# Patient Record
Sex: MALE | Race: WHITE | NOT HISPANIC OR LATINO | Employment: FULL TIME | URBAN - METROPOLITAN AREA
[De-identification: names, ages, dates, MRNs, and addresses within clinical notes are randomized per-mention and may not be internally consistent; named-entity substitution may affect disease eponyms.]

---

## 2017-01-19 ENCOUNTER — HOSPITAL ENCOUNTER (INPATIENT)
Facility: HOSPITAL | Age: 47
LOS: 4 days | Discharge: HOME | End: 2017-01-23
Attending: EMERGENCY MEDICINE | Admitting: SURGERY

## 2017-01-19 ENCOUNTER — APPOINTMENT (OUTPATIENT)
Dept: CT IMAGING | Facility: HOSPITAL | Age: 47
End: 2017-01-19

## 2017-01-19 DIAGNOSIS — Z93.2 ILEOSTOMY PRESENT (HCC): ICD-10-CM

## 2017-01-19 DIAGNOSIS — K56.609 SBO (SMALL BOWEL OBSTRUCTION) (HCC): Primary | ICD-10-CM

## 2017-01-19 LAB
ALBUMIN SERPL-MCNC: 4.3 G/DL (ref 3.5–5.2)
ALBUMIN/GLOB SERPL: 1.1 G/DL
ALP SERPL-CCNC: 114 U/L (ref 39–117)
ALT SERPL W P-5'-P-CCNC: 32 U/L (ref 1–41)
ANION GAP SERPL CALCULATED.3IONS-SCNC: 12.2 MMOL/L
AST SERPL-CCNC: 30 U/L (ref 1–40)
BASOPHILS # BLD AUTO: 0.03 10*3/MM3 (ref 0–0.2)
BASOPHILS NFR BLD AUTO: 0.2 % (ref 0–1.5)
BILIRUB SERPL-MCNC: 0.8 MG/DL (ref 0.1–1.2)
BUN BLD-MCNC: 10 MG/DL (ref 6–20)
BUN/CREAT SERPL: 10.9 (ref 7–25)
CALCIUM SPEC-SCNC: 9.9 MG/DL (ref 8.6–10.5)
CHLORIDE SERPL-SCNC: 99 MMOL/L (ref 98–107)
CO2 SERPL-SCNC: 28.8 MMOL/L (ref 22–29)
CREAT BLD-MCNC: 0.92 MG/DL (ref 0.76–1.27)
DEPRECATED RDW RBC AUTO: 49 FL (ref 37–54)
EOSINOPHIL # BLD AUTO: 0.04 10*3/MM3 (ref 0–0.7)
EOSINOPHIL NFR BLD AUTO: 0.3 % (ref 0.3–6.2)
ERYTHROCYTE [DISTWIDTH] IN BLOOD BY AUTOMATED COUNT: 13.2 % (ref 11.5–14.5)
GFR SERPL CREATININE-BSD FRML MDRD: 89 ML/MIN/1.73
GLOBULIN UR ELPH-MCNC: 3.8 GM/DL
GLUCOSE BLD-MCNC: 119 MG/DL (ref 65–99)
HCT VFR BLD AUTO: 49.5 % (ref 40.4–52.2)
HGB BLD-MCNC: 16.1 G/DL (ref 13.7–17.6)
IMM GRANULOCYTES # BLD: 0.03 10*3/MM3 (ref 0–0.03)
IMM GRANULOCYTES NFR BLD: 0.2 % (ref 0–0.5)
LYMPHOCYTES # BLD AUTO: 1.06 10*3/MM3 (ref 0.9–4.8)
LYMPHOCYTES NFR BLD AUTO: 7.1 % (ref 19.6–45.3)
MCH RBC QN AUTO: 32.7 PG (ref 27–32.7)
MCHC RBC AUTO-ENTMCNC: 32.5 G/DL (ref 32.6–36.4)
MCV RBC AUTO: 100.6 FL (ref 79.8–96.2)
MONOCYTES # BLD AUTO: 0.73 10*3/MM3 (ref 0.2–1.2)
MONOCYTES NFR BLD AUTO: 4.9 % (ref 5–12)
NEUTROPHILS # BLD AUTO: 13.03 10*3/MM3 (ref 1.9–8.1)
NEUTROPHILS NFR BLD AUTO: 87.3 % (ref 42.7–76)
PLATELET # BLD AUTO: 248 10*3/MM3 (ref 140–500)
PMV BLD AUTO: 10.4 FL (ref 6–12)
POTASSIUM BLD-SCNC: 5.4 MMOL/L (ref 3.5–5.2)
PROT SERPL-MCNC: 8.1 G/DL (ref 6–8.5)
RBC # BLD AUTO: 4.92 10*6/MM3 (ref 4.6–6)
SODIUM BLD-SCNC: 140 MMOL/L (ref 136–145)
WBC NRBC COR # BLD: 14.92 10*3/MM3 (ref 4.5–10.7)

## 2017-01-19 PROCEDURE — 25010000002 DIPHENHYDRAMINE PER 50 MG: Performed by: SURGERY

## 2017-01-19 PROCEDURE — 25010000002 ONDANSETRON PER 1 MG

## 2017-01-19 PROCEDURE — 85025 COMPLETE CBC W/AUTO DIFF WBC: CPT | Performed by: EMERGENCY MEDICINE

## 2017-01-19 PROCEDURE — 80053 COMPREHEN METABOLIC PANEL: CPT | Performed by: EMERGENCY MEDICINE

## 2017-01-19 PROCEDURE — 99284 EMERGENCY DEPT VISIT MOD MDM: CPT

## 2017-01-19 PROCEDURE — 0 IOPAMIDOL 61 % SOLUTION: Performed by: EMERGENCY MEDICINE

## 2017-01-19 PROCEDURE — 25010000002 ENOXAPARIN PER 10 MG: Performed by: SURGERY

## 2017-01-19 PROCEDURE — 74177 CT ABD & PELVIS W/CONTRAST: CPT

## 2017-01-19 PROCEDURE — 25010000002 MORPHINE PER 10 MG: Performed by: EMERGENCY MEDICINE

## 2017-01-19 PROCEDURE — 25010000002 MORPHINE PER 10 MG

## 2017-01-19 PROCEDURE — 25010000002 ONDANSETRON PER 1 MG: Performed by: PHYSICIAN ASSISTANT

## 2017-01-19 RX ORDER — LISINOPRIL AND HYDROCHLOROTHIAZIDE 20; 12.5 MG/1; MG/1
1 TABLET ORAL DAILY
Status: DISCONTINUED | OUTPATIENT
Start: 2017-01-20 | End: 2017-01-23 | Stop reason: HOSPADM

## 2017-01-19 RX ORDER — SERTRALINE HYDROCHLORIDE 100 MG/1
100 TABLET, FILM COATED ORAL DAILY
Status: DISCONTINUED | OUTPATIENT
Start: 2017-01-20 | End: 2017-01-23 | Stop reason: HOSPADM

## 2017-01-19 RX ORDER — ONDANSETRON 2 MG/ML
INJECTION INTRAMUSCULAR; INTRAVENOUS
Status: COMPLETED
Start: 2017-01-19 | End: 2017-01-19

## 2017-01-19 RX ORDER — ONDANSETRON 2 MG/ML
4 INJECTION INTRAMUSCULAR; INTRAVENOUS ONCE
Status: COMPLETED | OUTPATIENT
Start: 2017-01-19 | End: 2017-01-19

## 2017-01-19 RX ORDER — SODIUM CHLORIDE 0.9 % (FLUSH) 0.9 %
1-10 SYRINGE (ML) INJECTION AS NEEDED
Status: DISCONTINUED | OUTPATIENT
Start: 2017-01-19 | End: 2017-01-23 | Stop reason: HOSPADM

## 2017-01-19 RX ORDER — PROMETHAZINE HYDROCHLORIDE 12.5 MG/1
12.5 SUPPOSITORY RECTAL EVERY 6 HOURS PRN
Status: DISCONTINUED | OUTPATIENT
Start: 2017-01-19 | End: 2017-01-23 | Stop reason: HOSPADM

## 2017-01-19 RX ORDER — SODIUM CHLORIDE 0.9 % (FLUSH) 0.9 %
10 SYRINGE (ML) INJECTION AS NEEDED
Status: DISCONTINUED | OUTPATIENT
Start: 2017-01-19 | End: 2017-01-19

## 2017-01-19 RX ORDER — DEXTROSE AND SODIUM CHLORIDE 5; .45 G/100ML; G/100ML
100 INJECTION, SOLUTION INTRAVENOUS CONTINUOUS
Status: DISCONTINUED | OUTPATIENT
Start: 2017-01-19 | End: 2017-01-23 | Stop reason: HOSPADM

## 2017-01-19 RX ORDER — HYDROMORPHONE HYDROCHLORIDE 1 MG/ML
0.5 INJECTION, SOLUTION INTRAMUSCULAR; INTRAVENOUS; SUBCUTANEOUS
Status: DISCONTINUED | OUTPATIENT
Start: 2017-01-19 | End: 2017-01-23 | Stop reason: HOSPADM

## 2017-01-19 RX ORDER — DIPHENHYDRAMINE HYDROCHLORIDE 50 MG/ML
50 INJECTION INTRAMUSCULAR; INTRAVENOUS ONCE
Status: COMPLETED | OUTPATIENT
Start: 2017-01-19 | End: 2017-01-19

## 2017-01-19 RX ORDER — PROMETHAZINE HYDROCHLORIDE 25 MG/1
12.5 TABLET ORAL EVERY 6 HOURS PRN
Status: DISCONTINUED | OUTPATIENT
Start: 2017-01-19 | End: 2017-01-23 | Stop reason: HOSPADM

## 2017-01-19 RX ORDER — PROMETHAZINE HYDROCHLORIDE 25 MG/ML
12.5 INJECTION, SOLUTION INTRAMUSCULAR; INTRAVENOUS EVERY 6 HOURS PRN
Status: DISCONTINUED | OUTPATIENT
Start: 2017-01-19 | End: 2017-01-23 | Stop reason: HOSPADM

## 2017-01-19 RX ORDER — NALOXONE HCL 0.4 MG/ML
0.4 VIAL (ML) INJECTION
Status: DISCONTINUED | OUTPATIENT
Start: 2017-01-19 | End: 2017-01-23 | Stop reason: HOSPADM

## 2017-01-19 RX ADMIN — ONDANSETRON 4 MG: 2 INJECTION INTRAMUSCULAR; INTRAVENOUS at 18:17

## 2017-01-19 RX ADMIN — SODIUM CHLORIDE 1000 ML: 9 INJECTION, SOLUTION INTRAVENOUS at 14:53

## 2017-01-19 RX ADMIN — ENOXAPARIN SODIUM 40 MG: 40 INJECTION SUBCUTANEOUS at 23:02

## 2017-01-19 RX ADMIN — DEXTROSE AND SODIUM CHLORIDE 100 ML/HR: 5; 450 INJECTION, SOLUTION INTRAVENOUS at 23:02

## 2017-01-19 RX ADMIN — Medication 4 MG: at 18:18

## 2017-01-19 RX ADMIN — DIPHENHYDRAMINE HYDROCHLORIDE 50 MG: 50 INJECTION, SOLUTION INTRAMUSCULAR; INTRAVENOUS at 23:02

## 2017-01-19 RX ADMIN — MORPHINE SULFATE 4 MG: 4 INJECTION, SOLUTION INTRAMUSCULAR; INTRAVENOUS at 14:59

## 2017-01-19 RX ADMIN — IOPAMIDOL 85 ML: 612 INJECTION, SOLUTION INTRAVENOUS at 15:44

## 2017-01-19 RX ADMIN — ONDANSETRON 4 MG: 2 INJECTION INTRAMUSCULAR; INTRAVENOUS at 14:59

## 2017-01-19 RX ADMIN — MORPHINE SULFATE 4 MG: 4 INJECTION, SOLUTION INTRAMUSCULAR; INTRAVENOUS at 18:18

## 2017-01-19 NOTE — Clinical Note
Level of Care: Med/Surg [1]   Admitting Physician: MEET RUDOLPH [[id number]]   Attending Physician: MEET RUDOLPH [[id number]]   Patient Class: Inpatient [101]

## 2017-01-19 NOTE — ED PROVIDER NOTES
46 y.o. male presents c/o abd pain w/ decreased outpt from his ileostomy since 0630 this morning. Pt also reports diffuse abd pain & 2-3 episodes of vomiting. Pt has a hx of colon cancer & Dr. Crane performed protocolectomy in 07/2015.    On exam:  Pt is AOx3 & in NAD. Pt has dry mucous membranes, PERRL, his heart is RRR, lungs are clear to auscultation bilaterally, & has a normal neuro exam. Pt has decreased bowel sounds, upper abd & periosteal pain with non tender hernia, & no guarding or rebound.    Results/Plan:    1614 Reviewed CT abd/pel, which showed an SBO. Independently viewed by me. Interpreted by radiologist. I will call Dr. Crane (gen surgery) for admission.    I supervised care provided by the midlevel provider.  We have discussed this patient's history, physical exam, and treatment plan.  I have reviewed the note and personally saw and examined the patient and agree with the plan of care.    --  Documentation assistance provided by zeb Mccracken for .  Information recorded by the zeb was done at my direction and has been verified and validated by me.     Linda Mccracken  01/19/17 1536       Linda Mccracken  01/19/17 1624       Linda Mccracken  01/19/17 1712       Gurmeet Hernandez MD  01/19/17 [id number]

## 2017-01-19 NOTE — ED PROVIDER NOTES
EMERGENCY DEPARTMENT ENCOUNTER    CHIEF COMPLAINT  Chief Complaint: Abdominal Pain  History given by: Patient  History limited by:  Room Number: 11/11  PMD: Ace Herron MD  Oncologist: Dr. Shelby  Surgeon: Dr. Crane    HPI:  Pt is a 46 y.o. male who presents with abdominal pain that onset today at 06:30. 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 Pt states he is normally able to the pain the with cold beverages which did not improve the sxs at this time. The only change that he notes is eating tater tots yesterday.  He describes the pain as a generalized pressure/tightness. He states that he has had decreased output of his ileostomy since this AM an producing a small 1/2 dollar amount of stool every few hours. Pt denies any fever, but states that he has had nausea and vomiting x1        Duration: 8 hours  Timing: gradual  Location: generalized abd  Radiation:none  Quality: pressure/tightness  Intensity/Severity: moderate  Progression:uncahnged  Associated Symptoms: nausea, vomiting  Aggravating Factors: none  Alleviating Factors: none  Previous Episodes: sxs usually resolved without intervention  Treatment before arrival:drank cold liquid which did not improve sxs.     MEDICAL RECORD REVIEW      PAST MEDICAL HISTORY  Active Ambulatory Problems     Diagnosis Date Noted   • Malignant neoplasm of rectum 06/01/2016     Resolved Ambulatory Problems     Diagnosis Date Noted   • No Resolved Ambulatory Problems     Past Medical History   Diagnosis Date   • Adenomatous polyposis    • Anemia    • Colon carcinoma    • Depression    • Hypertension    • Insomnia    • Renal calculi    • RLS (restless legs syndrome)    • Testicular hypofunction        PAST SURGICAL HISTORY  Past Surgical History   Procedure Laterality Date   • Colonoscopy  06/16/1915     DR SHASTA HOWARD   • Endoscopy  06/23/2015     DR SHASTA HOWARD   • Abdominoperineal proctocolectomy  07/06/2015     DR MEET CRANE   •  Ileostomy  07/06/2015       FAMILY HISTORY  Family History   Problem Relation Age of Onset   • Colon cancer Father    • Aneurysm Maternal Grandfather    • Colon cancer Paternal Grandfather    • Hypertension Maternal Uncle    • Diabetes Maternal Uncle        SOCIAL HISTORY  Social History     Social History   • Marital status:      Spouse name: N/A   • Number of children: N/A   • Years of education: college     Occupational History   • Maintenance      Social History Main Topics   • Smoking status: Current Every Day Smoker   • Smokeless tobacco: Current User     Types: Chew      Comment: CHEWS TOBACCO   • Alcohol use 3.6 oz/week     6 Cans of beer per week      Comment: DAILY CONSUMPTION LEVEL   • Drug use: No   • Sexual activity: Defer     Other Topics Concern   • Not on file     Social History Narrative       ALLERGIES  Review of patient's allergies indicates no known allergies.    REVIEW OF SYSTEMS  Review of Systems   Constitutional: Negative for activity change, appetite change and fever.   HENT: Negative for congestion and sore throat.    Eyes: Negative.    Respiratory: Negative for cough and shortness of breath.    Cardiovascular: Negative for chest pain and leg swelling.   Gastrointestinal: Positive for abdominal pain, nausea and vomiting. Negative for diarrhea.   Endocrine: Negative.    Genitourinary: Negative for decreased urine volume and dysuria.   Musculoskeletal: Negative for neck pain.   Skin: Negative for rash and wound.   Allergic/Immunologic: Negative.    Neurological: Negative for weakness, numbness and headaches.   Hematological: Negative.    Psychiatric/Behavioral: Negative.    All other systems reviewed and are negative.      PHYSICAL EXAM  ED Triage Vitals   Temp Heart Rate Resp BP SpO2   01/19/17 1412 01/19/17 1412 01/19/17 1415 01/19/17 1415 01/19/17 1412   98.5 °F (36.9 °C) 84 16 134/90 95 %      Temp src Heart Rate Source Patient Position BP Location FiO2 (%)   01/19/17 1412 01/19/17  1412 01/19/17 1415 01/19/17 1415 --   Tympanic Monitor Sitting Right arm        Physical Exam   Constitutional: He is oriented to person, place, and time and well-developed, well-nourished, and in no distress.  Non-toxic appearance. He does not have a sickly appearance. No distress.   HENT:   Head: Normocephalic and atraumatic.   Mouth/Throat: Oropharynx is clear and moist.   Eyes: EOM are normal. Pupils are equal, round, and reactive to light.   Neck: Normal range of motion. Neck supple.   Cardiovascular: Normal rate, regular rhythm and normal heart sounds.    Pulmonary/Chest: Effort normal and breath sounds normal. No respiratory distress. He has no wheezes. He exhibits no tenderness.   Abdominal: Soft. He exhibits no distension. Bowel sounds are hypoactive. There is generalized tenderness. There is no rebound and no guarding.   Ileostomy in place with minimal amount of yellow stool in bag   Musculoskeletal: Normal range of motion. He exhibits no edema.   Lymphadenopathy:     He has no cervical adenopathy.   Neurological: He is alert and oriented to person, place, and time.   Skin: Skin is warm and dry. No rash noted. No pallor.   Psychiatric: Mood, memory, affect and judgment normal.   Nursing note and vitals reviewed.      LAB RESULTS  Recent Results (from the past 24 hour(s))   Comprehensive Metabolic Panel    Collection Time: 01/19/17  2:53 PM   Result Value Ref Range    Glucose 119 (H) 65 - 99 mg/dL    BUN 10 6 - 20 mg/dL    Creatinine 0.92 0.76 - 1.27 mg/dL    Sodium 140 136 - 145 mmol/L    Potassium 5.4 (H) 3.5 - 5.2 mmol/L    Chloride 99 98 - 107 mmol/L    CO2 28.8 22.0 - 29.0 mmol/L    Calcium 9.9 8.6 - 10.5 mg/dL    Total Protein 8.1 6.0 - 8.5 g/dL    Albumin 4.30 3.50 - 5.20 g/dL    ALT (SGPT) 32 1 - 41 U/L    AST (SGOT) 30 1 - 40 U/L    Alkaline Phosphatase 114 39 - 117 U/L    Total Bilirubin 0.8 0.1 - 1.2 mg/dL    eGFR Non African Amer 89 >60 mL/min/1.73    Globulin 3.8 gm/dL    A/G Ratio 1.1 g/dL     BUN/Creatinine Ratio 10.9 7.0 - 25.0    Anion Gap 12.2 mmol/L   CBC Auto Differential    Collection Time: 01/19/17  2:53 PM   Result Value Ref Range    WBC 14.92 (H) 4.50 - 10.70 10*3/mm3    RBC 4.92 4.60 - 6.00 10*6/mm3    Hemoglobin 16.1 13.7 - 17.6 g/dL    Hematocrit 49.5 40.4 - 52.2 %    .6 (H) 79.8 - 96.2 fL    MCH 32.7 27.0 - 32.7 pg    MCHC 32.5 (L) 32.6 - 36.4 g/dL    RDW 13.2 11.5 - 14.5 %    RDW-SD 49.0 37.0 - 54.0 fl    MPV 10.4 6.0 - 12.0 fL    Platelets 248 140 - 500 10*3/mm3    Neutrophil % 87.3 (H) 42.7 - 76.0 %    Lymphocyte % 7.1 (L) 19.6 - 45.3 %    Monocyte % 4.9 (L) 5.0 - 12.0 %    Eosinophil % 0.3 0.3 - 6.2 %    Basophil % 0.2 0.0 - 1.5 %    Immature Grans % 0.2 0.0 - 0.5 %    Neutrophils, Absolute 13.03 (H) 1.90 - 8.10 10*3/mm3    Lymphocytes, Absolute 1.06 0.90 - 4.80 10*3/mm3    Monocytes, Absolute 0.73 0.20 - 1.20 10*3/mm3    Eosinophils, Absolute 0.04 0.00 - 0.70 10*3/mm3    Basophils, Absolute 0.03 0.00 - 0.20 10*3/mm3    Immature Grans, Absolute 0.03 0.00 - 0.03 10*3/mm3       I ordered the above labs and reviewed the results    RADIOLOGY  CT Abdomen Pelvis With Contrast - SBO in lower abd. Hernia that is slightly larger than previous CT.        I ordered the above noted radiological studies and reviewed the images on the PACS system.  Spoke with Dr. Garcia regarding CT scan results    PROCEDURES      COURSE & MEDICAL DECISION MAKING  Pertinent Labs and Imaging studies that were ordered and reviewed are noted above.  Results were reviewed/discussed with the patient and they were also made aware of online assess.     PROGRESS AND CONSULTS    Progress Notes:    1442  Ordered CT abd/pel. Ordered blood work. Morphine and Zofran ordered for pain and nausea prn. He is currently refusing meds at this time.     1521   Reviewed pt's history and workup with Dr. Hernandez.  After a bedside evaluation; Dr Hernandez agrees with the plan of care    1620  Call placed to Dr. Crane     1623 Rechecked  "with pt. Informed pt of his imaging result showing SBO and consult call to surgery. Discussed with pt about his need for admission and possible surgery. Pt understands and agrees with plan. All concerns addressed. He has had analgesics and states that sxs have improved.       1711 Based on the patient's lab findings and presenting symptoms, the doctor and I feel it is appropriate to admit the patient for further management, evaluation, and treatment.  I have discussed this with the admitting team (Dr. Crane) .  I have also discussed this with the patient/family.  They are in agreement with admission. Will place NG tube prior to admission.        MEDICATIONS GIVEN IN ER  Medications   sodium chloride 0.9 % flush 10 mL (not administered)   sodium chloride 0.9 % bolus 1,000 mL (0 mL Intravenous Stopped 1/19/17 1616)   ondansetron (ZOFRAN) injection 4 mg (4 mg Intravenous Given 1/19/17 1459)   morphine injection 4 mg (4 mg Intravenous Given 1/19/17 1459)   iopamidol (ISOVUE-300) 61 % injection 100 mL (85 mL Intravenous Given 1/19/17 1544)       Visit Vitals   • /91   • Pulse 84   • Temp 98.5 °F (36.9 °C) (Tympanic)   • Resp 16   • Ht 68\" (172.7 cm)   • Wt 260 lb (118 kg)   • SpO2 99%   • BMI 39.53 kg/m2         DIAGNOSIS  Final diagnoses:   SBO (small bowel obstruction)   Ileostomy present     I personally scribed for Anisa Guerrero PA-C on 1/19/2017 at 5:11 PM.  Electronically signed by Delvis Encarnacion on 1/19/2017 at time 5:11 PM         Jacob Encarnacion  01/19/17 1529       Jacob Encarnacion  01/19/17 1630       Jacob Encarnacion  01/19/17 1714       Anisa Guerrero PA-C  01/19/17 1717    "

## 2017-01-19 NOTE — IP AVS SNAPSHOT
AFTER VISIT SUMMARY             Nikolai Shaw           About your hospitalization     You were admitted on:  January 19, 2017 You last received care in the:  [address]       Procedures & Surgeries         Medications    If you or your caregiver advised us that you are currently taking a medication and that medication is marked below as “Resume”, this simply indicates that we have reviewed those medications to make sure our new therapy recommendations do not interfere.  If you have concerns about medications other than those new ones which we are prescribing today, please consult the physician who prescribed them (or your primary physician).  Our review of your home medications is not meant to indicate that we are directing their use.             Your Medications      CONTINUE taking these medications     lisinopril-hydrochlorothiazide 20-12.5 MG per tablet   Take  by mouth daily.   Last time this was given:  1/23/2017 10:23 AM   Commonly known as:  PRINZIDE,ZESTORETIC           sertraline 100 MG tablet   Take  by mouth daily.   Last time this was given:  1/23/2017 10:23 AM   Commonly known as:  ZOLOFT                      Your Medications      Your Medication List           Morning Noon Evening Bedtime As Needed    lisinopril-hydrochlorothiazide 20-12.5 MG per tablet   Take  by mouth daily.   Commonly known as:  PRINZIDE,ZESTORETIC                                sertraline 100 MG tablet   Take  by mouth daily.   Commonly known as:  ZOLOFT                                         Instructions for After Discharge        Discharge References/Attachments     SMALL BOWEL OBSTRUCTION, EASY-TO-READ (ENGLISH)    HYDROCHLOROTHIAZIDE, HCTZ; LISINOPRIL TABLETS (ENGLISH)    SERTRALINE TABLETS (ENGLISH)       Follow-ups for After Discharge        Scheduled Appointments     Feb 03, 2017  9:00 AM EST   Lab with LAB CHAIR 3 [facility]    [address]  [address]  [facility] [address]   [phone]            Feb 03, 2017  9:40 AM EST   FOLLOW UP with Jacob Shelby MD   [facility] BLOOD DISORDERS AND CANCER ([facility])    [address]  [facility] [address]   [phone]              MyChart Signup     Our records indicate that your [employer] account has been deactivated. If you would like to reactivate your account, please email [email or web address] or call [phone] to talk to our [employer] staff.         Summary of Your Hospitalization        Reason for Hospitalization     Your primary diagnosis was:  Not on File    Your diagnoses also included:  Sbo (Small Bowel Obstruction)      Care Providers     Provider Service Role Specialty    Pal Crane MD -- Attending Provider General Surgery    Pal Crane MD -- Consulting Physician  General Surgery      Your Allergies  Date Reviewed: 1/20/2017    No active allergies      Patient Belongings Returned     Document Return of Belongings Flowsheet     Were the patient bedside belongings sent home?   --   Belongings Retrieved from Security & Sent Home   --    Belongings Sent to Safe   --   Medications Retrieved from Pharmacy & Sent Home   --              More Information      Small Bowel Obstruction  A small bowel obstruction means that something is blocking the small bowel. The small bowel is also called the small intestine. It is the long tube that connects the stomach to the colon. An obstruction will stop food and fluids from passing through the small bowel. Treatment depends on what is causing the problem and how bad the problem is.  HOME CARE  · Get a lot of rest.  · Follow your diet as told by your doctor. You may need to:    Only drink clear liquids until you start to get better.    Avoid solid foods as told by your doctor.  · Take over-the-counter and prescription medicines only as told by your doctor.  · Keep all  follow-up visits as told by your doctor. This is important.  GET HELP IF:  · You have a fever.  · You have chills.  GET HELP RIGHT AWAY IF:  · You have pain or cramps that get worse.  · You throw up (vomit) blood.  · You have a feeling of being sick to your stomach (nausea) that does not go away.  · You cannot stop throwing up.  · You cannot drink fluids.  · You feel confused.  · You feel dry or thirsty (dehydrated).  · Your belly gets more bloated.  · You feel weak or you pass out (faint).     This information is not intended to replace advice given to you by your health care provider. Make sure you discuss any questions you have with your health care provider.     Document Released: 01/25/2006 Document Revised: 09/07/2016 Document Reviewed: 02/11/2016  [employer] Interactive Patient Education ©2016 [employer]vier Inc.          Hydrochlorothiazide, HCTZ; Lisinopril tablets  What is this medicine?  HYDROCHLOROTHIAZIDE; LISINOPRIL (bryan droe klor oh THYE a zide; lyse IN oh pril) is a combination of a diuretic and an ACE inhibitor. It is used to treat high blood pressure.  This medicine may be used for other purposes; ask your health care provider or pharmacist if you have questions.  What should I tell my health care provider before I take this medicine?  They need to know if you have any of these conditions:  -bone marrow disease  -decreased urine  -heart or blood vessel disease  -if you are on a special diet like a low salt diet  -immune system problems, like lupus  -kidney disease  -liver disease  -previous swelling of the tongue, face, or lips with difficulty breathing, difficulty swallowing, hoarseness, or tightening of the throat  -recent heart attack or stroke  -an unusual or allergic reaction to lisinopril, hydrochlorothiazide, sulfa drugs, other medicines, insect venom, foods, dyes, or preservatives  -pregnant or trying to get pregnant  -breast-feeding  How should I use this medicine?  Take this medicine by mouth  with a glass of water. Follow the directions on the prescription label. You can take it with or without food. If it upsets your stomach, take it with food. Take your medicine at regular intervals. Do not take it more often than directed. Do not stop taking except on your doctor's advice.  Talk to your pediatrician regarding the use of this medicine in children. Special care may be needed.  Overdosage: If you think you have taken too much of this medicine contact a poison control center or emergency room at once.  NOTE: This medicine is only for you. Do not share this medicine with others.  What if I miss a dose?  If you miss a dose, take it as soon as you can. If it is almost time for your next dose, take only that dose. Do not take double or extra doses.  What may interact with this medicine?  -barbiturates like phenobarbital  -blood pressure medicines  -corticosteroids like prednisone  -diabetic medications  -diuretics, especially triamterene, spironolactone or amiloride  -lithium  -NSAIDs like ibuprofen  -potassium salts or potassium supplements  -prescription pain medicines  -skeletal muscle relaxants like tubocurarine  -some cholesterol lowering medications like cholestyramine or colestipol  This list may not describe all possible interactions. Give your health care provider a list of all the medicines, herbs, non-prescription drugs, or dietary supplements you use. Also tell them if you smoke, drink alcohol, or use illegal drugs. Some items may interact with your medicine.  What should I watch for while using this medicine?  Visit your doctor or health care professional for regular checks on your progress. Check your blood pressure as directed. Ask your doctor or health care professional what your blood pressure should be and when you should contact him or her. Call your doctor or health care professional if you notice an irregular or fast heart beat.  You must not get dehydrated. Ask your doctor or health care  professional how much fluid you need to drink a day. Check with him or her if you get an attack of severe diarrhea, nausea and vomiting, or if you sweat a lot. The loss of too much body fluid can make it dangerous for you to take this medicine.  Women should inform their doctor if they wish to become pregnant or think they might be pregnant. There is a potential for serious side effects to an unborn child. Talk to your health care professional or pharmacist for more information.  You may get drowsy or dizzy. Do not drive, use machinery, or do anything that needs mental alertness until you know how this drug affects you. Do not stand or sit up quickly, especially if you are an older patient. This reduces the risk of dizzy or fainting spells. Alcohol can make you more drowsy and dizzy. Avoid alcoholic drinks.  This medicine may affect your blood sugar level. If you have diabetes, check with your doctor or health care professional before changing the dose of your diabetic medicine.  Avoid salt substitutes unless you are told otherwise by your doctor or health care professional.  This medicine can make you more sensitive to the sun. Keep out of the sun. If you cannot avoid being in the sun, wear protective clothing and use sunscreen. Do not use sun lamps or tanning beds/booths.  Do not treat yourself for coughs, colds, or pain while you are taking this medicine without asking your doctor or health care professional for advice. Some ingredients may increase your blood pressure.  What side effects may I notice from receiving this medicine?  Side effects that you should report to your doctor or health care professional as soon as possible:  -changes in vision  -confusion, dizziness, light headedness or fainting spells  -decreased amount of urine passed  -difficulty breathing or swallowing, hoarseness, or tightening of the throat  -eye pain  -fast or irregular heart beat, palpitations, or chest pain  -muscle cramps  -nausea  and vomiting  -persistent dry cough  -redness, blistering, peeling or loosening of the skin, including inside the mouth  -stomach pain  -swelling of your face, lips, tongue, hands, or feet  -unusual rash, bleeding or bruising, or pinpoint red spots on the skin  -worsened gout pain  -yellowing of the eyes or skin  Side effects that usually do not require medical attention (report to your doctor or health care professional if they continue or are bothersome):  -change in sex drive or performance  -cough  -headache  This list may not describe all possible side effects. Call your doctor for medical advice about side effects. You may report side effects to FDA at 2-236-FDA-6402.  Where should I keep my medicine?  Keep out of the reach of children.  Store at room temperature between 20 and 25 degrees C (68 and 77 degrees F). Protect from moisture and excessive light. Keep container tightly closed. Throw away any unused medicine after the expiration date.  NOTE: This sheet is a summary. It may not cover all possible information. If you have questions about this medicine, talk to your doctor, pharmacist, or health care provider.     © 2016, Elsevier/Gold Standard. (2011-09-07 13:33:52)          Sertraline tablets  What is this medicine?  SERTRALINE (SER tra bailey) is used to treat depression. It may also be used to treat obsessive compulsive disorder, panic disorder, post-trauma stress, premenstrual dysphoric disorder (PMDD) or social anxiety.  This medicine may be used for other purposes; ask your health care provider or pharmacist if you have questions.  What should I tell my health care provider before I take this medicine?  They need to know if you have any of these conditions:  -bipolar disorder or a family history of bipolar disorder  -diabetes  -glaucoma  -heart disease  -high blood pressure  -history of irregular heartbeat  -history of low levels of calcium, magnesium, or potassium in the blood  -if you often drink  alcohol  -liver disease  -receiving electroconvulsive therapy  -seizures  -suicidal thoughts, plans, or attempt; a previous suicide attempt by you or a family member  -thyroid disease  -an unusual or allergic reaction to sertraline, other medicines, foods, dyes, or preservatives  -pregnant or trying to get pregnant  -breast-feeding  How should I use this medicine?  Take this medicine by mouth with a glass of water. Follow the directions on the prescription label. You can take it with or without food. Take your medicine at regular intervals. Do not take your medicine more often than directed. Do not stop taking this medicine suddenly except upon the advice of your doctor. Stopping this medicine too quickly may cause serious side effects or your condition may worsen.  A special MedGuide will be given to you by the pharmacist with each prescription and refill. Be sure to read this information carefully each time.  Talk to your pediatrician regarding the use of this medicine in children. While this drug may be prescribed for children as young as 7 years for selected conditions, precautions do apply.  Overdosage: If you think you have taken too much of this medicine contact a poison control center or emergency room at once.  NOTE: This medicine is only for you. Do not share this medicine with others.  What if I miss a dose?  If you miss a dose, take it as soon as you can. If it is almost time for your next dose, take only that dose. Do not take double or extra doses.  What may interact with this medicine?  Do not take this medicine with any of the following medications:  -certain medicines for fungal infections like fluconazole, itraconazole, ketoconazole, posaconazole, voriconazole  -cisapride  -disulfiram  -dofetilide  -linezolid  -MAOIs like Carbex, Eldepryl, Marplan, Nardil, and Parnate  -metronidazole  -methylene blue (injected into a vein)  -pimozide  -thioridazine  -ziprasidone  This medicine may also interact  with the following medications:  -alcohol  -aspirin and aspirin-like medicines  -certain medicines for depression, anxiety, or psychotic disturbances  -certain medicines for irregular heart beat like flecainide, propafenone  -certain medicines for migraine headaches like almotriptan, eletriptan, frovatriptan, naratriptan, rizatriptan, sumatriptan, zolmitriptan  -certain medicines for sleep  -certain medicines for seizures like carbamazepine, valproic acid, phenytoin  -certain medicines that treat or prevent blood clots like warfarin, enoxaparin, dalteparin  -cimetidine  -digoxin  -diuretics  -fentanyl  -furazolidone  -isoniazid  -lithium  -NSAIDs, medicines for pain and inflammation, like ibuprofen or naproxen  -other medicines that prolong the QT interval (cause an abnormal heart rhythm)  -procarbazine  -rasagiline  -supplements like Leatha's wort, kava kava, valerian  -tolbutamide  -tramadol  -tryptophan  This list may not describe all possible interactions. Give your health care provider a list of all the medicines, herbs, non-prescription drugs, or dietary supplements you use. Also tell them if you smoke, drink alcohol, or use illegal drugs. Some items may interact with your medicine.  What should I watch for while using this medicine?  Tell your doctor if your symptoms do not get better or if they get worse. Visit your doctor or health care professional for regular checks on your progress. Because it may take several weeks to see the full effects of this medicine, it is important to continue your treatment as prescribed by your doctor.  Patients and their families should watch out for new or worsening thoughts of suicide or depression. Also watch out for sudden changes in feelings such as feeling anxious, agitated, panicky, irritable, hostile, aggressive, impulsive, severely restless, overly excited and hyperactive, or not being able to sleep. If this happens, especially at the beginning of treatment or after  a change in dose, call your health care professional.  You may get drowsy or dizzy. Do not drive, use machinery, or do anything that needs mental alertness until you know how this medicine affects you. Do not stand or sit up quickly, especially if you are an older patient. This reduces the risk of dizzy or fainting spells. Alcohol may interfere with the effect of this medicine. Avoid alcoholic drinks.  Your mouth may get dry. Chewing sugarless gum or sucking hard candy, and drinking plenty of water may help. Contact your doctor if the problem does not go away or is severe.  What side effects may I notice from receiving this medicine?  Side effects that you should report to your doctor or health care professional as soon as possible:  -allergic reactions like skin rash, itching or hives, swelling of the face, lips, or tongue  -black or bloody stools, blood in the urine or vomit  -fast, irregular heartbeat  -feeling faint or lightheaded, falls  -hallucination, loss of contact with reality  -seizures  -suicidal thoughts or other mood changes  -unusual bleeding or bruising  -unusually weak or tired  -vomiting  Side effects that usually do not require medical attention (report to your doctor or health care professional if they continue or are bothersome):  -change in appetite  -change in sex drive or performance  -diarrhea  -increased sweating  -indigestion, nausea  -tremors  This list may not describe all possible side effects. Call your doctor for medical advice about side effects. You may report side effects to FDA at 6-048-FDA-9930.  Where should I keep my medicine?  Keep out of the reach of children.  Store at room temperature between 15 and 30 degrees C (59 and 86 degrees F). Throw away any unused medicine after the expiration date.  NOTE: This sheet is a summary. It may not cover all possible information. If you have questions about this medicine, talk to your doctor, pharmacist, or health care provider.     ©  2016, Elsevier/Gold Standard. (2014-07-15 12:57:35)            SYMPTOMS OF A STROKE    Call 911 or have someone take you to the Emergency Department if you have any of the following:    · Sudden numbness or weakness of your face, arm or leg especially on one side of the body  · Sudden confusion, diffiiculty speaking or trouble understanding   · Changes in your vision or loss of sight in one eye  · Sudden severe headache with no known cause  · sudden dizziness, trouble walking, loss of balance or coordination    It is important to seek emergency care right away if you have further stroke symptoms. If you get emergency help quickly, the powerful clot-dissolving medicines can reduce the disabilities caused by a stroke.     For more information:    American Stroke Association  7-708-7-STROKE  www.strokeassociation.org           IF YOU SMOKE OR USE TOBACCO PLEASE READ THE FOLLOWING:    Why is smoking bad for me?  Smoking increases the risk of heart disease, lung disease, vascular disease, stroke, and cancer.     If you smoke, STOP!    If you would like more information on quitting smoking, please visit the [employer] website: [email or web address]   This link will provide additional resources including the QUIT line and the Beat the Pack support groups.     For more information:    American Cancer Society  ([phone]    American Heart Association  1-950.887.5888               YOU ARE THE MOST IMPORTANT FACTOR IN YOUR RECOVERY.     Follow all instructions carefully.     I have reviewed my discharge instructions with my nurse, including the following information, if applicable:     Information about my illness and diagnosis   Follow up appointments (including lab draws)   Wound Care   Equipment Needs   Medications (new and continuing) along with side effects   Preventative information such as vaccines and smoking cessations   Diet   Pain   I know when to contact my  Doctor's office or seek emergency care      I want my nurse to describe the side effects of my medications: YES NO   If the answer is no, I understand the side effects of my medications: YES NO   My nurse described the side effects of my medications in a way that I could understand: YES NO   I have taken my personal belongings and my own medications with me at discharge: YES NO            I have received this information and my questions have been answered. I have discussed any concerns I see with this plan with the nurse or physician. I understand these instructions.    Signature of Patient or Responsible Person: _____________________________________    Date: _________________  Time: __________________    Signature of Healthcare Provider: _______________________________________  Date: _________________  Time: __________________

## 2017-01-19 NOTE — LETTER
January 23, 2017     Patient: Nikolai Shaw   YOB: 1970   Date of Visit: 1/19/2017       To Whom It May Concern:    It is my medical opinion that Nikolai Shaw be excused from work 1/19/17-1/25/17 due to being in the hospital..           Sincerely,        Dr. Royce LEVI    CC: No Recipients

## 2017-01-19 NOTE — ED NOTES
"Patient has had a very small amount of output into his illeostomy since 0630 today. He states he has had this happen before, but he would be able to drink coke gatorade and it would \"break free\" but it's not working today.      Tenisha Carmona RN  01/19/17 4715       Tenisha Carmona RN  01/19/17 0727    "

## 2017-01-20 ENCOUNTER — APPOINTMENT (OUTPATIENT)
Dept: GENERAL RADIOLOGY | Facility: HOSPITAL | Age: 47
End: 2017-01-20

## 2017-01-20 PROCEDURE — 25010000002 HYDROMORPHONE PER 4 MG: Performed by: SURGERY

## 2017-01-20 PROCEDURE — 25010000002 ENOXAPARIN PER 10 MG: Performed by: SURGERY

## 2017-01-20 PROCEDURE — 99221 1ST HOSP IP/OBS SF/LOW 40: CPT | Performed by: SURGERY

## 2017-01-20 PROCEDURE — 74020 HC XR ABDOMEN FLAT & UPRIGHT: CPT

## 2017-01-20 RX ADMIN — HYDROMORPHONE HYDROCHLORIDE 0.5 MG: 1 INJECTION, SOLUTION INTRAMUSCULAR; INTRAVENOUS; SUBCUTANEOUS at 01:07

## 2017-01-20 RX ADMIN — DEXTROSE AND SODIUM CHLORIDE 100 ML/HR: 5; 450 INJECTION, SOLUTION INTRAVENOUS at 21:42

## 2017-01-20 RX ADMIN — SERTRALINE 100 MG: 100 TABLET, FILM COATED ORAL at 07:59

## 2017-01-20 RX ADMIN — HYDROMORPHONE HYDROCHLORIDE 0.5 MG: 1 INJECTION, SOLUTION INTRAMUSCULAR; INTRAVENOUS; SUBCUTANEOUS at 21:41

## 2017-01-20 RX ADMIN — HYDROMORPHONE HYDROCHLORIDE 0.5 MG: 1 INJECTION, SOLUTION INTRAMUSCULAR; INTRAVENOUS; SUBCUTANEOUS at 07:59

## 2017-01-20 RX ADMIN — ENOXAPARIN SODIUM 40 MG: 40 INJECTION SUBCUTANEOUS at 21:40

## 2017-01-20 RX ADMIN — HYDROMORPHONE HYDROCHLORIDE 0.5 MG: 1 INJECTION, SOLUTION INTRAMUSCULAR; INTRAVENOUS; SUBCUTANEOUS at 11:32

## 2017-01-20 RX ADMIN — LISINOPRIL AND HYDROCHLOROTHIAZIDE 1 TABLET: 12.5; 2 TABLET ORAL at 07:59

## 2017-01-20 RX ADMIN — HYDROMORPHONE HYDROCHLORIDE 0.5 MG: 1 INJECTION, SOLUTION INTRAMUSCULAR; INTRAVENOUS; SUBCUTANEOUS at 04:29

## 2017-01-20 RX ADMIN — SERTRALINE 100 MG: 100 TABLET, FILM COATED ORAL at 08:00

## 2017-01-20 NOTE — PLAN OF CARE
Problem: Patient Care Overview (Adult)  Goal: Plan of Care Review  Outcome: Ongoing (interventions implemented as appropriate)    01/20/17 1813   Coping/Psychosocial Response Interventions   Plan Of Care Reviewed With patient;spouse   Patient Care Overview   Progress improving   Outcome Evaluation   Outcome Summary/Follow up Plan med x2 for pain w/ relief. n/g d/c'd. taking sips of water.         Problem: Bowel Obstruction (Adult)  Goal: Signs and Symptoms of Listed Potential Problems Will be Absent or Manageable (Bowel Obstruction)  Outcome: Ongoing (interventions implemented as appropriate)    01/20/17 1813   Bowel Obstruction   Problems Assessed (Bowel Obstruction) pain   Problems Present (Bowel Obstruction) pain         Problem: Fall Risk (Adult)  Goal: Absence of Falls  Outcome: Ongoing (interventions implemented as appropriate)    01/20/17 1813   Fall Risk (Adult)   Absence of Falls making progress toward outcome         Problem: Pain, Acute (Adult)  Goal: Acceptable Pain Control/Comfort Level  Outcome: Ongoing (interventions implemented as appropriate)    01/20/17 1813   Pain, Acute (Adult)   Acceptable Pain Control/Comfort Level making progress toward outcome

## 2017-01-20 NOTE — PLAN OF CARE
Problem: Patient Care Overview (Adult)  Goal: Plan of Care Review  Outcome: Ongoing (interventions implemented as appropriate)    01/20/17 0532   Coping/Psychosocial Response Interventions   Plan Of Care Reviewed With patient   Patient Care Overview   Progress no change   Outcome Evaluation   Outcome Summary/Follow up Plan Pt admitted from ER, oriented to Room/Unit. NG tube to cont. LWS, rust-colored clear drainge noted. Pt c/o pain abdomen meidcated with IV dilaudid x2, with positive effect noted. Pt ambulated around nurses station. Ileostomy drainge clear red colored liquid. No distress noted.       Goal: Adult Individualization and Mutuality  Outcome: Ongoing (interventions implemented as appropriate)  Goal: Discharge Needs Assessment  Outcome: Ongoing (interventions implemented as appropriate)    Problem: Bowel Obstruction (Adult)  Goal: Signs and Symptoms of Listed Potential Problems Will be Absent or Manageable (Bowel Obstruction)  Outcome: Ongoing (interventions implemented as appropriate)    Problem: Fall Risk (Adult)  Goal: Identify Related Risk Factors and Signs and Symptoms  Outcome: Outcome(s) achieved Date Met:  01/20/17  Goal: Absence of Falls  Outcome: Ongoing (interventions implemented as appropriate)    Problem: Pain, Acute (Adult)  Goal: Identify Related Risk Factors and Signs and Symptoms  Outcome: Outcome(s) achieved Date Met:  01/20/17  Goal: Acceptable Pain Control/Comfort Level  Outcome: Ongoing (interventions implemented as appropriate)

## 2017-01-20 NOTE — H&P
CC:  Crampy abdominal pain    HPI:  46-year-old gentleman developed moderately severe intermittent crampy abdominal pain and a generalized distribution starting slightly more than 24 hours ago associated with nausea and emesis.    PSH:    7/6/15 laparoscopic total proctocolectomy with ileostomy    PMH:    Hypertension  Familial adenomatous polyposis  History of rectal cancer    MEDICATIONS & ALLERGIES: reviewed and reconciled in EMR    FAMILY HISTORY:  Familial adenomatous polyposis    SOCIAL HISTORY:  Denies tobacco use, occasional alcohol    ROS:  No chest pain or shortness of air.  Negative for unexpected weight loss, appetite change or change in ostomy output prior to 24 hours ago.  Negative for fever or chills.  All other systems reviewed and negative other than presenting complaints.    PHYSICAL EXAM:  Constitutional: Well-developed well-nourished, no acute distress   Height 68 inches   Weight 260 pounds, BMI 39.5   Heart rate 75, /67, RR 18, T 99.6  Eyes: Conjunctiva normal, sclera nonicteric  ENMT: Hearing grossly normal, oral mucosa moist  Neck: Supple, no palpable mass, normal thyroid, trachea midline  Respiratory: Clear to auscultation, normal inspiratory effort  Cardiovascular: Regular rate, no murmur, no carotid bruit, no peripheral edema, no jugular venous distention  Gastrointestinal: Soft, nontender, no palpable mass, no hepatosplenomegaly, bowel sounds are present, parastomal hernia is present without tenderness  Lymphatics: Palpable adenopathy:  Cervical-no, inguinal-no  Skin:  Warm, dry  Musculoskeletal: Symmetric strength, normal gait  Psychiatric: Alert and oriented ×3, normal affect     RADIOLOGY:    CT abdomen pelvis 1/19/17 Bahai: Small bowel obstruction, on my review of the images it does appear that the obstruction is in the pelvis, there is a parastomal hernia which does not appear to be the source of obstruction    LABS:  (1/19/17)  CMP: Glucose 119, potassium 5.4, otherwise  normal  CBC: WBC 14.9, hemoglobin 16.1, platelets 248    CLINICAL SUMMARY (A/P):    46-year-old gentleman status post laparoscopic total proctocolectomy with end ileostomy in July 2015 for familial adenomatous polyposis and rectal cancer presents now with signs and symptoms of small bowel obstruction with confirmatory CT scan.  Plan is nasogastric tube decompression, IV fluids and gut rest with follow-up x-rays and further recommendations pending clinical course.    Pal Crane M.D.

## 2017-01-20 NOTE — PROGRESS NOTES
Discharge Planning Assessment  [facility]     Patient Name: Nikolai Shaw  MRN: [id number]  Today's Date: 1/20/2017    Admit Date: 1/19/2017          Discharge Needs Assessment       01/20/17 1053    Living Environment    Lives With spouse    Living Arrangements house    Provides Primary Care For no one    Quality Of Family Relationships supportive    Able to Return to Prior Living Arrangements yes    Discharge Needs Assessment    Concerns To Be Addressed no discharge needs identified    Readmission Within The Last 30 Days no previous admission in last 30 days    Equipment Currently Used at Home none    Transportation Available car;family or friend will provide            Discharge Plan       01/20/17 1054    Case Management/Social Work Plan    Plan home with wife    Patient/Family In Agreement With Plan yes    Additional Comments Face sheet verified.  Patient is independent with adl's prior to admission.  Drives.  Denies use of any assistive devices.  Meds through [facility] pharmacy- denies problems with meds.  Patient does plan to return back home at discharge with assist of wife as needed, and currently denies any discharge planning needs at this time.  Holly PEREZ,CCP        Discharge Placement     No information found                Demographic Summary       01/20/17 1044    Referral Information    Admission Type inpatient    Arrived From home or self-care    Reason For Consult discharge planning    Contact Information    Permission Granted to Share Information With family/designee    Comments Wife Hansa Whitman [phone]    Primary Care Physician Information    Name Dr. Ace Herron            Functional Status       01/20/17 1052    Functional Status Current    Ambulation 0-->independent    Transferring 0-->independent    Toileting 0-->independent    Bathing 0-->independent    Dressing 0-->independent    Eating 0-->independent    Communication 0-->understands/communicates without difficulty    Change in  Functional Status Since Onset of Current Illness/Injury no    Functional Status Prior    Ambulation 0-->independent    Transferring 0-->independent    Toileting 0-->independent    Bathing 0-->independent    Dressing 0-->independent    Eating 0-->independent    Communication 0-->understands/communicates without difficulty    Swallowing 0-->swallows foods/liquids without difficulty    IADL    Medications independent    Meal Preparation independent    Housekeeping independent    Laundry independent    Shopping independent    Oral Care independent    Activity Tolerance    Current Activity Limitations none    Cognitive/Perceptual/Developmental    Current Mental Status/Cognitive Functioning no deficits noted                Sarah Wadsworth, RN

## 2017-01-21 PROCEDURE — 25010000002 PROMETHAZINE PER 50 MG: Performed by: SURGERY

## 2017-01-21 PROCEDURE — 25010000002 HYDROMORPHONE PER 4 MG: Performed by: SURGERY

## 2017-01-21 PROCEDURE — 25010000002 ENOXAPARIN PER 10 MG: Performed by: SURGERY

## 2017-01-21 PROCEDURE — 99232 SBSQ HOSP IP/OBS MODERATE 35: CPT | Performed by: SURGERY

## 2017-01-21 RX ADMIN — SERTRALINE 100 MG: 100 TABLET, FILM COATED ORAL at 09:24

## 2017-01-21 RX ADMIN — HYDROMORPHONE HYDROCHLORIDE 0.5 MG: 1 INJECTION, SOLUTION INTRAMUSCULAR; INTRAVENOUS; SUBCUTANEOUS at 13:56

## 2017-01-21 RX ADMIN — HYDROMORPHONE HYDROCHLORIDE 0.5 MG: 1 INJECTION, SOLUTION INTRAMUSCULAR; INTRAVENOUS; SUBCUTANEOUS at 10:33

## 2017-01-21 RX ADMIN — HYDROMORPHONE HYDROCHLORIDE 0.5 MG: 1 INJECTION, SOLUTION INTRAMUSCULAR; INTRAVENOUS; SUBCUTANEOUS at 20:10

## 2017-01-21 RX ADMIN — DEXTROSE AND SODIUM CHLORIDE 100 ML/HR: 5; 450 INJECTION, SOLUTION INTRAVENOUS at 20:11

## 2017-01-21 RX ADMIN — PROMETHAZINE HYDROCHLORIDE 12.5 MG: 25 INJECTION INTRAMUSCULAR; INTRAVENOUS at 17:09

## 2017-01-21 RX ADMIN — LISINOPRIL AND HYDROCHLOROTHIAZIDE 1 TABLET: 12.5; 2 TABLET ORAL at 09:24

## 2017-01-21 RX ADMIN — HYDROMORPHONE HYDROCHLORIDE 0.5 MG: 1 INJECTION, SOLUTION INTRAMUSCULAR; INTRAVENOUS; SUBCUTANEOUS at 17:04

## 2017-01-21 RX ADMIN — HYDROMORPHONE HYDROCHLORIDE 0.5 MG: 1 INJECTION, SOLUTION INTRAMUSCULAR; INTRAVENOUS; SUBCUTANEOUS at 01:14

## 2017-01-21 RX ADMIN — ENOXAPARIN SODIUM 40 MG: 40 INJECTION SUBCUTANEOUS at 20:11

## 2017-01-21 RX ADMIN — PROMETHAZINE HYDROCHLORIDE 12.5 MG: 25 INJECTION INTRAMUSCULAR; INTRAVENOUS at 22:38

## 2017-01-21 NOTE — PLAN OF CARE
Problem: Patient Care Overview (Adult)  Goal: Plan of Care Review  Outcome: Ongoing (interventions implemented as appropriate)    01/21/17 6644   Coping/Psychosocial Response Interventions   Plan Of Care Reviewed With patient   Patient Care Overview   Progress no change   Outcome Evaluation   Outcome Summary/Follow up Plan patient having some liquid /stool out of ileostomy, still ahving pain issues, iv pain medications given, patient alert and independent, follows commands, will coontinue to monitor

## 2017-01-21 NOTE — PROGRESS NOTES
"Chief Complaint   Patient presents with   • Abdominal Pain     Pt reports that he has not had output from his illeostomy since 0630 this morning.        S: Patient had crampy abdominal pain overnight. Better this morning. Minimal gas and stool at the ostomy bag.     Diet:   Diet Order   Procedures   • Diet Clear Liquid       I/O last 3 completed shifts:  In: 770 [I.V.:770]  Out: 1225 [Urine:850; Other:250; Stool:125]       O:  Visit Vitals   • /78 (BP Location: Left arm, Patient Position: Lying)   • Pulse 67   • Temp 98.4 °F (36.9 °C) (Oral)   • Resp 18   • Ht 68\" (172.7 cm)   • Wt 260 lb (118 kg)   • SpO2 95%   • BMI 39.53 kg/m2     Body mass index is 39.53 kg/(m^2).  GENERAL:alert, well appearing, and in no distress and oriented to person, place, and time  HEENT: normochephalic, atraumatic, moist mucus membranes, clear sclera    CHEST: clear to auscultation, no wheezes, rales or rhonchi, symmetric air entry  CARDIAC: regular rate and rhythm    ABDOMEN: soft, nondistended, no masses or organomegaly  tenderness noted over LLQ and left paraumbilical area. No rebound or guarding. Ostomy congested but viable, prolapsed, some gas and liquid stool.   EXTREMITIES: no cyanosis, clubbing or edema    SKIN: Warm and moist, no rashes       A/P: 46 y.o. male with partial bowel obstruction s/p total colectomy with end ileostomy. His pain is improved and has been tolerating small amounts of clears. Small amount of gas and liquid stool.     - I will advance diet at this time. Diet: CLD  - AXR on am  - Labs on am  - Encourage ambulation  - SCDs  - DVT prophylaxis      Dre Rodriguez MD  General, Minimally Invasive and Endoscopic Surgery  [facility]    [address], [address]  [place], [place], [address]  P: [phone]  F: [phone]   "

## 2017-01-21 NOTE — PLAN OF CARE
Problem: Patient Care Overview (Adult)  Goal: Plan of Care Review  Outcome: Ongoing (interventions implemented as appropriate)    01/21/17 4793   Coping/Psychosocial Response Interventions   Plan Of Care Reviewed With patient;spouse   Patient Care Overview   Progress improving   Outcome Evaluation   Outcome Summary/Follow up Plan pt c/o pain, stool liquid yellow from ileostomy, care done, enc pt to ambulate, x1 nausea meds given       Goal: Adult Individualization and Mutuality  Outcome: Ongoing (interventions implemented as appropriate)  Goal: Discharge Needs Assessment  Outcome: Ongoing (interventions implemented as appropriate)    Problem: Bowel Obstruction (Adult)  Goal: Signs and Symptoms of Listed Potential Problems Will be Absent or Manageable (Bowel Obstruction)  Outcome: Ongoing (interventions implemented as appropriate)    Problem: Fall Risk (Adult)  Goal: Absence of Falls  Outcome: Ongoing (interventions implemented as appropriate)    Problem: Pain, Acute (Adult)  Goal: Acceptable Pain Control/Comfort Level  Outcome: Ongoing (interventions implemented as appropriate)    Problem: Ileostomy (Adult)  Goal: Signs and Symptoms of Listed Potential Problems Will be Absent or Manageable (Ileostomy)  Outcome: Ongoing (interventions implemented as appropriate)

## 2017-01-22 ENCOUNTER — APPOINTMENT (OUTPATIENT)
Dept: GENERAL RADIOLOGY | Facility: HOSPITAL | Age: 47
End: 2017-01-22

## 2017-01-22 LAB
ANION GAP SERPL CALCULATED.3IONS-SCNC: 11.2 MMOL/L
BASOPHILS # BLD AUTO: 0.02 10*3/MM3 (ref 0–0.2)
BASOPHILS NFR BLD AUTO: 0.2 % (ref 0–1.5)
BUN BLD-MCNC: 9 MG/DL (ref 6–20)
BUN/CREAT SERPL: 11 (ref 7–25)
CALCIUM SPEC-SCNC: 9.2 MG/DL (ref 8.6–10.5)
CHLORIDE SERPL-SCNC: 91 MMOL/L (ref 98–107)
CO2 SERPL-SCNC: 34.8 MMOL/L (ref 22–29)
CREAT BLD-MCNC: 0.82 MG/DL (ref 0.76–1.27)
DEPRECATED RDW RBC AUTO: 47.7 FL (ref 37–54)
EOSINOPHIL # BLD AUTO: 0.13 10*3/MM3 (ref 0–0.7)
EOSINOPHIL NFR BLD AUTO: 1.5 % (ref 0.3–6.2)
ERYTHROCYTE [DISTWIDTH] IN BLOOD BY AUTOMATED COUNT: 12.9 % (ref 11.5–14.5)
GFR SERPL CREATININE-BSD FRML MDRD: 101 ML/MIN/1.73
GLUCOSE BLD-MCNC: 100 MG/DL (ref 65–99)
HCT VFR BLD AUTO: 44.6 % (ref 40.4–52.2)
HGB BLD-MCNC: 14.1 G/DL (ref 13.7–17.6)
IMM GRANULOCYTES # BLD: 0.02 10*3/MM3 (ref 0–0.03)
IMM GRANULOCYTES NFR BLD: 0.2 % (ref 0–0.5)
LYMPHOCYTES # BLD AUTO: 1.21 10*3/MM3 (ref 0.9–4.8)
LYMPHOCYTES NFR BLD AUTO: 14.3 % (ref 19.6–45.3)
MAGNESIUM SERPL-MCNC: 2.4 MG/DL (ref 1.6–2.6)
MCH RBC QN AUTO: 32 PG (ref 27–32.7)
MCHC RBC AUTO-ENTMCNC: 31.6 G/DL (ref 32.6–36.4)
MCV RBC AUTO: 101.4 FL (ref 79.8–96.2)
MONOCYTES # BLD AUTO: 1.21 10*3/MM3 (ref 0.2–1.2)
MONOCYTES NFR BLD AUTO: 14.3 % (ref 5–12)
NEUTROPHILS # BLD AUTO: 5.88 10*3/MM3 (ref 1.9–8.1)
NEUTROPHILS NFR BLD AUTO: 69.5 % (ref 42.7–76)
PHOSPHATE SERPL-MCNC: 3.9 MG/DL (ref 2.5–4.5)
PLATELET # BLD AUTO: 243 10*3/MM3 (ref 140–500)
PMV BLD AUTO: 10.4 FL (ref 6–12)
POTASSIUM BLD-SCNC: 3.9 MMOL/L (ref 3.5–5.2)
RBC # BLD AUTO: 4.4 10*6/MM3 (ref 4.6–6)
SODIUM BLD-SCNC: 137 MMOL/L (ref 136–145)
WBC NRBC COR # BLD: 8.47 10*3/MM3 (ref 4.5–10.7)

## 2017-01-22 PROCEDURE — 80048 BASIC METABOLIC PNL TOTAL CA: CPT | Performed by: SURGERY

## 2017-01-22 PROCEDURE — 99232 SBSQ HOSP IP/OBS MODERATE 35: CPT | Performed by: SURGERY

## 2017-01-22 PROCEDURE — 83735 ASSAY OF MAGNESIUM: CPT | Performed by: SURGERY

## 2017-01-22 PROCEDURE — 25010000002 PROMETHAZINE PER 50 MG: Performed by: SURGERY

## 2017-01-22 PROCEDURE — 85025 COMPLETE CBC W/AUTO DIFF WBC: CPT | Performed by: SURGERY

## 2017-01-22 PROCEDURE — 84100 ASSAY OF PHOSPHORUS: CPT | Performed by: SURGERY

## 2017-01-22 PROCEDURE — 25010000002 HYDROMORPHONE PER 4 MG: Performed by: SURGERY

## 2017-01-22 PROCEDURE — 25010000002 ENOXAPARIN PER 10 MG: Performed by: SURGERY

## 2017-01-22 PROCEDURE — 74020 HC XR ABDOMEN FLAT & UPRIGHT: CPT

## 2017-01-22 RX ADMIN — SERTRALINE 100 MG: 100 TABLET, FILM COATED ORAL at 09:01

## 2017-01-22 RX ADMIN — PROMETHAZINE HYDROCHLORIDE 12.5 MG: 25 INJECTION INTRAMUSCULAR; INTRAVENOUS at 21:24

## 2017-01-22 RX ADMIN — HYDROMORPHONE HYDROCHLORIDE 0.5 MG: 1 INJECTION, SOLUTION INTRAMUSCULAR; INTRAVENOUS; SUBCUTANEOUS at 15:22

## 2017-01-22 RX ADMIN — HYDROMORPHONE HYDROCHLORIDE 0.5 MG: 1 INJECTION, SOLUTION INTRAMUSCULAR; INTRAVENOUS; SUBCUTANEOUS at 02:36

## 2017-01-22 RX ADMIN — PROMETHAZINE HYDROCHLORIDE 12.5 MG: 25 INJECTION INTRAMUSCULAR; INTRAVENOUS at 09:01

## 2017-01-22 RX ADMIN — LISINOPRIL AND HYDROCHLOROTHIAZIDE 1 TABLET: 12.5; 2 TABLET ORAL at 09:01

## 2017-01-22 RX ADMIN — HYDROMORPHONE HYDROCHLORIDE 0.5 MG: 1 INJECTION, SOLUTION INTRAMUSCULAR; INTRAVENOUS; SUBCUTANEOUS at 11:34

## 2017-01-22 RX ADMIN — ENOXAPARIN SODIUM 40 MG: 40 INJECTION SUBCUTANEOUS at 22:42

## 2017-01-22 RX ADMIN — HYDROMORPHONE HYDROCHLORIDE 0.5 MG: 1 INJECTION, SOLUTION INTRAMUSCULAR; INTRAVENOUS; SUBCUTANEOUS at 09:01

## 2017-01-22 RX ADMIN — DEXTROSE AND SODIUM CHLORIDE 100 ML/HR: 5; 450 INJECTION, SOLUTION INTRAVENOUS at 21:27

## 2017-01-22 RX ADMIN — HYDROMORPHONE HYDROCHLORIDE 0.5 MG: 1 INJECTION, SOLUTION INTRAMUSCULAR; INTRAVENOUS; SUBCUTANEOUS at 21:24

## 2017-01-22 RX ADMIN — PROMETHAZINE HYDROCHLORIDE 12.5 MG: 25 INJECTION INTRAMUSCULAR; INTRAVENOUS at 15:22

## 2017-01-22 RX ADMIN — HYDROMORPHONE HYDROCHLORIDE 0.5 MG: 1 INJECTION, SOLUTION INTRAMUSCULAR; INTRAVENOUS; SUBCUTANEOUS at 05:30

## 2017-01-22 NOTE — PROGRESS NOTES
"GENERAL SURGERY  Nikolai Shaw  1970    CC: abdominal pain    HPI: Started having more ostomy output this am. Crampy RUQ abdominal pain with CLD. Ambulating. Some nausea after taking pain meds.    Temp:  [98.7 °F (37.1 °C)-99 °F (37.2 °C)] 99 °F (37.2 °C)  Heart Rate:  [76-85] 85  Resp:  [16-18] 16  BP: (129-140)/(78-88) 137/78  Intake & Output (last day)       01/21 0701 - 01/22 0700 01/22 0701 - 01/23 0700    Urine (mL/kg/hr) 300 (0.1)     Stool      Total Output 300      Net -300                Physical Exam   Constitutional: He is oriented to person, place, and time.   Cardiovascular: Normal rate.    Pulmonary/Chest: Effort normal and breath sounds normal.   Abdominal: Soft. He exhibits no distension. There is tenderness (mid abdomen bilaterally, mild). There is no rebound and no guarding.   Ostomy with yellow output, small amount   Neurological: He is alert and oriented to person, place, and time.   Skin: Skin is warm and dry.       Pertinent labs/imaging:    Results from last 7 days  Lab Units 01/22/17  0735 01/19/17  1453   WBC 10*3/mm3 8.47 14.92*   HEMOGLOBIN g/dL 14.1 16.1   HEMATOCRIT % 44.6 49.5   PLATELETS 10*3/mm3 243 248       Results from last 7 days  Lab Units 01/19/17  1453   SODIUM mmol/L 140   POTASSIUM mmol/L 5.4*   CHLORIDE mmol/L 99   TOTAL CO2 mmol/L 28.8   BUN mg/dL 10   CREATININE mg/dL 0.92   CALCIUM mg/dL 9.9   BILIRUBIN mg/dL 0.8   ALK PHOS U/L 114   ALT (SGPT) U/L 32   AST (SGOT) U/L 30   GLUCOSE mg/dL 119*     A/P: 46 y.o. male with partial bowel obstruction s/p total colectomy with end ileostomy. His pain is improved \"somewhat\" . His ostomy started working this am.     - XR ordered  - Encourage ambulation  - SCDs  - Advance to FLD  - DVT prophylaxis  - BMP in AM  - Continue IVFs    YOUNG Linares     This patient was evaluated by me, recommendations made, documentation reviewed, edited, and revised by me.    S/p total colectomy and end ileostomy now with partial sbo, started " having output from ostomy this am and tolerating clears without nausea. XR showed still features of bowel obstruction but this was done prior to patient having more gas and liquid at ostomy.     - Advance to FLD.   - Cont IVF  - DVT prophylaxis    Dre Rodriguez MD  General, Minimally Invasive and Endoscopic Surgery  [facility]    [address], [address]  [place], [place], [address]  P: [phone]  F: [phone]

## 2017-01-22 NOTE — PLAN OF CARE
Problem: Patient Care Overview (Adult)  Goal: Plan of Care Review  Outcome: Ongoing (interventions implemented as appropriate)    01/22/17 0531   Coping/Psychosocial Response Interventions   Plan Of Care Reviewed With patient   Patient Care Overview   Progress no change   Outcome Evaluation   Outcome Summary/Follow up Plan patient had intermittent nauseand vomiting, emesis greenish brown, prn medications given, patient alert inependent follows commands, patient ambulated in the hallways 300 feet, no distress noted will continue to monitor

## 2017-01-23 ENCOUNTER — APPOINTMENT (OUTPATIENT)
Dept: GENERAL RADIOLOGY | Facility: HOSPITAL | Age: 47
End: 2017-01-23
Attending: SURGERY

## 2017-01-23 VITALS
RESPIRATION RATE: 16 BRPM | DIASTOLIC BLOOD PRESSURE: 74 MMHG | HEIGHT: 68 IN | TEMPERATURE: 98.7 F | BODY MASS INDEX: 39.4 KG/M2 | SYSTOLIC BLOOD PRESSURE: 114 MMHG | HEART RATE: 63 BPM | OXYGEN SATURATION: 94 % | WEIGHT: 260 LBS

## 2017-01-23 LAB
ANION GAP SERPL CALCULATED.3IONS-SCNC: 12.4 MMOL/L
BUN BLD-MCNC: 13 MG/DL (ref 6–20)
BUN/CREAT SERPL: 14.6 (ref 7–25)
CALCIUM SPEC-SCNC: 9.8 MG/DL (ref 8.6–10.5)
CHLORIDE SERPL-SCNC: 88 MMOL/L (ref 98–107)
CO2 SERPL-SCNC: 34.6 MMOL/L (ref 22–29)
CREAT BLD-MCNC: 0.89 MG/DL (ref 0.76–1.27)
GFR SERPL CREATININE-BSD FRML MDRD: 92 ML/MIN/1.73
GLUCOSE BLD-MCNC: 107 MG/DL (ref 65–99)
POTASSIUM BLD-SCNC: 3.8 MMOL/L (ref 3.5–5.2)
SODIUM BLD-SCNC: 135 MMOL/L (ref 136–145)

## 2017-01-23 PROCEDURE — 99238 HOSP IP/OBS DSCHRG MGMT 30/<: CPT | Performed by: SURGERY

## 2017-01-23 PROCEDURE — 74020 HC XR ABDOMEN FLAT & UPRIGHT: CPT

## 2017-01-23 PROCEDURE — 25010000002 HYDROMORPHONE PER 4 MG: Performed by: SURGERY

## 2017-01-23 PROCEDURE — 80048 BASIC METABOLIC PNL TOTAL CA: CPT | Performed by: NURSE PRACTITIONER

## 2017-01-23 RX ADMIN — SERTRALINE 100 MG: 100 TABLET, FILM COATED ORAL at 10:23

## 2017-01-23 RX ADMIN — HYDROMORPHONE HYDROCHLORIDE 0.5 MG: 1 INJECTION, SOLUTION INTRAMUSCULAR; INTRAVENOUS; SUBCUTANEOUS at 01:29

## 2017-01-23 RX ADMIN — LISINOPRIL AND HYDROCHLOROTHIAZIDE 1 TABLET: 12.5; 2 TABLET ORAL at 10:23

## 2017-01-23 NOTE — PLAN OF CARE
Problem: Patient Care Overview (Adult)  Goal: Plan of Care Review  Outcome: Ongoing (interventions implemented as appropriate)    01/22/17 2032   Coping/Psychosocial Response Interventions   Plan Of Care Reviewed With patient   Patient Care Overview   Progress improving   Outcome Evaluation   Outcome Summary/Follow up Plan diet advanced to full liq, admin pain meds, no c/o nausea, ambulating halls will cont to monitor       Goal: Adult Individualization and Mutuality  Outcome: Ongoing (interventions implemented as appropriate)  Goal: Discharge Needs Assessment  Outcome: Ongoing (interventions implemented as appropriate)    Problem: Bowel Obstruction (Adult)  Goal: Signs and Symptoms of Listed Potential Problems Will be Absent or Manageable (Bowel Obstruction)  Outcome: Ongoing (interventions implemented as appropriate)    Problem: Fall Risk (Adult)  Goal: Absence of Falls  Outcome: Ongoing (interventions implemented as appropriate)    Problem: Pain, Acute (Adult)  Goal: Acceptable Pain Control/Comfort Level  Outcome: Ongoing (interventions implemented as appropriate)    Problem: Ileostomy (Adult)  Goal: Signs and Symptoms of Listed Potential Problems Will be Absent or Manageable (Ileostomy)  Outcome: Ongoing (interventions implemented as appropriate)

## 2017-01-23 NOTE — PROGRESS NOTES
Continued Stay Note  [facility]     Patient Name: Nikolai Shaw  MRN: [id number]  Today's Date: 1/23/2017    Admit Date: 1/19/2017          Discharge Plan       01/23/17 1705    Case Management/Social Work Plan    Plan HOme with wife, no needs    Final Note    Final Note home with wife              Discharge Codes       01/23/17 1705    Discharge Codes    Discharge Codes 01  Discharge to home        Expected Discharge Date and Time     Expected Discharge Date Expected Discharge Time    Jan 23, 2017             Farrah Post RN

## 2017-01-23 NOTE — PLAN OF CARE
Problem: Patient Care Overview (Adult)  Goal: Plan of Care Review  Outcome: Ongoing (interventions implemented as appropriate)    01/23/17 0426   Coping/Psychosocial Response Interventions   Plan Of Care Reviewed With patient   Patient Care Overview   Progress improving   Outcome Evaluation   Outcome Summary/Follow up Plan pt resting in bed. medicated prn for pain. pt ambulating in halls. tolerating full liquid diet. emptying and caring for ostomy himself. Will continue to monitor.,        Goal: Adult Individualization and Mutuality  Outcome: Ongoing (interventions implemented as appropriate)  Goal: Discharge Needs Assessment  Outcome: Ongoing (interventions implemented as appropriate)    Problem: Bowel Obstruction (Adult)  Goal: Signs and Symptoms of Listed Potential Problems Will be Absent or Manageable (Bowel Obstruction)  Outcome: Ongoing (interventions implemented as appropriate)    Problem: Fall Risk (Adult)  Goal: Absence of Falls  Outcome: Ongoing (interventions implemented as appropriate)    Problem: Pain, Acute (Adult)  Goal: Acceptable Pain Control/Comfort Level  Outcome: Ongoing (interventions implemented as appropriate)    Problem: Ileostomy (Adult)  Goal: Signs and Symptoms of Listed Potential Problems Will be Absent or Manageable (Ileostomy)  Outcome: Ongoing (interventions implemented as appropriate)

## 2017-01-23 NOTE — DISCHARGE SUMMARY
DATE OF ADMIT:  1/19/17    DATE OF DISCHARGE:  1/23/17    DIAGNOSIS:  Small bowel obstruction    SUMMARY OF HOSPITAL COURSE:   Admitted through emergency room on 1/19/17 with signs and symptoms consistent with small bowel obstruction and confirmatory CT scan.  This resolved with conservative measures including gut rest and nasogastric decompression.  By the date of discharge his abdomen was soft, nontender with active normal bowel sounds.  He was tolerating a full liquid diet and instructed to advance further as tolerated.  He is to follow-up as needed.    Pal Crane M.D.

## 2017-01-23 NOTE — PROGRESS NOTES
Continued Stay Note  [facility]     Patient Name: Nikolai Shaw  MRN: [id number]  Today's Date: 1/23/2017    Admit Date: 1/19/2017          Discharge Plan       01/23/17 1007    Case Management/Social Work Plan    Plan Home with wife, no needs    Patient/Family In Agreement With Plan yes    Additional Comments S/W patient and wife, at bedside and verified plan to go home and denies needing any HH, at this time.  Melvin, RN, CCP    Final Note    Final Note orders to d/c home              Discharge Codes     None        Expected Discharge Date and Time     Expected Discharge Date Expected Discharge Time    Jan 23, 2017             Farrah Post RN

## 2017-01-27 ENCOUNTER — HOSPITAL ENCOUNTER (INPATIENT)
Facility: HOSPITAL | Age: 47
LOS: 13 days | Discharge: HOME | End: 2017-02-09
Attending: SURGERY | Admitting: SURGERY

## 2017-01-27 DIAGNOSIS — K56.609 SMALL BOWEL OBSTRUCTION (HCC): ICD-10-CM

## 2017-01-28 ENCOUNTER — APPOINTMENT (OUTPATIENT)
Dept: GENERAL RADIOLOGY | Facility: HOSPITAL | Age: 47
End: 2017-01-28

## 2017-01-28 PROBLEM — K56.609 SMALL BOWEL OBSTRUCTION (HCC): Status: ACTIVE | Noted: 2017-01-28

## 2017-01-28 LAB
ALBUMIN SERPL-MCNC: 4.1 G/DL (ref 3.5–5.2)
ALBUMIN/GLOB SERPL: 0.9 G/DL
ALP SERPL-CCNC: 123 U/L (ref 39–117)
ALT SERPL W P-5'-P-CCNC: 52 U/L (ref 1–41)
AMYLASE SERPL-CCNC: 126 U/L (ref 28–100)
ANION GAP SERPL CALCULATED.3IONS-SCNC: 15.1 MMOL/L
ANION GAP SERPL CALCULATED.3IONS-SCNC: 17.8 MMOL/L
AST SERPL-CCNC: 29 U/L (ref 1–40)
BASOPHILS # BLD AUTO: 0.04 10*3/MM3 (ref 0–0.2)
BASOPHILS NFR BLD AUTO: 0.3 % (ref 0–1.5)
BILIRUB SERPL-MCNC: 0.6 MG/DL (ref 0.1–1.2)
BUN BLD-MCNC: 21 MG/DL (ref 6–20)
BUN BLD-MCNC: 24 MG/DL (ref 6–20)
BUN/CREAT SERPL: 18.8 (ref 7–25)
BUN/CREAT SERPL: 20 (ref 7–25)
CALCIUM SPEC-SCNC: 10.1 MG/DL (ref 8.6–10.5)
CALCIUM SPEC-SCNC: 9.4 MG/DL (ref 8.6–10.5)
CHLORIDE SERPL-SCNC: 88 MMOL/L (ref 98–107)
CHLORIDE SERPL-SCNC: 90 MMOL/L (ref 98–107)
CO2 SERPL-SCNC: 23.9 MMOL/L (ref 22–29)
CO2 SERPL-SCNC: 24.2 MMOL/L (ref 22–29)
CREAT BLD-MCNC: 1.12 MG/DL (ref 0.76–1.27)
CREAT BLD-MCNC: 1.2 MG/DL (ref 0.76–1.27)
DEPRECATED RDW RBC AUTO: 43.1 FL (ref 37–54)
DEPRECATED RDW RBC AUTO: 44.4 FL (ref 37–54)
EOSINOPHIL # BLD AUTO: 0.15 10*3/MM3 (ref 0–0.7)
EOSINOPHIL NFR BLD AUTO: 1 % (ref 0.3–6.2)
ERYTHROCYTE [DISTWIDTH] IN BLOOD BY AUTOMATED COUNT: 12.5 % (ref 11.5–14.5)
ERYTHROCYTE [DISTWIDTH] IN BLOOD BY AUTOMATED COUNT: 12.6 % (ref 11.5–14.5)
GFR SERPL CREATININE-BSD FRML MDRD: 65 ML/MIN/1.73
GFR SERPL CREATININE-BSD FRML MDRD: 71 ML/MIN/1.73
GLOBULIN UR ELPH-MCNC: 4.4 GM/DL
GLUCOSE BLD-MCNC: 113 MG/DL (ref 65–99)
GLUCOSE BLD-MCNC: 99 MG/DL (ref 65–99)
HCT VFR BLD AUTO: 45.3 % (ref 40.4–52.2)
HCT VFR BLD AUTO: 45.9 % (ref 40.4–52.2)
HGB BLD-MCNC: 15.3 G/DL (ref 13.7–17.6)
HGB BLD-MCNC: 16.3 G/DL (ref 13.7–17.6)
IMM GRANULOCYTES # BLD: 0.08 10*3/MM3 (ref 0–0.03)
IMM GRANULOCYTES NFR BLD: 0.5 % (ref 0–0.5)
LYMPHOCYTES # BLD AUTO: 1.76 10*3/MM3 (ref 0.9–4.8)
LYMPHOCYTES NFR BLD AUTO: 11.3 % (ref 19.6–45.3)
MAGNESIUM SERPL-MCNC: 2.2 MG/DL (ref 1.6–2.6)
MCH RBC QN AUTO: 32.8 PG (ref 27–32.7)
MCH RBC QN AUTO: 33.5 PG (ref 27–32.7)
MCHC RBC AUTO-ENTMCNC: 33.8 G/DL (ref 32.6–36.4)
MCHC RBC AUTO-ENTMCNC: 35.5 G/DL (ref 32.6–36.4)
MCV RBC AUTO: 94.3 FL (ref 79.8–96.2)
MCV RBC AUTO: 97.2 FL (ref 79.8–96.2)
MONOCYTES # BLD AUTO: 1.7 10*3/MM3 (ref 0.2–1.2)
MONOCYTES NFR BLD AUTO: 10.9 % (ref 5–12)
NEUTROPHILS # BLD AUTO: 11.81 10*3/MM3 (ref 1.9–8.1)
NEUTROPHILS NFR BLD AUTO: 76 % (ref 42.7–76)
PHOSPHATE SERPL-MCNC: 3.8 MG/DL (ref 2.5–4.5)
PLATELET # BLD AUTO: 346 10*3/MM3 (ref 140–500)
PLATELET # BLD AUTO: 369 10*3/MM3 (ref 140–500)
PMV BLD AUTO: 10.8 FL (ref 6–12)
PMV BLD AUTO: 11 FL (ref 6–12)
POTASSIUM BLD-SCNC: 3.9 MMOL/L (ref 3.5–5.2)
POTASSIUM BLD-SCNC: 4.3 MMOL/L (ref 3.5–5.2)
PROT SERPL-MCNC: 8.5 G/DL (ref 6–8.5)
RBC # BLD AUTO: 4.66 10*6/MM3 (ref 4.6–6)
RBC # BLD AUTO: 4.87 10*6/MM3 (ref 4.6–6)
SODIUM BLD-SCNC: 129 MMOL/L (ref 136–145)
SODIUM BLD-SCNC: 130 MMOL/L (ref 136–145)
WBC NRBC COR # BLD: 15.22 10*3/MM3 (ref 4.5–10.7)
WBC NRBC COR # BLD: 15.54 10*3/MM3 (ref 4.5–10.7)

## 2017-01-28 PROCEDURE — 85025 COMPLETE CBC W/AUTO DIFF WBC: CPT | Performed by: SURGERY

## 2017-01-28 PROCEDURE — 82150 ASSAY OF AMYLASE: CPT | Performed by: SURGERY

## 2017-01-28 PROCEDURE — 83735 ASSAY OF MAGNESIUM: CPT | Performed by: SURGERY

## 2017-01-28 PROCEDURE — 25010000002 HYDROMORPHONE PER 4 MG: Performed by: SURGERY

## 2017-01-28 PROCEDURE — 74000 HC ABDOMEN KUB: CPT

## 2017-01-28 PROCEDURE — 99222 1ST HOSP IP/OBS MODERATE 55: CPT | Performed by: SURGERY

## 2017-01-28 PROCEDURE — 85027 COMPLETE CBC AUTOMATED: CPT | Performed by: SURGERY

## 2017-01-28 PROCEDURE — 80053 COMPREHEN METABOLIC PANEL: CPT | Performed by: SURGERY

## 2017-01-28 PROCEDURE — 84100 ASSAY OF PHOSPHORUS: CPT | Performed by: SURGERY

## 2017-01-28 PROCEDURE — 80048 BASIC METABOLIC PNL TOTAL CA: CPT | Performed by: SURGERY

## 2017-01-28 RX ORDER — LISINOPRIL AND HYDROCHLOROTHIAZIDE 20; 12.5 MG/1; MG/1
1 TABLET ORAL
Status: DISCONTINUED | OUTPATIENT
Start: 2017-01-28 | End: 2017-02-09 | Stop reason: HOSPADM

## 2017-01-28 RX ORDER — SODIUM CHLORIDE 9 MG/ML
75 INJECTION, SOLUTION INTRAVENOUS CONTINUOUS
Status: DISCONTINUED | OUTPATIENT
Start: 2017-01-28 | End: 2017-02-01

## 2017-01-28 RX ORDER — ONDANSETRON 2 MG/ML
4 INJECTION INTRAMUSCULAR; INTRAVENOUS EVERY 6 HOURS PRN
Status: DISCONTINUED | OUTPATIENT
Start: 2017-01-28 | End: 2017-02-09 | Stop reason: HOSPADM

## 2017-01-28 RX ORDER — NALOXONE HCL 0.4 MG/ML
0.4 VIAL (ML) INJECTION
Status: DISCONTINUED | OUTPATIENT
Start: 2017-01-28 | End: 2017-02-07

## 2017-01-28 RX ORDER — SODIUM CHLORIDE 0.9 % (FLUSH) 0.9 %
1-10 SYRINGE (ML) INJECTION AS NEEDED
Status: DISCONTINUED | OUTPATIENT
Start: 2017-01-28 | End: 2017-02-09 | Stop reason: HOSPADM

## 2017-01-28 RX ORDER — HYDROMORPHONE HYDROCHLORIDE 1 MG/ML
0.5 INJECTION, SOLUTION INTRAMUSCULAR; INTRAVENOUS; SUBCUTANEOUS
Status: DISCONTINUED | OUTPATIENT
Start: 2017-01-28 | End: 2017-02-07

## 2017-01-28 RX ORDER — PANTOPRAZOLE SODIUM 40 MG/10ML
40 INJECTION, POWDER, LYOPHILIZED, FOR SOLUTION INTRAVENOUS
Status: DISCONTINUED | OUTPATIENT
Start: 2017-01-28 | End: 2017-02-09

## 2017-01-28 RX ORDER — PROMETHAZINE HYDROCHLORIDE 25 MG/ML
12.5 INJECTION, SOLUTION INTRAMUSCULAR; INTRAVENOUS EVERY 6 HOURS PRN
Status: DISCONTINUED | OUTPATIENT
Start: 2017-01-28 | End: 2017-02-09 | Stop reason: HOSPADM

## 2017-01-28 RX ORDER — SERTRALINE HYDROCHLORIDE 100 MG/1
100 TABLET, FILM COATED ORAL DAILY
Status: DISCONTINUED | OUTPATIENT
Start: 2017-01-28 | End: 2017-02-09 | Stop reason: HOSPADM

## 2017-01-28 RX ADMIN — SERTRALINE 100 MG: 100 TABLET, FILM COATED ORAL at 08:26

## 2017-01-28 RX ADMIN — LISINOPRIL AND HYDROCHLOROTHIAZIDE 1 TABLET: 12.5; 2 TABLET ORAL at 08:26

## 2017-01-28 RX ADMIN — HYDROMORPHONE HYDROCHLORIDE 0.5 MG: 1 INJECTION, SOLUTION INTRAMUSCULAR; INTRAVENOUS; SUBCUTANEOUS at 15:06

## 2017-01-28 RX ADMIN — HYDROMORPHONE HYDROCHLORIDE 0.5 MG: 1 INJECTION, SOLUTION INTRAMUSCULAR; INTRAVENOUS; SUBCUTANEOUS at 17:29

## 2017-01-28 RX ADMIN — HYDROMORPHONE HYDROCHLORIDE 0.5 MG: 1 INJECTION, SOLUTION INTRAMUSCULAR; INTRAVENOUS; SUBCUTANEOUS at 20:25

## 2017-01-28 RX ADMIN — HYDROMORPHONE HYDROCHLORIDE 0.5 MG: 1 INJECTION, SOLUTION INTRAMUSCULAR; INTRAVENOUS; SUBCUTANEOUS at 22:37

## 2017-01-28 RX ADMIN — SODIUM CHLORIDE 150 ML/HR: 9 INJECTION, SOLUTION INTRAVENOUS at 15:02

## 2017-01-28 RX ADMIN — HYDROMORPHONE HYDROCHLORIDE 0.5 MG: 1 INJECTION, SOLUTION INTRAMUSCULAR; INTRAVENOUS; SUBCUTANEOUS at 03:45

## 2017-01-28 RX ADMIN — HYDROMORPHONE HYDROCHLORIDE 0.5 MG: 1 INJECTION, SOLUTION INTRAMUSCULAR; INTRAVENOUS; SUBCUTANEOUS at 08:26

## 2017-01-28 RX ADMIN — SODIUM CHLORIDE 150 ML/HR: 9 INJECTION, SOLUTION INTRAVENOUS at 01:28

## 2017-01-28 RX ADMIN — HYDROMORPHONE HYDROCHLORIDE 0.5 MG: 1 INJECTION, SOLUTION INTRAMUSCULAR; INTRAVENOUS; SUBCUTANEOUS at 01:28

## 2017-01-28 RX ADMIN — PANTOPRAZOLE SODIUM 40 MG: 40 INJECTION, POWDER, FOR SOLUTION INTRAVENOUS at 06:24

## 2017-01-28 RX ADMIN — SODIUM CHLORIDE 150 ML/HR: 9 INJECTION, SOLUTION INTRAVENOUS at 21:39

## 2017-01-28 RX ADMIN — SODIUM CHLORIDE 150 ML/HR: 9 INJECTION, SOLUTION INTRAVENOUS at 08:21

## 2017-01-29 LAB
ANION GAP SERPL CALCULATED.3IONS-SCNC: 12.3 MMOL/L
BUN BLD-MCNC: 8 MG/DL (ref 6–20)
BUN/CREAT SERPL: 10 (ref 7–25)
CALCIUM SPEC-SCNC: 8.4 MG/DL (ref 8.6–10.5)
CHLORIDE SERPL-SCNC: 94 MMOL/L (ref 98–107)
CO2 SERPL-SCNC: 24.7 MMOL/L (ref 22–29)
CREAT BLD-MCNC: 0.8 MG/DL (ref 0.76–1.27)
DEPRECATED RDW RBC AUTO: 44.3 FL (ref 37–54)
ERYTHROCYTE [DISTWIDTH] IN BLOOD BY AUTOMATED COUNT: 12.6 % (ref 11.5–14.5)
GFR SERPL CREATININE-BSD FRML MDRD: 104 ML/MIN/1.73
GLUCOSE BLD-MCNC: 87 MG/DL (ref 65–99)
HCT VFR BLD AUTO: 41.7 % (ref 40.4–52.2)
HGB BLD-MCNC: 14 G/DL (ref 13.7–17.6)
MCH RBC QN AUTO: 32.7 PG (ref 27–32.7)
MCHC RBC AUTO-ENTMCNC: 33.6 G/DL (ref 32.6–36.4)
MCV RBC AUTO: 97.4 FL (ref 79.8–96.2)
PLATELET # BLD AUTO: 304 10*3/MM3 (ref 140–500)
PMV BLD AUTO: 10.4 FL (ref 6–12)
POTASSIUM BLD-SCNC: 3.9 MMOL/L (ref 3.5–5.2)
RBC # BLD AUTO: 4.28 10*6/MM3 (ref 4.6–6)
SODIUM BLD-SCNC: 131 MMOL/L (ref 136–145)
WBC NRBC COR # BLD: 12.55 10*3/MM3 (ref 4.5–10.7)

## 2017-01-29 PROCEDURE — 85027 COMPLETE CBC AUTOMATED: CPT | Performed by: SURGERY

## 2017-01-29 PROCEDURE — 80048 BASIC METABOLIC PNL TOTAL CA: CPT | Performed by: SURGERY

## 2017-01-29 PROCEDURE — 99231 SBSQ HOSP IP/OBS SF/LOW 25: CPT | Performed by: SURGERY

## 2017-01-29 PROCEDURE — 25010000002 HYDROMORPHONE PER 4 MG: Performed by: SURGERY

## 2017-01-29 RX ADMIN — HYDROMORPHONE HYDROCHLORIDE 0.5 MG: 1 INJECTION, SOLUTION INTRAMUSCULAR; INTRAVENOUS; SUBCUTANEOUS at 18:09

## 2017-01-29 RX ADMIN — PANTOPRAZOLE SODIUM 40 MG: 40 INJECTION, POWDER, FOR SOLUTION INTRAVENOUS at 05:34

## 2017-01-29 RX ADMIN — HYDROMORPHONE HYDROCHLORIDE 0.5 MG: 1 INJECTION, SOLUTION INTRAMUSCULAR; INTRAVENOUS; SUBCUTANEOUS at 11:04

## 2017-01-29 RX ADMIN — LISINOPRIL AND HYDROCHLOROTHIAZIDE 1 TABLET: 12.5; 2 TABLET ORAL at 08:26

## 2017-01-29 RX ADMIN — HYDROMORPHONE HYDROCHLORIDE 0.5 MG: 1 INJECTION, SOLUTION INTRAMUSCULAR; INTRAVENOUS; SUBCUTANEOUS at 08:26

## 2017-01-29 RX ADMIN — SODIUM CHLORIDE 150 ML/HR: 9 INJECTION, SOLUTION INTRAVENOUS at 04:40

## 2017-01-29 RX ADMIN — HYDROMORPHONE HYDROCHLORIDE 0.5 MG: 1 INJECTION, SOLUTION INTRAMUSCULAR; INTRAVENOUS; SUBCUTANEOUS at 00:39

## 2017-01-29 RX ADMIN — HYDROMORPHONE HYDROCHLORIDE 0.5 MG: 1 INJECTION, SOLUTION INTRAMUSCULAR; INTRAVENOUS; SUBCUTANEOUS at 21:15

## 2017-01-29 RX ADMIN — SODIUM CHLORIDE 150 ML/HR: 9 INJECTION, SOLUTION INTRAVENOUS at 11:26

## 2017-01-29 RX ADMIN — SERTRALINE 100 MG: 100 TABLET, FILM COATED ORAL at 08:26

## 2017-01-29 RX ADMIN — SODIUM CHLORIDE 75 ML/HR: 9 INJECTION, SOLUTION INTRAVENOUS at 20:32

## 2017-01-29 RX ADMIN — HYDROMORPHONE HYDROCHLORIDE 0.5 MG: 1 INJECTION, SOLUTION INTRAMUSCULAR; INTRAVENOUS; SUBCUTANEOUS at 03:20

## 2017-01-30 LAB
ANION GAP SERPL CALCULATED.3IONS-SCNC: 11.3 MMOL/L
BUN BLD-MCNC: 5 MG/DL (ref 6–20)
BUN/CREAT SERPL: 5.8 (ref 7–25)
CALCIUM SPEC-SCNC: 8.1 MG/DL (ref 8.6–10.5)
CHLORIDE SERPL-SCNC: 102 MMOL/L (ref 98–107)
CO2 SERPL-SCNC: 24.7 MMOL/L (ref 22–29)
CREAT BLD-MCNC: 0.86 MG/DL (ref 0.76–1.27)
GFR SERPL CREATININE-BSD FRML MDRD: 96 ML/MIN/1.73
GLUCOSE BLD-MCNC: 71 MG/DL (ref 65–99)
MAGNESIUM SERPL-MCNC: 2.1 MG/DL (ref 1.6–2.6)
PHOSPHATE SERPL-MCNC: 2.5 MG/DL (ref 2.5–4.5)
POTASSIUM BLD-SCNC: 3.2 MMOL/L (ref 3.5–5.2)
SODIUM BLD-SCNC: 138 MMOL/L (ref 136–145)

## 2017-01-30 PROCEDURE — 84100 ASSAY OF PHOSPHORUS: CPT | Performed by: SURGERY

## 2017-01-30 PROCEDURE — 83735 ASSAY OF MAGNESIUM: CPT | Performed by: SURGERY

## 2017-01-30 PROCEDURE — 80048 BASIC METABOLIC PNL TOTAL CA: CPT | Performed by: SURGERY

## 2017-01-30 PROCEDURE — 25010000002 HYDROMORPHONE PER 4 MG: Performed by: SURGERY

## 2017-01-30 PROCEDURE — 99231 SBSQ HOSP IP/OBS SF/LOW 25: CPT | Performed by: SURGERY

## 2017-01-30 RX ADMIN — HYDROMORPHONE HYDROCHLORIDE 0.5 MG: 1 INJECTION, SOLUTION INTRAMUSCULAR; INTRAVENOUS; SUBCUTANEOUS at 17:33

## 2017-01-30 RX ADMIN — HYDROMORPHONE HYDROCHLORIDE 0.5 MG: 1 INJECTION, SOLUTION INTRAMUSCULAR; INTRAVENOUS; SUBCUTANEOUS at 02:41

## 2017-01-30 RX ADMIN — LISINOPRIL AND HYDROCHLOROTHIAZIDE 1 TABLET: 12.5; 2 TABLET ORAL at 08:28

## 2017-01-30 RX ADMIN — HYDROMORPHONE HYDROCHLORIDE 0.5 MG: 1 INJECTION, SOLUTION INTRAMUSCULAR; INTRAVENOUS; SUBCUTANEOUS at 00:15

## 2017-01-30 RX ADMIN — PANTOPRAZOLE SODIUM 40 MG: 40 INJECTION, POWDER, FOR SOLUTION INTRAVENOUS at 06:21

## 2017-01-30 RX ADMIN — HYDROMORPHONE HYDROCHLORIDE 0.5 MG: 1 INJECTION, SOLUTION INTRAMUSCULAR; INTRAVENOUS; SUBCUTANEOUS at 04:40

## 2017-01-30 RX ADMIN — SERTRALINE 100 MG: 100 TABLET, FILM COATED ORAL at 08:28

## 2017-01-30 RX ADMIN — HYDROMORPHONE HYDROCHLORIDE 0.5 MG: 1 INJECTION, SOLUTION INTRAMUSCULAR; INTRAVENOUS; SUBCUTANEOUS at 20:18

## 2017-01-30 RX ADMIN — HYDROMORPHONE HYDROCHLORIDE 0.5 MG: 1 INJECTION, SOLUTION INTRAMUSCULAR; INTRAVENOUS; SUBCUTANEOUS at 23:48

## 2017-01-30 RX ADMIN — SODIUM CHLORIDE 75 ML/HR: 9 INJECTION, SOLUTION INTRAVENOUS at 23:58

## 2017-01-30 RX ADMIN — HYDROMORPHONE HYDROCHLORIDE 0.5 MG: 1 INJECTION, SOLUTION INTRAMUSCULAR; INTRAVENOUS; SUBCUTANEOUS at 12:57

## 2017-01-30 RX ADMIN — HYDROMORPHONE HYDROCHLORIDE 0.5 MG: 1 INJECTION, SOLUTION INTRAMUSCULAR; INTRAVENOUS; SUBCUTANEOUS at 15:12

## 2017-01-30 RX ADMIN — HYDROMORPHONE HYDROCHLORIDE 0.5 MG: 1 INJECTION, SOLUTION INTRAMUSCULAR; INTRAVENOUS; SUBCUTANEOUS at 10:14

## 2017-01-31 ENCOUNTER — APPOINTMENT (OUTPATIENT)
Dept: GENERAL RADIOLOGY | Facility: HOSPITAL | Age: 47
End: 2017-01-31
Attending: SURGERY

## 2017-01-31 ENCOUNTER — APPOINTMENT (OUTPATIENT)
Dept: GENERAL RADIOLOGY | Facility: HOSPITAL | Age: 47
End: 2017-01-31

## 2017-01-31 PROCEDURE — 99231 SBSQ HOSP IP/OBS SF/LOW 25: CPT | Performed by: SURGERY

## 2017-01-31 PROCEDURE — 25010000002 HYDROMORPHONE PER 4 MG: Performed by: SURGERY

## 2017-01-31 PROCEDURE — 74000 HC ABDOMEN KUB: CPT

## 2017-01-31 RX ADMIN — HYDROMORPHONE HYDROCHLORIDE 0.5 MG: 1 INJECTION, SOLUTION INTRAMUSCULAR; INTRAVENOUS; SUBCUTANEOUS at 19:22

## 2017-01-31 RX ADMIN — HYDROMORPHONE HYDROCHLORIDE 0.5 MG: 1 INJECTION, SOLUTION INTRAMUSCULAR; INTRAVENOUS; SUBCUTANEOUS at 21:26

## 2017-01-31 RX ADMIN — SERTRALINE 100 MG: 100 TABLET, FILM COATED ORAL at 08:53

## 2017-01-31 RX ADMIN — HYDROMORPHONE HYDROCHLORIDE 0.5 MG: 1 INJECTION, SOLUTION INTRAMUSCULAR; INTRAVENOUS; SUBCUTANEOUS at 04:41

## 2017-01-31 RX ADMIN — HYDROMORPHONE HYDROCHLORIDE 0.5 MG: 1 INJECTION, SOLUTION INTRAMUSCULAR; INTRAVENOUS; SUBCUTANEOUS at 15:55

## 2017-01-31 RX ADMIN — PANTOPRAZOLE SODIUM 40 MG: 40 INJECTION, POWDER, FOR SOLUTION INTRAVENOUS at 06:29

## 2017-01-31 RX ADMIN — SODIUM CHLORIDE 75 ML/HR: 9 INJECTION, SOLUTION INTRAVENOUS at 12:44

## 2017-01-31 RX ADMIN — HYDROMORPHONE HYDROCHLORIDE 0.5 MG: 1 INJECTION, SOLUTION INTRAMUSCULAR; INTRAVENOUS; SUBCUTANEOUS at 13:53

## 2017-02-01 ENCOUNTER — APPOINTMENT (OUTPATIENT)
Dept: GENERAL RADIOLOGY | Facility: HOSPITAL | Age: 47
End: 2017-02-01
Attending: SURGERY

## 2017-02-01 PROCEDURE — 74000 HC ABDOMEN KUB: CPT

## 2017-02-01 PROCEDURE — 25010000002 ONDANSETRON PER 1 MG: Performed by: SURGERY

## 2017-02-01 PROCEDURE — 99231 SBSQ HOSP IP/OBS SF/LOW 25: CPT | Performed by: SURGERY

## 2017-02-01 PROCEDURE — 25010000002 PROMETHAZINE PER 50 MG: Performed by: SURGERY

## 2017-02-01 PROCEDURE — 25010000002 HYDROMORPHONE PER 4 MG: Performed by: SURGERY

## 2017-02-01 RX ORDER — DEXTROSE, SODIUM CHLORIDE, AND POTASSIUM CHLORIDE 5; .45; .15 G/100ML; G/100ML; G/100ML
100 INJECTION INTRAVENOUS CONTINUOUS
Status: DISCONTINUED | OUTPATIENT
Start: 2017-02-01 | End: 2017-02-04 | Stop reason: DRUGHIGH

## 2017-02-01 RX ADMIN — SODIUM CHLORIDE 75 ML/HR: 9 INJECTION, SOLUTION INTRAVENOUS at 02:35

## 2017-02-01 RX ADMIN — ONDANSETRON 4 MG: 2 INJECTION INTRAMUSCULAR; INTRAVENOUS at 20:17

## 2017-02-01 RX ADMIN — PROMETHAZINE HYDROCHLORIDE 12.5 MG: 25 INJECTION INTRAMUSCULAR; INTRAVENOUS at 14:28

## 2017-02-01 RX ADMIN — HYDROMORPHONE HYDROCHLORIDE 0.5 MG: 1 INJECTION, SOLUTION INTRAMUSCULAR; INTRAVENOUS; SUBCUTANEOUS at 23:51

## 2017-02-01 RX ADMIN — PROMETHAZINE HYDROCHLORIDE 12.5 MG: 25 INJECTION INTRAMUSCULAR; INTRAVENOUS at 07:56

## 2017-02-01 RX ADMIN — SERTRALINE 100 MG: 100 TABLET, FILM COATED ORAL at 10:00

## 2017-02-01 RX ADMIN — HYDROMORPHONE HYDROCHLORIDE 0.5 MG: 1 INJECTION, SOLUTION INTRAMUSCULAR; INTRAVENOUS; SUBCUTANEOUS at 00:36

## 2017-02-01 RX ADMIN — HYDROMORPHONE HYDROCHLORIDE 0.5 MG: 1 INJECTION, SOLUTION INTRAMUSCULAR; INTRAVENOUS; SUBCUTANEOUS at 20:18

## 2017-02-01 RX ADMIN — HYDROMORPHONE HYDROCHLORIDE 0.5 MG: 1 INJECTION, SOLUTION INTRAMUSCULAR; INTRAVENOUS; SUBCUTANEOUS at 03:11

## 2017-02-01 RX ADMIN — LISINOPRIL AND HYDROCHLOROTHIAZIDE 1 TABLET: 12.5; 2 TABLET ORAL at 10:00

## 2017-02-01 RX ADMIN — PANTOPRAZOLE SODIUM 40 MG: 40 INJECTION, POWDER, FOR SOLUTION INTRAVENOUS at 07:58

## 2017-02-01 RX ADMIN — POTASSIUM CHLORIDE, DEXTROSE MONOHYDRATE AND SODIUM CHLORIDE 100 ML/HR: 150; 5; 450 INJECTION, SOLUTION INTRAVENOUS at 15:14

## 2017-02-01 RX ADMIN — HYDROMORPHONE HYDROCHLORIDE 0.5 MG: 1 INJECTION, SOLUTION INTRAMUSCULAR; INTRAVENOUS; SUBCUTANEOUS at 16:53

## 2017-02-01 NOTE — PROGRESS NOTES
CC:  Follow-up small bowel obstruction  HPI: Again had episodes of emesis today, rather sleepy right now secondary to receiving Phenergan.  Pain has not been significant.  X-rays: Plain abdominal films today demonstrate improves bowel gas pattern, agree with report based on my review of images  PE:   Awake, sleepy due to Phenergan administration  Lungs CTA, normal inspiratory effort  Heart RRR, no JVD  Abdomen: Soft, nondistended and not tender, bowel sounds are present  Vital signs: Afebrile, vital signs stable     IMPRESSION & PLAN:  Small bowel obstruction which yesterday appeared to be improving but today he again has had 2 episodes of emesis despite plain films seemingly showing improvement.  I have recommended and ordered a small bowel follow-through to start the morning and if high-grade partial or complete bowel obstruction confirmed then we will proceed with surgery.     Pal Crane M.D.

## 2017-02-02 ENCOUNTER — APPOINTMENT (OUTPATIENT)
Dept: GENERAL RADIOLOGY | Facility: HOSPITAL | Age: 47
End: 2017-02-02

## 2017-02-02 PROCEDURE — 74250 X-RAY XM SM INT 1CNTRST STD: CPT

## 2017-02-02 PROCEDURE — 25010000002 HYDROMORPHONE PER 4 MG: Performed by: SURGERY

## 2017-02-02 PROCEDURE — 25010000002 PROMETHAZINE PER 50 MG: Performed by: SURGERY

## 2017-02-02 PROCEDURE — 99231 SBSQ HOSP IP/OBS SF/LOW 25: CPT | Performed by: SURGERY

## 2017-02-02 PROCEDURE — 25010000002 ONDANSETRON PER 1 MG: Performed by: SURGERY

## 2017-02-02 RX ADMIN — POTASSIUM CHLORIDE, DEXTROSE MONOHYDRATE AND SODIUM CHLORIDE 100 ML/HR: 150; 5; 450 INJECTION, SOLUTION INTRAVENOUS at 02:01

## 2017-02-02 RX ADMIN — HYDROMORPHONE HYDROCHLORIDE 0.5 MG: 1 INJECTION, SOLUTION INTRAMUSCULAR; INTRAVENOUS; SUBCUTANEOUS at 06:50

## 2017-02-02 RX ADMIN — PROMETHAZINE HYDROCHLORIDE 12.5 MG: 25 INJECTION INTRAMUSCULAR; INTRAVENOUS at 20:42

## 2017-02-02 RX ADMIN — ONDANSETRON 4 MG: 2 INJECTION INTRAMUSCULAR; INTRAVENOUS at 11:53

## 2017-02-02 RX ADMIN — POTASSIUM CHLORIDE, DEXTROSE MONOHYDRATE AND SODIUM CHLORIDE 100 ML/HR: 150; 5; 450 INJECTION, SOLUTION INTRAVENOUS at 11:57

## 2017-02-02 RX ADMIN — HYDROMORPHONE HYDROCHLORIDE 0.5 MG: 1 INJECTION, SOLUTION INTRAMUSCULAR; INTRAVENOUS; SUBCUTANEOUS at 16:00

## 2017-02-02 RX ADMIN — HYDROMORPHONE HYDROCHLORIDE 0.5 MG: 1 INJECTION, SOLUTION INTRAMUSCULAR; INTRAVENOUS; SUBCUTANEOUS at 02:01

## 2017-02-02 RX ADMIN — HYDROMORPHONE HYDROCHLORIDE 0.5 MG: 1 INJECTION, SOLUTION INTRAMUSCULAR; INTRAVENOUS; SUBCUTANEOUS at 17:59

## 2017-02-02 RX ADMIN — HYDROMORPHONE HYDROCHLORIDE 0.5 MG: 1 INJECTION, SOLUTION INTRAMUSCULAR; INTRAVENOUS; SUBCUTANEOUS at 22:43

## 2017-02-02 RX ADMIN — HYDROMORPHONE HYDROCHLORIDE 0.5 MG: 1 INJECTION, SOLUTION INTRAMUSCULAR; INTRAVENOUS; SUBCUTANEOUS at 04:52

## 2017-02-02 RX ADMIN — HYDROMORPHONE HYDROCHLORIDE 0.5 MG: 1 INJECTION, SOLUTION INTRAMUSCULAR; INTRAVENOUS; SUBCUTANEOUS at 11:53

## 2017-02-02 RX ADMIN — ONDANSETRON 4 MG: 2 INJECTION INTRAMUSCULAR; INTRAVENOUS at 22:43

## 2017-02-02 RX ADMIN — POTASSIUM CHLORIDE, DEXTROSE MONOHYDRATE AND SODIUM CHLORIDE 100 ML/HR: 150; 5; 450 INJECTION, SOLUTION INTRAVENOUS at 22:29

## 2017-02-02 RX ADMIN — HYDROMORPHONE HYDROCHLORIDE 0.5 MG: 1 INJECTION, SOLUTION INTRAMUSCULAR; INTRAVENOUS; SUBCUTANEOUS at 20:43

## 2017-02-02 RX ADMIN — PANTOPRAZOLE SODIUM 40 MG: 40 INJECTION, POWDER, FOR SOLUTION INTRAVENOUS at 06:22

## 2017-02-02 RX ADMIN — HYDROMORPHONE HYDROCHLORIDE 0.5 MG: 1 INJECTION, SOLUTION INTRAMUSCULAR; INTRAVENOUS; SUBCUTANEOUS at 09:31

## 2017-02-02 NOTE — PLAN OF CARE
Problem: Patient Care Overview (Adult)  Goal: Plan of Care Review  Outcome: Ongoing (interventions implemented as appropriate)    02/02/17 1608   Coping/Psychosocial Response Interventions   Plan Of Care Reviewed With patient   Outcome Evaluation   Outcome Summary/Follow up Plan IV dilaudid for pain. NPO. Spent most of day in XR getting SBFT. Zofran given x1 for nausea.   Patient Care Overview   Progress improving       Goal: Adult Individualization and Mutuality  Outcome: Ongoing (interventions implemented as appropriate)  Goal: Discharge Needs Assessment  Outcome: Ongoing (interventions implemented as appropriate)    Problem: Pain, Acute (Adult)  Goal: Acceptable Pain Control/Comfort Level  Outcome: Ongoing (interventions implemented as appropriate)    Problem: Bowel Obstruction (Adult)  Goal: Signs and Symptoms of Listed Potential Problems Will be Absent or Manageable (Bowel Obstruction)  Outcome: Ongoing (interventions implemented as appropriate)

## 2017-02-02 NOTE — PROGRESS NOTES
CC:  Follow-up small bowel obstruction  HPI: No further emesis since nothing by mouth.  Abdominal pain minimal.  X-rays: Small bowel follow-through shows dilated small bowel throughout course of study thus far.  No transition point has been seen.  I reviewed the images and discussed with Dr. Pickett.  PE:   Awake, sleepy due to Phenergan administration  Lungs CTA, normal inspiratory effort  Heart RRR, no JVD  Abdomen: Soft, nondistended and not tender, bowel sounds are present  Vital signs: Afebrile, vital signs stable      IMPRESSION & PLAN:  Small bowel obstruction.  Based on the small bowel follow-through results thus far I am starting to wonder whether the main source of obstruction is the paraesophageal hernia or stoma configuration itself.  We'll continue to follow films tonight and first thing in the morning.  If no intra-abdominal transition point present them we will likely proceed with stomal revision tomorrow.      Pal Crane M.D.

## 2017-02-03 ENCOUNTER — APPOINTMENT (OUTPATIENT)
Dept: LAB | Facility: HOSPITAL | Age: 47
End: 2017-02-03

## 2017-02-03 ENCOUNTER — ANESTHESIA EVENT (OUTPATIENT)
Dept: PERIOP | Facility: HOSPITAL | Age: 47
End: 2017-02-03

## 2017-02-03 ENCOUNTER — APPOINTMENT (OUTPATIENT)
Dept: ONCOLOGY | Facility: CLINIC | Age: 47
End: 2017-02-03

## 2017-02-03 ENCOUNTER — TELEPHONE (OUTPATIENT)
Dept: ONCOLOGY | Facility: CLINIC | Age: 47
End: 2017-02-03

## 2017-02-03 ENCOUNTER — ANESTHESIA (OUTPATIENT)
Dept: PERIOP | Facility: HOSPITAL | Age: 47
End: 2017-02-03

## 2017-02-03 ENCOUNTER — APPOINTMENT (OUTPATIENT)
Dept: GENERAL RADIOLOGY | Facility: HOSPITAL | Age: 47
End: 2017-02-03

## 2017-02-03 LAB
ALBUMIN SERPL-MCNC: 3.5 G/DL (ref 3.5–5.2)
ALBUMIN/GLOB SERPL: 0.9 G/DL
ALP SERPL-CCNC: 100 U/L (ref 39–117)
ALT SERPL W P-5'-P-CCNC: 41 U/L (ref 1–41)
ANION GAP SERPL CALCULATED.3IONS-SCNC: 14.1 MMOL/L
AST SERPL-CCNC: 29 U/L (ref 1–40)
BASOPHILS # BLD AUTO: 0.06 10*3/MM3 (ref 0–0.2)
BASOPHILS NFR BLD AUTO: 0.4 % (ref 0–1.5)
BILIRUB SERPL-MCNC: 0.4 MG/DL (ref 0.1–1.2)
BUN BLD-MCNC: 14 MG/DL (ref 6–20)
BUN/CREAT SERPL: 9.8 (ref 7–25)
CALCIUM SPEC-SCNC: 9.2 MG/DL (ref 8.6–10.5)
CHLORIDE SERPL-SCNC: 97 MMOL/L (ref 98–107)
CO2 SERPL-SCNC: 24.9 MMOL/L (ref 22–29)
CREAT BLD-MCNC: 1.43 MG/DL (ref 0.76–1.27)
DEPRECATED RDW RBC AUTO: 43.8 FL (ref 37–54)
EOSINOPHIL # BLD AUTO: 0.36 10*3/MM3 (ref 0–0.7)
EOSINOPHIL NFR BLD AUTO: 2.4 % (ref 0.3–6.2)
ERYTHROCYTE [DISTWIDTH] IN BLOOD BY AUTOMATED COUNT: 12.6 % (ref 11.5–14.5)
GFR SERPL CREATININE-BSD FRML MDRD: 53 ML/MIN/1.73
GLOBULIN UR ELPH-MCNC: 3.9 GM/DL
GLUCOSE BLD-MCNC: 90 MG/DL (ref 65–99)
HCT VFR BLD AUTO: 42.5 % (ref 40.4–52.2)
HGB BLD-MCNC: 14.5 G/DL (ref 13.7–17.6)
IMM GRANULOCYTES # BLD: 0.05 10*3/MM3 (ref 0–0.03)
IMM GRANULOCYTES NFR BLD: 0.3 % (ref 0–0.5)
LYMPHOCYTES # BLD AUTO: 1.8 10*3/MM3 (ref 0.9–4.8)
LYMPHOCYTES NFR BLD AUTO: 12.2 % (ref 19.6–45.3)
MCH RBC QN AUTO: 32.2 PG (ref 27–32.7)
MCHC RBC AUTO-ENTMCNC: 34.1 G/DL (ref 32.6–36.4)
MCV RBC AUTO: 94.4 FL (ref 79.8–96.2)
MONOCYTES # BLD AUTO: 1.24 10*3/MM3 (ref 0.2–1.2)
MONOCYTES NFR BLD AUTO: 8.4 % (ref 5–12)
NEUTROPHILS # BLD AUTO: 11.23 10*3/MM3 (ref 1.9–8.1)
NEUTROPHILS NFR BLD AUTO: 76.3 % (ref 42.7–76)
PLATELET # BLD AUTO: 352 10*3/MM3 (ref 140–500)
PMV BLD AUTO: 9.9 FL (ref 6–12)
POTASSIUM BLD-SCNC: 4.1 MMOL/L (ref 3.5–5.2)
PROT SERPL-MCNC: 7.4 G/DL (ref 6–8.5)
RBC # BLD AUTO: 4.5 10*6/MM3 (ref 4.6–6)
SODIUM BLD-SCNC: 136 MMOL/L (ref 136–145)
WBC NRBC COR # BLD: 14.74 10*3/MM3 (ref 4.5–10.7)

## 2017-02-03 PROCEDURE — 85025 COMPLETE CBC W/AUTO DIFF WBC: CPT | Performed by: ANESTHESIOLOGY

## 2017-02-03 PROCEDURE — 25010000002 HYDROMORPHONE PER 4 MG: Performed by: SURGERY

## 2017-02-03 PROCEDURE — 25010000002 PHENYLEPHRINE PER 1 ML: Performed by: NURSE ANESTHETIST, CERTIFIED REGISTERED

## 2017-02-03 PROCEDURE — 25010000002 PROPOFOL 10 MG/ML EMULSION: Performed by: NURSE ANESTHETIST, CERTIFIED REGISTERED

## 2017-02-03 PROCEDURE — 44120 REMOVAL OF SMALL INTESTINE: CPT | Performed by: SURGERY

## 2017-02-03 PROCEDURE — 88307 TISSUE EXAM BY PATHOLOGIST: CPT | Performed by: SURGERY

## 2017-02-03 PROCEDURE — 44120 REMOVAL OF SMALL INTESTINE: CPT | Performed by: SPECIALIST/TECHNOLOGIST, OTHER

## 2017-02-03 PROCEDURE — 74000 HC ABDOMEN KUB: CPT

## 2017-02-03 PROCEDURE — 25010000002 FENTANYL CITRATE (PF) 100 MCG/2ML SOLUTION: Performed by: ANESTHESIOLOGY

## 2017-02-03 PROCEDURE — 25010000002 MIDAZOLAM PER 1 MG: Performed by: ANESTHESIOLOGY

## 2017-02-03 PROCEDURE — 80053 COMPREHEN METABOLIC PANEL: CPT | Performed by: ANESTHESIOLOGY

## 2017-02-03 PROCEDURE — 25010000003 CEFAZOLIN IN DEXTROSE 2-4 GM/100ML-% SOLUTION: Performed by: SURGERY

## 2017-02-03 PROCEDURE — 0DT80ZZ RESECTION OF SMALL INTESTINE, OPEN APPROACH: ICD-10-PCS | Performed by: SURGERY

## 2017-02-03 PROCEDURE — 88331 PATH CONSLTJ SURG 1 BLK 1SPC: CPT | Performed by: SURGERY

## 2017-02-03 PROCEDURE — 25010000002 FENTANYL CITRATE (PF) 100 MCG/2ML SOLUTION: Performed by: NURSE ANESTHETIST, CERTIFIED REGISTERED

## 2017-02-03 PROCEDURE — 25010000002 HYDROMORPHONE PER 4 MG: Performed by: NURSE ANESTHETIST, CERTIFIED REGISTERED

## 2017-02-03 RX ORDER — DIPHENHYDRAMINE HYDROCHLORIDE 50 MG/ML
12.5 INJECTION INTRAMUSCULAR; INTRAVENOUS
Status: DISCONTINUED | OUTPATIENT
Start: 2017-02-03 | End: 2017-02-03 | Stop reason: HOSPADM

## 2017-02-03 RX ORDER — LABETALOL HYDROCHLORIDE 5 MG/ML
5 INJECTION, SOLUTION INTRAVENOUS
Status: DISCONTINUED | OUTPATIENT
Start: 2017-02-03 | End: 2017-02-03 | Stop reason: HOSPADM

## 2017-02-03 RX ORDER — BUPIVACAINE HYDROCHLORIDE AND EPINEPHRINE 5; 5 MG/ML; UG/ML
INJECTION, SOLUTION PERINEURAL AS NEEDED
Status: DISCONTINUED | OUTPATIENT
Start: 2017-02-03 | End: 2017-02-03 | Stop reason: HOSPADM

## 2017-02-03 RX ORDER — HYDRALAZINE HYDROCHLORIDE 20 MG/ML
5 INJECTION INTRAMUSCULAR; INTRAVENOUS
Status: DISCONTINUED | OUTPATIENT
Start: 2017-02-03 | End: 2017-02-03 | Stop reason: HOSPADM

## 2017-02-03 RX ORDER — PROMETHAZINE HYDROCHLORIDE 25 MG/ML
12.5 INJECTION, SOLUTION INTRAMUSCULAR; INTRAVENOUS ONCE AS NEEDED
Status: DISCONTINUED | OUTPATIENT
Start: 2017-02-03 | End: 2017-02-03 | Stop reason: HOSPADM

## 2017-02-03 RX ORDER — HYDROMORPHONE HYDROCHLORIDE 1 MG/ML
0.5 INJECTION, SOLUTION INTRAMUSCULAR; INTRAVENOUS; SUBCUTANEOUS
Status: DISCONTINUED | OUTPATIENT
Start: 2017-02-03 | End: 2017-02-03 | Stop reason: HOSPADM

## 2017-02-03 RX ORDER — HYDROMORPHONE HYDROCHLORIDE 1 MG/ML
0.25 INJECTION, SOLUTION INTRAMUSCULAR; INTRAVENOUS; SUBCUTANEOUS
Status: DISCONTINUED | OUTPATIENT
Start: 2017-02-03 | End: 2017-02-03 | Stop reason: HOSPADM

## 2017-02-03 RX ORDER — MIDAZOLAM HYDROCHLORIDE 1 MG/ML
1 INJECTION INTRAMUSCULAR; INTRAVENOUS
Status: DISCONTINUED | OUTPATIENT
Start: 2017-02-03 | End: 2017-02-03 | Stop reason: HOSPADM

## 2017-02-03 RX ORDER — LIDOCAINE HYDROCHLORIDE 20 MG/ML
INJECTION, SOLUTION INFILTRATION; PERINEURAL AS NEEDED
Status: DISCONTINUED | OUTPATIENT
Start: 2017-02-03 | End: 2017-02-03 | Stop reason: SURG

## 2017-02-03 RX ORDER — SODIUM CHLORIDE 0.9 % (FLUSH) 0.9 %
1-10 SYRINGE (ML) INJECTION AS NEEDED
Status: DISCONTINUED | OUTPATIENT
Start: 2017-02-03 | End: 2017-02-03 | Stop reason: HOSPADM

## 2017-02-03 RX ORDER — HYDROMORPHONE HCL IN 0.9% NACL 10 MG/50ML
PATIENT CONTROLLED ANALGESIA SYRINGE INTRAVENOUS
Status: COMPLETED
Start: 2017-02-03 | End: 2017-02-03

## 2017-02-03 RX ORDER — ROCURONIUM BROMIDE 10 MG/ML
INJECTION, SOLUTION INTRAVENOUS AS NEEDED
Status: DISCONTINUED | OUTPATIENT
Start: 2017-02-03 | End: 2017-02-03 | Stop reason: SURG

## 2017-02-03 RX ORDER — CEFAZOLIN SODIUM 2 G/100ML
2 INJECTION, SOLUTION INTRAVENOUS
Status: COMPLETED | OUTPATIENT
Start: 2017-02-03 | End: 2017-02-03

## 2017-02-03 RX ORDER — HYDROCODONE BITARTRATE AND ACETAMINOPHEN 7.5; 325 MG/1; MG/1
1 TABLET ORAL ONCE AS NEEDED
Status: DISCONTINUED | OUTPATIENT
Start: 2017-02-03 | End: 2017-02-03 | Stop reason: HOSPADM

## 2017-02-03 RX ORDER — MIDAZOLAM HYDROCHLORIDE 1 MG/ML
2 INJECTION INTRAMUSCULAR; INTRAVENOUS
Status: DISCONTINUED | OUTPATIENT
Start: 2017-02-03 | End: 2017-02-03 | Stop reason: HOSPADM

## 2017-02-03 RX ORDER — NALOXONE HCL 0.4 MG/ML
0.1 VIAL (ML) INJECTION
Status: DISCONTINUED | OUTPATIENT
Start: 2017-02-03 | End: 2017-02-09 | Stop reason: HOSPADM

## 2017-02-03 RX ORDER — FENTANYL CITRATE 50 UG/ML
50 INJECTION, SOLUTION INTRAMUSCULAR; INTRAVENOUS
Status: DISCONTINUED | OUTPATIENT
Start: 2017-02-03 | End: 2017-02-03 | Stop reason: HOSPADM

## 2017-02-03 RX ORDER — PROMETHAZINE HYDROCHLORIDE 25 MG/1
25 SUPPOSITORY RECTAL ONCE AS NEEDED
Status: DISCONTINUED | OUTPATIENT
Start: 2017-02-03 | End: 2017-02-03 | Stop reason: HOSPADM

## 2017-02-03 RX ORDER — FLUMAZENIL 0.1 MG/ML
0.2 INJECTION INTRAVENOUS AS NEEDED
Status: DISCONTINUED | OUTPATIENT
Start: 2017-02-03 | End: 2017-02-03 | Stop reason: HOSPADM

## 2017-02-03 RX ORDER — NALOXONE HCL 0.4 MG/ML
0.2 VIAL (ML) INJECTION AS NEEDED
Status: DISCONTINUED | OUTPATIENT
Start: 2017-02-03 | End: 2017-02-03 | Stop reason: HOSPADM

## 2017-02-03 RX ORDER — MAGNESIUM HYDROXIDE 1200 MG/15ML
LIQUID ORAL AS NEEDED
Status: DISCONTINUED | OUTPATIENT
Start: 2017-02-03 | End: 2017-02-03 | Stop reason: HOSPADM

## 2017-02-03 RX ORDER — PROMETHAZINE HYDROCHLORIDE 25 MG/1
25 TABLET ORAL ONCE AS NEEDED
Status: DISCONTINUED | OUTPATIENT
Start: 2017-02-03 | End: 2017-02-03 | Stop reason: HOSPADM

## 2017-02-03 RX ORDER — SODIUM CHLORIDE, SODIUM LACTATE, POTASSIUM CHLORIDE, CALCIUM CHLORIDE 600; 310; 30; 20 MG/100ML; MG/100ML; MG/100ML; MG/100ML
9 INJECTION, SOLUTION INTRAVENOUS CONTINUOUS
Status: DISCONTINUED | OUTPATIENT
Start: 2017-02-03 | End: 2017-02-03

## 2017-02-03 RX ORDER — DEXTROSE, SODIUM CHLORIDE, AND POTASSIUM CHLORIDE 5; .45; .15 G/100ML; G/100ML; G/100ML
125 INJECTION INTRAVENOUS CONTINUOUS
Status: DISCONTINUED | OUTPATIENT
Start: 2017-02-03 | End: 2017-02-07

## 2017-02-03 RX ORDER — OXYCODONE AND ACETAMINOPHEN 7.5; 325 MG/1; MG/1
1 TABLET ORAL ONCE AS NEEDED
Status: DISCONTINUED | OUTPATIENT
Start: 2017-02-03 | End: 2017-02-03 | Stop reason: HOSPADM

## 2017-02-03 RX ORDER — PROPOFOL 10 MG/ML
VIAL (ML) INTRAVENOUS AS NEEDED
Status: DISCONTINUED | OUTPATIENT
Start: 2017-02-03 | End: 2017-02-03 | Stop reason: SURG

## 2017-02-03 RX ORDER — ONDANSETRON 2 MG/ML
4 INJECTION INTRAMUSCULAR; INTRAVENOUS ONCE AS NEEDED
Status: DISCONTINUED | OUTPATIENT
Start: 2017-02-03 | End: 2017-02-03 | Stop reason: HOSPADM

## 2017-02-03 RX ORDER — HYDROMORPHONE HCL IN 0.9% NACL 10 MG/50ML
PATIENT CONTROLLED ANALGESIA SYRINGE INTRAVENOUS CONTINUOUS
Status: DISCONTINUED | OUTPATIENT
Start: 2017-02-03 | End: 2017-02-06

## 2017-02-03 RX ORDER — PROMETHAZINE HYDROCHLORIDE 25 MG/1
12.5 TABLET ORAL ONCE AS NEEDED
Status: DISCONTINUED | OUTPATIENT
Start: 2017-02-03 | End: 2017-02-03 | Stop reason: HOSPADM

## 2017-02-03 RX ORDER — FAMOTIDINE 10 MG/ML
20 INJECTION, SOLUTION INTRAVENOUS ONCE
Status: COMPLETED | OUTPATIENT
Start: 2017-02-03 | End: 2017-02-03

## 2017-02-03 RX ORDER — SODIUM CHLORIDE 9 MG/ML
INJECTION, SOLUTION INTRAVENOUS CONTINUOUS PRN
Status: DISCONTINUED | OUTPATIENT
Start: 2017-02-03 | End: 2017-02-03 | Stop reason: SURG

## 2017-02-03 RX ORDER — FENTANYL CITRATE 50 UG/ML
INJECTION, SOLUTION INTRAMUSCULAR; INTRAVENOUS AS NEEDED
Status: DISCONTINUED | OUTPATIENT
Start: 2017-02-03 | End: 2017-02-03 | Stop reason: SURG

## 2017-02-03 RX ADMIN — HYDROMORPHONE HYDROCHLORIDE 0.5 MG: 1 INJECTION, SOLUTION INTRAMUSCULAR; INTRAVENOUS; SUBCUTANEOUS at 06:34

## 2017-02-03 RX ADMIN — POTASSIUM CHLORIDE, DEXTROSE MONOHYDRATE AND SODIUM CHLORIDE 100 ML/HR: 150; 5; 450 INJECTION, SOLUTION INTRAVENOUS at 09:47

## 2017-02-03 RX ADMIN — HYDROMORPHONE HYDROCHLORIDE 0.5 MG: 1 INJECTION, SOLUTION INTRAMUSCULAR; INTRAVENOUS; SUBCUTANEOUS at 09:43

## 2017-02-03 RX ADMIN — FENTANYL CITRATE 50 MCG: 50 INJECTION, SOLUTION INTRAMUSCULAR; INTRAVENOUS at 14:12

## 2017-02-03 RX ADMIN — SODIUM CHLORIDE, POTASSIUM CHLORIDE, SODIUM LACTATE AND CALCIUM CHLORIDE: 600; 310; 30; 20 INJECTION, SOLUTION INTRAVENOUS at 14:30

## 2017-02-03 RX ADMIN — FENTANYL CITRATE 50 MCG: 50 INJECTION, SOLUTION INTRAMUSCULAR; INTRAVENOUS at 14:05

## 2017-02-03 RX ADMIN — POTASSIUM CHLORIDE, DEXTROSE MONOHYDRATE AND SODIUM CHLORIDE 125 ML/HR: 150; 5; 450 INJECTION, SOLUTION INTRAVENOUS at 17:42

## 2017-02-03 RX ADMIN — SERTRALINE 100 MG: 100 TABLET, FILM COATED ORAL at 09:43

## 2017-02-03 RX ADMIN — PANTOPRAZOLE SODIUM 40 MG: 40 INJECTION, POWDER, FOR SOLUTION INTRAVENOUS at 06:06

## 2017-02-03 RX ADMIN — ROCURONIUM BROMIDE 10 MG: 10 INJECTION INTRAVENOUS at 15:20

## 2017-02-03 RX ADMIN — SUGAMMADEX 300 MG: 100 INJECTION, SOLUTION INTRAVENOUS at 15:35

## 2017-02-03 RX ADMIN — HYDROMORPHONE HYDROCHLORIDE 0.5 MG: 1 INJECTION, SOLUTION INTRAMUSCULAR; INTRAVENOUS; SUBCUTANEOUS at 00:53

## 2017-02-03 RX ADMIN — HYDROMORPHONE HYDROCHLORIDE 0.5 MG: 1 INJECTION, SOLUTION INTRAMUSCULAR; INTRAVENOUS; SUBCUTANEOUS at 16:48

## 2017-02-03 RX ADMIN — EPHEDRINE SULFATE 10 MG: 50 INJECTION INTRAMUSCULAR; INTRAVENOUS; SUBCUTANEOUS at 13:43

## 2017-02-03 RX ADMIN — FAMOTIDINE 20 MG: 10 INJECTION, SOLUTION INTRAVENOUS at 12:33

## 2017-02-03 RX ADMIN — ROCURONIUM BROMIDE 10 MG: 10 INJECTION INTRAVENOUS at 14:09

## 2017-02-03 RX ADMIN — Medication: at 16:20

## 2017-02-03 RX ADMIN — EPHEDRINE SULFATE 10 MG: 50 INJECTION INTRAMUSCULAR; INTRAVENOUS; SUBCUTANEOUS at 13:51

## 2017-02-03 RX ADMIN — ROCURONIUM BROMIDE 10 MG: 10 INJECTION INTRAVENOUS at 14:11

## 2017-02-03 RX ADMIN — LIDOCAINE HYDROCHLORIDE 100 MG: 20 INJECTION, SOLUTION INFILTRATION; PERINEURAL at 13:33

## 2017-02-03 RX ADMIN — HYDROMORPHONE HYDROCHLORIDE 0.5 MG: 1 INJECTION, SOLUTION INTRAMUSCULAR; INTRAVENOUS; SUBCUTANEOUS at 16:33

## 2017-02-03 RX ADMIN — CEFAZOLIN SODIUM 2 G: 2 INJECTION, SOLUTION INTRAVENOUS at 13:24

## 2017-02-03 RX ADMIN — FENTANYL CITRATE 100 MCG: 50 INJECTION, SOLUTION INTRAMUSCULAR; INTRAVENOUS at 13:28

## 2017-02-03 RX ADMIN — FENTANYL CITRATE 50 MCG: 50 INJECTION INTRAMUSCULAR; INTRAVENOUS at 12:33

## 2017-02-03 RX ADMIN — LISINOPRIL AND HYDROCHLOROTHIAZIDE 1 TABLET: 12.5; 2 TABLET ORAL at 09:43

## 2017-02-03 RX ADMIN — HYDROMORPHONE HYDROCHLORIDE 0.5 MG: 1 INJECTION, SOLUTION INTRAMUSCULAR; INTRAVENOUS; SUBCUTANEOUS at 17:49

## 2017-02-03 RX ADMIN — SODIUM CHLORIDE, POTASSIUM CHLORIDE, SODIUM LACTATE AND CALCIUM CHLORIDE 9 ML/HR: 600; 310; 30; 20 INJECTION, SOLUTION INTRAVENOUS at 12:34

## 2017-02-03 RX ADMIN — PHENYLEPHRINE HYDROCHLORIDE 200 MCG: 10 INJECTION INTRAVENOUS at 13:43

## 2017-02-03 RX ADMIN — EPHEDRINE SULFATE 10 MG: 50 INJECTION INTRAMUSCULAR; INTRAVENOUS; SUBCUTANEOUS at 14:53

## 2017-02-03 RX ADMIN — FENTANYL CITRATE 50 MCG: 50 INJECTION, SOLUTION INTRAMUSCULAR; INTRAVENOUS at 15:21

## 2017-02-03 RX ADMIN — ROCURONIUM BROMIDE 40 MG: 10 INJECTION INTRAVENOUS at 13:33

## 2017-02-03 RX ADMIN — HYDROMORPHONE HYDROCHLORIDE 0.5 MG: 1 INJECTION, SOLUTION INTRAMUSCULAR; INTRAVENOUS; SUBCUTANEOUS at 04:30

## 2017-02-03 RX ADMIN — PROPOFOL 150 MG: 10 INJECTION, EMULSION INTRAVENOUS at 13:33

## 2017-02-03 RX ADMIN — MIDAZOLAM 1 MG: 1 INJECTION INTRAMUSCULAR; INTRAVENOUS at 12:33

## 2017-02-03 RX ADMIN — SODIUM CHLORIDE: 9 INJECTION, SOLUTION INTRAVENOUS at 14:30

## 2017-02-03 NOTE — PAYOR COMM NOTE
"Bob Peres (46 y.o. Male)     Date of Birth Social Security Number Address Home Phone MRN    1970  [id number] [facility] [address]  [id number]    Holiness Marital Status          None        Admission Date Admission Type Admitting Provider Attending Provider Department, Room/Bed    1/27/17 Urgent Pal Crane MD Pokorny, Richard, MD [facility] [address] [place], P393/1    Discharge Date Discharge Disposition Discharge Destination                      Attending Provider: Pal Crane MD     Allergies:  No Known Allergies    Isolation:  None   Infection:  None   Code Status:  FULL    Ht:  68\" (172.7 cm)   Wt:  244 lb 11.4 oz (111 kg)    Admission Cmt:  None   Principal Problem:  SBO (small bowel obstruction) [K56.69]                 Active Insurance as of 1/27/2017     Primary Coverage     Payor Plan Insurance Group Employer/Plan Group    [employer] BLUE CROSS [employer] BLUE CROSS BLUE SHIELD PPO [id number]     Payor Plan Address Payor Plan Phone Number Effective From Effective To    [address] [phone] 6/1/2014     [place] [address]       Subscriber Name Subscriber Birth Date Member ID       BOB PERES 1970 [id number]                 Emergency Contacts      (Rel.) Home Phone Work Phone Mobile Phone    Hansa Whitman (Spouse) [phone] -- [phone]               History & Physical      Daniel Dye MD at 1/28/2017 11:56 AM          CHIEF COMPLAINT:    Abdominal pain and vomiting    HISTORY OF PRESENT ILLNESS:    Bob Peres is a 46 y.o. male who underwent a total proctocolectomy and end ileostomy a year and a half ago for familial adenomatous polyposis and rectal adenocarcinoma. He was admitted a week ago with similar symptoms and CT imaging that looked like a small bowel obstruction. Since discharge from the hospital, he has not been doing well. He kept on a liquid diet for the first two days, and ended up having a syncopal episode for " which his wife contacted EMS. After they evaluated him, it was decided that he did not need to be in the hospital.  He felt better the next day, but not well enough to eat much. Last night he began vomiting again and had centrally located cramping abdominal pain.    Past Medical History   Diagnosis Date   • Adenomatous polyposis      FAMILIAL    • Anemia    • Colon carcinoma      invasive adenocarcinoma of rectum w 59 benign lymph nodes   • Depression    • Hypertension    • Insomnia    • Renal calculi    • RLS (restless legs syndrome)    • Testicular hypofunction        Past Surgical History   Procedure Laterality Date   • Colonoscopy  06/16/1915     DR SHASTA HOWARD   • Endoscopy  06/23/2015     DR SHASTA HOWARD   • Abdominoperineal proctocolectomy  07/06/2015     DR MEET RUDOLPH   • Ileostomy  07/06/2015         Current Facility-Administered Medications:   •  HYDROmorphone (DILAUDID) injection 0.5 mg, 0.5 mg, Intravenous, Q2H PRN, 0.5 mg at 01/28/17 0826 **AND** naloxone (NARCAN) injection 0.4 mg, 0.4 mg, Intravenous, Q5 Min PRN, Daniel Dye MD  •  lisinopril-hydrochlorothiazide (PRINZIDE,ZESTORETIC) 20-12.5 MG per tablet 1 tablet, 1 tablet, Oral, Q24H, Daniel Dye MD, 1 tablet at 01/28/17 0826  •  ondansetron (ZOFRAN) injection 4 mg, 4 mg, Intravenous, Q6H PRN, Daniel Dye MD  •  pantoprazole (PROTONIX) injection 40 mg, 40 mg, Intravenous, Q AM, Daniel Dye MD, 40 mg at 01/28/17 0624  •  promethazine (PHENERGAN) injection 12.5 mg, 12.5 mg, Intravenous, Q6H PRN, Daniel Dye MD  •  sertraline (ZOLOFT) tablet 100 mg, 100 mg, Oral, Daily, Daniel Dye MD, 100 mg at 01/28/17 0826  •  sodium chloride 0.9 % flush 1-10 mL, 1-10 mL, Intravenous, PRN, Daniel Dye MD  •  sodium chloride 0.9 % infusion, 150 mL/hr, Intravenous, Continuous, Daniel Dye MD, Last Rate: 150 mL/hr at 01/28/17 0821, 150 mL/hr at 01/28/17 0821    No Known Allergies    Family History  "  Problem Relation Age of Onset   • Colon cancer Father    • Aneurysm Maternal Grandfather    • Colon cancer Paternal Grandfather    • Hypertension Maternal Uncle    • Diabetes Maternal Uncle        Social History     Social History   • Marital status:      Spouse name: Hansa   • Number of children: N/A   • Years of education: college     Occupational History   • Maintenance      Social History Main Topics   • Smoking status: Current Every Day Smoker   • Smokeless tobacco: Current User     Types: Chew      Comment: CHEWS TOBACCO   • Alcohol use 3.6 oz/week     6 Cans of beer per week      Comment: DAILY CONSUMPTION LEVEL   • Drug use: No   • Sexual activity: Defer     Other Topics Concern   • Not on file     Social History Narrative       Review of Systems   Constitutional: Positive for unexpected weight change (he estimates 18 lbs wt loss in 2 weeks).   HENT: Negative for trouble swallowing.    Eyes:        Color-blind   Respiratory: Negative for cough and shortness of breath.    Cardiovascular: Negative for chest pain.   Gastrointestinal: Positive for abdominal distention, abdominal pain, nausea and vomiting. Negative for blood in stool.        Decreased ileostomy output   Genitourinary: Negative for dysuria and hematuria.   Skin: Positive for wound (skin ulcer at the ileostomy site).   Neurological: Positive for syncope.   Psychiatric/Behavioral: Positive for confusion (on Wednesday post syncope). The patient is not nervous/anxious.        Objective     Visit Vitals   • /73 (BP Location: Left arm, Patient Position: Lying)   • Pulse 63   • Temp 97.3 °F (36.3 °C) (Oral)   • Resp 16   • Ht 68\" (172.7 cm)   • Wt 244 lb 11.4 oz (111 kg)   • SpO2 95%   • BMI 37.21 kg/m2       Physical Exam   Constitutional: He is oriented to person, place, and time. He appears well-developed and well-nourished. No distress.   HENT:   Head: Normocephalic and atraumatic.   Eyes: Conjunctivae are normal. No scleral icterus. "   Neck: Neck supple. No tracheal deviation present.   Cardiovascular: Normal rate, regular rhythm and normal heart sounds.    No murmur heard.  Pulmonary/Chest: Effort normal and breath sounds normal. No respiratory distress. He has no wheezes.   Abdominal: Soft. Bowel sounds are normal. He exhibits no distension and no mass. There is no tenderness. There is no rebound and no guarding. A hernia (paraileostomy hernia) is present.   There is a right abdominal ileostomy. The appliance contains pasty stool that appears not bloody   Musculoskeletal: He exhibits no edema.   Lymphadenopathy:     He has no cervical adenopathy.   Neurological: He is alert and oriented to person, place, and time.   Skin: Skin is warm and dry. He is not diaphoretic.   Ulcerated area under the ileostomy flange- not cellulitic   Psychiatric: He has a normal mood and affect. His behavior is normal.   Nursing note and vitals reviewed.      DIAGNOSTIC DATA:    CT images reviewed from prior admission. There were distended proximal small bowel loops and decompressed distal loops. There is a paraileostomy hernia that did not appear to be a point of obstruction.    KUB today shows air filled dilated small bowel consistent with bowel obstruction.    WBC 15.22, Hgb 15.3, Creat 1.12, CO2 23.9    ASSESSMENT:    Partial small bowel obstruction  Total proctocolectomy for familial polyposis and rectal adenocarcinoma    PLAN:    Admission  He has a non-toxic appearance, so I favor a period of IVF and bowel rest at present.  With regard to the ulcerated skin at the ileostomy, I offered consultation by the wound / ostomy nurse that he refused.     Electronically signed by Daniel Dye MD at 1/28/2017 12:18 PM        Hospital Medications (active)       Dose Frequency Start End    dextrose 5 % and sodium chloride 0.45 % with KCl 20 mEq/L infusion 100 mL/hr Continuous 2/1/2017     Sig - Route: Infuse 100 mL/hr into a venous catheter Continuous. - Intravenous  "   HYDROmorphone (DILAUDID) injection 0.5 mg 0.5 mg Every 2 Hours PRN 1/28/2017 2/7/2017    Sig - Route: Infuse 0.5 mg into a venous catheter Every 2 (Two) Hours As Needed for severe pain (7-10). - Intravenous    Linked Group 1:  \"And\" Linked Group Details        lisinopril-hydrochlorothiazide (PRINZIDE,ZESTORETIC) 20-12.5 MG per tablet 1 tablet 1 tablet Every 24 Hours Scheduled 1/28/2017     Sig - Route: Take 1 tablet by mouth Daily. - Oral    naloxone (NARCAN) injection 0.4 mg 0.4 mg Every 5 Minutes PRN 1/28/2017     Sig - Route: Infuse 1 mL into a venous catheter Every 5 (Five) Minutes As Needed for respiratory depression. - Intravenous    Linked Group 1:  \"And\" Linked Group Details        ondansetron (ZOFRAN) injection 4 mg 4 mg Every 6 Hours PRN 1/28/2017     Sig - Route: Infuse 2 mL into a venous catheter Every 6 (Six) Hours As Needed for nausea or vomiting. - Intravenous    pantoprazole (PROTONIX) injection 40 mg 40 mg Every Early Morning 1/28/2017     Sig - Route: Infuse 10 mL into a venous catheter Every Morning. - Intravenous    promethazine (PHENERGAN) injection 12.5 mg 12.5 mg Every 6 Hours PRN 1/28/2017     Sig - Route: Infuse 0.5 mL into a venous catheter Every 6 (Six) Hours As Needed for nausea or vomiting. - Intravenous    sertraline (ZOLOFT) tablet 100 mg 100 mg Daily 1/28/2017     Sig - Route: Take 1 tablet by mouth Daily. - Oral    sodium chloride 0.9 % flush 1-10 mL 1-10 mL As Needed 1/28/2017     Sig - Route: Infuse 1-10 mL into a venous catheter As Needed for line care. - Intravenous             Physician Progress Notes (last 72 hours) (Notes from 1/31/2017 10:42 AM through 2/3/2017 10:42 AM)      Pal Crane MD at 1/31/2017  2:02 PM  Version 1 of 1         CC:  Follow-up small bowel obstruction  HPI:  Had significant distention with some emesis during the day yesterday but as of 10 PM last night he's had no further nausea or vomiting and no significant crampy abdominal pain.  He is been " tolerating clear liquids all day today.  Ostomy appears to be functioning.  X-rays:  Plain abdominal films show small bowel distention but improved from 2 days ago on my review of images  PE:    Awake, alert  Lungs CTA, normal inspiratory effort  Heart RRR, no JVD  Abdomen: Soft, nondistended and not tender, bowel sounds are present  Vital signs: Afebrile, vital signs stable    IMPRESSION & PLAN:  Small bowel obstruction which clinically appears to be improving.  We will repeat abdominal series tomorrow.  We'll advance to regular diet today and see how he does.    Pal Crane M.D.     Electronically signed by Pal Crane MD at 1/31/2017  2:05 PM      Pal Crane MD at 2/1/2017  6:50 PM  Version 1 of 1         CC:  Follow-up small bowel obstruction  HPI: Again had episodes of emesis today, rather sleepy right now secondary to receiving Phenergan.  Pain has not been significant.  X-rays: Plain abdominal films today demonstrate improves bowel gas pattern, agree with report based on my review of images  PE:   Awake, sleepy due to Phenergan administration  Lungs CTA, normal inspiratory effort  Heart RRR, no JVD  Abdomen: Soft, nondistended and not tender, bowel sounds are present  Vital signs: Afebrile, vital signs stable     IMPRESSION & PLAN:  Small bowel obstruction which yesterday appeared to be improving but today he again has had 2 episodes of emesis despite plain films seemingly showing improvement.  I have recommended and ordered a small bowel follow-through to start the morning and if high-grade partial or complete bowel obstruction confirmed then we will proceed with surgery.     Pal Crane M.D.     Electronically signed by Pal Crane MD at 2/1/2017  6:53 PM      Pal Crane MD at 2/2/2017  5:11 PM  Version 1 of 1         CC:  Follow-up small bowel obstruction  HPI: No further emesis since nothing by mouth.  Abdominal pain minimal.  X-rays: Small bowel follow-through shows dilated  small bowel throughout course of study thus far.  No transition point has been seen.  I reviewed the images and discussed with Dr. Pickett.  PE:   Awake, sleepy due to Phenergan administration  Lungs CTA, normal inspiratory effort  Heart RRR, no JVD  Abdomen: Soft, nondistended and not tender, bowel sounds are present  Vital signs: Afebrile, vital signs stable      IMPRESSION & PLAN:  Small bowel obstruction.  Based on the small bowel follow-through results thus far I am starting to wonder whether the main source of obstruction is the paraesophageal hernia or stoma configuration itself.  We'll continue to follow films tonight and first thing in the morning.  If no intra-abdominal transition point present them we will likely proceed with stomal revision tomorrow.      Pal Crane M.D.     Electronically signed by Pal Crane MD at 2/2/2017  5:13 PM        Consult Notes (last 72 hours) (Notes from 1/31/2017 10:42 AM through 2/3/2017 10:42 AM)     No notes of this type exist for this encounter.        ATTN: NURSE REVIEW    REQUESTING CONTINUED STAY FOR ADDITIONAL DAYS FROM 2-1 FORWARD   REF#[id number]  PLEASE CALL BACK TO MIKE [phone] OR [phone]  THANKS!  MIKE

## 2017-02-03 NOTE — ANESTHESIA PREPROCEDURE EVALUATION
Anesthesia Evaluation     Patient summary reviewed and Nursing notes reviewed    Airway   Mallampati: II  TM distance: >3 FB  Neck ROM: full  no difficulty expected  Dental - normal exam     Pulmonary - negative pulmonary ROS    breath sounds clear to auscultation  Cardiovascular   (+) hypertension,     ECG reviewed  Rhythm: regular    Neuro/Psych- negative ROS  GI/Hepatic/Renal/Endo    (-) renal disease    ROS Comment: Colon cancer c ileoostomy    Musculoskeletal (-) negative ROS    Abdominal    Substance History - negative use     OB/GYN negative ob/gyn ROS         Other                         Anesthesia Plan    ASA 3     general     intravenous induction   Anesthetic plan and risks discussed with patient.    Plan discussed with CRNA.

## 2017-02-03 NOTE — TELEPHONE ENCOUNTER
----- Message from Jacob Shelby MD sent at 2/2/2017  9:29 PM EST -----  Regarding: inpatient  He is inpatient so please reschedule him for a few weeks from now.  Thanks,  BAUTISTA

## 2017-02-03 NOTE — OP NOTE
PREOPERATIVE DIAGNOSIS:  Small bowel obstruction    POSTOPERATIVE DIAGNOSIS AND FINDINGS:  Adhesive small bowel obstruction    PROCEDURE:  Open small bowel resection    SURGEON:  Pal Crane MD    ASSISTANT:  Candice Nielsen    ANESTHESIA:  General    EBL:  Minimal    DESCRIPTION:  In supine position under general anesthetic prepped and draped usual sterile manner.  Midline laparotomy incision made and peritoneal cavity entered.  Minimal intraperitoneal adhesions were encountered.  There was one loop of small bowel which was mid jejunal that was densely adhered to the pelvis and with blunt and sharp dissection this was pulled out of the pelvis.  The actual focus of obstruction though was just proximal to this loop and caused by a firm adhesive band between 2 loops of jejunum without involvement of omentum or abdominal wall or mesentery.  The obstructed area appeared chronically obstructed and strictured and I elected therefore to resect this section of small bowel as well as dissection which was adhesed into the pelvis.  I divided the small bowel proximal and distal with the PAUL stapler.  There was an additional area of jejunum that was scarred to that loop that I simply excised off of that distended loop of jejunum and closed the enterotomy in 2 layers.  I oversewed the staple line on the distended jejunum and then attempted and and of jejunum to side of distended jejunum anastomosis but during the course of the anastomosis to 2-0 silk sutures being used for the seromuscular layer and several areas tore through the small bowel.  I elected to take the resection slightly more proximal where the bowel was less thickened and more pliable and therefore re-divided it at that point with the PAUL stapler and just distal to the previous distal division with the PAUL stapler and came through that mesentery with the Enseal device.  I then fashioned a side of proximal jejunum to end of distal jejunum handsewn 2 layer  anastomosis of outer interrupted 2-0 silk and running interlocking 3-0 chromic.  Mesentery was closed with interrupted 2-0 silk.  Abdomen was irrigated with bacitracin solution and good hemostasis was noted.  Seprafilm was placed in the pelvis bowel was appropriately oriented and fascia was closed with running 0 PDS.  Skin was closed with staples.  Sterile dressing applied, tolerated well.    Pal Crane M.D.

## 2017-02-03 NOTE — PROGRESS NOTES
Continued Stay Note  [facility]     Patient Name: Nikolai Shaw  MRN: [id number]  Today's Date: 2/3/2017    Admit Date: 1/27/2017          Discharge Plan       02/03/17 1712    Case Management/Social Work Plan    Plan  Plans dc home with spouse. Currently in OR for open small bowel resection. Has used [facility] in the past. Continue to follow for potential post-op needs.    Patient/Family In Agreement With Plan yes              Discharge Codes     None            Aniyah Hernandez RN

## 2017-02-03 NOTE — ANESTHESIA PROCEDURE NOTES
Airway  Urgency: elective    Date/Time: 2/3/2017 1:36 PM  Airway not difficult    General Information and Staff    Patient location during procedure: OR  Anesthesiologist: SHARONDA ALAN  CRNA: JERONIMO CHAVES    Indications and Patient Condition  Indications for airway management: airway protection    Preoxygenated: yes  Mask difficulty assessment: 2 - vent by mask + OA or adjuvant +/- NMBA    Final Airway Details  Final airway type: endotracheal airway      Successful airway: ETT  Cuffed: yes   Successful intubation technique: direct laryngoscopy  Endotracheal tube insertion site: oral  Blade: Alexis  Blade size: #3  ETT size: 7.5 mm  Cormack-Lehane Classification: grade IIa - partial view of glottis  Placement verified by: chest auscultation and capnometry   Cuff volume (mL): 9  Measured from: lips  ETT to lips (cm): 22  Number of attempts at approach: 1    Additional Comments  EBBS: ETCO2+: Lips and teeth same as pre op

## 2017-02-03 NOTE — PLAN OF CARE
Problem: Perioperative Period (Adult)  Goal: Signs and Symptoms of Listed Potential Problems Will be Absent or Manageable (Perioperative Period)  Outcome: Ongoing (interventions implemented as appropriate)    02/03/17 2104   Perioperative Period   Problems Assessed (Perioperative Period) all   Problems Present (Perioperative Period) pain

## 2017-02-03 NOTE — ANESTHESIA POSTPROCEDURE EVALUATION
Patient: Nikolai Shaw    Procedure Summary     Date Anesthesia Start Anesthesia Stop Room / Location    02/03/17 1324 1427  [facility] OR 11 / [facility]       Procedure Diagnosis Surgeon Provider    LAPAROTOMY EXPLORATORY  SMALL BOWEL OBSTRUCTION (N/A Abdomen) No diagnosis on file. MD Isrrael Cameron MD          Anesthesia Type: general  Last vitals  /53 (02/03/17 1650)    Temp      Pulse 67 (02/03/17 1650)   Resp 14 (02/03/17 1650)    SpO2 97 % (02/03/17 1650)      Post Anesthesia Care and Evaluation    Patient location during evaluation: PACU  Patient participation: complete - patient participated  Level of consciousness: awake and alert  Pain score: 0  Pain management: adequate  Airway patency: patent  Anesthetic complications: No anesthetic complications    Cardiovascular status: acceptable  Respiratory status: acceptable  Hydration status: acceptable

## 2017-02-03 NOTE — ANESTHESIA POSTPROCEDURE EVALUATION
Patient: Nikolai Shaw    Procedure Summary     Date Anesthesia Start Anesthesia Stop Room / Location    02/03/17 1324 3192  [facility] OR 11 / [facility]       Procedure Diagnosis Surgeon Provider    LAPAROTOMY EXPLORATORY  SMALL BOWEL OBSTRUCTION (N/A Abdomen) No diagnosis on file. MD Isrrael Cameron MD          Anesthesia Type: general  Last vitals  BP     Temp      Pulse     Resp      SpO2        Post Anesthesia Care and Evaluation    Patient location during evaluation: bedside  Patient participation: complete - patient participated  Level of consciousness: awake  Pain score: 1  Pain management: adequate  Airway patency: patent  Anesthetic complications: No anesthetic complications    Cardiovascular status: acceptable  Respiratory status: acceptable  Hydration status: acceptable

## 2017-02-04 PROCEDURE — 99024 POSTOP FOLLOW-UP VISIT: CPT | Performed by: SURGERY

## 2017-02-04 RX ADMIN — POTASSIUM CHLORIDE, DEXTROSE MONOHYDRATE AND SODIUM CHLORIDE 125 ML/HR: 150; 5; 450 INJECTION, SOLUTION INTRAVENOUS at 09:25

## 2017-02-04 RX ADMIN — Medication: at 13:52

## 2017-02-04 RX ADMIN — PANTOPRAZOLE SODIUM 40 MG: 40 INJECTION, POWDER, FOR SOLUTION INTRAVENOUS at 06:16

## 2017-02-04 RX ADMIN — POTASSIUM CHLORIDE, DEXTROSE MONOHYDRATE AND SODIUM CHLORIDE 125 ML/HR: 150; 5; 450 INJECTION, SOLUTION INTRAVENOUS at 17:10

## 2017-02-04 RX ADMIN — POTASSIUM CHLORIDE, DEXTROSE MONOHYDRATE AND SODIUM CHLORIDE 125 ML/HR: 150; 5; 450 INJECTION, SOLUTION INTRAVENOUS at 01:47

## 2017-02-04 RX ADMIN — POTASSIUM CHLORIDE, DEXTROSE MONOHYDRATE AND SODIUM CHLORIDE 125 ML/HR: 150; 5; 450 INJECTION, SOLUTION INTRAVENOUS at 23:46

## 2017-02-04 NOTE — PROGRESS NOTES
Chief Complaint: POD # 1    Subjective   Pt reports he has not been out of bed, pain is controlled, denies vomiting but is nauseated, requesting montez removal    Objective     Vital signs in last 24 hours:  Temp:  [97.4 °F (36.3 °C)-98.5 °F (36.9 °C)] 98.3 °F (36.8 °C)  Heart Rate:  [60-76] 74  Resp:  [14-20] 16  BP: ()/(42-70) 100/59    Intake/Output last 3 shifts:  I/O last 3 completed shifts:  In: 5021 [I.V.:5021]  Out: 4225 [Urine:300; Stool:3925]    Intake/Output this shift:       Results from last 7 days  Lab Units 02/03/17  1227   WBC 10*3/mm3 14.74*   HEMOGLOBIN g/dL 14.5   HEMATOCRIT % 42.5   PLATELETS 10*3/mm3 352       Results from last 7 days  Lab Units 02/03/17  1226   SODIUM mmol/L 136   POTASSIUM mmol/L 4.1   CHLORIDE mmol/L 97*   TOTAL CO2 mmol/L 24.9   BUN mg/dL 14   CREATININE mg/dL 1.43*   GLUCOSE mg/dL 90   CALCIUM mg/dL 9.2       Physical Exam:  Lungs: CTA, good inspiratory effect  CV: RRR  Abd: no BS, soft, ND, usual post-op tenderness, no rebound, no guarding, incision bandage dry, ileostomy with small amount of stool    Assessment/Plan   S/P Open Small Bowel adhesiolysis   Expected post-op ileus - await bowel function  D/c montez  Ambulate Pt

## 2017-02-04 NOTE — PLAN OF CARE
Problem: Patient Care Overview (Adult)  Goal: Plan of Care Review  Outcome: Ongoing (interventions implemented as appropriate)    02/04/17 0150   Coping/Psychosocial Response Interventions   Plan Of Care Reviewed With patient   Outcome Evaluation   Outcome Summary/Follow up Plan Doing well post op. Pain controlled with Dilaudid PCA. Continues to be NPO, VSS. No complaints of nausea. Hypoactive BS. Continue to moniitor.    Patient Care Overview   Progress progress toward functional goals as expected       Goal: Discharge Needs Assessment  Outcome: Ongoing (interventions implemented as appropriate)    Problem: Pain, Acute (Adult)  Goal: Acceptable Pain Control/Comfort Level  Outcome: Ongoing (interventions implemented as appropriate)    Problem: Bowel Obstruction (Adult)  Goal: Signs and Symptoms of Listed Potential Problems Will be Absent or Manageable (Bowel Obstruction)  Outcome: Ongoing (interventions implemented as appropriate)    Problem: Perioperative Period (Adult)  Goal: Signs and Symptoms of Listed Potential Problems Will be Absent or Manageable (Perioperative Period)  Outcome: Ongoing (interventions implemented as appropriate)

## 2017-02-05 PROCEDURE — 25010000002 PROMETHAZINE PER 50 MG: Performed by: SURGERY

## 2017-02-05 PROCEDURE — 99024 POSTOP FOLLOW-UP VISIT: CPT | Performed by: SURGERY

## 2017-02-05 RX ADMIN — PANTOPRAZOLE SODIUM 40 MG: 40 INJECTION, POWDER, FOR SOLUTION INTRAVENOUS at 06:21

## 2017-02-05 RX ADMIN — POTASSIUM CHLORIDE, DEXTROSE MONOHYDRATE AND SODIUM CHLORIDE 125 ML/HR: 150; 5; 450 INJECTION, SOLUTION INTRAVENOUS at 07:28

## 2017-02-05 RX ADMIN — PROMETHAZINE HYDROCHLORIDE 12.5 MG: 25 INJECTION INTRAMUSCULAR; INTRAVENOUS at 01:29

## 2017-02-05 RX ADMIN — SERTRALINE 100 MG: 100 TABLET, FILM COATED ORAL at 09:24

## 2017-02-05 RX ADMIN — POTASSIUM CHLORIDE, DEXTROSE MONOHYDRATE AND SODIUM CHLORIDE 125 ML/HR: 150; 5; 450 INJECTION, SOLUTION INTRAVENOUS at 15:42

## 2017-02-05 RX ADMIN — Medication: at 10:20

## 2017-02-05 RX ADMIN — POTASSIUM CHLORIDE, DEXTROSE MONOHYDRATE AND SODIUM CHLORIDE 125 ML/HR: 150; 5; 450 INJECTION, SOLUTION INTRAVENOUS at 23:19

## 2017-02-05 NOTE — PLAN OF CARE
Problem: Patient Care Overview (Adult)  Goal: Plan of Care Review  Outcome: Ongoing (interventions implemented as appropriate)    02/04/17 1953 02/04/17 2000 02/05/17 0033   Coping/Psychosocial Response Interventions   Plan Of Care Reviewed With --  patient --    Outcome Evaluation   Outcome Summary/Follow up Plan --  --  Ileostomy with loose stool. Dilaudid pca controlling incisonal pain. Midline abdominal incision covered with island dressing.    Patient Care Overview   Progress improving --  --        Goal: Adult Individualization and Mutuality  Outcome: Ongoing (interventions implemented as appropriate)  Goal: Discharge Needs Assessment  Outcome: Ongoing (interventions implemented as appropriate)    Problem: Pain, Acute (Adult)  Goal: Acceptable Pain Control/Comfort Level  Outcome: Ongoing (interventions implemented as appropriate)    Problem: Bowel Obstruction (Adult)  Goal: Signs and Symptoms of Listed Potential Problems Will be Absent or Manageable (Bowel Obstruction)  Outcome: Ongoing (interventions implemented as appropriate)    Problem: Perioperative Period (Adult)  Goal: Signs and Symptoms of Listed Potential Problems Will be Absent or Manageable (Perioperative Period)  Outcome: Ongoing (interventions implemented as appropriate)

## 2017-02-05 NOTE — PLAN OF CARE
Problem: Patient Care Overview (Adult)  Goal: Plan of Care Review  Outcome: Ongoing (interventions implemented as appropriate)    02/05/17 1092   Coping/Psychosocial Response Interventions   Plan Of Care Reviewed With patient   Outcome Evaluation   Outcome Summary/Follow up Plan pain controlled with PCA dilaudid, ambulated in browne x2, tolerating clear liquid diet, midline incision with staples intact, illeostomy with stool   Patient Care Overview   Progress improving       Goal: Adult Individualization and Mutuality  Outcome: Ongoing (interventions implemented as appropriate)  Goal: Discharge Needs Assessment  Outcome: Ongoing (interventions implemented as appropriate)    Problem: Pain, Acute (Adult)  Goal: Acceptable Pain Control/Comfort Level  Outcome: Ongoing (interventions implemented as appropriate)    Problem: Bowel Obstruction (Adult)  Goal: Signs and Symptoms of Listed Potential Problems Will be Absent or Manageable (Bowel Obstruction)  Outcome: Ongoing (interventions implemented as appropriate)    Problem: Perioperative Period (Adult)  Goal: Signs and Symptoms of Listed Potential Problems Will be Absent or Manageable (Perioperative Period)  Outcome: Ongoing (interventions implemented as appropriate)

## 2017-02-05 NOTE — PROGRESS NOTES
Chief Complaint: POD # 2    Subjective   Pt reports emptying his ostomy bag x 2, has an appetite, no Nausea or Vomiting, ambulating in browne    Objective     Vital signs in last 24 hours:  Temp:  [98.2 °F (36.8 °C)-99.9 °F (37.7 °C)] 98.7 °F (37.1 °C)  Heart Rate:  [76-93] 76  Resp:  [16-18] 16  BP: ()/(58-70) 98/58    Intake/Output last 3 shifts:  I/O last 3 completed shifts:  In: 5139.4 [I.V.:5139.4]  Out: 950 [Urine:750; Stool:200]    Intake/Output this shift:  I/O this shift:  In: -   Out: 250 [Urine:250]    Physical Exam:  Respiratory: CTA, good inspiratory effort  CV: RRR  Abd: + BS, soft, ND, usual post-op tenderness, no rebound, no guarding, ileostomy with stool, incision no erythema, no drainage    Assessment/Plan   S/P small bowel adhesiolysis  Ileus resolving - start on clear liquids    Ifeoma Canales MD

## 2017-02-05 NOTE — PLAN OF CARE
Problem: Patient Care Overview (Adult)  Goal: Plan of Care Review  Outcome: Ongoing (interventions implemented as appropriate)    02/04/17 1953   Coping/Psychosocial Response Interventions   Plan Of Care Reviewed With patient;spouse   Outcome Evaluation   Outcome Summary/Follow up Plan pain controlled with PCA dilaudid, cont to be NPO except ice chips, montez dcd this am , voiding without difficulty, ambulated in browne for short distance, 100cc output of loose BM form ostomy   Patient Care Overview   Progress improving       Goal: Adult Individualization and Mutuality  Outcome: Ongoing (interventions implemented as appropriate)  Goal: Discharge Needs Assessment  Outcome: Ongoing (interventions implemented as appropriate)    Problem: Pain, Acute (Adult)  Goal: Acceptable Pain Control/Comfort Level  Outcome: Ongoing (interventions implemented as appropriate)    Problem: Bowel Obstruction (Adult)  Goal: Signs and Symptoms of Listed Potential Problems Will be Absent or Manageable (Bowel Obstruction)  Outcome: Ongoing (interventions implemented as appropriate)    Problem: Perioperative Period (Adult)  Goal: Signs and Symptoms of Listed Potential Problems Will be Absent or Manageable (Perioperative Period)  Outcome: Ongoing (interventions implemented as appropriate)

## 2017-02-06 LAB
BASOPHILS # BLD AUTO: 0.03 10*3/MM3 (ref 0–0.2)
BASOPHILS NFR BLD AUTO: 0.2 % (ref 0–1.5)
CYTO UR: NORMAL
DEPRECATED RDW RBC AUTO: 46.1 FL (ref 37–54)
EOSINOPHIL # BLD AUTO: 0.4 10*3/MM3 (ref 0–0.7)
EOSINOPHIL NFR BLD AUTO: 2.9 % (ref 0.3–6.2)
ERYTHROCYTE [DISTWIDTH] IN BLOOD BY AUTOMATED COUNT: 12.8 % (ref 11.5–14.5)
HCT VFR BLD AUTO: 36.4 % (ref 40.4–52.2)
HGB BLD-MCNC: 12.1 G/DL (ref 13.7–17.6)
IMM GRANULOCYTES # BLD: 0.03 10*3/MM3 (ref 0–0.03)
IMM GRANULOCYTES NFR BLD: 0.2 % (ref 0–0.5)
LAB AP CASE REPORT: NORMAL
LYMPHOCYTES # BLD AUTO: 1.39 10*3/MM3 (ref 0.9–4.8)
LYMPHOCYTES NFR BLD AUTO: 10.3 % (ref 19.6–45.3)
Lab: NORMAL
Lab: NORMAL
MCH RBC QN AUTO: 32.5 PG (ref 27–32.7)
MCHC RBC AUTO-ENTMCNC: 33.2 G/DL (ref 32.6–36.4)
MCV RBC AUTO: 97.8 FL (ref 79.8–96.2)
MONOCYTES # BLD AUTO: 0.93 10*3/MM3 (ref 0.2–1.2)
MONOCYTES NFR BLD AUTO: 6.9 % (ref 5–12)
NEUTROPHILS # BLD AUTO: 10.78 10*3/MM3 (ref 1.9–8.1)
NEUTROPHILS NFR BLD AUTO: 79.5 % (ref 42.7–76)
PATH REPORT.FINAL DX SPEC: NORMAL
PATH REPORT.GROSS SPEC: NORMAL
PLATELET # BLD AUTO: 282 10*3/MM3 (ref 140–500)
PMV BLD AUTO: 10.2 FL (ref 6–12)
RBC # BLD AUTO: 3.72 10*6/MM3 (ref 4.6–6)
WBC NRBC COR # BLD: 13.56 10*3/MM3 (ref 4.5–10.7)

## 2017-02-06 PROCEDURE — 25010000002 HYDROMORPHONE PER 4 MG: Performed by: SURGERY

## 2017-02-06 PROCEDURE — 25010000002 MORPHINE PER 10 MG: Performed by: SURGERY

## 2017-02-06 PROCEDURE — 85025 COMPLETE CBC W/AUTO DIFF WBC: CPT | Performed by: SURGERY

## 2017-02-06 PROCEDURE — 87040 BLOOD CULTURE FOR BACTERIA: CPT | Performed by: SURGERY

## 2017-02-06 RX ORDER — OXYCODONE HYDROCHLORIDE AND ACETAMINOPHEN 5; 325 MG/1; MG/1
2 TABLET ORAL EVERY 4 HOURS PRN
Status: DISCONTINUED | OUTPATIENT
Start: 2017-02-06 | End: 2017-02-09 | Stop reason: HOSPADM

## 2017-02-06 RX ORDER — MORPHINE SULFATE 2 MG/ML
2 INJECTION, SOLUTION INTRAMUSCULAR; INTRAVENOUS EVERY 4 HOURS PRN
Status: DISCONTINUED | OUTPATIENT
Start: 2017-02-06 | End: 2017-02-09 | Stop reason: HOSPADM

## 2017-02-06 RX ORDER — ACETAMINOPHEN 325 MG/1
650 TABLET ORAL EVERY 4 HOURS PRN
Status: DISCONTINUED | OUTPATIENT
Start: 2017-02-06 | End: 2017-02-09 | Stop reason: HOSPADM

## 2017-02-06 RX ADMIN — MORPHINE SULFATE 2 MG: 2 INJECTION, SOLUTION INTRAMUSCULAR; INTRAVENOUS at 23:57

## 2017-02-06 RX ADMIN — POTASSIUM CHLORIDE, DEXTROSE MONOHYDRATE AND SODIUM CHLORIDE 125 ML/HR: 150; 5; 450 INJECTION, SOLUTION INTRAVENOUS at 06:54

## 2017-02-06 RX ADMIN — SERTRALINE 100 MG: 100 TABLET, FILM COATED ORAL at 08:28

## 2017-02-06 RX ADMIN — OXYCODONE HYDROCHLORIDE AND ACETAMINOPHEN 2 TABLET: 5; 325 TABLET ORAL at 15:51

## 2017-02-06 RX ADMIN — POTASSIUM CHLORIDE, DEXTROSE MONOHYDRATE AND SODIUM CHLORIDE 125 ML/HR: 150; 5; 450 INJECTION, SOLUTION INTRAVENOUS at 14:51

## 2017-02-06 RX ADMIN — PANTOPRAZOLE SODIUM 40 MG: 40 INJECTION, POWDER, FOR SOLUTION INTRAVENOUS at 06:54

## 2017-02-06 RX ADMIN — Medication: at 07:34

## 2017-02-06 RX ADMIN — OXYCODONE HYDROCHLORIDE AND ACETAMINOPHEN 2 TABLET: 5; 325 TABLET ORAL at 20:57

## 2017-02-06 RX ADMIN — HYDROMORPHONE HYDROCHLORIDE 0.5 MG: 1 INJECTION, SOLUTION INTRAMUSCULAR; INTRAVENOUS; SUBCUTANEOUS at 18:30

## 2017-02-06 RX ADMIN — POTASSIUM CHLORIDE, DEXTROSE MONOHYDRATE AND SODIUM CHLORIDE 125 ML/HR: 150; 5; 450 INJECTION, SOLUTION INTRAVENOUS at 22:42

## 2017-02-06 NOTE — NURSING NOTE
SHOAIBN- checked on patient. He will plan to change the ostomy appliance tomorrow and I will come for that. He states he has a persistent wound around the stoma that needs to be evaluated again.

## 2017-02-06 NOTE — PROGRESS NOTES
Continued Stay Note  [facility]     Patient Name: Nikolai Shaw  MRN: [id number]  Today's Date: 2/6/2017    Admit Date: 1/27/2017          Discharge Plan       02/06/17 1421    Case Management/Social Work Plan    Plan Plans dc home with spouse. Has used [facility] in the past. S/P open small bowei resection.Continue to follow for potential post-op needs.    Patient/Family In Agreement With Plan yes              Discharge Codes     None            Aniyah Hernandez RN

## 2017-02-06 NOTE — PLAN OF CARE
Problem: Patient Care Overview (Adult)  Goal: Plan of Care Review  Outcome: Ongoing (interventions implemented as appropriate)    02/06/17 1622   Coping/Psychosocial Response Interventions   Plan Of Care Reviewed With patient;spouse   Outcome Evaluation   Outcome Summary/Follow up Plan ileostomy with loose stool, temp max 100. encouraged use of I.S., walked in hallways, pca dc/d, given percocet   Patient Care Overview   Progress improving       Goal: Adult Individualization and Mutuality  Outcome: Ongoing (interventions implemented as appropriate)  Goal: Discharge Needs Assessment  Outcome: Ongoing (interventions implemented as appropriate)    Problem: Pain, Acute (Adult)  Goal: Acceptable Pain Control/Comfort Level  Outcome: Ongoing (interventions implemented as appropriate)    Problem: Bowel Obstruction (Adult)  Goal: Signs and Symptoms of Listed Potential Problems Will be Absent or Manageable (Bowel Obstruction)  Outcome: Ongoing (interventions implemented as appropriate)    Problem: Perioperative Period (Adult)  Goal: Signs and Symptoms of Listed Potential Problems Will be Absent or Manageable (Perioperative Period)  Outcome: Outcome(s) achieved Date Met:  02/06/17

## 2017-02-06 NOTE — PROGRESS NOTES
"Adult Nutrition  Assessment/PES    Patient Name:  Nikolai Shaw  YOB: 1970  MRN: [id number]  Admit Date:  1/27/2017    Assessment Date:  2/6/2017     Patient seen-tolerating clear liquids and stated might be advanced to full liquids today per MD; encouraged intake and patient agreed to ensure clear; RD to monitor and follow           Reason for Assessment       02/06/17 1349    Reason for Assessment    Reason For Assessment/Visit length of stay    Diagnosis --   small bowel obstruction                Anthropometrics       02/06/17 1349    Anthropometrics (Special Considerations)    Height Used for Calculations 1.727 m (5' 8\")    Weight Used for Calculations 111 kg (244 lb)    RD Calculated IBW 68.4    RD Calculated %     RD Calculated BMI (kg/m2) 37.2    Body Mass Index (BMI)    BMI Grade 35 - 39.9 - obesity - grade II            Labs/Tests/Procedures/Meds       02/06/17 1350    Labs/Tests/Procedures/Meds    Diagnostic Test/Procedure Review reviewed    Labs/Tests Review Reviewed;Creat    Medication Review Reviewed, pertinent   PPI, D5% with NaCl    Significant Vitals reviewed            Physical Findings       02/06/17 1350    Physical Findings/Assessment    Additional Documentation --   B=20; ileostomy            Estimated/Assessed Needs       02/06/17 1350    Calculation Measurements    Weight Used For Calculations 111 kg (244 lb)    Height Used for Calculations 1.727 m (5' 8\")    Estimated/Assessed Energy Needs    Energy Need Method Kcal/kg    kcal/kg 20    20 Kcal/Kg (kcal) 2213.56    Estimated/Assessed Protein Needs    Weight Used for Protein Calculation 111 kg (244 lb)    Protein (gm/kg) 0.8    0.8 Gm Protein (gm) 88.54    Estimated/Assessed Fluid Needs    Fluid Need Method RDA method    RDA Method (mL)  2200            Nutrition Prescription Ordered       02/06/17 1351    Nutrition Prescription PO    Current PO Diet Clear Liquid            Evaluation of Received Nutrient/Fluid Intake      "  02/06/17 1351    PO Evaluation    % PO Intake tolerating clears              Problem/Interventions:        Problem 1       02/06/17 1351    Nutrition Diagnoses Problem 1    Problem 1 Overweight/Obesity    Etiology (related to) MNT for Treatment/Condition    Signs/Symptoms (evidenced by) BMI    BMI 35 - 39.9                    Intervention Goal       02/06/17 1351    Intervention Goal    General Maintain nutrition    PO Tolerate PO;Advance diet    Weight Appropriate weight loss            Nutrition Intervention       02/06/17 1351    Nutrition Intervention    RD/Tech Action Interview for preference;Follow Tx progress;Care plan reviewd;Supplement provided;Encourage intake            Nutrition Prescription       02/06/17 1351    Nutrition Prescription PO    PO Prescription Begin/change supplement    Supplement Ensure Clear    Supplement Frequency 2 times a day    New PO Prescription Ordered? Yes            Education/Evaluation       02/06/17 1351    Education    Education Will Instruct as appropriate    Monitor/Evaluation    Monitor Per protocol    Education Follow-up Reinforce PRN          Electronically signed by:  Gladis Rod RD  02/06/17 1:51 PM

## 2017-02-06 NOTE — PAYOR COMM NOTE
"Bob Peres (46 y.o. Male)     Date of Birth Social Security Number Address Home Phone MRN    1970  [id number] [address]  [id number]    Yarsani Marital Status          None        Admission Date Admission Type Admitting Provider Attending Provider Department, Room/Bed    1/27/17 Urgent Pal Crane MD Pokorny, Richard, MD [facility] [address] [place], P393/1    Discharge Date Discharge Disposition Discharge Destination                      Attending Provider: Pal Crane MD     Allergies:  No Known Allergies    Isolation:  None   Infection:  None   Code Status:  FULL    Ht:  68\" (172.7 cm)   Wt:  244 lb 11.4 oz (111 kg)    Admission Cmt:  None   Principal Problem:  SBO (small bowel obstruction) [K56.69]                 Active Insurance as of 1/27/2017     Primary Coverage     Payor Plan Insurance Group Employer/Plan Group    [employer] BLUE CROSS [employer] BLUE CROSS BLUE SHIELD PPO [id number]     Payor Plan Address Payor Plan Phone Number Effective From Effective To    [address] [phone] 6/1/2014     [place] [address]       Subscriber Name Subscriber Birth Date Member ID       BOB PERES 1970 [id number]                 Emergency Contacts      (Rel.) Home Phone Work Phone Mobile Phone    Hansa Whitman (Spouse) [phone] -- [phone]            [facility] Medications (active)       Dose Frequency Start End    acetaminophen (TYLENOL) tablet 650 mg 650 mg Every 4 Hours PRN 2/6/2017 2/11/2017    Sig - Route: Take 2 tablets by mouth Every 4 (Four) Hours As Needed for fever. - Oral    dextrose 5 % and sodium chloride 0.45 % with KCl 20 mEq/L infusion 125 mL/hr Continuous 2/3/2017     Sig - Route: Infuse 125 mL/hr into a venous catheter Continuous. - Intravenous    HYDROmorphone (DILAUDID) injection 0.5 mg 0.5 mg Every 2 Hours PRN 1/28/2017 2/7/2017    Sig - Route: Infuse 0.5 mg into a venous catheter Every 2 (Two) Hours As Needed for " "severe pain (7-10). - Intravenous    Linked Group 1:  \"And\" Linked Group Details        HYDROmorphone (DILAUDID) PCA 0.2 mg/ml 50 mL syringe  Continuous 2/3/2017 2/17/2017    Sig - Route: Infuse  into a venous catheter Continuous. - Intravenous    lisinopril-hydrochlorothiazide (PRINZIDE,ZESTORETIC) 20-12.5 MG per tablet 1 tablet 1 tablet Every 24 Hours Scheduled 1/28/2017     Sig - Route: Take 1 tablet by mouth Daily. - Oral    naloxone (NARCAN) injection 0.1 mg 0.1 mg Every 5 Minutes PRN 2/3/2017     Sig - Route: Infuse 0.25 mL into a venous catheter Every 5 (Five) Minutes As Needed for opioid reversal or respiratory depression (see administration instructions). - Intravenous    naloxone (NARCAN) injection 0.4 mg 0.4 mg Every 5 Minutes PRN 1/28/2017     Sig - Route: Infuse 1 mL into a venous catheter Every 5 (Five) Minutes As Needed for respiratory depression. - Intravenous    Linked Group 1:  \"And\" Linked Group Details        ondansetron (ZOFRAN) injection 4 mg 4 mg Every 6 Hours PRN 1/28/2017     Sig - Route: Infuse 2 mL into a venous catheter Every 6 (Six) Hours As Needed for nausea or vomiting. - Intravenous    pantoprazole (PROTONIX) injection 40 mg 40 mg Every Early Morning 1/28/2017     Sig - Route: Infuse 10 mL into a venous catheter Every Morning. - Intravenous    promethazine (PHENERGAN) injection 12.5 mg 12.5 mg Every 6 Hours PRN 1/28/2017     Sig - Route: Infuse 0.5 mL into a venous catheter Every 6 (Six) Hours As Needed for nausea or vomiting. - Intravenous    sertraline (ZOLOFT) tablet 100 mg 100 mg Daily 1/28/2017     Sig - Route: Take 1 tablet by mouth Daily. - Oral    sodium chloride 0.9 % flush 1-10 mL 1-10 mL As Needed 1/28/2017     Sig - Route: Infuse 1-10 mL into a venous catheter As Needed for line care. - Intravenous             Operative/Procedure Notes (last 72 hours) (Notes from 2/3/2017  2:52 PM through 2/6/2017  2:52 PM)      Pal Crane MD at 2/3/2017  4:05 PM  Version 1 of 1     "     PREOPERATIVE DIAGNOSIS:  Small bowel obstruction    POSTOPERATIVE DIAGNOSIS AND FINDINGS:  Adhesive small bowel obstruction    PROCEDURE:  Open small bowel resection    SURGEON:  Pal Crane MD    ASSISTANT:  Candice Nielsen    ANESTHESIA:  General    EBL:  Minimal    DESCRIPTION:  In supine position under general anesthetic prepped and draped usual sterile manner.  Midline laparotomy incision made and peritoneal cavity entered.  Minimal intraperitoneal adhesions were encountered.  There was one loop of small bowel which was mid jejunal that was densely adhered to the pelvis and with blunt and sharp dissection this was pulled out of the pelvis.  The actual focus of obstruction though was just proximal to this loop and caused by a firm adhesive band between 2 loops of jejunum without involvement of omentum or abdominal wall or mesentery.  The obstructed area appeared chronically obstructed and strictured and I elected therefore to resect this section of small bowel as well as dissection which was adhesed into the pelvis.  I divided the small bowel proximal and distal with the PAUL stapler.  There was an additional area of jejunum that was scarred to that loop that I simply excised off of that distended loop of jejunum and closed the enterotomy in 2 layers.  I oversewed the staple line on the distended jejunum and then attempted and and of jejunum to side of distended jejunum anastomosis but during the course of the anastomosis to 2-0 silk sutures being used for the seromuscular layer and several areas tore through the small bowel.  I elected to take the resection slightly more proximal where the bowel was less thickened and more pliable and therefore re-divided it at that point with the PAUL stapler and just distal to the previous distal division with the PAUL stapler and came through that mesentery with the Enseal device.  I then fashioned a side of proximal jejunum to end of distal jejunum handsewn 2 layer  anastomosis of outer interrupted 2-0 silk and running interlocking 3-0 chromic.  Mesentery was closed with interrupted 2-0 silk.  Abdomen was irrigated with bacitracin solution and good hemostasis was noted.  Seprafilm was placed in the pelvis bowel was appropriately oriented and fascia was closed with running 0 PDS.  Skin was closed with staples.  Sterile dressing applied, tolerated well.    Pal Crane M.D.         Electronically signed by Pal Crane MD at 2/3/2017  4:09 PM           Physician Progress Notes (last 72 hours) (Notes from 2/3/2017  2:52 PM through 2/6/2017  2:52 PM)      Ifeoma Canales MD at 2/4/2017  9:19 AM  Version 1 of 1         Chief Complaint: POD # 1    Subjective   Pt reports he has not been out of bed, pain is controlled, denies vomiting but is nauseated, requesting montez removal    Objective     Vital signs in last 24 hours:  Temp:  [97.4 °F (36.3 °C)-98.5 °F (36.9 °C)] 98.3 °F (36.8 °C)  Heart Rate:  [60-76] 74  Resp:  [14-20] 16  BP: ()/(42-70) 100/59    Intake/Output last 3 shifts:  I/O last 3 completed shifts:  In: 5021 [I.V.:5021]  Out: 4225 [Urine:300; Stool:3925]    Intake/Output this shift:       Results from last 7 days  Lab Units 02/03/17  1227   WBC 10*3/mm3 14.74*   HEMOGLOBIN g/dL 14.5   HEMATOCRIT % 42.5   PLATELETS 10*3/mm3 352       Results from last 7 days  Lab Units 02/03/17  1226   SODIUM mmol/L 136   POTASSIUM mmol/L 4.1   CHLORIDE mmol/L 97*   TOTAL CO2 mmol/L 24.9   BUN mg/dL 14   CREATININE mg/dL 1.43*   GLUCOSE mg/dL 90   CALCIUM mg/dL 9.2       Physical Exam:  Lungs: CTA, good inspiratory effect  CV: RRR  Abd: no BS, soft, ND, usual post-op tenderness, no rebound, no guarding, incision bandage dry, ileostomy with small amount of stool    Assessment&#47;Plan   S/P Open Small Bowel adhesiolysis   Expected post-op ileus - await bowel function  D/c montez  Ambulate Pt             Electronically signed by Ifeoma Canales MD at 2/4/2017  9:28 AM       Ifeoma Canales MD at 2/5/2017  9:47 AM  Version 1 of 1         Chief Complaint: POD # 2    Subjective   Pt reports emptying his ostomy bag x 2, has an appetite, no Nausea or Vomiting, ambulating in [place]    Objective     Vital signs in last 24 hours:  Temp:  [98.2 °F (36.8 °C)-99.9 °F (37.7 °C)] 98.7 °F (37.1 °C)  Heart Rate:  [76-93] 76  Resp:  [16-18] 16  BP: ()/(58-70) 98/58    Intake/Output last 3 shifts:  I/O last 3 completed shifts:  In: 5139.4 [I.V.:5139.4]  Out: 950 [Urine:750; Stool:200]    Intake/Output this shift:  I/O this shift:  In: -   Out: 250 [Urine:250]    Physical Exam:  Respiratory: CTA, good inspiratory effort  CV: RRR  Abd: + BS, soft, ND, usual post-op tenderness, no rebound, no guarding, ileostomy with stool, incision no erythema, no drainage    Assessment&#47;Plan   S/P small bowel adhesiolysis  Ileus resolving - start on clear liquids    Ifeoma Canales MD           Electronically signed by Ifeoma Canales MD at 2/5/2017  9:51 AM        Consult Notes (last 72 hours) (Notes from 2/3/2017  2:52 PM through 2/6/2017  2:52 PM)     No notes of this type exist for this encounter.        ATTN: NURSE REVIEW FOR CONTINUED STAY REF#[id number]  PLEASE CALL BACK TO MIKE [phone] OR [phone]  THANKS!  MIKE

## 2017-02-06 NOTE — PLAN OF CARE
Problem: Patient Care Overview (Adult)  Goal: Plan of Care Review  Outcome: Ongoing (interventions implemented as appropriate)    02/05/17 1853 02/05/17 2000 02/06/17 0040   Coping/Psychosocial Response Interventions   Plan Of Care Reviewed With --  patient --    Outcome Evaluation   Outcome Summary/Follow up Plan --  --  Ileostomy with loose stool. Temp 101.0, Dr. Jones called. Dilaudid PCA controlling incisional pain. Midline abdominal incision open to air, closed with staples. Tolerating cl liqs.    Patient Care Overview   Progress improving --  --        Goal: Adult Individualization and Mutuality  Outcome: Ongoing (interventions implemented as appropriate)  Goal: Discharge Needs Assessment  Outcome: Ongoing (interventions implemented as appropriate)    Problem: Pain, Acute (Adult)  Goal: Acceptable Pain Control/Comfort Level  Outcome: Ongoing (interventions implemented as appropriate)    Problem: Bowel Obstruction (Adult)  Goal: Signs and Symptoms of Listed Potential Problems Will be Absent or Manageable (Bowel Obstruction)  Outcome: Ongoing (interventions implemented as appropriate)    Problem: Perioperative Period (Adult)  Goal: Signs and Symptoms of Listed Potential Problems Will be Absent or Manageable (Perioperative Period)  Outcome: Ongoing (interventions implemented as appropriate)

## 2017-02-07 RX ADMIN — PANTOPRAZOLE SODIUM 40 MG: 40 INJECTION, POWDER, FOR SOLUTION INTRAVENOUS at 05:52

## 2017-02-07 RX ADMIN — OXYCODONE HYDROCHLORIDE AND ACETAMINOPHEN 2 TABLET: 5; 325 TABLET ORAL at 02:55

## 2017-02-07 RX ADMIN — OXYCODONE HYDROCHLORIDE AND ACETAMINOPHEN 2 TABLET: 5; 325 TABLET ORAL at 07:48

## 2017-02-07 RX ADMIN — OXYCODONE HYDROCHLORIDE AND ACETAMINOPHEN 2 TABLET: 5; 325 TABLET ORAL at 12:20

## 2017-02-07 RX ADMIN — SERTRALINE 100 MG: 100 TABLET, FILM COATED ORAL at 08:41

## 2017-02-07 RX ADMIN — OXYCODONE HYDROCHLORIDE AND ACETAMINOPHEN 2 TABLET: 5; 325 TABLET ORAL at 21:18

## 2017-02-07 RX ADMIN — OXYCODONE HYDROCHLORIDE AND ACETAMINOPHEN 2 TABLET: 5; 325 TABLET ORAL at 17:11

## 2017-02-07 RX ADMIN — LISINOPRIL AND HYDROCHLOROTHIAZIDE 1 TABLET: 12.5; 2 TABLET ORAL at 08:41

## 2017-02-07 RX ADMIN — POTASSIUM CHLORIDE, DEXTROSE MONOHYDRATE AND SODIUM CHLORIDE 125 ML/HR: 150; 5; 450 INJECTION, SOLUTION INTRAVENOUS at 05:52

## 2017-02-07 NOTE — NURSING NOTE
02/07/17 1452   Ileostomy 01/27/17 2345 ileostomy   Placement Date/Time: 01/27/17 2345   Existing LDA: present on admission to this facility  Ileostomy Type: ileostomy   Stoma Appearance irregular;moist;red;protruding above skin level   Appliance 2-piece;drainable pouch;changed;per patient home schedule;no leakage   Accessories/Skin Care appliance belt   Stoma Function stool   Stool Color brown   Peristomal Skin ulcerated   Tolerance no signs/symptoms of discomfort   Stool output (mL) 25     Patient has a chronic wound on the lateral edge of stoma- it is sleek, shiny, moist, red, 1x3cm, no depth- it is flush with skin. Patient states it sometimes bleeds. He has tried silver nitrate prior.   Recommend hydrofera blue, moistened. He would like to try this. Recommend to try for 2 weeks and then reevaluate- he can either call me or come to the office. I can send him home with samples for now. I instructed him on use and will follow up. He and wife verbalized understanding.

## 2017-02-07 NOTE — PLAN OF CARE
Problem: Patient Care Overview (Adult)  Goal: Plan of Care Review  Outcome: Ongoing (interventions implemented as appropriate)    02/07/17 1616   Coping/Psychosocial Response Interventions   Plan Of Care Reviewed With patient   Outcome Evaluation   Outcome Summary/Follow up Plan tolerated regular diet at lunch, prn percocet given, walked in halls, seen by ostomy rn   Patient Care Overview   Progress improving       Goal: Adult Individualization and Mutuality  Outcome: Ongoing (interventions implemented as appropriate)  Goal: Discharge Needs Assessment  Outcome: Ongoing (interventions implemented as appropriate)    Problem: Pain, Acute (Adult)  Goal: Acceptable Pain Control/Comfort Level  Outcome: Ongoing (interventions implemented as appropriate)

## 2017-02-07 NOTE — PROGRESS NOTES
Doing well, tolerating clear liquids, abdomen soft and incision healing well.    Advance to full liquid diet    Pal Crane M.D.

## 2017-02-07 NOTE — PLAN OF CARE
Problem: Patient Care Overview (Adult)  Goal: Plan of Care Review  Outcome: Ongoing (interventions implemented as appropriate)    02/07/17 0537   Coping/Psychosocial Response Interventions   Plan Of Care Reviewed With patient   Outcome Evaluation   Outcome Summary/Follow up Plan Pt had a good night; pain is controlled with percocet and a dose of iv morphine; moderate ileostomy output., tolerated full liquid diet ok   Patient Care Overview   Progress improving         Problem: Pain, Acute (Adult)  Goal: Acceptable Pain Control/Comfort Level  Outcome: Ongoing (interventions implemented as appropriate)    Problem: Bowel Obstruction (Adult)  Goal: Signs and Symptoms of Listed Potential Problems Will be Absent or Manageable (Bowel Obstruction)  Outcome: Ongoing (interventions implemented as appropriate)

## 2017-02-08 RX ADMIN — OXYCODONE HYDROCHLORIDE AND ACETAMINOPHEN 2 TABLET: 5; 325 TABLET ORAL at 05:21

## 2017-02-08 RX ADMIN — PANTOPRAZOLE SODIUM 40 MG: 40 INJECTION, POWDER, FOR SOLUTION INTRAVENOUS at 05:21

## 2017-02-08 RX ADMIN — OXYCODONE HYDROCHLORIDE AND ACETAMINOPHEN 2 TABLET: 5; 325 TABLET ORAL at 14:56

## 2017-02-08 RX ADMIN — SERTRALINE 100 MG: 100 TABLET, FILM COATED ORAL at 09:46

## 2017-02-08 RX ADMIN — OXYCODONE HYDROCHLORIDE AND ACETAMINOPHEN 2 TABLET: 5; 325 TABLET ORAL at 01:25

## 2017-02-08 RX ADMIN — OXYCODONE HYDROCHLORIDE AND ACETAMINOPHEN 2 TABLET: 5; 325 TABLET ORAL at 09:46

## 2017-02-08 RX ADMIN — OXYCODONE HYDROCHLORIDE AND ACETAMINOPHEN 2 TABLET: 5; 325 TABLET ORAL at 20:12

## 2017-02-08 RX ADMIN — LISINOPRIL AND HYDROCHLOROTHIAZIDE 1 TABLET: 12.5; 2 TABLET ORAL at 09:46

## 2017-02-08 NOTE — PLAN OF CARE
Problem: Patient Care Overview (Adult)  Goal: Plan of Care Review  Outcome: Ongoing (interventions implemented as appropriate)    02/08/17 7086   Coping/Psychosocial Response Interventions   Plan Of Care Reviewed With patient   Outcome Evaluation   Outcome Summary/Follow up Plan pt tolerating foods, percocet continued for complaints of pain. hopes to go home tomorrow   Patient Care Overview   Progress improving       Goal: Adult Individualization and Mutuality  Outcome: Ongoing (interventions implemented as appropriate)    Problem: Pain, Acute (Adult)  Goal: Acceptable Pain Control/Comfort Level  Outcome: Ongoing (interventions implemented as appropriate)    Problem: Bowel Obstruction (Adult)  Goal: Signs and Symptoms of Listed Potential Problems Will be Absent or Manageable (Bowel Obstruction)  Outcome: Ongoing (interventions implemented as appropriate)

## 2017-02-09 VITALS
BODY MASS INDEX: 37.09 KG/M2 | HEIGHT: 68 IN | OXYGEN SATURATION: 97 % | DIASTOLIC BLOOD PRESSURE: 70 MMHG | WEIGHT: 244.71 LBS | HEART RATE: 56 BPM | TEMPERATURE: 97.7 F | SYSTOLIC BLOOD PRESSURE: 122 MMHG | RESPIRATION RATE: 16 BRPM

## 2017-02-09 PROBLEM — K56.609 SBO (SMALL BOWEL OBSTRUCTION): Status: RESOLVED | Noted: 2017-01-19 | Resolved: 2017-02-09

## 2017-02-09 PROBLEM — K56.609 SMALL BOWEL OBSTRUCTION: Status: RESOLVED | Noted: 2017-01-28 | Resolved: 2017-02-09

## 2017-02-09 RX ORDER — OXYCODONE HYDROCHLORIDE AND ACETAMINOPHEN 5; 325 MG/1; MG/1
TABLET ORAL
Qty: 40 TABLET | Refills: 0 | Status: SHIPPED | OUTPATIENT
Start: 2017-02-09 | End: 2017-02-16

## 2017-02-09 RX ORDER — PANTOPRAZOLE SODIUM 40 MG/1
40 TABLET, DELAYED RELEASE ORAL
Status: DISCONTINUED | OUTPATIENT
Start: 2017-02-09 | End: 2017-02-09 | Stop reason: HOSPADM

## 2017-02-09 RX ADMIN — PANTOPRAZOLE SODIUM 40 MG: 40 TABLET, DELAYED RELEASE ORAL at 07:21

## 2017-02-09 RX ADMIN — OXYCODONE HYDROCHLORIDE AND ACETAMINOPHEN 2 TABLET: 5; 325 TABLET ORAL at 08:01

## 2017-02-09 RX ADMIN — LISINOPRIL AND HYDROCHLOROTHIAZIDE 1 TABLET: 12.5; 2 TABLET ORAL at 08:01

## 2017-02-09 RX ADMIN — SERTRALINE 100 MG: 100 TABLET, FILM COATED ORAL at 08:01

## 2017-02-09 RX ADMIN — OXYCODONE HYDROCHLORIDE AND ACETAMINOPHEN 2 TABLET: 5; 325 TABLET ORAL at 01:35

## 2017-02-09 NOTE — PROGRESS NOTES
Continued Stay Note  [facility]     Patient Name: Nikolai Shaw  MRN: [id number]  Today's Date: 2/9/2017    Admit Date: 1/27/2017          Discharge Plan       02/09/17 1321    Final Note    Final Note Orders received to dc home. No needs identified. Family to transport per private auto.              Discharge Codes       02/09/17 1321    Discharge Codes    Discharge Codes 01  Discharge to home        Expected Discharge Date and Time     Expected Discharge Date Expected Discharge Time    Feb 9, 2017             Aniyah Hernandez RN

## 2017-02-09 NOTE — PLAN OF CARE
Problem: Patient Care Overview (Adult)  Goal: Plan of Care Review  Outcome: Ongoing (interventions implemented as appropriate)    02/09/17 0323   Coping/Psychosocial Response Interventions   Plan Of Care Reviewed With patient   Outcome Evaluation   Outcome Summary/Follow up Plan Pt's pain controlled with Percocet. Possible d/c today. Lastly, pt slept through night.    Patient Care Overview   Progress no change         Problem: Pain, Acute (Adult)  Goal: Acceptable Pain Control/Comfort Level  Outcome: Ongoing (interventions implemented as appropriate)    Problem: Bowel Obstruction (Adult)  Goal: Signs and Symptoms of Listed Potential Problems Will be Absent or Manageable (Bowel Obstruction)  Outcome: Ongoing (interventions implemented as appropriate)

## 2017-02-09 NOTE — PLAN OF CARE
Problem: Patient Care Overview (Adult)  Goal: Plan of Care Review  Outcome: Ongoing (interventions implemented as appropriate)    02/08/17 0407 02/08/17 2011   Coping/Psychosocial Response Interventions   Plan Of Care Reviewed With --  patient   Outcome Evaluation   Outcome Summary/Follow up Plan pt tolerating foods, percocet continued for complaints of pain. hopes to go home tomorrow. No iv access, pt. Up ad petey --    Patient Care Overview   Progress improving --        Goal: Adult Individualization and Mutuality  Outcome: Ongoing (interventions implemented as appropriate)  Goal: Discharge Needs Assessment  Outcome: Ongoing (interventions implemented as appropriate)

## 2017-02-11 LAB
BACTERIA SPEC AEROBE CULT: NORMAL
BACTERIA SPEC AEROBE CULT: NORMAL

## 2017-02-16 ENCOUNTER — OFFICE VISIT (OUTPATIENT)
Dept: SURGERY | Facility: CLINIC | Age: 47
End: 2017-02-16

## 2017-02-16 DIAGNOSIS — Z09 FOLLOW UP: Primary | ICD-10-CM

## 2017-02-16 PROCEDURE — 99024 POSTOP FOLLOW-UP VISIT: CPT | Performed by: SURGERY

## 2017-02-16 RX ORDER — FLUCONAZOLE 100 MG/1
200 TABLET ORAL DAILY
Qty: 28 TABLET | Refills: 0 | Status: SHIPPED | OUTPATIENT
Start: 2017-02-16 | End: 2017-03-02

## 2017-02-16 NOTE — PROGRESS NOTES
"A 46-year-old gentleman returning to the office today status post exploratory laparotomy for small bowel obstruction with a small bowel resection.  He states that he is doing very well since his surgery with his only complaint being an area of irritation at the superior lateral aspect of his ostomy appliance which has a \"wet\" appearance and easily bleeds.  He has no pain associated with his incision which has healed very well nor does he have any issues with his ostomy output.  His stoma is very healthy in appearance and his incision again has healed very well.  His staples were removed at this time.  The area of concern along his ostomy appliance is consistent with fungal infection and he was written a prescription for Diflucan 200 mg daily for 2 weeks.  He is to return to the office in 3 weeks' time for further follow-up.  All the questions were answered and he is willing to proceed with all recommendations.  "

## 2017-02-17 ENCOUNTER — LAB (OUTPATIENT)
Dept: LAB | Facility: HOSPITAL | Age: 47
End: 2017-02-17

## 2017-02-17 ENCOUNTER — OFFICE VISIT (OUTPATIENT)
Dept: ONCOLOGY | Facility: CLINIC | Age: 47
End: 2017-02-17

## 2017-02-17 VITALS
WEIGHT: 237.8 LBS | RESPIRATION RATE: 16 BRPM | DIASTOLIC BLOOD PRESSURE: 72 MMHG | HEART RATE: 72 BPM | TEMPERATURE: 98.3 F | HEIGHT: 68 IN | BODY MASS INDEX: 36.04 KG/M2 | SYSTOLIC BLOOD PRESSURE: 120 MMHG

## 2017-02-17 DIAGNOSIS — C20 MALIGNANT NEOPLASM OF RECTUM (HCC): Primary | ICD-10-CM

## 2017-02-17 DIAGNOSIS — C20 MALIGNANT NEOPLASM OF RECTUM (HCC): ICD-10-CM

## 2017-02-17 LAB
ALBUMIN SERPL-MCNC: 3.7 G/DL (ref 3.5–5.2)
ALBUMIN/GLOB SERPL: 0.9 G/DL (ref 1.1–2.4)
ALP SERPL-CCNC: 138 U/L (ref 38–116)
ALT SERPL W P-5'-P-CCNC: 96 U/L (ref 0–41)
ANION GAP SERPL CALCULATED.3IONS-SCNC: 16.9 MMOL/L
AST SERPL-CCNC: 49 U/L (ref 0–40)
BASOPHILS # BLD AUTO: 0.06 10*3/MM3 (ref 0–0.1)
BASOPHILS NFR BLD AUTO: 0.6 % (ref 0–1.1)
BILIRUB SERPL-MCNC: 0.2 MG/DL (ref 0.1–1.2)
BUN BLD-MCNC: 7 MG/DL (ref 6–20)
BUN/CREAT SERPL: 8.6 (ref 7.3–30)
CALCIUM SPEC-SCNC: 9.3 MG/DL (ref 8.5–10.2)
CEA SERPL-MCNC: 1.12 NG/ML
CHLORIDE SERPL-SCNC: 99 MMOL/L (ref 98–107)
CO2 SERPL-SCNC: 22.1 MMOL/L (ref 22–29)
CREAT BLD-MCNC: 0.81 MG/DL (ref 0.7–1.3)
DEPRECATED RDW RBC AUTO: 43.3 FL (ref 37–49)
EOSINOPHIL # BLD AUTO: 0.67 10*3/MM3 (ref 0–0.36)
EOSINOPHIL NFR BLD AUTO: 6.9 % (ref 1–5)
ERYTHROCYTE [DISTWIDTH] IN BLOOD BY AUTOMATED COUNT: 12.8 % (ref 11.7–14.5)
GFR SERPL CREATININE-BSD FRML MDRD: 103 ML/MIN/1.73
GLOBULIN UR ELPH-MCNC: 4 GM/DL (ref 1.8–3.5)
GLUCOSE BLD-MCNC: 133 MG/DL (ref 74–124)
HCT VFR BLD AUTO: 41.3 % (ref 40–49)
HGB BLD-MCNC: 14.1 G/DL (ref 13.5–16.5)
IMM GRANULOCYTES # BLD: 0.03 10*3/MM3 (ref 0–0.03)
IMM GRANULOCYTES NFR BLD: 0.3 % (ref 0–0.5)
LYMPHOCYTES # BLD AUTO: 1.44 10*3/MM3 (ref 1–3.5)
LYMPHOCYTES NFR BLD AUTO: 14.8 % (ref 20–49)
MCH RBC QN AUTO: 31.6 PG (ref 27–33)
MCHC RBC AUTO-ENTMCNC: 34.1 G/DL (ref 32–35)
MCV RBC AUTO: 92.6 FL (ref 83–97)
MONOCYTES # BLD AUTO: 0.56 10*3/MM3 (ref 0.25–0.8)
MONOCYTES NFR BLD AUTO: 5.7 % (ref 4–12)
NEUTROPHILS # BLD AUTO: 6.98 10*3/MM3 (ref 1.5–7)
NEUTROPHILS NFR BLD AUTO: 71.7 % (ref 39–75)
NRBC BLD MANUAL-RTO: 0 /100 WBC (ref 0–0)
PLATELET # BLD AUTO: 470 10*3/MM3 (ref 150–375)
PMV BLD AUTO: 9.9 FL (ref 8.9–12.1)
POTASSIUM BLD-SCNC: 3.8 MMOL/L (ref 3.5–4.7)
PROT SERPL-MCNC: 7.7 G/DL (ref 6.3–8)
RBC # BLD AUTO: 4.46 10*6/MM3 (ref 4.3–5.5)
SODIUM BLD-SCNC: 138 MMOL/L (ref 134–145)
WBC NRBC COR # BLD: 9.74 10*3/MM3 (ref 4–10)

## 2017-02-17 PROCEDURE — 82378 CARCINOEMBRYONIC ANTIGEN: CPT | Performed by: INTERNAL MEDICINE

## 2017-02-17 PROCEDURE — 80053 COMPREHEN METABOLIC PANEL: CPT

## 2017-02-17 PROCEDURE — 99213 OFFICE O/P EST LOW 20 MIN: CPT | Performed by: INTERNAL MEDICINE

## 2017-02-17 PROCEDURE — 85025 COMPLETE CBC W/AUTO DIFF WBC: CPT

## 2017-02-17 PROCEDURE — 36415 COLL VENOUS BLD VENIPUNCTURE: CPT

## 2017-02-17 NOTE — PROGRESS NOTES
[facility] GROUP OUTPATIENT FOLLOW UP CLINIC VISIT    REASON FOR FOLLOW-UP:    1.  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 He had a total colectomy with ileostomy on 7/6/2015.  He declined adjuvant chemotherapy.  2.  Iron deficiency anemia, resolved.   3.  Small bowel obstruction in January 2017 ultimately requiring surgery.  No evidence of malignancy.    HISTORY OF PRESENT ILLNESS:  Nikolai Shaw is a 46 y.o. male who returns today for follow up of the above issue.  He is improving after small bowel resection.  He was hospitalized twice at [facility] with nausea and vomiting.  He was found to have a small bowel obstruction.  With conservative therapy this resolved during his first admission but he had recurrent symptoms quickly thereafter and again, ultimately required a small bowel resection.  Pathology was negative for malignancy.  He is healing nicely.  His staples were removed.  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      PAST MEDICAL, SURGICAL, FAMILY, AND SOCIAL HISTORIES were reviewed with the patient and in the electronic medical record, and were updated if indicated.    ALLERGIES:  No Known Allergies    MEDICATIONS:  The medication list has been reviewed with the patient by the medical assistant, and the list has been updated in the electronic medical record, which I reviewed.  Medication dosages and frequencies were confirmed to be accurate.    REVIEW OF SYSTEMS:  PAIN:  See Vital Signs below.  GENERAL:  No fevers, chills, night sweats, or unintended weight loss.  SKIN:  Ulceration and skin irritation around the ostomy site he since  HEME/LYMPH:  No abnormal bleeding.  No palpable lymphadenopathy.  EYES:  No vision changes or diplopia.  ENT:  No sore throat or difficulty swallowing.  RESPIRATORY:  No cough, shortness of breath,  "hemoptysis, or wheezing.  CARDIOVASCULAR:  No chest pain, palpitations, orthopnea, or dyspnea on exertion.  GASTROINTESTINAL:  See history of present illness.  Symptoms resolved.  GENITOURINARY:  No dysuria or hematuria.  MUSCULOSKELETAL:  No joint pain, swelling, or erythema.  NEUROLOGIC:  No dizziness, loss of consciousness, or seizures.  PSYCHIATRIC:  No depression, anxiety, or mood changes.    Vitals:    02/17/17 1449   BP: 120/72   Pulse: 72   Resp: 16   Temp: 98.3 °F (36.8 °C)   Weight: 237 lb 12.8 oz (108 kg)   Height: 67.72\" (172 cm)  Comment: new ht.   PainSc: 0-No pain       PHYSICAL EXAMINATION:  GENERAL:  Well-developed well-nourished male; awake, alert and oriented, in no acute distress.  SKIN:  Warm and dry, without rashes, purpura, or petechiae.  HEAD:  Normocephalic, atraumatic.  EYES:  Pupils equal, round and reactive to light.  Extraocular movements intact.  Conjunctivae normal.  EARS:  Hearing intact.  NOSE:  Septum midline.  No excoriations or nasal discharge.  MOUTH:  No stomatitis or ulcers.  Lips are normal.  THROAT:  Oropharynx without lesions or exudates.  NECK:  Supple with good range of motion; no thyromegaly or masses; no JVD or bruits.  LYMPHATICS:  No cervical, supraclavicular, axillary, or inguinal lymphadenopathy.  CHEST:  Lungs are clear to auscultation bilaterally.  No wheezes, rales, or rhonchi.  HEART:  Regular rate; normal rhythm.  No murmurs, gallops or rubs.  ABDOMEN:  Soft, non-tender, non-distended.  Normal active bowel sounds.  No organomegaly.  An ileostomy is in place in the right upper quadrant.  Herniation of bowel in the ostomy site.  EXTREMITIES:  No clubbing, cyanosis, or edema.  NEUROLOGICAL:  No focal neurologic deficits.    DIAGNOSTIC DATA:  Lab Results   Component Value Date    WBC 9.74 02/17/2017    HGB 14.1 02/17/2017    HCT 41.3 02/17/2017    MCV 92.6 02/17/2017     (H) 02/17/2017     CEA is pending.    IMAGING:  None reviewed    ASSESSMENT:  This is a " 46 y.o. male with:  1.  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 He had a total colectomy with ileostomy on 7/6/2015.  He declined adjuvant chemotherapy.  No evidence of disease.  2.  Iron deficiency anemia, resolved.   3.  Recent small bowel obstruction: No evidence for malignancy.    PLAN:  1.  Because he just had CT imaging last month, I will see him back in 5 months with CT imaging of the chest abdomen and pelvis and labs done 1 week prior.

## 2017-03-02 ENCOUNTER — OFFICE VISIT (OUTPATIENT)
Dept: SURGERY | Facility: CLINIC | Age: 47
End: 2017-03-02

## 2017-03-02 DIAGNOSIS — Z09 FOLLOW UP: Primary | ICD-10-CM

## 2017-03-02 PROCEDURE — 99024 POSTOP FOLLOW-UP VISIT: CPT | Performed by: SURGERY

## 2017-03-06 NOTE — PROGRESS NOTES
Postoperative visit    2/3/17 open small bowel resection and adhesio lysis for small bowel obstruction    Doing well, abdomen soft, incision healing well, staples removed.  Return as needed    Pal Crane M.D.

## 2017-07-07 ENCOUNTER — HOSPITAL ENCOUNTER (OUTPATIENT)
Dept: PET IMAGING | Facility: HOSPITAL | Age: 47
Discharge: HOME | End: 2017-07-07
Attending: INTERNAL MEDICINE

## 2017-07-28 ENCOUNTER — LAB (OUTPATIENT)
Dept: LAB | Facility: HOSPITAL | Age: 47
End: 2017-07-28

## 2017-07-28 ENCOUNTER — HOSPITAL ENCOUNTER (OUTPATIENT)
Dept: PET IMAGING | Facility: HOSPITAL | Age: 47
Discharge: HOME | End: 2017-07-28
Attending: INTERNAL MEDICINE | Admitting: INTERNAL MEDICINE

## 2017-07-28 DIAGNOSIS — C20 MALIGNANT NEOPLASM OF RECTUM (HCC): ICD-10-CM

## 2017-07-28 LAB
ALBUMIN SERPL-MCNC: 4.5 G/DL (ref 3.5–5.2)
ALBUMIN/GLOB SERPL: 1.1 G/DL (ref 1.1–2.4)
ALP SERPL-CCNC: 105 U/L (ref 38–116)
ALT SERPL W P-5'-P-CCNC: 74 U/L (ref 0–41)
ANION GAP SERPL CALCULATED.3IONS-SCNC: 19.1 MMOL/L
AST SERPL-CCNC: 68 U/L (ref 0–40)
BASOPHILS # BLD AUTO: 0.02 10*3/MM3 (ref 0–0.1)
BASOPHILS NFR BLD AUTO: 0.3 % (ref 0–1.1)
BILIRUB SERPL-MCNC: 0.3 MG/DL (ref 0.1–1.2)
BUN BLD-MCNC: 7 MG/DL (ref 6–20)
BUN/CREAT SERPL: 9.3 (ref 7.3–30)
CALCIUM SPEC-SCNC: 9.1 MG/DL (ref 8.5–10.2)
CEA SERPL-MCNC: 1.36 NG/ML
CHLORIDE SERPL-SCNC: 100 MMOL/L (ref 98–107)
CO2 SERPL-SCNC: 20.9 MMOL/L (ref 22–29)
CREAT BLD-MCNC: 0.75 MG/DL (ref 0.7–1.3)
CREAT BLDA-MCNC: 0.9 MG/DL (ref 0.6–1.3)
DEPRECATED RDW RBC AUTO: 48.2 FL (ref 37–49)
EOSINOPHIL # BLD AUTO: 0.12 10*3/MM3 (ref 0–0.36)
EOSINOPHIL NFR BLD AUTO: 2 % (ref 1–5)
ERYTHROCYTE [DISTWIDTH] IN BLOOD BY AUTOMATED COUNT: 13.2 % (ref 11.7–14.5)
GFR SERPL CREATININE-BSD FRML MDRD: 112 ML/MIN/1.73
GLOBULIN UR ELPH-MCNC: 4 GM/DL (ref 1.8–3.5)
GLUCOSE BLD-MCNC: 98 MG/DL (ref 74–124)
HCT VFR BLD AUTO: 48.5 % (ref 40–49)
HGB BLD-MCNC: 16.1 G/DL (ref 13.5–16.5)
IMM GRANULOCYTES # BLD: 0.03 10*3/MM3 (ref 0–0.03)
IMM GRANULOCYTES NFR BLD: 0.5 % (ref 0–0.5)
LYMPHOCYTES # BLD AUTO: 1.12 10*3/MM3 (ref 1–3.5)
LYMPHOCYTES NFR BLD AUTO: 19 % (ref 20–49)
MCH RBC QN AUTO: 33 PG (ref 27–33)
MCHC RBC AUTO-ENTMCNC: 33.2 G/DL (ref 32–35)
MCV RBC AUTO: 99.4 FL (ref 83–97)
MONOCYTES # BLD AUTO: 0.48 10*3/MM3 (ref 0.25–0.8)
MONOCYTES NFR BLD AUTO: 8.2 % (ref 4–12)
NEUTROPHILS # BLD AUTO: 4.11 10*3/MM3 (ref 1.5–7)
NEUTROPHILS NFR BLD AUTO: 70 % (ref 39–75)
NRBC BLD MANUAL-RTO: 0 /100 WBC (ref 0–0)
PLATELET # BLD AUTO: 231 10*3/MM3 (ref 150–375)
PMV BLD AUTO: 10.9 FL (ref 8.9–12.1)
POTASSIUM BLD-SCNC: 3.8 MMOL/L (ref 3.5–4.7)
PROT SERPL-MCNC: 8.5 G/DL (ref 6.3–8)
RBC # BLD AUTO: 4.88 10*6/MM3 (ref 4.3–5.5)
SODIUM BLD-SCNC: 140 MMOL/L (ref 134–145)
WBC NRBC COR # BLD: 5.88 10*3/MM3 (ref 4–10)

## 2017-07-28 PROCEDURE — 74177 CT ABD & PELVIS W/CONTRAST: CPT

## 2017-07-28 PROCEDURE — 71260 CT THORAX DX C+: CPT

## 2017-07-28 PROCEDURE — 82378 CARCINOEMBRYONIC ANTIGEN: CPT | Performed by: INTERNAL MEDICINE

## 2017-07-28 PROCEDURE — 36415 COLL VENOUS BLD VENIPUNCTURE: CPT | Performed by: INTERNAL MEDICINE

## 2017-07-28 PROCEDURE — 0 IOPAMIDOL 61 % SOLUTION: Performed by: INTERNAL MEDICINE

## 2017-07-28 PROCEDURE — 80053 COMPREHEN METABOLIC PANEL: CPT

## 2017-07-28 PROCEDURE — 85025 COMPLETE CBC W/AUTO DIFF WBC: CPT | Performed by: INTERNAL MEDICINE

## 2017-07-28 PROCEDURE — 82565 ASSAY OF CREATININE: CPT

## 2017-07-28 PROCEDURE — 0 DIATRIZOATE MEGLUMINE & SODIUM PER 1 ML: Performed by: INTERNAL MEDICINE

## 2017-07-28 RX ADMIN — DIATRIZOATE MEGLUMINE AND DIATRIZOATE SODIUM 30 ML: 660; 100 LIQUID ORAL; RECTAL at 09:10

## 2017-07-28 RX ADMIN — IOPAMIDOL 85 ML: 612 INJECTION, SOLUTION INTRAVENOUS at 09:10

## 2017-08-04 ENCOUNTER — OFFICE VISIT (OUTPATIENT)
Dept: ONCOLOGY | Facility: CLINIC | Age: 47
End: 2017-08-04

## 2017-08-04 ENCOUNTER — APPOINTMENT (OUTPATIENT)
Dept: LAB | Facility: HOSPITAL | Age: 47
End: 2017-08-04

## 2017-08-04 VITALS
SYSTOLIC BLOOD PRESSURE: 162 MMHG | HEART RATE: 66 BPM | WEIGHT: 257.8 LBS | BODY MASS INDEX: 39.07 KG/M2 | HEIGHT: 68 IN | OXYGEN SATURATION: 98 % | TEMPERATURE: 98.5 F | DIASTOLIC BLOOD PRESSURE: 100 MMHG | RESPIRATION RATE: 12 BRPM

## 2017-08-04 DIAGNOSIS — C20 MALIGNANT NEOPLASM OF RECTUM (HCC): Primary | ICD-10-CM

## 2017-08-04 PROCEDURE — 99214 OFFICE O/P EST MOD 30 MIN: CPT | Performed by: INTERNAL MEDICINE

## 2017-08-04 PROCEDURE — G0463 HOSPITAL OUTPT CLINIC VISIT: HCPCS | Performed by: INTERNAL MEDICINE

## 2017-08-04 NOTE — PROGRESS NOTES
[facility] GROUP OUTPATIENT FOLLOW UP CLINIC VISIT    REASON FOR FOLLOW-UP:    1.  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 He had a total colectomy with ileostomy on 7/6/2015.  He declined adjuvant chemotherapy.  2.  Iron deficiency anemia, resolved.   3.  Small bowel obstruction in January 2017 ultimately requiring surgery.  No evidence of malignancy.    HISTORY OF PRESENT ILLNESS:  Nikolai Shaw is a 47 y.o. male who returns today for follow up of the above issue.  He is doing well.  No further issues with bowel obstruction.  Did not take his blood pressure medicine this morning.  He is gaining weight.      PAST MEDICAL, SURGICAL, FAMILY, AND SOCIAL HISTORIES were reviewed with the patient and in the electronic medical record, and were updated if indicated.    ALLERGIES:  No Known Allergies    MEDICATIONS:  The medication list has been reviewed with the patient by the medical assistant, and the list has been updated in the electronic medical record, which I reviewed.  Medication dosages and frequencies were confirmed to be accurate.    REVIEW OF SYSTEMS:  PAIN:  See Vital Signs below.  GENERAL:  No fevers, chills, night sweats, or unintended weight loss.  Weight gain  SKIN:  Ulceration and skin irritation around the ostomy site he since  HEME/LYMPH:  No abnormal bleeding.  No palpable lymphadenopathy.  EYES:  No vision changes or diplopia.  ENT:  No sore throat or difficulty swallowing.  RESPIRATORY:  No cough, shortness of breath, hemoptysis, or wheezing.  CARDIOVASCULAR:  No chest pain, palpitations, orthopnea, or dyspnea on exertion.  GASTROINTESTINAL: No abdominal pain, nausea, vomiting, or diarrhea  GENITOURINARY:  No dysuria or hematuria.  MUSCULOSKELETAL:  No joint pain, swelling, or erythema.  NEUROLOGIC:  No dizziness, loss of consciousness, or seizures.  PSYCHIATRIC:  No depression, anxiety,  "or mood changes.    Vitals:    08/04/17 1348   BP: 162/100   Pulse: 66   Resp: 12   Temp: 98.5 °F (36.9 °C)   TempSrc: Oral   SpO2: 98%   Weight: 257 lb 12.8 oz (117 kg)   Height: 68\" (172.7 cm)   PainSc: 0-No pain       PHYSICAL EXAMINATION:  GENERAL:  Well-developed well-nourished male; awake, alert and oriented, in no acute distress.  SKIN:  Warm and dry, without rashes, purpura, or petechiae.  HEAD:  Normocephalic, atraumatic.  EYES:  Pupils equal, round and reactive to light.  Extraocular movements intact.  Conjunctivae normal.  EARS:  Hearing intact.  NOSE:  Septum midline.  No excoriations or nasal discharge.  MOUTH:  No stomatitis or ulcers.  Lips are normal.  THROAT:  Oropharynx without lesions or exudates.  NECK:  Supple with good range of motion; no thyromegaly or masses; no JVD or bruits.  LYMPHATICS:  No cervical, supraclavicular, axillary, or inguinal lymphadenopathy.  CHEST:  Lungs are clear to auscultation bilaterally.  No wheezes, rales, or rhonchi.  HEART:  Regular rate; normal rhythm.  No murmurs, gallops or rubs.  ABDOMEN:  Soft, non-tender, non-distended.  Normal active bowel sounds.  No organomegaly.  An ileostomy is in place in the right upper quadrant.  Herniation of bowel in the ostomy site.  EXTREMITIES:  No clubbing, cyanosis, or edema.  NEUROLOGICAL:  No focal neurologic deficits.    DIAGNOSTIC DATA:  Lab Results   Component Value Date    WBC 5.88 07/28/2017    HGB 16.1 07/28/2017    HCT 48.5 07/28/2017    MCV 99.4 (H) 07/28/2017     07/28/2017     CEA 1.36.  AST 68, ALT 74    IMAGING: CT images from 7/28/2017 personally reviewed.  No evidence for metastatic disease.  Hepatic steatosis.    ASSESSMENT:  This is a 47 y.o. male with:  1.  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 He had a total colectomy with ileostomy on 7/6/2015.  He declined adjuvant chemotherapy.  No evidence of " disease now 2 years out from his surgery.  We will plan to repeat imaging annually at this point.  2.  Iron deficiency anemia, resolved.   3.  Recent small bowel obstruction: No evidence for malignancy.  4.  Hypertension: He should follow up with primary care.    5.  Weight gain: I advised him to lose weight.    PLAN:  1.  Return in 6 months for follow-up with labs.  2.  Repeat CT imaging in 1 year.

## 2018-02-09 ENCOUNTER — LAB (OUTPATIENT)
Dept: LAB | Facility: HOSPITAL | Age: 48
End: 2018-02-09

## 2018-02-09 ENCOUNTER — OFFICE VISIT (OUTPATIENT)
Dept: ONCOLOGY | Facility: CLINIC | Age: 48
End: 2018-02-09

## 2018-02-09 VITALS
RESPIRATION RATE: 16 BRPM | BODY MASS INDEX: 40.25 KG/M2 | TEMPERATURE: 98.6 F | DIASTOLIC BLOOD PRESSURE: 88 MMHG | HEIGHT: 68 IN | HEART RATE: 73 BPM | WEIGHT: 265.6 LBS | SYSTOLIC BLOOD PRESSURE: 128 MMHG | OXYGEN SATURATION: 98 %

## 2018-02-09 DIAGNOSIS — C20 MALIGNANT NEOPLASM OF RECTUM (HCC): ICD-10-CM

## 2018-02-09 DIAGNOSIS — Z85.048 HISTORY OF RECTAL CANCER: Primary | ICD-10-CM

## 2018-02-09 LAB
ALBUMIN SERPL-MCNC: 4 G/DL (ref 3.5–5.2)
ALBUMIN/GLOB SERPL: 1 G/DL (ref 1.1–2.4)
ALP SERPL-CCNC: 103 U/L (ref 38–116)
ALT SERPL W P-5'-P-CCNC: 37 U/L (ref 0–41)
ANION GAP SERPL CALCULATED.3IONS-SCNC: 11.4 MMOL/L
AST SERPL-CCNC: 38 U/L (ref 0–40)
BASOPHILS # BLD AUTO: 0.03 10*3/MM3 (ref 0–0.1)
BASOPHILS NFR BLD AUTO: 0.3 % (ref 0–1.1)
BILIRUB SERPL-MCNC: 0.6 MG/DL (ref 0.1–1.2)
BUN BLD-MCNC: 10 MG/DL (ref 6–20)
BUN/CREAT SERPL: 12.5 (ref 7.3–30)
CALCIUM SPEC-SCNC: 9.5 MG/DL (ref 8.5–10.2)
CEA SERPL-MCNC: 1.12 NG/ML
CHLORIDE SERPL-SCNC: 98 MMOL/L (ref 98–107)
CO2 SERPL-SCNC: 30.6 MMOL/L (ref 22–29)
CREAT BLD-MCNC: 0.8 MG/DL (ref 0.7–1.3)
DEPRECATED RDW RBC AUTO: 44.6 FL (ref 37–49)
EOSINOPHIL # BLD AUTO: 0.2 10*3/MM3 (ref 0–0.36)
EOSINOPHIL NFR BLD AUTO: 2.2 % (ref 1–5)
ERYTHROCYTE [DISTWIDTH] IN BLOOD BY AUTOMATED COUNT: 12.6 % (ref 11.7–14.5)
GFR SERPL CREATININE-BSD FRML MDRD: 104 ML/MIN/1.73
GLOBULIN UR ELPH-MCNC: 3.9 GM/DL (ref 1.8–3.5)
GLUCOSE BLD-MCNC: 114 MG/DL (ref 74–124)
HCT VFR BLD AUTO: 45.6 % (ref 40–49)
HGB BLD-MCNC: 15.1 G/DL (ref 13.5–16.5)
IMM GRANULOCYTES # BLD: 0.03 10*3/MM3 (ref 0–0.03)
IMM GRANULOCYTES NFR BLD: 0.3 % (ref 0–0.5)
LYMPHOCYTES # BLD AUTO: 1.36 10*3/MM3 (ref 1–3.5)
LYMPHOCYTES NFR BLD AUTO: 14.8 % (ref 20–49)
MCH RBC QN AUTO: 32 PG (ref 27–33)
MCHC RBC AUTO-ENTMCNC: 33.1 G/DL (ref 32–35)
MCV RBC AUTO: 96.6 FL (ref 83–97)
MONOCYTES # BLD AUTO: 0.72 10*3/MM3 (ref 0.25–0.8)
MONOCYTES NFR BLD AUTO: 7.8 % (ref 4–12)
NEUTROPHILS # BLD AUTO: 6.85 10*3/MM3 (ref 1.5–7)
NEUTROPHILS NFR BLD AUTO: 74.6 % (ref 39–75)
NRBC BLD MANUAL-RTO: 0 /100 WBC (ref 0–0)
PLATELET # BLD AUTO: 229 10*3/MM3 (ref 150–375)
PMV BLD AUTO: 9.8 FL (ref 8.9–12.1)
POTASSIUM BLD-SCNC: 4.4 MMOL/L (ref 3.5–4.7)
PROT SERPL-MCNC: 7.9 G/DL (ref 6.3–8)
RBC # BLD AUTO: 4.72 10*6/MM3 (ref 4.3–5.5)
SODIUM BLD-SCNC: 140 MMOL/L (ref 134–145)
WBC NRBC COR # BLD: 9.19 10*3/MM3 (ref 4–10)

## 2018-02-09 PROCEDURE — 36415 COLL VENOUS BLD VENIPUNCTURE: CPT | Performed by: INTERNAL MEDICINE

## 2018-02-09 PROCEDURE — 99213 OFFICE O/P EST LOW 20 MIN: CPT | Performed by: INTERNAL MEDICINE

## 2018-02-09 PROCEDURE — 85025 COMPLETE CBC W/AUTO DIFF WBC: CPT | Performed by: INTERNAL MEDICINE

## 2018-02-09 PROCEDURE — 82378 CARCINOEMBRYONIC ANTIGEN: CPT | Performed by: INTERNAL MEDICINE

## 2018-02-09 PROCEDURE — 80053 COMPREHEN METABOLIC PANEL: CPT | Performed by: INTERNAL MEDICINE

## 2018-02-09 NOTE — PROGRESS NOTES
[facility] GROUP OUTPATIENT FOLLOW UP CLINIC VISIT    REASON FOR FOLLOW-UP:    1.  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 He had a total colectomy with ileostomy on 7/6/2015.  He declined adjuvant chemotherapy.  2.  Iron deficiency anemia, resolved.   3.  Small bowel obstruction in January 2017 ultimately requiring surgery.  No evidence of malignancy.    HISTORY OF PRESENT ILLNESS:  Nikolai Shaw is a 47 y.o. male who returns today for follow up of the above issue.  He continues to do well.  The irritated area around his stoma has completely healed.  He continues to gain weight.  No abdominal pain.  No other GI symptoms.      PAST MEDICAL, SURGICAL, FAMILY, AND SOCIAL HISTORIES were reviewed with the patient and in the electronic medical record, and were updated if indicated.    ALLERGIES:  No Known Allergies    MEDICATIONS:  The medication list has been reviewed with the patient by the medical assistant, and the list has been updated in the electronic medical record, which I reviewed.  Medication dosages and frequencies were confirmed to be accurate.    REVIEW OF SYSTEMS:  PAIN:  See Vital Signs below.  GENERAL:  No fevers, chills, night sweats, or unintended weight loss.  Weight gain  SKIN:  Ulceration around the ostomy site has healed.  HEME/LYMPH:  No abnormal bleeding.  No palpable lymphadenopathy.  EYES:  No vision changes or diplopia.  ENT:  No sore throat or difficulty swallowing.  RESPIRATORY:  No cough, shortness of breath, hemoptysis, or wheezing.  CARDIOVASCULAR:  No chest pain, palpitations, orthopnea, or dyspnea on exertion.  GASTROINTESTINAL: No abdominal pain, nausea, vomiting, or diarrhea  GENITOURINARY:  No dysuria or hematuria.  MUSCULOSKELETAL:  No joint pain, swelling, or erythema.  NEUROLOGIC:  No dizziness, loss of consciousness, or seizures.  PSYCHIATRIC:  No depression, anxiety,  "or mood changes.    Vitals:    02/09/18 0942   BP: 128/88   Pulse: 73   Resp: 16   Temp: 98.6 °F (37 °C)   TempSrc: Oral   SpO2: 98%   Weight: 120 kg (265 lb 9.6 oz)   Height: 172.7 cm (67.99\")   PainSc: 0-No pain       PHYSICAL EXAMINATION:  GENERAL:  Well-developed well-nourished male; awake, alert and oriented, in no acute distress.  SKIN:  Warm and dry, without rashes, purpura, or petechiae.  HEAD:  Normocephalic, atraumatic.  EYES:  Pupils equal, round and reactive to light.  Extraocular movements intact.  Conjunctivae normal.  EARS:  Hearing intact.  NOSE:  Septum midline.  No excoriations or nasal discharge.  MOUTH:  No stomatitis or ulcers.  Lips are normal.  THROAT:  Oropharynx without lesions or exudates.  NECK:  Supple with good range of motion; no thyromegaly or masses; no JVD or bruits.  LYMPHATICS:  No cervical, supraclavicular, axillary, or inguinal lymphadenopathy.  CHEST:  Lungs are clear to auscultation bilaterally.  No wheezes, rales, or rhonchi.  HEART:  Regular rate; normal rhythm.  No murmurs, gallops or rubs.  ABDOMEN:  Soft, non-tender, non-distended.  Normal active bowel sounds.  No organomegaly.  Right upper quadrant ileostomy  EXTREMITIES:  No clubbing, cyanosis, or edema.  NEUROLOGICAL:  No focal neurologic deficits.    DIAGNOSTIC DATA:  Lab Results   Component Value Date    WBC 9.19 02/09/2018    HGB 15.1 02/09/2018    HCT 45.6 02/09/2018    MCV 96.6 02/09/2018     02/09/2018       IMAGING: CT images from 7/28/2017.  No evidence for metastatic disease.  Hepatic steatosis.    ASSESSMENT:  This is a 47 y.o. male with:  1.  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 He had a total colectomy with ileostomy on 7/6/2015.  He declined adjuvant chemotherapy.  No evidence of disease. Imaging annually at this point.  2.  Iron deficiency anemia, resolved.   3.  History of small bowel " obstruction: No evidence for malignancy.  4.  Hypertension: Improved  5.  Weight gain: I advised him to lose weight.    PLAN:  1.  Return in 6 months for follow-up with labs and repeat CT imaging done one week prior

## 2018-07-20 ENCOUNTER — LAB (OUTPATIENT)
Dept: LAB | Facility: HOSPITAL | Age: 48
End: 2018-07-20

## 2018-07-20 ENCOUNTER — HOSPITAL ENCOUNTER (OUTPATIENT)
Dept: PET IMAGING | Facility: HOSPITAL | Age: 48
Discharge: HOME | End: 2018-07-20
Attending: INTERNAL MEDICINE | Admitting: INTERNAL MEDICINE

## 2018-07-20 DIAGNOSIS — Z85.048 HISTORY OF RECTAL CANCER: ICD-10-CM

## 2018-07-20 LAB
ALBUMIN SERPL-MCNC: 4.2 G/DL (ref 3.5–5.2)
ALBUMIN/GLOB SERPL: 1.1 G/DL (ref 1.1–2.4)
ALP SERPL-CCNC: 105 U/L (ref 38–116)
ALT SERPL W P-5'-P-CCNC: 35 U/L (ref 0–41)
ANION GAP SERPL CALCULATED.3IONS-SCNC: 14.4 MMOL/L
AST SERPL-CCNC: 42 U/L (ref 0–40)
BASOPHILS # BLD AUTO: 0.06 10*3/MM3 (ref 0–0.1)
BASOPHILS NFR BLD AUTO: 0.7 % (ref 0–1.1)
BILIRUB SERPL-MCNC: 0.4 MG/DL (ref 0.1–1.2)
BUN BLD-MCNC: 11 MG/DL (ref 6–20)
BUN/CREAT SERPL: 12.8 (ref 7.3–30)
CALCIUM SPEC-SCNC: 9.3 MG/DL (ref 8.5–10.2)
CEA SERPL-MCNC: 1.16 NG/ML
CHLORIDE SERPL-SCNC: 92 MMOL/L (ref 98–107)
CO2 SERPL-SCNC: 23.6 MMOL/L (ref 22–29)
CREAT BLD-MCNC: 0.86 MG/DL (ref 0.7–1.3)
CREAT BLDA-MCNC: 0.9 MG/DL (ref 0.6–1.3)
DEPRECATED RDW RBC AUTO: 44.6 FL (ref 37–49)
EOSINOPHIL # BLD AUTO: 0.16 10*3/MM3 (ref 0–0.36)
EOSINOPHIL NFR BLD AUTO: 1.8 % (ref 1–5)
ERYTHROCYTE [DISTWIDTH] IN BLOOD BY AUTOMATED COUNT: 12.5 % (ref 11.7–14.5)
GFR SERPL CREATININE-BSD FRML MDRD: 95 ML/MIN/1.73
GLOBULIN UR ELPH-MCNC: 3.7 GM/DL (ref 1.8–3.5)
GLUCOSE BLD-MCNC: 76 MG/DL (ref 74–124)
HCT VFR BLD AUTO: 42.3 % (ref 40–49)
HGB BLD-MCNC: 14.2 G/DL (ref 13.5–16.5)
IMM GRANULOCYTES # BLD: 0.06 10*3/MM3 (ref 0–0.03)
IMM GRANULOCYTES NFR BLD: 0.7 % (ref 0–0.5)
LYMPHOCYTES # BLD AUTO: 1.03 10*3/MM3 (ref 1–3.5)
LYMPHOCYTES NFR BLD AUTO: 11.6 % (ref 20–49)
MCH RBC QN AUTO: 32.6 PG (ref 27–33)
MCHC RBC AUTO-ENTMCNC: 33.6 G/DL (ref 32–35)
MCV RBC AUTO: 97 FL (ref 83–97)
MONOCYTES # BLD AUTO: 0.82 10*3/MM3 (ref 0.25–0.8)
MONOCYTES NFR BLD AUTO: 9.2 % (ref 4–12)
NEUTROPHILS # BLD AUTO: 6.75 10*3/MM3 (ref 1.5–7)
NEUTROPHILS NFR BLD AUTO: 76 % (ref 39–75)
NRBC BLD MANUAL-RTO: 0 /100 WBC (ref 0–0)
PLATELET # BLD AUTO: 240 10*3/MM3 (ref 150–375)
PMV BLD AUTO: 10.1 FL (ref 8.9–12.1)
POTASSIUM BLD-SCNC: 4.1 MMOL/L (ref 3.5–4.7)
PROT SERPL-MCNC: 7.9 G/DL (ref 6.3–8)
RBC # BLD AUTO: 4.36 10*6/MM3 (ref 4.3–5.5)
SODIUM BLD-SCNC: 130 MMOL/L (ref 134–145)
WBC NRBC COR # BLD: 8.88 10*3/MM3 (ref 4–10)

## 2018-07-20 PROCEDURE — 85025 COMPLETE CBC W/AUTO DIFF WBC: CPT | Performed by: INTERNAL MEDICINE

## 2018-07-20 PROCEDURE — 36415 COLL VENOUS BLD VENIPUNCTURE: CPT | Performed by: INTERNAL MEDICINE

## 2018-07-20 PROCEDURE — 80053 COMPREHEN METABOLIC PANEL: CPT | Performed by: INTERNAL MEDICINE

## 2018-07-20 PROCEDURE — 0 DIATRIZOATE MEGLUMINE & SODIUM PER 1 ML: Performed by: INTERNAL MEDICINE

## 2018-07-20 PROCEDURE — 74177 CT ABD & PELVIS W/CONTRAST: CPT

## 2018-07-20 PROCEDURE — 71260 CT THORAX DX C+: CPT

## 2018-07-20 PROCEDURE — 25010000002 IOPAMIDOL 61 % SOLUTION: Performed by: INTERNAL MEDICINE

## 2018-07-20 PROCEDURE — 82378 CARCINOEMBRYONIC ANTIGEN: CPT | Performed by: INTERNAL MEDICINE

## 2018-07-20 PROCEDURE — 82565 ASSAY OF CREATININE: CPT

## 2018-07-20 RX ADMIN — DIATRIZOATE MEGLUMINE AND DIATRIZOATE SODIUM 30 ML: 660; 100 LIQUID ORAL; RECTAL at 07:25

## 2018-07-20 RX ADMIN — IOPAMIDOL 85 ML: 612 INJECTION, SOLUTION INTRAVENOUS at 08:20

## 2018-07-27 ENCOUNTER — OFFICE VISIT (OUTPATIENT)
Dept: ONCOLOGY | Facility: CLINIC | Age: 48
End: 2018-07-27

## 2018-07-27 ENCOUNTER — APPOINTMENT (OUTPATIENT)
Dept: OTHER | Facility: HOSPITAL | Age: 48
End: 2018-07-27

## 2018-07-27 VITALS
WEIGHT: 263.3 LBS | TEMPERATURE: 98.3 F | HEIGHT: 68 IN | HEART RATE: 68 BPM | DIASTOLIC BLOOD PRESSURE: 72 MMHG | OXYGEN SATURATION: 98 % | SYSTOLIC BLOOD PRESSURE: 117 MMHG | BODY MASS INDEX: 39.9 KG/M2

## 2018-07-27 DIAGNOSIS — E87.1 HYPONATREMIA: ICD-10-CM

## 2018-07-27 DIAGNOSIS — Z85.048 HISTORY OF RECTAL CANCER: Primary | ICD-10-CM

## 2018-07-27 PROCEDURE — 99215 OFFICE O/P EST HI 40 MIN: CPT | Performed by: INTERNAL MEDICINE

## 2018-07-27 PROCEDURE — G0463 HOSPITAL OUTPT CLINIC VISIT: HCPCS | Performed by: INTERNAL MEDICINE

## 2018-07-27 NOTE — PROGRESS NOTES
[facility] GROUP OUTPATIENT FOLLOW UP CLINIC VISIT    REASON FOR FOLLOW-UP:    1.  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 He had a total colectomy with ileostomy on 7/6/2015.  He declined adjuvant chemotherapy.  2.  Iron deficiency anemia, resolved.   3.  Small bowel obstruction in January 2017 ultimately requiring surgery.  No evidence of malignancy.    HISTORY OF PRESENT ILLNESS:  Nikolai Shaw is a 48 y.o. male who returns today for follow up of the above issue.  He continues to do very well.  No evidence of bowel obstruction.  No lymphadenopathy.  His ostomy continues to function normally.  He does drink a lot of water at work as he works outside and sweats a lot.  He recently has started to drink more Gatorade and has felt better as result of this.      PAST MEDICAL, SURGICAL, FAMILY, AND SOCIAL HISTORIES were reviewed with the patient and in the electronic medical record, and were updated if indicated.    ALLERGIES:  No Known Allergies    MEDICATIONS:  The medication list has been reviewed with the patient by the medical assistant, and the list has been updated in the electronic medical record, which I reviewed.  Medication dosages and frequencies were confirmed to be accurate.    REVIEW OF SYSTEMS:  PAIN:  See Vital Signs below.  GENERAL:  No fevers, chills, night sweats, or unintended weight loss  SKIN:  Ulceration around the ostomy site has healed.  Ostomy prolapse persists.  HEME/LYMPH:  No abnormal bleeding.  No palpable lymphadenopathy.  EYES:  No vision changes or diplopia.  ENT:  No sore throat or difficulty swallowing.  RESPIRATORY:  No cough, shortness of breath, hemoptysis, or wheezing.  CARDIOVASCULAR:  No chest pain, palpitations, orthopnea, or dyspnea on exertion.  GASTROINTESTINAL: No abdominal pain, nausea, vomiting, or diarrhea  GENITOURINARY:  No dysuria or  "hematuria.  MUSCULOSKELETAL:  No joint pain, swelling, or erythema.  NEUROLOGIC:  No dizziness, loss of consciousness, or seizures.  PSYCHIATRIC:  No depression, anxiety, or mood changes.    Vitals:    07/27/18 1023   BP: 117/72   Pulse: 68   Temp: 98.3 °F (36.8 °C)   TempSrc: Oral   SpO2: 98%   Weight: 119 kg (263 lb 4.8 oz)   Height: 172.7 cm (67.99\")   PainSc: 0-No pain       PHYSICAL EXAMINATION:  GENERAL:  Well-developed well-nourished male; awake, alert and oriented, in no acute distress.  SKIN:  Warm and dry, without rashes, purpura, or petechiae.  HEAD:  Normocephalic, atraumatic.  EYES:  Pupils equal, round and reactive to light.  Extraocular movements intact.  Conjunctivae normal.  EARS:  Hearing intact.  NOSE:  Septum midline.  No excoriations or nasal discharge.  MOUTH:  No stomatitis or ulcers.  Lips are normal.  THROAT:  Oropharynx without lesions or exudates.  NECK:  Supple with good range of motion; no thyromegaly or masses; no JVD or bruits.  LYMPHATICS:  No cervical, supraclavicular, axillary, or inguinal lymphadenopathy.  CHEST:  Lungs are clear to auscultation bilaterally.  No wheezes, rales, or rhonchi.  HEART:  Regular rate; normal rhythm.  No murmurs, gallops or rubs.  ABDOMEN:  Soft, non-tender, non-distended.  Normal active bowel sounds.  No organomegaly.  Right upper quadrant ileostomy with some prolapse.  EXTREMITIES:  No clubbing, cyanosis, or edema.  NEUROLOGICAL:  No focal neurologic deficits.    DIAGNOSTIC DATA:  Results for orders placed or performed during the hospital encounter of 07/20/18   POC Creatinine   Result Value Ref Range    Creatinine 0.90 0.60 - 1.30 mg/dL     WBC   Date Value Ref Range Status   07/20/2018 8.88 4.00 - 10.00 10*3/mm3 Final     RBC   Date Value Ref Range Status   07/20/2018 4.36 4.30 - 5.50 10*6/mm3 Final     Hemoglobin   Date Value Ref Range Status   07/20/2018 14.2 13.5 - 16.5 g/dL Final     Hematocrit   Date Value Ref Range Status   07/20/2018 42.3 40.0 " - 49.0 % Final     MCV   Date Value Ref Range Status   07/20/2018 97.0 83.0 - 97.0 fL Final     MCH   Date Value Ref Range Status   07/20/2018 32.6 27.0 - 33.0 pg Final     MCHC   Date Value Ref Range Status   07/20/2018 33.6 32.0 - 35.0 g/dL Final     RDW   Date Value Ref Range Status   07/20/2018 12.5 11.7 - 14.5 % Final     RDW-SD   Date Value Ref Range Status   07/20/2018 44.6 37.0 - 49.0 fl Final     MPV   Date Value Ref Range Status   07/20/2018 10.1 8.9 - 12.1 fL Final     Platelets   Date Value Ref Range Status   07/20/2018 240 150 - 375 10*3/mm3 Final     Neutrophil %   Date Value Ref Range Status   07/20/2018 76.0 (H) 39.0 - 75.0 % Final     Lymphocyte %   Date Value Ref Range Status   07/20/2018 11.6 (L) 20.0 - 49.0 % Final     Monocyte %   Date Value Ref Range Status   07/20/2018 9.2 4.0 - 12.0 % Final     Eosinophil %   Date Value Ref Range Status   07/20/2018 1.8 1.0 - 5.0 % Final     Basophil %   Date Value Ref Range Status   07/20/2018 0.7 0.0 - 1.1 % Final     Immature Grans %   Date Value Ref Range Status   07/20/2018 0.7 (H) 0.0 - 0.5 % Final     Neutrophils, Absolute   Date Value Ref Range Status   07/20/2018 6.75 1.50 - 7.00 10*3/mm3 Final     Lymphocytes, Absolute   Date Value Ref Range Status   07/20/2018 1.03 1.00 - 3.50 10*3/mm3 Final     Monocytes, Absolute   Date Value Ref Range Status   07/20/2018 0.82 (H) 0.25 - 0.80 10*3/mm3 Final     Eosinophils, Absolute   Date Value Ref Range Status   07/20/2018 0.16 0.00 - 0.36 10*3/mm3 Final     Basophils, Absolute   Date Value Ref Range Status   07/20/2018 0.06 0.00 - 0.10 10*3/mm3 Final     Immature Grans, Absolute   Date Value Ref Range Status   07/20/2018 0.06 (H) 0.00 - 0.03 10*3/mm3 Final     nRBC   Date Value Ref Range Status   07/20/2018 0.0 0.0 - 0.0 /100 WBC Final     Lab Results   Component Value Date    GLUCOSE 76 07/20/2018    BUN 11 07/20/2018    CREATININE 0.90 07/20/2018    EGFRIFNONA 95 07/20/2018    BCR 12.8 07/20/2018    K 4.1  07/20/2018    CO2 23.6 07/20/2018    CALCIUM 9.3 07/20/2018    ALBUMIN 4.20 07/20/2018    AST 42 (H) 07/20/2018    ALT 35 07/20/2018     Na 130    IMAGING: CT images from 7/20/2018 images personally reviewed.  No evidence of metastatic disease.    ASSESSMENT:  This is a 48 y.o. male with:  1.  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 He had a total colectomy with ileostomy on 7/6/2015.  He declined adjuvant chemotherapy.  No evidence of disease. Imaging annually at this point.  2.  Iron deficiency anemia, resolved.   3.  History of small bowel obstruction: No evidence for malignancy.  4.  Hypertension: Improved.  See below  5.  Weight gain:  His weight is stable and he knows he needs to lose weight.  6.  Hyponatremia:  His sodium is 130.  New issue addressed today.  He does drink a lot of water as he does work outside and sweats a lot.  He is drinking more Gatorade.  He is on lisinopril/hydrochlorothiazide and I wonder if the hydrochlorothiazide could be contributing to this as well.  I advised him to discuss this with Dr. Herron his primary care provider.    PLAN:  1.  He will discuss the hyponatremia with Dr. Herron his primary care provider  2.  He will return in 6 months for follow-up with a CBC, CMP, and CEA that day.  3.  Annual CT imaging ×2 more years.

## 2019-01-11 ENCOUNTER — APPOINTMENT (OUTPATIENT)
Dept: ONCOLOGY | Facility: CLINIC | Age: 49
End: 2019-01-11

## 2019-01-11 ENCOUNTER — APPOINTMENT (OUTPATIENT)
Dept: OTHER | Facility: HOSPITAL | Age: 49
End: 2019-01-11

## 2019-01-25 ENCOUNTER — OFFICE VISIT (OUTPATIENT)
Dept: ONCOLOGY | Facility: CLINIC | Age: 49
End: 2019-01-25

## 2019-01-25 ENCOUNTER — LAB (OUTPATIENT)
Dept: OTHER | Facility: HOSPITAL | Age: 49
End: 2019-01-25

## 2019-01-25 ENCOUNTER — TELEPHONE (OUTPATIENT)
Dept: ONCOLOGY | Facility: CLINIC | Age: 49
End: 2019-01-25

## 2019-01-25 VITALS
SYSTOLIC BLOOD PRESSURE: 169 MMHG | HEIGHT: 68 IN | RESPIRATION RATE: 18 BRPM | OXYGEN SATURATION: 97 % | DIASTOLIC BLOOD PRESSURE: 84 MMHG | TEMPERATURE: 98.1 F | BODY MASS INDEX: 40.65 KG/M2 | WEIGHT: 268.2 LBS | HEART RATE: 73 BPM

## 2019-01-25 DIAGNOSIS — E87.1 HYPONATREMIA: ICD-10-CM

## 2019-01-25 DIAGNOSIS — Z85.048 HISTORY OF RECTAL CANCER: Primary | ICD-10-CM

## 2019-01-25 DIAGNOSIS — Z85.048 HISTORY OF RECTAL CANCER: ICD-10-CM

## 2019-01-25 LAB
ALBUMIN SERPL-MCNC: 4.1 G/DL (ref 3.5–5.2)
ALBUMIN/GLOB SERPL: 1.2 G/DL
ALP SERPL-CCNC: 102 U/L (ref 39–117)
ALT SERPL W P-5'-P-CCNC: 25 U/L (ref 1–41)
ANION GAP SERPL CALCULATED.3IONS-SCNC: 10.9 MMOL/L
AST SERPL-CCNC: 33 U/L (ref 1–40)
BASOPHILS # BLD AUTO: 0.04 10*3/MM3 (ref 0–0.2)
BASOPHILS NFR BLD AUTO: 0.4 % (ref 0–1.5)
BILIRUB SERPL-MCNC: 0.6 MG/DL (ref 0.1–1.2)
BUN BLD-MCNC: 10 MG/DL (ref 6–20)
BUN/CREAT SERPL: 12.5 (ref 7–25)
CALCIUM SPEC-SCNC: 9.6 MG/DL (ref 8.6–10.5)
CEA SERPL-MCNC: 1.13 NG/ML
CHLORIDE SERPL-SCNC: 98 MMOL/L (ref 98–107)
CO2 SERPL-SCNC: 28.1 MMOL/L (ref 22–29)
CREAT BLD-MCNC: 0.8 MG/DL (ref 0.76–1.27)
DEPRECATED RDW RBC AUTO: 45.7 FL (ref 37–54)
EOSINOPHIL # BLD AUTO: 0.19 10*3/MM3 (ref 0–0.7)
EOSINOPHIL NFR BLD AUTO: 2.1 % (ref 0.3–6.2)
ERYTHROCYTE [DISTWIDTH] IN BLOOD BY AUTOMATED COUNT: 13.1 % (ref 11.5–14.5)
GFR SERPL CREATININE-BSD FRML MDRD: 103 ML/MIN/1.73
GLOBULIN UR ELPH-MCNC: 3.5 GM/DL
GLUCOSE BLD-MCNC: 133 MG/DL (ref 65–99)
HCT VFR BLD AUTO: 40.8 % (ref 40.4–52.2)
HGB BLD-MCNC: 14.1 G/DL (ref 13.7–17.6)
IMM GRANULOCYTES # BLD AUTO: 0.03 10*3/MM3 (ref 0–0.03)
IMM GRANULOCYTES NFR BLD AUTO: 0.3 % (ref 0–0.5)
LYMPHOCYTES # BLD AUTO: 1.05 10*3/MM3 (ref 0.9–4.8)
LYMPHOCYTES NFR BLD AUTO: 11.8 % (ref 19.6–45.3)
MCH RBC QN AUTO: 32.9 PG (ref 27–32.7)
MCHC RBC AUTO-ENTMCNC: 34.6 G/DL (ref 32.6–36.4)
MCV RBC AUTO: 95.3 FL (ref 79.8–96.2)
MONOCYTES # BLD AUTO: 0.63 10*3/MM3 (ref 0.2–1.2)
MONOCYTES NFR BLD AUTO: 7.1 % (ref 5–12)
NEUTROPHILS # BLD AUTO: 6.99 10*3/MM3 (ref 1.9–8.1)
NEUTROPHILS NFR BLD AUTO: 78.3 % (ref 42.7–76)
NRBC BLD AUTO-RTO: 0 /100 WBC (ref 0–0)
PLATELET # BLD AUTO: 209 10*3/MM3 (ref 140–500)
PMV BLD AUTO: 9.9 FL (ref 6–12)
POTASSIUM BLD-SCNC: 4.3 MMOL/L (ref 3.5–5.2)
PROT SERPL-MCNC: 7.6 G/DL (ref 6–8.5)
RBC # BLD AUTO: 4.28 10*6/MM3 (ref 4.6–6)
SODIUM BLD-SCNC: 137 MMOL/L (ref 136–145)
WBC NRBC COR # BLD: 8.93 10*3/MM3 (ref 4.5–10.7)

## 2019-01-25 PROCEDURE — 36415 COLL VENOUS BLD VENIPUNCTURE: CPT

## 2019-01-25 PROCEDURE — 99214 OFFICE O/P EST MOD 30 MIN: CPT | Performed by: INTERNAL MEDICINE

## 2019-01-25 PROCEDURE — 80053 COMPREHEN METABOLIC PANEL: CPT | Performed by: INTERNAL MEDICINE

## 2019-01-25 PROCEDURE — 82378 CARCINOEMBRYONIC ANTIGEN: CPT | Performed by: INTERNAL MEDICINE

## 2019-01-25 PROCEDURE — 85025 COMPLETE CBC W/AUTO DIFF WBC: CPT | Performed by: INTERNAL MEDICINE

## 2019-01-25 NOTE — PROGRESS NOTES
[facility] GROUP OUTPATIENT FOLLOW UP CLINIC VISIT    REASON FOR FOLLOW-UP:    1.  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 He had a total colectomy with ileostomy on 7/6/2015.  He declined adjuvant chemotherapy.  2.  Iron deficiency anemia, resolved.   3.  Small bowel obstruction in January 2017 ultimately requiring surgery.  No evidence of malignancy.    HISTORY OF PRESENT ILLNESS:  Nikolai Shaw is a 48 y.o. male who returns today for follow up of the above issue.  He continues to do very well.  No evidence of bowel obstruction.  No lymphadenopathy.  His ostomy continues to function normally with occasional constipation.        PAST MEDICAL, SURGICAL, FAMILY, AND SOCIAL HISTORIES were reviewed with the patient and in the electronic medical record, and were updated if indicated.    ALLERGIES:  No Known Allergies    MEDICATIONS:  The medication list has been reviewed with the patient by the medical assistant, and the list has been updated in the electronic medical record, which I reviewed.  Medication dosages and frequencies were confirmed to be accurate.    REVIEW OF SYSTEMS:  PAIN:  See Vital Signs below.  GENERAL:  No fevers, chills, night sweats, or unintended weight loss  SKIN:   Ostomy prolapse persists.  HEME/LYMPH:  No abnormal bleeding.  No palpable lymphadenopathy.  EYES:  No vision changes or diplopia.  ENT:  No sore throat or difficulty swallowing.  RESPIRATORY:  No cough, shortness of breath, hemoptysis, or wheezing.  CARDIOVASCULAR:  No chest pain, palpitations, orthopnea, or dyspnea on exertion.  GASTROINTESTINAL: No abdominal pain, nausea, vomiting, or diarrhea  GENITOURINARY:  No dysuria or hematuria.  MUSCULOSKELETAL:  No joint pain, swelling, or erythema.  NEUROLOGIC:  No dizziness, loss of consciousness, or seizures.  PSYCHIATRIC:  No depression, anxiety, or mood changes.    Vitals:     "01/25/19 0837   BP: 169/84   Pulse: 73   Resp: 18   Temp: 98.1 °F (36.7 °C)   TempSrc: Oral   SpO2: 97%   Weight: 122 kg (268 lb 3.2 oz)   Height: 172.7 cm (67.99\")   PainSc: 0-No pain  Comment: rectal cancer       PHYSICAL EXAMINATION:  GENERAL:  Well-developed well-nourished male; awake, alert and oriented, in no acute distress.  SKIN:  Warm and dry, without rashes, purpura, or petechiae.  HEAD:  Normocephalic, atraumatic.  EYES:  Pupils equal, round and reactive to light.  Extraocular movements intact.  Conjunctivae normal.  EARS:  Hearing intact.  NOSE:  Septum midline.  No excoriations or nasal discharge.  MOUTH:  No stomatitis or ulcers.  Lips are normal.  THROAT:  Oropharynx without lesions or exudates.  NECK:  Supple with good range of motion; no thyromegaly or masses; no JVD or bruits.  LYMPHATICS:  No cervical, supraclavicular, axillary, or inguinal lymphadenopathy.  CHEST:  Lungs are clear to auscultation bilaterally.  No wheezes, rales, or rhonchi.  HEART:  Regular rate; normal rhythm.  No murmurs, gallops or rubs.  ABDOMEN:  Soft, non-tender, non-distended.  Normal active bowel sounds.  No organomegaly.  Right upper quadrant ileostomy with prolapse.  EXTREMITIES:  No clubbing, cyanosis, or edema.  NEUROLOGICAL:  No focal neurologic deficits.    DIAGNOSTIC DATA:  Results for orders placed or performed in visit on 01/25/19   Comprehensive Metabolic Panel   Result Value Ref Range    Glucose 133 (H) 65 - 99 mg/dL    BUN 10 6 - 20 mg/dL    Creatinine 0.80 0.76 - 1.27 mg/dL    Sodium 137 136 - 145 mmol/L    Potassium 4.3 3.5 - 5.2 mmol/L    Chloride 98 98 - 107 mmol/L    CO2 28.1 22.0 - 29.0 mmol/L    Calcium 9.6 8.6 - 10.5 mg/dL    Total Protein 7.6 6.0 - 8.5 g/dL    Albumin 4.10 3.50 - 5.20 g/dL    ALT (SGPT) 25 1 - 41 U/L    AST (SGOT) 33 1 - 40 U/L    Alkaline Phosphatase 102 39 - 117 U/L    Total Bilirubin 0.6 0.1 - 1.2 mg/dL    eGFR Non African Amer 103 >60 mL/min/1.73    Globulin 3.5 gm/dL    A/G Ratio " 1.2 g/dL    BUN/Creatinine Ratio 12.5 7.0 - 25.0    Anion Gap 10.9 mmol/L   CBC Auto Differential   Result Value Ref Range    WBC 8.93 4.50 - 10.70 10*3/mm3    RBC 4.28 (L) 4.60 - 6.00 10*6/mm3    Hemoglobin 14.1 13.7 - 17.6 g/dL    Hematocrit 40.8 40.4 - 52.2 %    MCV 95.3 79.8 - 96.2 fL    MCH 32.9 (H) 27.0 - 32.7 pg    MCHC 34.6 32.6 - 36.4 g/dL    RDW 13.1 11.5 - 14.5 %    RDW-SD 45.7 37.0 - 54.0 fl    MPV 9.9 6.0 - 12.0 fL    Platelets 209 140 - 500 10*3/mm3    Neutrophil % 78.3 (H) 42.7 - 76.0 %    Lymphocyte % 11.8 (L) 19.6 - 45.3 %    Monocyte % 7.1 5.0 - 12.0 %    Eosinophil % 2.1 0.3 - 6.2 %    Basophil % 0.4 0.0 - 1.5 %    Immature Grans % 0.3 0.0 - 0.5 %    Neutrophils, Absolute 6.99 1.90 - 8.10 10*3/mm3    Lymphocytes, Absolute 1.05 0.90 - 4.80 10*3/mm3    Monocytes, Absolute 0.63 0.20 - 1.20 10*3/mm3    Eosinophils, Absolute 0.19 0.00 - 0.70 10*3/mm3    Basophils, Absolute 0.04 0.00 - 0.20 10*3/mm3    Immature Grans, Absolute 0.03 0.00 - 0.03 10*3/mm3    nRBC 0.0 0.0 - 0.0 /100 WBC     IMAGING: CT images from 7/20/2018.  No evidence of metastatic disease.    ASSESSMENT:  This is a 48 y.o. male with:  1.  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 He had a total colectomy with ileostomy on 7/6/2015.  He declined adjuvant chemotherapy.  No evidence of disease. Imaging annually at this point.  2.  Iron deficiency anemia, resolved.   3.  History of small bowel obstruction: No evidence for malignancy.  4.  Hypertension: He continues his blood pressure medication.  I advised him that his blood pressure is higher than goal today.  He should follow up with primary care.  5.  Weight gain:  His weight is increased a few pounds and he knows he needs to lose weight.  6.  Hyponatremia:   Sodium at his last visit was 130.  It is now 137 today.  We will notify him.    PLAN:  1.  He will return in 6 months for  follow-up with a CBC, CMP, and CEA and CT imaging of the chest abdomen and pelvis done prior to that.  2.  Annual CT imaging through the summer of 2020  3.  Follow-up with primary care regarding his hypertension.  Advised weight loss.

## 2019-01-25 NOTE — TELEPHONE ENCOUNTER
Called patient per Dr. Shelby regarding  sodium levels from his appointment today.    Mr. Shaw said thank you and that was fast.

## 2020-01-03 ENCOUNTER — HOSPITAL ENCOUNTER (OUTPATIENT)
Dept: CT IMAGING | Facility: HOSPITAL | Age: 50
Discharge: HOME | End: 2020-01-03
Admitting: INTERNAL MEDICINE

## 2020-01-03 DIAGNOSIS — E87.1 HYPONATREMIA: ICD-10-CM

## 2020-01-03 DIAGNOSIS — Z85.048 HISTORY OF RECTAL CANCER: ICD-10-CM

## 2020-01-03 PROCEDURE — 0 DIATRIZOATE MEGLUMINE & SODIUM PER 1 ML: Performed by: INTERNAL MEDICINE

## 2020-01-03 PROCEDURE — 74177 CT ABD & PELVIS W/CONTRAST: CPT

## 2020-01-03 PROCEDURE — 25010000002 IOPAMIDOL 61 % SOLUTION: Performed by: INTERNAL MEDICINE

## 2020-01-03 PROCEDURE — 82565 ASSAY OF CREATININE: CPT

## 2020-01-03 PROCEDURE — 71260 CT THORAX DX C+: CPT

## 2020-01-03 RX ADMIN — IOPAMIDOL 95 ML: 612 INJECTION, SOLUTION INTRAVENOUS at 08:41

## 2020-01-03 RX ADMIN — DIATRIZOATE MEGLUMINE AND DIATRIZOATE SODIUM 30 ML: 660; 100 LIQUID ORAL; RECTAL at 07:40

## 2020-01-04 LAB — CREAT BLDA-MCNC: 1.2 MG/DL (ref 0.6–1.3)

## 2020-01-10 ENCOUNTER — OFFICE VISIT (OUTPATIENT)
Dept: ONCOLOGY | Facility: CLINIC | Age: 50
End: 2020-01-10

## 2020-01-10 ENCOUNTER — LAB (OUTPATIENT)
Dept: OTHER | Facility: HOSPITAL | Age: 50
End: 2020-01-10

## 2020-01-10 VITALS
TEMPERATURE: 98 F | OXYGEN SATURATION: 96 % | DIASTOLIC BLOOD PRESSURE: 95 MMHG | HEIGHT: 68 IN | HEART RATE: 79 BPM | RESPIRATION RATE: 16 BRPM | SYSTOLIC BLOOD PRESSURE: 139 MMHG | BODY MASS INDEX: 40.16 KG/M2 | WEIGHT: 265 LBS

## 2020-01-10 DIAGNOSIS — Z85.048 HISTORY OF RECTAL CANCER: Primary | ICD-10-CM

## 2020-01-10 DIAGNOSIS — C20 MALIGNANT NEOPLASM OF RECTUM (HCC): Primary | ICD-10-CM

## 2020-01-10 DIAGNOSIS — R91.1 PULMONARY NODULE: ICD-10-CM

## 2020-01-10 LAB
ALBUMIN SERPL-MCNC: 4.3 G/DL (ref 3.5–5.2)
ALBUMIN/GLOB SERPL: 1.2 G/DL
ALP SERPL-CCNC: 103 U/L (ref 39–117)
ALT SERPL W P-5'-P-CCNC: 31 U/L (ref 1–41)
ANION GAP SERPL CALCULATED.3IONS-SCNC: 11.8 MMOL/L (ref 5–15)
AST SERPL-CCNC: 36 U/L (ref 1–40)
BASOPHILS # BLD AUTO: 0.05 10*3/MM3 (ref 0–0.2)
BASOPHILS NFR BLD AUTO: 0.6 % (ref 0–1.5)
BILIRUB SERPL-MCNC: 0.7 MG/DL (ref 0.1–1.2)
BUN BLD-MCNC: 11 MG/DL (ref 6–20)
BUN/CREAT SERPL: 13.3 (ref 7–25)
CALCIUM SPEC-SCNC: 9.8 MG/DL (ref 8.6–10.5)
CEA SERPL-MCNC: 1.01 NG/ML
CHLORIDE SERPL-SCNC: 96 MMOL/L (ref 98–107)
CO2 SERPL-SCNC: 27.2 MMOL/L (ref 22–29)
CREAT BLD-MCNC: 0.83 MG/DL (ref 0.76–1.27)
DEPRECATED RDW RBC AUTO: 44.2 FL (ref 37–54)
EOSINOPHIL # BLD AUTO: 0.16 10*3/MM3 (ref 0–0.4)
EOSINOPHIL NFR BLD AUTO: 1.9 % (ref 0.3–6.2)
ERYTHROCYTE [DISTWIDTH] IN BLOOD BY AUTOMATED COUNT: 12.8 % (ref 12.3–15.4)
GFR SERPL CREATININE-BSD FRML MDRD: 98 ML/MIN/1.73
GLOBULIN UR ELPH-MCNC: 3.6 GM/DL
GLUCOSE BLD-MCNC: 110 MG/DL (ref 65–99)
HCT VFR BLD AUTO: 43.4 % (ref 37.5–51)
HGB BLD-MCNC: 14.8 G/DL (ref 13–17.7)
IMM GRANULOCYTES # BLD AUTO: 0.04 10*3/MM3 (ref 0–0.05)
IMM GRANULOCYTES NFR BLD AUTO: 0.5 % (ref 0–0.5)
LYMPHOCYTES # BLD AUTO: 1.33 10*3/MM3 (ref 0.7–3.1)
LYMPHOCYTES NFR BLD AUTO: 15.9 % (ref 19.6–45.3)
MCH RBC QN AUTO: 32.1 PG (ref 26.6–33)
MCHC RBC AUTO-ENTMCNC: 34.1 G/DL (ref 31.5–35.7)
MCV RBC AUTO: 94.1 FL (ref 79–97)
MONOCYTES # BLD AUTO: 0.77 10*3/MM3 (ref 0.1–0.9)
MONOCYTES NFR BLD AUTO: 9.2 % (ref 5–12)
NEUTROPHILS # BLD AUTO: 6.03 10*3/MM3 (ref 1.7–7)
NEUTROPHILS NFR BLD AUTO: 71.9 % (ref 42.7–76)
NRBC BLD AUTO-RTO: 0 /100 WBC (ref 0–0.2)
PLATELET # BLD AUTO: 240 10*3/MM3 (ref 140–450)
PMV BLD AUTO: 10.1 FL (ref 6–12)
POTASSIUM BLD-SCNC: 4.4 MMOL/L (ref 3.5–5.2)
PROT SERPL-MCNC: 7.9 G/DL (ref 6–8.5)
RBC # BLD AUTO: 4.61 10*6/MM3 (ref 4.14–5.8)
SODIUM BLD-SCNC: 135 MMOL/L (ref 136–145)
WBC NRBC COR # BLD: 8.38 10*3/MM3 (ref 3.4–10.8)

## 2020-01-10 PROCEDURE — 80053 COMPREHEN METABOLIC PANEL: CPT | Performed by: INTERNAL MEDICINE

## 2020-01-10 PROCEDURE — 99215 OFFICE O/P EST HI 40 MIN: CPT | Performed by: INTERNAL MEDICINE

## 2020-01-10 PROCEDURE — 85025 COMPLETE CBC W/AUTO DIFF WBC: CPT | Performed by: INTERNAL MEDICINE

## 2020-01-10 PROCEDURE — 82378 CARCINOEMBRYONIC ANTIGEN: CPT | Performed by: INTERNAL MEDICINE

## 2020-01-10 PROCEDURE — 36415 COLL VENOUS BLD VENIPUNCTURE: CPT

## 2020-01-10 RX ORDER — METHOCARBAMOL 750 MG/1
50000 TABLET ORAL WEEKLY
COMMUNITY
Start: 2019-10-25 | End: 2021-03-22 | Stop reason: SDUPTHER
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
## 2020-06-26 ENCOUNTER — HOSPITAL ENCOUNTER (OUTPATIENT)
Dept: CT IMAGING | Facility: HOSPITAL | Age: 50
Discharge: HOME | End: 2020-06-26
Admitting: INTERNAL MEDICINE

## 2020-06-26 DIAGNOSIS — R91.1 PULMONARY NODULE: ICD-10-CM

## 2020-06-26 DIAGNOSIS — C20 MALIGNANT NEOPLASM OF RECTUM (HCC): ICD-10-CM

## 2020-06-26 PROCEDURE — 71250 CT THORAX DX C-: CPT

## 2020-07-07 ENCOUNTER — TELEPHONE (OUTPATIENT)
Dept: ONCOLOGY | Facility: CLINIC | Age: 50
End: 2020-07-07

## 2020-07-07 NOTE — TELEPHONE ENCOUNTER
Patient called wanting to cancel his appt scheduled for 7/10. He said if he needs the appt or to follow up to call and let him know.?    [phone]

## 2020-07-10 ENCOUNTER — APPOINTMENT (OUTPATIENT)
Dept: OTHER | Facility: HOSPITAL | Age: 50
End: 2020-07-10

## 2021-01-22 ENCOUNTER — IMMUNIZATION (OUTPATIENT)
Dept: VACCINE CLINIC | Facility: HOSPITAL | Age: 51
End: 2021-01-22

## 2021-01-22 PROCEDURE — 91300 HC SARSCOV02 VAC 30MCG/0.3ML IM: CPT | Performed by: INTERNAL MEDICINE

## 2021-01-22 PROCEDURE — 0001A: CPT | Performed by: INTERNAL MEDICINE

## 2021-02-05 ENCOUNTER — HOSPITAL ENCOUNTER (INPATIENT)
Facility: HOSPITAL | Age: 51
LOS: 1 days | Discharge: HOME | End: 2021-02-06
Attending: EMERGENCY MEDICINE | Admitting: SURGERY

## 2021-02-05 ENCOUNTER — APPOINTMENT (OUTPATIENT)
Dept: CT IMAGING | Facility: HOSPITAL | Age: 51
End: 2021-02-05

## 2021-02-05 DIAGNOSIS — K43.5 PARASTOMAL HERNIA WITHOUT OBSTRUCTION OR GANGRENE: Primary | ICD-10-CM

## 2021-02-05 DIAGNOSIS — E87.6 HYPOKALEMIA: ICD-10-CM

## 2021-02-05 DIAGNOSIS — R10.31 RIGHT LOWER QUADRANT ABDOMINAL PAIN: ICD-10-CM

## 2021-02-05 LAB
ALBUMIN SERPL-MCNC: 3.8 G/DL (ref 3.5–5.2)
ALBUMIN/GLOB SERPL: 1 G/DL
ALP SERPL-CCNC: 84 U/L (ref 39–117)
ALT SERPL W P-5'-P-CCNC: 18 U/L (ref 1–41)
ANION GAP SERPL CALCULATED.3IONS-SCNC: 11.7 MMOL/L (ref 5–15)
AST SERPL-CCNC: 18 U/L (ref 1–40)
BASOPHILS # BLD AUTO: 0.05 10*3/MM3 (ref 0–0.2)
BASOPHILS NFR BLD AUTO: 0.5 % (ref 0–1.5)
BILIRUB SERPL-MCNC: 0.6 MG/DL (ref 0–1.2)
BILIRUB UR QL STRIP: NEGATIVE
BUN SERPL-MCNC: 4 MG/DL (ref 6–20)
BUN/CREAT SERPL: 4.9 (ref 7–25)
CALCIUM SPEC-SCNC: 9.4 MG/DL (ref 8.6–10.5)
CHLORIDE SERPL-SCNC: 98 MMOL/L (ref 98–107)
CLARITY UR: CLEAR
CO2 SERPL-SCNC: 29.3 MMOL/L (ref 22–29)
COLOR UR: YELLOW
CREAT SERPL-MCNC: 0.82 MG/DL (ref 0.76–1.27)
DEPRECATED RDW RBC AUTO: 47 FL (ref 37–54)
EOSINOPHIL # BLD AUTO: 0.17 10*3/MM3 (ref 0–0.4)
EOSINOPHIL NFR BLD AUTO: 1.5 % (ref 0.3–6.2)
ERYTHROCYTE [DISTWIDTH] IN BLOOD BY AUTOMATED COUNT: 13.4 % (ref 12.3–15.4)
GFR SERPL CREATININE-BSD FRML MDRD: 99 ML/MIN/1.73
GLOBULIN UR ELPH-MCNC: 3.7 GM/DL
GLUCOSE SERPL-MCNC: 111 MG/DL (ref 65–99)
GLUCOSE UR STRIP-MCNC: NEGATIVE MG/DL
HCT VFR BLD AUTO: 42.7 % (ref 37.5–51)
HGB BLD-MCNC: 14.6 G/DL (ref 13–17.7)
HGB UR QL STRIP.AUTO: NEGATIVE
HOLD SPECIMEN: NORMAL
IMM GRANULOCYTES # BLD AUTO: 0.07 10*3/MM3 (ref 0–0.05)
IMM GRANULOCYTES NFR BLD AUTO: 0.6 % (ref 0–0.5)
KETONES UR QL STRIP: NEGATIVE
LEUKOCYTE ESTERASE UR QL STRIP.AUTO: NEGATIVE
LIPASE SERPL-CCNC: 24 U/L (ref 13–60)
LYMPHOCYTES # BLD AUTO: 0.94 10*3/MM3 (ref 0.7–3.1)
LYMPHOCYTES NFR BLD AUTO: 8.6 % (ref 19.6–45.3)
MCH RBC QN AUTO: 33 PG (ref 26.6–33)
MCHC RBC AUTO-ENTMCNC: 34.2 G/DL (ref 31.5–35.7)
MCV RBC AUTO: 96.6 FL (ref 79–97)
MONOCYTES # BLD AUTO: 1.09 10*3/MM3 (ref 0.1–0.9)
MONOCYTES NFR BLD AUTO: 9.9 % (ref 5–12)
NEUTROPHILS NFR BLD AUTO: 78.9 % (ref 42.7–76)
NEUTROPHILS NFR BLD AUTO: 8.65 10*3/MM3 (ref 1.7–7)
NITRITE UR QL STRIP: NEGATIVE
NRBC BLD AUTO-RTO: 0 /100 WBC (ref 0–0.2)
PH UR STRIP.AUTO: 6.5 [PH] (ref 5–8)
PLATELET # BLD AUTO: 222 10*3/MM3 (ref 140–450)
PMV BLD AUTO: 11.2 FL (ref 6–12)
POTASSIUM SERPL-SCNC: 2.9 MMOL/L (ref 3.5–5.2)
PROT SERPL-MCNC: 7.5 G/DL (ref 6–8.5)
PROT UR QL STRIP: ABNORMAL
RBC # BLD AUTO: 4.42 10*6/MM3 (ref 4.14–5.8)
SARS-COV-2 ORF1AB RESP QL NAA+PROBE: NOT DETECTED
SODIUM SERPL-SCNC: 139 MMOL/L (ref 136–145)
SP GR UR STRIP: >=1.03 (ref 1–1.03)
UROBILINOGEN UR QL STRIP: ABNORMAL
WBC # BLD AUTO: 10.97 10*3/MM3 (ref 3.4–10.8)
WHOLE BLOOD HOLD SPECIMEN: NORMAL
WHOLE BLOOD HOLD SPECIMEN: NORMAL

## 2021-02-05 PROCEDURE — 83690 ASSAY OF LIPASE: CPT | Performed by: NURSE PRACTITIONER

## 2021-02-05 PROCEDURE — C9803 HOPD COVID-19 SPEC COLLECT: HCPCS

## 2021-02-05 PROCEDURE — 25010000002 IOPAMIDOL 61 % SOLUTION: Performed by: EMERGENCY MEDICINE

## 2021-02-05 PROCEDURE — U0004 COV-19 TEST NON-CDC HGH THRU: HCPCS | Performed by: NURSE PRACTITIONER

## 2021-02-05 PROCEDURE — 99222 1ST HOSP IP/OBS MODERATE 55: CPT | Performed by: SURGERY

## 2021-02-05 PROCEDURE — 99284 EMERGENCY DEPT VISIT MOD MDM: CPT

## 2021-02-05 PROCEDURE — 85025 COMPLETE CBC W/AUTO DIFF WBC: CPT | Performed by: NURSE PRACTITIONER

## 2021-02-05 PROCEDURE — 25010000003 POTASSIUM CHLORIDE 10 MEQ/100ML SOLUTION: Performed by: NURSE PRACTITIONER

## 2021-02-05 PROCEDURE — 80053 COMPREHEN METABOLIC PANEL: CPT | Performed by: NURSE PRACTITIONER

## 2021-02-05 PROCEDURE — 96376 TX/PRO/DX INJ SAME DRUG ADON: CPT

## 2021-02-05 PROCEDURE — 74177 CT ABD & PELVIS W/CONTRAST: CPT

## 2021-02-05 PROCEDURE — 96375 TX/PRO/DX INJ NEW DRUG ADDON: CPT

## 2021-02-05 PROCEDURE — 81003 URINALYSIS AUTO W/O SCOPE: CPT | Performed by: NURSE PRACTITIONER

## 2021-02-05 PROCEDURE — 25010000002 MORPHINE PER 10 MG: Performed by: NURSE PRACTITIONER

## 2021-02-05 PROCEDURE — 25010000002 ONDANSETRON PER 1 MG: Performed by: NURSE PRACTITIONER

## 2021-02-05 RX ORDER — HYDROCODONE BITARTRATE AND ACETAMINOPHEN 7.5; 325 MG/1; MG/1
1 TABLET ORAL EVERY 6 HOURS PRN
Status: DISCONTINUED | OUTPATIENT
Start: 2021-02-05 | End: 2021-02-06 | Stop reason: HOSPADM

## 2021-02-05 RX ORDER — ONDANSETRON 2 MG/ML
4 INJECTION INTRAMUSCULAR; INTRAVENOUS ONCE
Status: COMPLETED | OUTPATIENT
Start: 2021-02-05 | End: 2021-02-05

## 2021-02-05 RX ORDER — SODIUM CHLORIDE 0.9 % (FLUSH) 0.9 %
10 SYRINGE (ML) INJECTION AS NEEDED
Status: DISCONTINUED | OUTPATIENT
Start: 2021-02-05 | End: 2021-02-06 | Stop reason: HOSPADM

## 2021-02-05 RX ORDER — DEXTROSE, SODIUM CHLORIDE, AND POTASSIUM CHLORIDE 5; .45; .15 G/100ML; G/100ML; G/100ML
75 INJECTION INTRAVENOUS CONTINUOUS
Status: DISCONTINUED | OUTPATIENT
Start: 2021-02-05 | End: 2021-02-06 | Stop reason: HOSPADM

## 2021-02-05 RX ORDER — SERTRALINE HYDROCHLORIDE 25 MG/1
25 TABLET, FILM COATED ORAL DAILY
Status: DISCONTINUED | OUTPATIENT
Start: 2021-02-05 | End: 2021-02-06 | Stop reason: HOSPADM

## 2021-02-05 RX ORDER — POTASSIUM CHLORIDE 7.45 MG/ML
10 INJECTION INTRAVENOUS ONCE
Status: COMPLETED | OUTPATIENT
Start: 2021-02-05 | End: 2021-02-05

## 2021-02-05 RX ORDER — MORPHINE SULFATE 2 MG/ML
4 INJECTION, SOLUTION INTRAMUSCULAR; INTRAVENOUS ONCE
Status: COMPLETED | OUTPATIENT
Start: 2021-02-05 | End: 2021-02-05

## 2021-02-05 RX ORDER — AMLODIPINE BESYLATE 5 MG/1
5 TABLET ORAL DAILY
COMMUNITY
Start: 2021-01-05 | End: 2021-02-15 | Stop reason: ALTCHOICE

## 2021-02-05 RX ORDER — ONDANSETRON 2 MG/ML
4 INJECTION INTRAMUSCULAR; INTRAVENOUS EVERY 6 HOURS PRN
Status: DISCONTINUED | OUTPATIENT
Start: 2021-02-05 | End: 2021-02-06 | Stop reason: HOSPADM

## 2021-02-05 RX ADMIN — POTASSIUM CHLORIDE, DEXTROSE MONOHYDRATE AND SODIUM CHLORIDE 75 ML/HR: 150; 5; 450 INJECTION, SOLUTION INTRAVENOUS at 12:56

## 2021-02-05 RX ADMIN — SERTRALINE 25 MG: 25 TABLET, FILM COATED ORAL at 15:46

## 2021-02-05 RX ADMIN — IOPAMIDOL 85 ML: 612 INJECTION, SOLUTION INTRAVENOUS at 09:21

## 2021-02-05 RX ADMIN — HYDROCODONE BITARTRATE AND ACETAMINOPHEN 1 TABLET: 7.5; 325 TABLET ORAL at 20:10

## 2021-02-05 RX ADMIN — HYDROCODONE BITARTRATE AND ACETAMINOPHEN 1 TABLET: 7.5; 325 TABLET ORAL at 12:56

## 2021-02-05 RX ADMIN — POTASSIUM CHLORIDE 10 MEQ: 7.46 INJECTION, SOLUTION INTRAVENOUS at 11:26

## 2021-02-05 RX ADMIN — MORPHINE SULFATE 4 MG: 2 INJECTION, SOLUTION INTRAMUSCULAR; INTRAVENOUS at 10:28

## 2021-02-05 RX ADMIN — MORPHINE SULFATE 4 MG: 2 INJECTION, SOLUTION INTRAMUSCULAR; INTRAVENOUS at 08:42

## 2021-02-05 RX ADMIN — ONDANSETRON 4 MG: 2 INJECTION INTRAMUSCULAR; INTRAVENOUS at 08:39

## 2021-02-05 RX ADMIN — SODIUM CHLORIDE 1000 ML: 9 INJECTION, SOLUTION INTRAVENOUS at 08:46

## 2021-02-05 NOTE — H&P
Cc: Decreased stoma output, pain around stoma    History of presenting illness:   This is a nice, obese 50-year-old patient of Dr. 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  He subsequently underwent laparotomy with small bowel resection for small bowel obstruction done in February 2017.  He is done fairly well since that time, although he has had some parastomal hernia and protrusion of his stoma, but says that beginning a couple of days ago he began to have some pain just above and lateral to his ileostomy.  He also felt that the volume of his output was decreased, as well as the character being a little bit thinner than usual.  He denies any fever.  There is no vomiting.  He has been eating well.    Past Medical History: FAP, rectal cancer, hypertension, obesity    Past Surgical History: Laparoscopic total proctocolectomy with permanent end ileostomy done July 2015, February 2017 with laparotomy and small bowel resection for small bowel obstruction    Medications: Reviewed and reconciled in epic    Allergies: None known    Social History: Uses alcohol occasionally, no tobacco use    Family History: Positive for familial adenomatous polyposis syndrome    Review of Systems:  Constitutional: Negative for fever, chills, weight loss or weakness  Neck: no swollen glands or dysphagia or odynophagia  Respiratory: negative for SOB, cough, hemoptysis or wheezing  Cardiovascular: negative for chest pain, palpitations or peripheral edema  Gastrointestinal: Negative for nausea or vomiting.  Positive for pain around his stoma, negative for bleeding from stoma      Physical Exam:   Body mass index 40.5  Temperature 96.4 heart rate 74 blood pressure 120/65  General: alert and oriented, appropriate, no acute distress  Neck: Supple without lymphadenopathy or thyromegaly, trachea is in the midline  Respiratory: Lungs are clear bilaterally without wheezing, no use of accessory muscles is  noted  Cardiovascular: Regular rate and rhythm without murmur, no peripheral edema  Gastrointestinal: Soft, no ventral hernia is appreciated.  There is some protrusion of his stoma noted.  There is some generalized swelling and bulging around the stoma and there is some mild tenderness just above and lateral to the stoma.  There is no guarding or rebound tenderness.  I am not able to clearly appreciate any fascial defect.  There is relatively normal-appearing liquid succus noted in his ostomy appliance.    Laboratory data: White blood cell count is 10.9, hemoglobin 14.6, albumin is 3.8.  Potassium is a little bit low at 2.9.  Renal function is preserved with a creatinine of 0.82.    Imaging data: CT images are reviewed and compared to prior study.  He has had a parastomal hernia going back for the last several scans, but clearly the amount of fatty soft tissue present seems to be more substantial now.  There does appear to be some stranding of the omental fat adjacent to the wall of the ileum.  There is no evidence for small bowel obstruction.  There is a minimal amount of herniated small bowel within the hernia sac.  There may be some mild wall thickening of the small bowel in this region.      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      Harry Brand MD, FACS  General, Minimally Invasive and Endoscopic Surgery  [facility], [place], operated by [facility]    [address], [address]  [place], [place], [address]  P: [phone]  F: [phone]

## 2021-02-05 NOTE — PLAN OF CARE
Goal Outcome Evaluation:  Plan of Care Reviewed With: patient  Progress: no change  Outcome Summary: Pt admitted today through ER with c/o RLQ pain. Pt has Ileostomy and takes care of it himself. Pt is A&O x 4, up ad petey. IV fluids infusing. VSS. GI soft diet. Norco given x 1 for pain.

## 2021-02-05 NOTE — ED PROVIDER NOTES
EMERGENCY DEPARTMENT ENCOUNTER    Room Number:  P395/1  Date of encounter:  2/5/2021  PCP: Ace Herron MD  Historian: Patient      PPE    Patient was placed in face mask in first look. Patient was wearing facemask when I entered the room and throughout our encounter. I wore full protective equipment throughout this patient encounter including a face mask, and gloves. Hand hygiene was performed before donning protective equipment and after removal when leaving the room.        HPI:  Chief Complaint: Abdominal pain  A complete HPI/ROS/PMH/PSH/SH/FH are unobtainable due to: Nothing    Context: Nikolai Shaw is a 50 y.o. male who arrives to the ED via private vehicle from home.  Patient presents with c/o mild, constant, sharp right-sided abdominal pain for the past 2 days.   Patient also complains of decreased output from his ileostomy also for the past 2 days.  Patient states when he does have output the pain in his abdomen increases.  He states he has noticed a noise from his stoma when the output is trying to come out.  Patient denies fever, chills, nausea, vomiting, chest pain, shortness of breath or any other symptoms.  Patient states that nothing makes the symptoms better and nothing worsens symptoms.          PAST MEDICAL HISTORY  Active Ambulatory Problems     Diagnosis Date Noted   • Malignant neoplasm of rectum (CMS/HCC) 06/01/2016   • History of rectal cancer 02/09/2018   • Hyponatremia 07/27/2018   • Pulmonary nodule 01/10/2020     Resolved Ambulatory Problems     Diagnosis Date Noted   • SBO (small bowel obstruction) (CMS/HCC) 01/19/2017   • Small bowel obstruction (CMS/HCC) 01/28/2017     Past Medical History:   Diagnosis Date   • Adenomatous polyposis    • Anemia    • Colon carcinoma (CMS/HCC)    • Depression    • Hypertension    • Insomnia    • Renal calculi    • RLS (restless legs syndrome)    • Testicular hypofunction          PAST SURGICAL HISTORY  Past Surgical History:   Procedure Laterality  Date   • ABDOMINOPERINEAL PROCTOCOLECTOMY  07/06/2015    DR PAL RUDOLPH   • COLONOSCOPY  06/16/1915    DR SHASTA HOWARD   • ENDOSCOPY  06/23/2015    DR SHASTA HOWARD   • EXPLORATORY LAPAROTOMY N/A 2/3/2017    Procedure: LAPAROTOMY EXPLORATORY  SMALL BOWEL OBSTRUCTION;  Surgeon: Pal Rudolph MD;  Location: [facility];  Service:    • ILEOSTOMY  07/06/2015         FAMILY HISTORY  Family History   Problem Relation Age of Onset   • Colon cancer Father    • Aneurysm Maternal Grandfather    • Colon cancer Paternal Grandfather    • Hypertension Maternal Uncle    • Diabetes Maternal Uncle          SOCIAL HISTORY  Social History     Socioeconomic History   • Marital status:      Spouse name: Hansa   • Number of children: Not on file   • Years of education: college   • Highest education level: Not on file   Occupational History   • Occupation: Maintenance     Employer: [employer]   Tobacco Use   • Smoking status: Never Smoker   • Smokeless tobacco: Current User     Types: Chew   • Tobacco comment: CHEWS TOBACCO   Substance and Sexual Activity   • Alcohol use: Yes     Alcohol/week: 6.0 standard drinks     Types: 6 Cans of beer per week     Comment: DAILY CONSUMPTION LEVEL   • Drug use: No   • Sexual activity: Defer         ALLERGIES  Patient has no known allergies.        REVIEW OF SYSTEMS  Review of Systems     All systems reviewed and negative except for those discussed in HPI.        PHYSICAL EXAM    ED Triage Vitals   Temp Heart Rate Resp BP SpO2   02/05/21 0706 02/05/21 0706 02/05/21 0706 02/05/21 0724 02/05/21 0706   96.4 °F (35.8 °C) 79 16 142/84 94 %       Physical Exam  GENERAL: Well appearing, non-toxic appearing, not distressed  HENT: normocephalic, atraumatic  EYES: no scleral icterus, PERRL  CV: regular rhythm, regular rate, no murmur  RESPIRATORY: normal effort, CTAB  ABDOMEN: soft, hypoactive bowel sounds, right lower tenderness, no rebound, guarding or rigidity  Ileostomy noted to  right lower abdomen with beefy red stoma, small amount of dark liquidy stool in colostomy bag  MUSCULOSKELETAL: no deformity  NEURO: alert, moves all extremities, follows commands, mental status normal/baseline  SKIN: warm, dry, no rash   Psych: Appropriate mood and affect  Nursing notes and vital signs reviewed      LAB RESULTS  Recent Results (from the past 24 hour(s))   Light Blue Top    Collection Time: 02/05/21  7:40 AM   Result Value Ref Range    Extra Tube hold for add-on    Green Top (Gel)    Collection Time: 02/05/21  7:40 AM   Result Value Ref Range    Extra Tube Hold for add-ons.    Lavender Top    Collection Time: 02/05/21  7:40 AM   Result Value Ref Range    Extra Tube hold for add-on    Comprehensive Metabolic Panel    Collection Time: 02/05/21  7:40 AM    Specimen: Blood   Result Value Ref Range    Glucose 111 (H) 65 - 99 mg/dL    BUN 4 (L) 6 - 20 mg/dL    Creatinine 0.82 0.76 - 1.27 mg/dL    Sodium 139 136 - 145 mmol/L    Potassium 2.9 (L) 3.5 - 5.2 mmol/L    Chloride 98 98 - 107 mmol/L    CO2 29.3 (H) 22.0 - 29.0 mmol/L    Calcium 9.4 8.6 - 10.5 mg/dL    Total Protein 7.5 6.0 - 8.5 g/dL    Albumin 3.80 3.50 - 5.20 g/dL    ALT (SGPT) 18 1 - 41 U/L    AST (SGOT) 18 1 - 40 U/L    Alkaline Phosphatase 84 39 - 117 U/L    Total Bilirubin 0.6 0.0 - 1.2 mg/dL    eGFR Non African Amer 99 >60 mL/min/1.73    Globulin 3.7 gm/dL    A/G Ratio 1.0 g/dL    BUN/Creatinine Ratio 4.9 (L) 7.0 - 25.0    Anion Gap 11.7 5.0 - 15.0 mmol/L   Lipase    Collection Time: 02/05/21  7:40 AM    Specimen: Blood   Result Value Ref Range    Lipase 24 13 - 60 U/L   CBC Auto Differential    Collection Time: 02/05/21  7:40 AM    Specimen: Blood   Result Value Ref Range    WBC 10.97 (H) 3.40 - 10.80 10*3/mm3    RBC 4.42 4.14 - 5.80 10*6/mm3    Hemoglobin 14.6 13.0 - 17.7 g/dL    Hematocrit 42.7 37.5 - 51.0 %    MCV 96.6 79.0 - 97.0 fL    MCH 33.0 26.6 - 33.0 pg    MCHC 34.2 31.5 - 35.7 g/dL    RDW 13.4 12.3 - 15.4 %    RDW-SD 47.0 37.0  - 54.0 fl    MPV 11.2 6.0 - 12.0 fL    Platelets 222 140 - 450 10*3/mm3    Neutrophil % 78.9 (H) 42.7 - 76.0 %    Lymphocyte % 8.6 (L) 19.6 - 45.3 %    Monocyte % 9.9 5.0 - 12.0 %    Eosinophil % 1.5 0.3 - 6.2 %    Basophil % 0.5 0.0 - 1.5 %    Immature Grans % 0.6 (H) 0.0 - 0.5 %    Neutrophils, Absolute 8.65 (H) 1.70 - 7.00 10*3/mm3    Lymphocytes, Absolute 0.94 0.70 - 3.10 10*3/mm3    Monocytes, Absolute 1.09 (H) 0.10 - 0.90 10*3/mm3    Eosinophils, Absolute 0.17 0.00 - 0.40 10*3/mm3    Basophils, Absolute 0.05 0.00 - 0.20 10*3/mm3    Immature Grans, Absolute 0.07 (H) 0.00 - 0.05 10*3/mm3    nRBC 0.0 0.0 - 0.2 /100 WBC   Urinalysis With Microscopic If Indicated (No Culture) - Urine, Clean Catch    Collection Time: 02/05/21 12:14 PM    Specimen: Urine, Clean Catch   Result Value Ref Range    Color, UA Yellow Yellow, Straw    Appearance, UA Clear Clear    pH, UA 6.5 5.0 - 8.0    Specific Gravity, UA >=1.030 1.005 - 1.030    Glucose, UA Negative Negative    Ketones, UA Negative Negative    Bilirubin, UA Negative Negative    Blood, UA Negative Negative    Protein, UA Trace (A) Negative    Leuk Esterase, UA Negative Negative    Nitrite, UA Negative Negative    Urobilinogen, UA 0.2 E.U./dL 0.2 - 1.0 E.U./dL       Ordered the above labs and independently reviewed the results.      RADIOLOGY  Ct Abdomen Pelvis With Contrast    Result Date: 2/5/2021  EXAMINATION: CT OF THE ABDOMEN AND PELVIS WITH CONTRAST  HISTORY: 50-year-old male with a history of right-sided abdominal pain and decreased output from ileostomy.  TECHNIQUE: Contiguous axial images were obtained through the abdomen and pelvis following intravenous administration of nonionic contrast. Oral contrast  was not administered. Radiation dose reduction techniques were utilized, including automated exposure control and exposure modulation based on body size.  COMPARISON: CT of abdomen pelvis with contrast, 01/03/2020.  FINDINGS: The visualized portion of the lung  basesare clear. The visualized portion of the heart appears normal . The liver demonstrates a diffuse hypodense appearance throughout without evidence for focal abnormality. This is consistent with diffuse hepatic steatosis. The degree of steatosis appears to have increased when compared to the prior examination, as the liver has a more hypodense appearance than seen previously with a density measuring on the order of 18 Hounsfield units, where previously they measured on the order of 40 Hounsfield units.  The pancreas appears normal. The gallbladder has a normal appearance. The kidneys appear normal. The adrenal glands have a normal appearance. The spleen appears normal.  There is a right lower quadrant ileostomy. Since the prior examination, there has been interval development of intense stranding within the fat of the omentum that herniates into the extra-abdominal region to the ileostomy defect. This fat that appears inflamed is located medial to the exiting bowel loop at the ostomy opening superficially. There is herniation of omental fat through the stomal defect. There is adjacent thickening of the wall of the bowel to 5 mm which suggests some associated inflammatory change. To a lesser degree, there is also some stranding of the omental fat that herniates through the defect that is noted to be lateral to the loop of the bowel. Surrounding stranding of the subcutaneous fat lower anterior abdominal wall is also noted. Mild L4-L5 and L5-S1 degenerative disc disease is noted.      1.There is a right lower quadrant ileostomy that contains both bowel and a large amount of omental fat that measures up to 9.4 x 8.6 cm in transverse dimensions. The omental fat demonstrates considerable stranding and there is adjacent thickening of the wall of the ileum up to 5 mm. Additionally, there is stranding within the surrounding subcutaneous tissues of the right lower quadrant fat. Exact etiology is uncertain, but given the  presence of abundant intra-abdominal fat that has herniated into the extra-abdominal region through the defect in the right lower quadrant, I cannot exclude the presence of an element of strangulation and/or restricted vascular drainage and/or supply in this region. Also, panniculitis and/or inflammation/infection of the omental fat is a possibility. 2. Progressive hepatic steatosis.  This was communicated to India Tate on 02/05/2021 at 9:33 AM in the emergency room.        I ordered the above noted radiological studies and viewed the images on the PACS system.         MEDICAL RECORD REVIEW  Medical records reviewed in epic, patient was last admitted in February 2017 for a recurrent small bowel obstruction      PROCEDURES    Procedures        DIFFERENTIAL DIAGNOSIS  Differential diagnosis for abdominal pain include but are not limited to the following:      -Pancreatitis  -Small or large bowel obstruction  -Appendicitis  -Diverticulitis  -UTI including pyelonephritis  -Ureteral stone  -Zoster  -Colitis, including infectious and ischemic      PROGRESS, DATA ANALYSIS, CONSULTS, AND MEDICAL DECISION MAKING        ED Course as of Feb 05 1333 Fri Feb 05, 2021   0828 Discussed pertinent information from history and physical exam with patient.  Discussed differential diagnosis and plan for ED evaluation/work-up and treatment including labs, CT abdomen and pelvis, pain control.  All questions answered.  Patient is agreeable with this plan.        [MS]   0936 Potassium(!): 2.9 [MS]   0958 Reviewed pt's history and workup with Dr. Singh.  After a bedside evaluation, they agree with the plan of care.          [MS]   1031 Discussed patient and CT findings with Dr. Brand, general surgeon on call.  He states he will come down and evaluate patient and look at his scans.    [MS]   1118 Consult Note    Discussed care with Dr Brand  Reviewed patient's history, exam, results and need for admission secondary to  parastomal hernia and hypokalemia  Dr. Brand accepts the patient to be admitted to [facility] inpatient bed.        [MS]      ED Course User Index  [MS] JoleneIndia de la vega, YOUNG     ADMISSION    Discussed treatment plan and reason for admission with pt/family and admitting physician.  Pt/family voiced understanding of the plan for admission for further testing/treatment as needed.      DIAGNOSIS  Final diagnoses:   Parastomal hernia without obstruction or gangrene   Right lower quadrant abdominal pain   Hypokalemia           MEDICATIONS GIVEN IN ED    Medications   sodium chloride 0.9 % flush 10 mL (has no administration in time range)   sodium chloride 0.9 % flush 10 mL (has no administration in time range)   dextrose 5 % and sodium chloride 0.45 % with KCl 20 mEq/L infusion (75 mL/hr Intravenous Not Given 2/5/21 1257)   HYDROcodone-acetaminophen (NORCO) 7.5-325 MG per tablet 1 tablet (1 tablet Oral Given 2/5/21 1256)   ondansetron (ZOFRAN) injection 4 mg (has no administration in time range)   lisinopril (PRINIVIL,ZESTRIL) 20 mg, hydroCHLOROthiazide (HYDRODIURIL) 12.5 mg for ZESTORETIC 20-12.5 (has no administration in time range)   sertraline (ZOLOFT) tablet 25 mg (has no administration in time range)   sodium chloride 0.9 % bolus 1,000 mL ( Intravenous Currently Infusing 2/5/21 1028)   morphine injection 4 mg (4 mg Intravenous Given 2/5/21 0842)   ondansetron (ZOFRAN) injection 4 mg (4 mg Intravenous Given 2/5/21 0839)   iopamidol (ISOVUE-300) 61 % injection 100 mL (85 mL Intravenous Given by Other 2/5/21 0921)   morphine injection 4 mg (4 mg Intravenous Given 2/5/21 1028)   potassium chloride 10 mEq in 100 mL IVPB (10 mEq Intravenous New Bag 2/5/21 1126)           COURSE & MEDICAL DECISION MAKING  Any/All labs and Any/All Imaging studies that were ordered were reviewed and are noted above.  Results were reviewed/discussed with the patient and they were also made aware of online access.    Pt also made aware  that some labs, such as cultures, will not be resulted during ER visit and follow up with PMD is necessary.        India Tate, APRN  02/05/21 7958

## 2021-02-05 NOTE — ED NOTES
Pt still unable to provide urine specimen at this time. Urinal placed at bedside     Dominique Robles RN  02/05/21 9379

## 2021-02-05 NOTE — ED NOTES
Nursing report ED to floor  Nikolai Shaw  50 y.o.  male    HPI (triage note):   Chief Complaint   Patient presents with   • ileostomy issue       Admitting doctor:   Harry Brand MD    Admitting diagnosis:   The primary encounter diagnosis was Parastomal hernia without obstruction or gangrene. Diagnoses of Right lower quadrant abdominal pain and Hypokalemia were also pertinent to this visit.    Code status:   Current Code Status     Date Active Code Status Order ID Comments User Context       2/5/2021 1111 [facility] [id number]  Harry Brand MD ED     Advance Care Planning Activity      Questions for Current Code Status     Question Answer Comment    Code Status CPR     Medical Interventions (Level of Support Prior to Arrest) Full     Level Of Support Discussed With Patient           Allergies:   Patient has no known allergies.    Weight:       02/05/21  0724   Weight: 121 kg (266 lb 5.1 oz)       Most recent vitals:   Vitals:    02/05/21 0923 02/05/21 1018 02/05/21 1100 02/05/21 1118   BP:   120/65    BP Location:       Patient Position:       Pulse: 73 73  74   Resp:       Temp:       TempSrc:       SpO2: 96% 92%  96%   Weight:       Height:           Active LDAs/IV Access:   Lines, Drains & Airways    Active LDAs     Name:   Placement date:   Placement time:   Site:   Days:    Peripheral IV 02/05/21 0738 Right Antecubital   02/05/21 0738    Antecubital   less than 1                Labs (abnormal labs have a star):   Labs Reviewed   COMPREHENSIVE METABOLIC PANEL - Abnormal; Notable for the following components:       Result Value    Glucose 111 (*)     BUN 4 (*)     Potassium 2.9 (*)     CO2 29.3 (*)     BUN/Creatinine Ratio 4.9 (*)     All other components within normal limits    Narrative:     GFR Normal >60  Chronic Kidney Disease <60  Kidney Failure <15     CBC WITH AUTO DIFFERENTIAL - Abnormal; Notable for the following components:    WBC 10.97 (*)     Neutrophil % 78.9 (*)     Lymphocyte % 8.6 (*)      Immature Grans % 0.6 (*)     Neutrophils, Absolute 8.65 (*)     Monocytes, Absolute 1.09 (*)     Immature Grans, Absolute 0.07 (*)     All other components within normal limits   LIPASE - Normal   COVID PRE-OP / PRE-PROCEDURE SCREENING ORDER (NO ISOLATION)    Narrative:     The following orders were created for panel order COVID PRE-OP / PRE-PROCEDURE SCREENING ORDER (NO ISOLATION) - Swab, Nasopharynx.  Procedure                               Abnormality         Status                     ---------                               -----------         ------                     COVID-19,APTIMA PANTHER,...[[id number]]                                                   Please view results for these tests on the individual orders.   COVID-19,APTIMA PANTHER,[facility]RAN IN-HOUSE,NP/OP SWAB IN UTM/VTM/SALINE TRANSPORT MEDIA,24 HR TAT   RAINBOW DRAW    Narrative:     The following orders were created for panel order [place] Draw.  Procedure                               Abnormality         Status                     ---------                               -----------         ------                     Light Blue Top[[id number]]                                   Final result               Green Top (Gel)[[id number]]                                  Final result               Lavender Top[[id number]]                                     Final result                 Please view results for these tests on the individual orders.   URINALYSIS W/ MICROSCOPIC IF INDICATED (NO CULTURE)   LIGHT BLUE TOP   GREEN TOP   LAVENDER TOP   CBC AND DIFFERENTIAL    Narrative:     The following orders were created for panel order CBC & Differential.  Procedure                               Abnormality         Status                     ---------                               -----------         ------                     CBC Auto Differential[[id number]]        Abnormal            Final result                 Please view results for these tests on the  individual orders.       EKG:   No orders to display       Meds given in ED:   Medications   sodium chloride 0.9 % flush 10 mL (has no administration in time range)   sodium chloride 0.9 % flush 10 mL (has no administration in time range)   potassium chloride 10 mEq in 100 mL IVPB (10 mEq Intravenous New Bag 2/5/21 1126)   dextrose 5 % and sodium chloride 0.45 % with KCl 20 mEq/L infusion (has no administration in time range)   HYDROcodone-acetaminophen (NORCO) 7.5-325 MG per tablet 1 tablet (has no administration in time range)   ondansetron (ZOFRAN) injection 4 mg (has no administration in time range)   lisinopril (PRINIVIL,ZESTRIL) 20 mg, hydroCHLOROthiazide (HYDRODIURIL) 12.5 mg for ZESTORETIC 20-12.5 (has no administration in time range)   sertraline (ZOLOFT) tablet 25 mg (has no administration in time range)   sodium chloride 0.9 % bolus 1,000 mL ( Intravenous Currently Infusing 2/5/21 1028)   morphine injection 4 mg (4 mg Intravenous Given 2/5/21 0842)   ondansetron (ZOFRAN) injection 4 mg (4 mg Intravenous Given 2/5/21 0839)   iopamidol (ISOVUE-300) 61 % injection 100 mL (85 mL Intravenous Given by Other 2/5/21 0921)   morphine injection 4 mg (4 mg Intravenous Given 2/5/21 1028)       Imaging results:  Ct Abdomen Pelvis With Contrast    Result Date: 2/5/2021  1.There is a right lower quadrant ileostomy that contains both bowel and a large amount of omental fat that measures up to 9.4 x 8.6 cm in transverse dimensions. The omental fat demonstrates considerable stranding and there is adjacent thickening of the wall of the ileum up to 5 mm. Additionally, there is stranding within the surrounding subcutaneous tissues of the right lower quadrant fat. Exact etiology is uncertain, but given the presence of abundant intra-abdominal fat that has herniated into the extra-abdominal region through the defect in the right lower quadrant, I cannot exclude the presence of an element of strangulation and/or restricted  vascular drainage and/or supply in this region. Also, panniculitis and/or inflammation/infection of the omental fat is a possibility. 2. Progressive hepatic steatosis.  This was communicated to India Tate on 02/05/2021 at 9:33 AM in the emergency room.        Ambulatory status:   -Up ad petey    Social issues:   Social History     Socioeconomic History   • Marital status:      Spouse name: Hansa   • Number of children: Not on file   • Years of education: college   • Highest education level: Not on file   Occupational History   • Occupation: Maintenance     Employer: [employer]   Tobacco Use   • Smoking status: Never Smoker   • Smokeless tobacco: Current User     Types: Chew   • Tobacco comment: CHEWS TOBACCO   Substance and Sexual Activity   • Alcohol use: Yes     Alcohol/week: 6.0 standard drinks     Types: 6 Cans of beer per week     Comment: DAILY CONSUMPTION LEVEL   • Drug use: No   • Sexual activity: Defer    Nursing report ED to floor       Dominique Robles, RN  02/05/21 9617

## 2021-02-05 NOTE — ED PROVIDER NOTES
I supervised care provided by the midlevel provider.   We have discussed this patient's history, physical exam, and treatment plan.  I have reviewed the note and personally saw and examined the patient and agree with the plan of care.   I discussed the case and obtained history from the patient as well as spouse.  He has had some swelling to where his ileostomy in his right lower quadrant as well as a change in the caliber of stool.  This is been going on for a brief period of time.  He has had no vomiting.  His output has decreased.  Feels like a pressure as if something is pushing at right at his ostomy site.  At the stoma site in the tissue see says there is more protrusion of the mucosal tissue in his ostomy.    GENERAL: not distressed  HENT: nares patent  Head/neck/ face are symmetric without gross deformity or swelling  EYES: no scleral icterus  CV: regular rhythm, regular rate with intact distal pulses  RESPIRATORY: normal effort and no respiratory distress  ABDOMEN: soft and localized tenderness around his ostomy.  There is some dark green stool in the ostomy.  There is protrusion of the mucosal surface seen in the ostomy more prominent than typical.  He also has some firmness and palpitation of the tissue around the ostomy.  He has normal bowel sounds.  MUSCULOSKELETAL: no deformity  NEURO: alert and appropriate, moves all extremities, follows commands  SKIN: warm, dry    Vital signs and nursing notes reviewed.    Plan I reviewed the lab work.  I have also reviewed the CT scan report.  Informed the patient and the spouse of the findings on the CT scan.  Get a consult general surgery.  All questions answered at this time    We are currently under a pandemic from the COVID19 infection.  The patient presented to the emergency department by ambulance or personal vehicle. I followed the current protocols required by Infection Control at [facility] in my evaluation and treatment of the patient. The  patient was wearing a face mask during my evaluation and throughout my encounter. During my whole encounter with this patient I used appropriate personal protective equipment.  This equipment consisted of eye protection, facemask, gown, and gloves.  I applied this equipment before entering the room.           Sergio Singh MD  02/05/21 1543

## 2021-02-05 NOTE — ED NOTES
Pt unable to provide urine specimen at this time. Will continue to monitor       Dominique Robles, RN  02/05/21 0820

## 2021-02-05 NOTE — ED NOTES
has ileostomy the past 2 days not passing anything and pain around the stoma.  Pt in mask at triage as well as triage wearing appropriate PPE     Jessica Mac RN  02/05/21 0190

## 2021-02-05 NOTE — ED NOTES
Pt O2 sat 88% RA. Pt placed on 2L NC and improved to 96%.      Dominique Robles, RN  02/05/21 0882

## 2021-02-06 VITALS
BODY MASS INDEX: 39.4 KG/M2 | HEART RATE: 73 BPM | OXYGEN SATURATION: 95 % | DIASTOLIC BLOOD PRESSURE: 76 MMHG | SYSTOLIC BLOOD PRESSURE: 124 MMHG | WEIGHT: 260 LBS | HEIGHT: 68 IN | TEMPERATURE: 98.1 F | RESPIRATION RATE: 18 BRPM

## 2021-02-06 LAB
ANION GAP SERPL CALCULATED.3IONS-SCNC: 8.2 MMOL/L (ref 5–15)
BASOPHILS # BLD AUTO: 0.05 10*3/MM3 (ref 0–0.2)
BASOPHILS NFR BLD AUTO: 0.6 % (ref 0–1.5)
BUN SERPL-MCNC: 6 MG/DL (ref 6–20)
BUN/CREAT SERPL: 7.8 (ref 7–25)
CALCIUM SPEC-SCNC: 8.3 MG/DL (ref 8.6–10.5)
CHLORIDE SERPL-SCNC: 101 MMOL/L (ref 98–107)
CO2 SERPL-SCNC: 29.8 MMOL/L (ref 22–29)
CREAT SERPL-MCNC: 0.77 MG/DL (ref 0.76–1.27)
DEPRECATED RDW RBC AUTO: 47.3 FL (ref 37–54)
EOSINOPHIL # BLD AUTO: 0.28 10*3/MM3 (ref 0–0.4)
EOSINOPHIL NFR BLD AUTO: 3.3 % (ref 0.3–6.2)
ERYTHROCYTE [DISTWIDTH] IN BLOOD BY AUTOMATED COUNT: 13.1 % (ref 12.3–15.4)
GFR SERPL CREATININE-BSD FRML MDRD: 107 ML/MIN/1.73
GLUCOSE SERPL-MCNC: 101 MG/DL (ref 65–99)
HCT VFR BLD AUTO: 37.7 % (ref 37.5–51)
HGB BLD-MCNC: 12.7 G/DL (ref 13–17.7)
IMM GRANULOCYTES # BLD AUTO: 0.03 10*3/MM3 (ref 0–0.05)
IMM GRANULOCYTES NFR BLD AUTO: 0.4 % (ref 0–0.5)
LYMPHOCYTES # BLD AUTO: 1 10*3/MM3 (ref 0.7–3.1)
LYMPHOCYTES NFR BLD AUTO: 11.7 % (ref 19.6–45.3)
MCH RBC QN AUTO: 33.4 PG (ref 26.6–33)
MCHC RBC AUTO-ENTMCNC: 33.7 G/DL (ref 31.5–35.7)
MCV RBC AUTO: 99.2 FL (ref 79–97)
MONOCYTES # BLD AUTO: 0.99 10*3/MM3 (ref 0.1–0.9)
MONOCYTES NFR BLD AUTO: 11.6 % (ref 5–12)
NEUTROPHILS NFR BLD AUTO: 6.18 10*3/MM3 (ref 1.7–7)
NEUTROPHILS NFR BLD AUTO: 72.4 % (ref 42.7–76)
NRBC BLD AUTO-RTO: 0.1 /100 WBC (ref 0–0.2)
PLATELET # BLD AUTO: 204 10*3/MM3 (ref 140–450)
PMV BLD AUTO: 11 FL (ref 6–12)
POTASSIUM SERPL-SCNC: 3.7 MMOL/L (ref 3.5–5.2)
RBC # BLD AUTO: 3.8 10*6/MM3 (ref 4.14–5.8)
SODIUM SERPL-SCNC: 139 MMOL/L (ref 136–145)
WBC # BLD AUTO: 8.53 10*3/MM3 (ref 3.4–10.8)

## 2021-02-06 PROCEDURE — 36415 COLL VENOUS BLD VENIPUNCTURE: CPT | Performed by: SURGERY

## 2021-02-06 PROCEDURE — 85025 COMPLETE CBC W/AUTO DIFF WBC: CPT | Performed by: SURGERY

## 2021-02-06 PROCEDURE — 80048 BASIC METABOLIC PNL TOTAL CA: CPT | Performed by: SURGERY

## 2021-02-06 PROCEDURE — 99238 HOSP IP/OBS DSCHRG MGMT 30/<: CPT | Performed by: SURGERY

## 2021-02-06 RX ORDER — HYDROCODONE BITARTRATE AND ACETAMINOPHEN 7.5; 325 MG/1; MG/1
1 TABLET ORAL EVERY 6 HOURS PRN
Qty: 15 TABLET | Refills: 0 | Status: SHIPPED | OUTPATIENT
Start: 2021-02-06 | End: 2021-02-12

## 2021-02-06 RX ADMIN — HYDROCODONE BITARTRATE AND ACETAMINOPHEN 1 TABLET: 7.5; 325 TABLET ORAL at 03:33

## 2021-02-06 RX ADMIN — POTASSIUM CHLORIDE, DEXTROSE MONOHYDRATE AND SODIUM CHLORIDE 75 ML/HR: 150; 5; 450 INJECTION, SOLUTION INTRAVENOUS at 01:23

## 2021-02-06 NOTE — PLAN OF CARE
"Goal Outcome Evaluation:        Outcome Summary: ED admit 2/5 for RLQ pain, scan showed parastoma hernia, HX rectal cancer, FAP syndrone, A7O, Ad petey, norco q6 given x2, gi soft, IVF, pt stated when he emptied bag there was a piece of spagatti 6\" that he believes caused a blockage?  "

## 2021-02-06 NOTE — DISCHARGE SUMMARY
Discharge Summary    Patient name: Nikolai Shaw    Medical record number: [id number]    Admission date: 2/5/2021  Discharge date: 2/6/2021    Attending physician: Harry Brand MD    Primary care physician: Ace Herron MD    Referring physician: Harry Brand MD  [address] [address]    Consulting physician(s): None    Condition on discharge: stable    Primary Diagnoses: Parastomal hernia    Secondary Diagnoses: Obesity    Operative Procedure: None    Hospital Course: The patient is a 50-year-old gentleman with a history of familial adenomatous polyposis who is status post proctocolectomy with permanent end ileostomy.  Presented to the emergency room with some increased swelling and what he described as watery stools through his stoma.  There was some increased pain lateral to his stoma as well.  Evaluation in the ER demonstrated evidence of an increase in the size of his chronic parastomal hernia with probably some incarcerated omental fat.  No evidence of bowel obstruction.  The patient (somewhat grudgingly) agreed to overnight observation.  Over the course of the evening his stoma began to function more normally, he was able to eat well and he had minimal pain.  He was anxious to go home.  We will allow him to follow-up with Dr. Crane in the office in a week or so.  He will probably need to be considered for revision.    Discharge medications:      Discharge Medications      New Medications      Instructions Start Date   HYDROcodone-acetaminophen 7.5-325 MG per tablet  Commonly known as: NORCO   1 tablet, Oral, Every 6 Hours PRN         Continue These Medications      Instructions Start Date   amLODIPine 5 MG tablet  Commonly known as: NORVASC   5 mg, Oral, Daily      b complex-C-E-zinc tablet   1 tablet, Oral, Daily      D3-50 1.25 MG (50644 UT) capsule  Generic drug: cholecalciferol   TAKE 1 (ONE) CAPSULE WEEKLY      sertraline 100 MG tablet  Commonly known as:  ZOLOFT   Oral, Daily             Discharge instructions: Diet as tolerated.      Follow-up appointment: Follow up with Pal Crane MD in the office in 1 week. Call for appointment at [phone].

## 2021-02-08 NOTE — PROGRESS NOTES
Case Management Discharge Note      Final Note: Home--no needs         Selected Continued Care - Discharged on 2/6/2021 Admission date: 2/5/2021 - Discharge disposition: Home or Self Care    Destination    No services have been selected for the patient.              Durable Medical Equipment    No services have been selected for the patient.              Dialysis/Infusion    No services have been selected for the patient.              Home Medical Care    No services have been selected for the patient.              Therapy    No services have been selected for the patient.              Community Resources    No services have been selected for the patient.                       Final Discharge Disposition Code: 01 - home or self-care

## 2021-02-12 ENCOUNTER — IMMUNIZATION (OUTPATIENT)
Dept: VACCINE CLINIC | Facility: HOSPITAL | Age: 51
End: 2021-02-12

## 2021-02-12 PROCEDURE — 0002A: CPT | Performed by: INTERNAL MEDICINE

## 2021-02-12 PROCEDURE — 91300 HC SARSCOV02 VAC 30MCG/0.3ML IM: CPT | Performed by: INTERNAL MEDICINE

## 2021-02-15 ENCOUNTER — PREP FOR SURGERY (OUTPATIENT)
Dept: OTHER | Facility: HOSPITAL | Age: 51
End: 2021-02-15

## 2021-02-15 ENCOUNTER — OFFICE VISIT (OUTPATIENT)
Dept: SURGERY | Facility: CLINIC | Age: 51
End: 2021-02-15

## 2021-02-15 VITALS — HEIGHT: 68 IN | WEIGHT: 269.4 LBS | BODY MASS INDEX: 40.83 KG/M2

## 2021-02-15 DIAGNOSIS — K63.89 POLYP OF ILEUM: ICD-10-CM

## 2021-02-15 DIAGNOSIS — K43.5 PARASTOMAL HERNIA WITHOUT OBSTRUCTION OR GANGRENE: Primary | ICD-10-CM

## 2021-02-15 PROCEDURE — 99214 OFFICE O/P EST MOD 30 MIN: CPT | Performed by: SURGERY

## 2021-02-15 RX ORDER — HYDROCHLOROTHIAZIDE 25 MG/1
25 TABLET ORAL DAILY
COMMUNITY
Start: 2021-02-10 | End: 2021-03-19 | Stop reason: SDUPTHER

## 2021-02-15 NOTE — PROGRESS NOTES
SUMMARY (A/P):    50-year-old gentleman with a moderately large parastomal hernia with incarceration and recent admission due to symptoms. Additionally, he has a palpable firm nodule of the ileostomy itself that appears to be a calcified mass on my review of the CT scan. Based on his symptoms, past history, CT findings, and physical exam findings, I have recommended proceeding with ileoscopy and excision of the mass on the ileostomy as well as biopsy of the adjacent skin. Further recommendations will follow acquisition of pathology results from skin biopsy and nodule excision. However, I did tell him today that he probably should strongly consider parastomal hernia repair given this presentation and findings on CT scan. I described to him in detail the approach which would likely be attempted Sugarbaker repair versus relocation of his stoma depending on findings at time of surgery. He also understands that his risks associated with surgical intervention in terms of infection and recurrence of hernia are increased secondary to BMI of 41.      CC:    Parastomal hernia    HPI:    50-year-old gentleman who has a family history of familial adenomatous polyposis and colon cancer who himself had rectal cancer and underwent laparoscopic total proctocolectomy with end ileostomy in 2015. His only other surgery was open small bowel resection for bowel obstruction in 2017. He was admitted from the emergency room on 2/6/2021 secondary to increased swelling and moderate parastomal pain. CT scan showed a moderately large parastomal hernia and he had an uneventful hospital course and was discharged the day after admission.    RADIOLOGY/ENDOSCOPY:    • CT abdomen pelvis 2/5/2021: Right lower quadrant ileostomy that contains both bowel and a large amount of omental fat measuring 9.4 x 8.6 cm. Omental fat demonstrates considerable stranding. I reviewed the images and concur, also noting that there is a calcium deposition submucosally  in the exposed portion of ileostomy, likely representing the lesion that is palpable on exam.    LABS:    • Pathology from small bowel resection 2/3/2017 showed benign small bowel  • Pathology from total proctocolectomy 2015 showed invasive adenocarcinoma of the rectum T3N0 as well as numerous additional adenomatous polyps including one with high-grade dysplasia    PHYSICAL EXAM:   • Constitutional: Well-developed well-nourished, no acute distress  • Vital signs: Weight 269 pounds, height 60 inches, BMI 41-Discussed with patient increased perioperative risks associated with obesity including increased risks of DVT, infection, seromas, poor wound healing and hernias (with abdominal surgery).  • Eyes: Conjunctiva normal, sclera nonicteric  • ENMT: Hearing grossly normal, oral mucosa moist  • Neck: Supple, no palpable mass, trachea midline  • Respiratory: Clear to auscultation, normal inspiratory effort  • Cardiovascular: Regular rate, no murmur, no carotid bruit  • Gastrointestinal: Soft, nontender, moderately large parastomal hernia with viable stoma, 1 cm firm nodule at apex of ileostomy, pyoderma type cutaneous change adjacent to ileostomy  • Musculoskeletal: Symmetric strength, normal gait  • Psychiatric: Alert and oriented ×3, normal affect     ALLERGIES:   • None    MEDICATIONS:   • Vitamin D  • Vitamin D  • Hydrochlorothiazide  • Sertraline    PMH:    • Rectal cancer   • Familial adenomatous polyposis  • Hypertension  • Depression    PSH:    • Open small bowel resection for adhesive small bowel obstruction 2/3/2017 (minimal intraperitoneal adhesions were encountered)  • Laparoscopic total proctocolectomy with end ileostomy 2015    FAMILY HISTORY:    • Familial adenomatous polyposis (father  at age 38 from colon cancer, paternal grandfather with colon cancer as well)    SOCIAL HISTORY:   • Denies tobacco use  • Occasional alcohol use    MEET RUDOLPH M.D.

## 2021-02-16 ENCOUNTER — TRANSCRIBE ORDERS (OUTPATIENT)
Dept: PREADMISSION TESTING | Facility: HOSPITAL | Age: 51
End: 2021-02-16

## 2021-02-16 DIAGNOSIS — Z01.818 OTHER SPECIFIED PRE-OPERATIVE EXAMINATION: Primary | ICD-10-CM

## 2021-02-19 ENCOUNTER — PRE-ADMISSION TESTING (OUTPATIENT)
Dept: PREADMISSION TESTING | Facility: HOSPITAL | Age: 51
End: 2021-02-19

## 2021-02-19 VITALS
DIASTOLIC BLOOD PRESSURE: 94 MMHG | SYSTOLIC BLOOD PRESSURE: 160 MMHG | HEART RATE: 72 BPM | HEIGHT: 68 IN | BODY MASS INDEX: 40.69 KG/M2 | TEMPERATURE: 97.8 F | OXYGEN SATURATION: 96 % | WEIGHT: 268.5 LBS | RESPIRATION RATE: 20 BRPM

## 2021-02-19 LAB — QT INTERVAL: 432 MS

## 2021-02-19 PROCEDURE — 93010 ELECTROCARDIOGRAM REPORT: CPT | Performed by: INTERNAL MEDICINE

## 2021-02-19 PROCEDURE — 93005 ELECTROCARDIOGRAM TRACING: CPT

## 2021-02-20 ENCOUNTER — LAB (OUTPATIENT)
Dept: LAB | Facility: HOSPITAL | Age: 51
End: 2021-02-20

## 2021-02-20 DIAGNOSIS — Z01.818 OTHER SPECIFIED PRE-OPERATIVE EXAMINATION: ICD-10-CM

## 2021-02-20 PROCEDURE — U0004 COV-19 TEST NON-CDC HGH THRU: HCPCS

## 2021-02-20 PROCEDURE — C9803 HOPD COVID-19 SPEC COLLECT: HCPCS

## 2021-02-22 LAB — SARS-COV-2 RNA RESP QL NAA+PROBE: NOT DETECTED

## 2021-02-23 ENCOUNTER — ANESTHESIA EVENT (OUTPATIENT)
Dept: PERIOP | Facility: HOSPITAL | Age: 51
End: 2021-02-23

## 2021-02-23 ENCOUNTER — HOSPITAL ENCOUNTER (OUTPATIENT)
Facility: HOSPITAL | Age: 51
Setting detail: HOSPITAL OUTPATIENT SURGERY
Discharge: HOME | End: 2021-02-23
Attending: SURGERY | Admitting: SURGERY

## 2021-02-23 ENCOUNTER — ANESTHESIA (OUTPATIENT)
Dept: PERIOP | Facility: HOSPITAL | Age: 51
End: 2021-02-23

## 2021-02-23 VITALS
HEIGHT: 68 IN | TEMPERATURE: 97.7 F | DIASTOLIC BLOOD PRESSURE: 82 MMHG | SYSTOLIC BLOOD PRESSURE: 160 MMHG | HEART RATE: 62 BPM | RESPIRATION RATE: 16 BRPM | BODY MASS INDEX: 40.3 KG/M2 | WEIGHT: 265.88 LBS | OXYGEN SATURATION: 96 %

## 2021-02-23 DIAGNOSIS — K43.5 PARASTOMAL HERNIA WITHOUT OBSTRUCTION OR GANGRENE: ICD-10-CM

## 2021-02-23 DIAGNOSIS — K63.89 POLYP OF ILEUM: ICD-10-CM

## 2021-02-23 PROCEDURE — 25010000002 MIDAZOLAM PER 1 MG: Performed by: NURSE ANESTHETIST, CERTIFIED REGISTERED

## 2021-02-23 PROCEDURE — 88305 TISSUE EXAM BY PATHOLOGIST: CPT | Performed by: SURGERY

## 2021-02-23 PROCEDURE — 25010000002 PROPOFOL 10 MG/ML EMULSION: Performed by: NURSE ANESTHETIST, CERTIFIED REGISTERED

## 2021-02-23 PROCEDURE — 25010000002 FENTANYL CITRATE (PF) 100 MCG/2ML SOLUTION: Performed by: NURSE ANESTHETIST, CERTIFIED REGISTERED

## 2021-02-23 PROCEDURE — 44382 SMALL BOWEL ENDOSCOPY: CPT | Performed by: SURGERY

## 2021-02-23 RX ORDER — PROMETHAZINE HYDROCHLORIDE 12.5 MG/1
25 TABLET ORAL ONCE AS NEEDED
Status: DISCONTINUED | OUTPATIENT
Start: 2021-02-23 | End: 2021-02-23 | Stop reason: HOSPADM

## 2021-02-23 RX ORDER — PROPOFOL 10 MG/ML
VIAL (ML) INTRAVENOUS CONTINUOUS PRN
Status: DISCONTINUED | OUTPATIENT
Start: 2021-02-23 | End: 2021-02-23 | Stop reason: SURG

## 2021-02-23 RX ORDER — MIDAZOLAM HYDROCHLORIDE 1 MG/ML
2 INJECTION INTRAMUSCULAR; INTRAVENOUS
Status: DISCONTINUED | OUTPATIENT
Start: 2021-02-23 | End: 2021-02-23 | Stop reason: HOSPADM

## 2021-02-23 RX ORDER — MIDAZOLAM HYDROCHLORIDE 1 MG/ML
INJECTION INTRAMUSCULAR; INTRAVENOUS AS NEEDED
Status: DISCONTINUED | OUTPATIENT
Start: 2021-02-23 | End: 2021-02-23 | Stop reason: SURG

## 2021-02-23 RX ORDER — LABETALOL HYDROCHLORIDE 5 MG/ML
5 INJECTION, SOLUTION INTRAVENOUS
Status: DISCONTINUED | OUTPATIENT
Start: 2021-02-23 | End: 2021-02-23 | Stop reason: HOSPADM

## 2021-02-23 RX ORDER — DIPHENHYDRAMINE HCL 25 MG
25 CAPSULE ORAL
Status: DISCONTINUED | OUTPATIENT
Start: 2021-02-23 | End: 2021-02-23 | Stop reason: HOSPADM

## 2021-02-23 RX ORDER — PROMETHAZINE HYDROCHLORIDE 25 MG/1
25 SUPPOSITORY RECTAL ONCE AS NEEDED
Status: DISCONTINUED | OUTPATIENT
Start: 2021-02-23 | End: 2021-02-23 | Stop reason: HOSPADM

## 2021-02-23 RX ORDER — LIDOCAINE HYDROCHLORIDE 10 MG/ML
0.5 INJECTION, SOLUTION EPIDURAL; INFILTRATION; INTRACAUDAL; PERINEURAL ONCE AS NEEDED
Status: DISCONTINUED | OUTPATIENT
Start: 2021-02-23 | End: 2021-02-23 | Stop reason: HOSPADM

## 2021-02-23 RX ORDER — MIDAZOLAM HYDROCHLORIDE 1 MG/ML
1 INJECTION INTRAMUSCULAR; INTRAVENOUS
Status: DISCONTINUED | OUTPATIENT
Start: 2021-02-23 | End: 2021-02-23 | Stop reason: HOSPADM

## 2021-02-23 RX ORDER — FLUMAZENIL 0.1 MG/ML
0.2 INJECTION INTRAVENOUS AS NEEDED
Status: DISCONTINUED | OUTPATIENT
Start: 2021-02-23 | End: 2021-02-23 | Stop reason: HOSPADM

## 2021-02-23 RX ORDER — SODIUM CHLORIDE 0.9 % (FLUSH) 0.9 %
3-10 SYRINGE (ML) INJECTION AS NEEDED
Status: DISCONTINUED | OUTPATIENT
Start: 2021-02-23 | End: 2021-02-23 | Stop reason: HOSPADM

## 2021-02-23 RX ORDER — HYDRALAZINE HYDROCHLORIDE 20 MG/ML
5 INJECTION INTRAMUSCULAR; INTRAVENOUS
Status: DISCONTINUED | OUTPATIENT
Start: 2021-02-23 | End: 2021-02-23 | Stop reason: HOSPADM

## 2021-02-23 RX ORDER — FENTANYL CITRATE 50 UG/ML
INJECTION, SOLUTION INTRAMUSCULAR; INTRAVENOUS AS NEEDED
Status: DISCONTINUED | OUTPATIENT
Start: 2021-02-23 | End: 2021-02-23 | Stop reason: SURG

## 2021-02-23 RX ORDER — ONDANSETRON 2 MG/ML
4 INJECTION INTRAMUSCULAR; INTRAVENOUS ONCE AS NEEDED
Status: DISCONTINUED | OUTPATIENT
Start: 2021-02-23 | End: 2021-02-23 | Stop reason: HOSPADM

## 2021-02-23 RX ORDER — PROPOFOL 10 MG/ML
VIAL (ML) INTRAVENOUS AS NEEDED
Status: DISCONTINUED | OUTPATIENT
Start: 2021-02-23 | End: 2021-02-23 | Stop reason: SURG

## 2021-02-23 RX ORDER — LIDOCAINE HYDROCHLORIDE 20 MG/ML
INJECTION, SOLUTION INFILTRATION; PERINEURAL AS NEEDED
Status: DISCONTINUED | OUTPATIENT
Start: 2021-02-23 | End: 2021-02-23 | Stop reason: SURG

## 2021-02-23 RX ORDER — SODIUM CHLORIDE 0.9 % (FLUSH) 0.9 %
3 SYRINGE (ML) INJECTION EVERY 12 HOURS SCHEDULED
Status: DISCONTINUED | OUTPATIENT
Start: 2021-02-23 | End: 2021-02-23 | Stop reason: HOSPADM

## 2021-02-23 RX ORDER — FENTANYL CITRATE 50 UG/ML
50 INJECTION, SOLUTION INTRAMUSCULAR; INTRAVENOUS
Status: DISCONTINUED | OUTPATIENT
Start: 2021-02-23 | End: 2021-02-23 | Stop reason: HOSPADM

## 2021-02-23 RX ORDER — MAGNESIUM HYDROXIDE 1200 MG/15ML
LIQUID ORAL AS NEEDED
Status: DISCONTINUED | OUTPATIENT
Start: 2021-02-23 | End: 2021-02-23 | Stop reason: HOSPADM

## 2021-02-23 RX ORDER — NALOXONE HCL 0.4 MG/ML
0.2 VIAL (ML) INJECTION AS NEEDED
Status: DISCONTINUED | OUTPATIENT
Start: 2021-02-23 | End: 2021-02-23 | Stop reason: HOSPADM

## 2021-02-23 RX ORDER — HYDROCODONE BITARTRATE AND ACETAMINOPHEN 7.5; 325 MG/1; MG/1
1 TABLET ORAL ONCE AS NEEDED
Status: DISCONTINUED | OUTPATIENT
Start: 2021-02-23 | End: 2021-02-23 | Stop reason: HOSPADM

## 2021-02-23 RX ORDER — EPHEDRINE SULFATE 50 MG/ML
5 INJECTION, SOLUTION INTRAVENOUS ONCE AS NEEDED
Status: DISCONTINUED | OUTPATIENT
Start: 2021-02-23 | End: 2021-02-23 | Stop reason: HOSPADM

## 2021-02-23 RX ORDER — OXYCODONE AND ACETAMINOPHEN 7.5; 325 MG/1; MG/1
1 TABLET ORAL ONCE AS NEEDED
Status: DISCONTINUED | OUTPATIENT
Start: 2021-02-23 | End: 2021-02-23 | Stop reason: HOSPADM

## 2021-02-23 RX ORDER — FAMOTIDINE 10 MG/ML
20 INJECTION, SOLUTION INTRAVENOUS ONCE
Status: COMPLETED | OUTPATIENT
Start: 2021-02-23 | End: 2021-02-23

## 2021-02-23 RX ORDER — DIPHENHYDRAMINE HYDROCHLORIDE 50 MG/ML
12.5 INJECTION INTRAMUSCULAR; INTRAVENOUS
Status: DISCONTINUED | OUTPATIENT
Start: 2021-02-23 | End: 2021-02-23 | Stop reason: HOSPADM

## 2021-02-23 RX ORDER — SODIUM CHLORIDE, SODIUM LACTATE, POTASSIUM CHLORIDE, CALCIUM CHLORIDE 600; 310; 30; 20 MG/100ML; MG/100ML; MG/100ML; MG/100ML
9 INJECTION, SOLUTION INTRAVENOUS CONTINUOUS
Status: DISCONTINUED | OUTPATIENT
Start: 2021-02-23 | End: 2021-02-23 | Stop reason: HOSPADM

## 2021-02-23 RX ADMIN — FAMOTIDINE 20 MG: 10 INJECTION INTRAVENOUS at 12:46

## 2021-02-23 RX ADMIN — MIDAZOLAM 1 MG: 1 INJECTION INTRAMUSCULAR; INTRAVENOUS at 14:20

## 2021-02-23 RX ADMIN — PROPOFOL 100 MCG/KG/MIN: 10 INJECTION, EMULSION INTRAVENOUS at 14:17

## 2021-02-23 RX ADMIN — FENTANYL CITRATE 50 MCG: 50 INJECTION INTRAMUSCULAR; INTRAVENOUS at 14:19

## 2021-02-23 RX ADMIN — PROPOFOL 50 MG: 10 INJECTION, EMULSION INTRAVENOUS at 14:24

## 2021-02-23 RX ADMIN — LIDOCAINE HYDROCHLORIDE 80 MG: 20 INJECTION, SOLUTION INFILTRATION; PERINEURAL at 14:17

## 2021-02-23 RX ADMIN — FENTANYL CITRATE 50 MCG: 50 INJECTION INTRAMUSCULAR; INTRAVENOUS at 14:15

## 2021-02-23 RX ADMIN — MIDAZOLAM 1 MG: 1 INJECTION INTRAMUSCULAR; INTRAVENOUS at 14:15

## 2021-02-23 RX ADMIN — SODIUM CHLORIDE, POTASSIUM CHLORIDE, SODIUM LACTATE AND CALCIUM CHLORIDE 9 ML/HR: 600; 310; 30; 20 INJECTION, SOLUTION INTRAVENOUS at 12:46

## 2021-02-23 NOTE — ANESTHESIA PREPROCEDURE EVALUATION
Anesthesia Evaluation     Patient summary reviewed and Nursing notes reviewed   no history of anesthetic complications:  NPO Solid Status: > 8 hours  NPO Liquid Status: > 2 hours           Airway   Mallampati: III  Dental - normal exam     Pulmonary - negative pulmonary ROS and normal exam   Cardiovascular - normal exam    (+) hypertension less than 2 medications,       Neuro/Psych  (+) psychiatric history Depression,     GI/Hepatic/Renal/Endo    (+) obesity, morbid obesity,  renal disease stones,     Musculoskeletal     Abdominal    Substance History      OB/GYN          Other      history of cancer remission                    Anesthesia Plan    ASA 3     MAC     intravenous induction     Anesthetic plan, all risks, benefits, and alternatives have been provided, discussed and informed consent has been obtained with: patient.

## 2021-02-23 NOTE — ANESTHESIA POSTPROCEDURE EVALUATION
"Patient: Nikolai Shaw    Procedure Summary     Date: 02/23/21 Room / Location: [facility] OR 03 /  [facility] OR    Anesthesia Start: 1409 Anesthesia Stop: 1448    Procedure: ILEOSCOPY, EXCISION OF ILEOSTOMY NODULE (N/A ) Diagnosis:       Parastomal hernia without obstruction or gangrene      Polyp of ileum      (Parastomal hernia without obstruction or gangrene [K43.5])      (Polyp of ileum [K63.89])    Surgeon: Pal Crane MD Provider: Mark Whittaker MD    Anesthesia Type: MAC ASA Status: 3          Anesthesia Type: MAC    Vitals  Vitals Value Taken Time   /82 02/23/21 1505   Temp 36.5 °C (97.7 °F) 02/23/21 1445   Pulse 62 02/23/21 1505   Resp 16 02/23/21 1505   SpO2 96 % 02/23/21 1515   Vitals shown include unvalidated device data.        Post Anesthesia Care and Evaluation    Patient location during evaluation: PACU  Patient participation: complete - patient participated  Level of consciousness: awake and alert  Pain management: adequate  Airway patency: patent  Anesthetic complications: No anesthetic complications    Cardiovascular status: acceptable  Respiratory status: acceptable  Hydration status: acceptable    Comments: /82   Pulse 62   Temp 36.5 °C (97.7 °F) (Oral)   Resp 16   Ht 172.7 cm (68\")   Wt 121 kg (265 lb 14 oz)   SpO2 96%   BMI 40.43 kg/m²       "

## 2021-02-25 LAB
LAB AP CASE REPORT: NORMAL
PATH REPORT.FINAL DX SPEC: NORMAL
PATH REPORT.GROSS SPEC: NORMAL

## 2021-03-08 ENCOUNTER — OFFICE VISIT (OUTPATIENT)
Dept: SURGERY | Facility: CLINIC | Age: 51
End: 2021-03-08

## 2021-03-08 VITALS — WEIGHT: 266 LBS | HEIGHT: 68 IN | BODY MASS INDEX: 40.32 KG/M2

## 2021-03-08 DIAGNOSIS — Z09 FOLLOW UP: Primary | ICD-10-CM

## 2021-03-08 PROCEDURE — 99024 POSTOP FOLLOW-UP VISIT: CPT | Performed by: SURGERY

## 2021-03-10 NOTE — PROGRESS NOTES
Postoperative visit    He underwent ileoscopy with biopsy/removal of terminal ileal mass on 2/23/2021.  70 cm of terminal ileum was visualized and normal.  The mass on his stoma was removed.  Pathology showed ulcerated and cauterized tubular adenoma extending to the inked cauterized margin.  No high-grade dysplasia.  He indicates that he is doing much better now and is having no problem keeping his pouch in place and free from leakage.  He is obviously at high risk for surgical intervention for parastomal hernia repair or ostomy relocation based on his BMI.  He would prefer to not undergo surgery at this time and have asked him to come back and see me in 2 to 3 months to recheck the stoma for recurrence of the polyp.

## 2021-03-19 ENCOUNTER — OFFICE VISIT (OUTPATIENT)
Dept: FAMILY MEDICINE CLINIC | Facility: CLINIC | Age: 51
End: 2021-03-19

## 2021-03-19 VITALS
DIASTOLIC BLOOD PRESSURE: 70 MMHG | TEMPERATURE: 98.2 F | BODY MASS INDEX: 41.1 KG/M2 | HEART RATE: 77 BPM | HEIGHT: 68 IN | SYSTOLIC BLOOD PRESSURE: 118 MMHG | WEIGHT: 271.2 LBS | OXYGEN SATURATION: 96 %

## 2021-03-19 DIAGNOSIS — E87.1 HYPONATREMIA: ICD-10-CM

## 2021-03-19 DIAGNOSIS — F33.41 RECURRENT MAJOR DEPRESSIVE DISORDER, IN PARTIAL REMISSION (HCC): ICD-10-CM

## 2021-03-19 DIAGNOSIS — E55.9 VITAMIN D DEFICIENCY: ICD-10-CM

## 2021-03-19 DIAGNOSIS — F41.9 ANXIETY: ICD-10-CM

## 2021-03-19 DIAGNOSIS — I10 ESSENTIAL HYPERTENSION: Primary | ICD-10-CM

## 2021-03-19 DIAGNOSIS — D64.9 ANEMIA, UNSPECIFIED TYPE: ICD-10-CM

## 2021-03-19 PROBLEM — R55 SYNCOPE: Status: RESOLVED | Noted: 2021-03-19 | Resolved: 2021-03-19

## 2021-03-19 PROBLEM — R55 SYNCOPE: Status: ACTIVE | Noted: 2021-03-19

## 2021-03-19 PROBLEM — R91.1 PULMONARY NODULE: Status: RESOLVED | Noted: 2020-01-10 | Resolved: 2021-03-19

## 2021-03-19 PROCEDURE — 99203 OFFICE O/P NEW LOW 30 MIN: CPT | Performed by: NURSE PRACTITIONER

## 2021-03-19 RX ORDER — HYDROCHLOROTHIAZIDE 25 MG/1
25 TABLET ORAL DAILY
Qty: 30 TABLET | Refills: 0 | Status: SHIPPED | OUTPATIENT
Start: 2021-03-19 | End: 2021-03-22 | Stop reason: SDUPTHER

## 2021-03-19 NOTE — PATIENT INSTRUCTIONS
Continue to monitor your blood pressure periodically and record results.  Continue to work on healthy diet and exercise.  Follow up pending lab results.  Follow up in 3 months for physical with fasting labs, or sooner if problems or concerns.

## 2021-03-20 LAB
25(OH)D3+25(OH)D2 SERPL-MCNC: 43.8 NG/ML (ref 30–100)
ALBUMIN SERPL-MCNC: 4.1 G/DL (ref 3.5–5.2)
ALBUMIN/GLOB SERPL: 1.5 G/DL
ALP SERPL-CCNC: 113 U/L (ref 39–117)
ALT SERPL-CCNC: 33 U/L (ref 1–41)
AST SERPL-CCNC: 37 U/L (ref 1–40)
BASOPHILS # BLD AUTO: 0.06 10*3/MM3 (ref 0–0.2)
BASOPHILS NFR BLD AUTO: 0.8 % (ref 0–1.5)
BILIRUB SERPL-MCNC: 0.4 MG/DL (ref 0–1.2)
BUN SERPL-MCNC: 8 MG/DL (ref 6–20)
BUN/CREAT SERPL: 10.5 (ref 7–25)
CALCIUM SERPL-MCNC: 9.1 MG/DL (ref 8.6–10.5)
CHLORIDE SERPL-SCNC: 100 MMOL/L (ref 98–107)
CO2 SERPL-SCNC: 34.4 MMOL/L (ref 22–29)
CREAT SERPL-MCNC: 0.76 MG/DL (ref 0.76–1.27)
EOSINOPHIL # BLD AUTO: 0.21 10*3/MM3 (ref 0–0.4)
EOSINOPHIL NFR BLD AUTO: 2.9 % (ref 0.3–6.2)
ERYTHROCYTE [DISTWIDTH] IN BLOOD BY AUTOMATED COUNT: 13.7 % (ref 12.3–15.4)
FERRITIN SERPL-MCNC: 135 NG/ML (ref 30–400)
FOLATE SERPL-MCNC: 12.4 NG/ML (ref 4.78–24.2)
GLOBULIN SER CALC-MCNC: 2.7 GM/DL
GLUCOSE SERPL-MCNC: 94 MG/DL (ref 65–99)
HCT VFR BLD AUTO: 44 % (ref 37.5–51)
HGB BLD-MCNC: 15.1 G/DL (ref 13–17.7)
IMM GRANULOCYTES # BLD AUTO: 0.03 10*3/MM3 (ref 0–0.05)
IMM GRANULOCYTES NFR BLD AUTO: 0.4 % (ref 0–0.5)
IRON SERPL-MCNC: 68 MCG/DL (ref 59–158)
LYMPHOCYTES # BLD AUTO: 1.15 10*3/MM3 (ref 0.7–3.1)
LYMPHOCYTES NFR BLD AUTO: 15.9 % (ref 19.6–45.3)
MCH RBC QN AUTO: 33.6 PG (ref 26.6–33)
MCHC RBC AUTO-ENTMCNC: 34.3 G/DL (ref 31.5–35.7)
MCV RBC AUTO: 98 FL (ref 79–97)
MONOCYTES # BLD AUTO: 0.79 10*3/MM3 (ref 0.1–0.9)
MONOCYTES NFR BLD AUTO: 10.9 % (ref 5–12)
NEUTROPHILS # BLD AUTO: 4.98 10*3/MM3 (ref 1.7–7)
NEUTROPHILS NFR BLD AUTO: 69.1 % (ref 42.7–76)
NRBC BLD AUTO-RTO: 0 /100 WBC (ref 0–0.2)
PLATELET # BLD AUTO: 221 10*3/MM3 (ref 140–450)
POTASSIUM SERPL-SCNC: 4.1 MMOL/L (ref 3.5–5.2)
PROT SERPL-MCNC: 6.8 G/DL (ref 6–8.5)
RBC # BLD AUTO: 4.49 10*6/MM3 (ref 4.14–5.8)
SODIUM SERPL-SCNC: 145 MMOL/L (ref 136–145)
VIT B12 SERPL-MCNC: 378 PG/ML (ref 211–946)
WBC # BLD AUTO: 7.22 10*3/MM3 (ref 3.4–10.8)

## 2021-03-20 NOTE — ASSESSMENT & PLAN NOTE
Hypertension is stable.  Regular aerobic exercise.  Continue current medications.  Ambulatory blood pressure monitoring.  Blood pressure will be reassessed in 3 months.  Continue HCTZ daily.

## 2021-03-20 NOTE — ASSESSMENT & PLAN NOTE
Patient's depression is recurrent and is mild without psychosis. Their depression is currently in partial remission and the condition is stable. This will be reassessed in 3 months. F/U as described:patient will continue current medication therapy.  Continue Sertraline daily.

## 2021-03-22 DIAGNOSIS — I10 ESSENTIAL HYPERTENSION: ICD-10-CM

## 2021-03-22 DIAGNOSIS — E55.9 VITAMIN D DEFICIENCY: Primary | ICD-10-CM

## 2021-03-22 RX ORDER — METHOCARBAMOL 750 MG/1
50000 TABLET ORAL WEEKLY
Qty: 12 CAPSULE | Refills: 1 | Status: SHIPPED | OUTPATIENT
Start: 2021-03-22 | End: 2021-06-28 | Stop reason: SDUPTHER

## 2021-03-22 RX ORDER — HYDROCHLOROTHIAZIDE 25 MG/1
25 TABLET ORAL DAILY
Qty: 90 TABLET | Refills: 0 | Status: SHIPPED | OUTPATIENT
Start: 2021-03-22 | End: 2021-04-08 | Stop reason: ALTCHOICE

## 2021-04-07 ENCOUNTER — TELEPHONE (OUTPATIENT)
Dept: FAMILY MEDICINE CLINIC | Facility: CLINIC | Age: 51
End: 2021-04-07

## 2021-04-07 NOTE — TELEPHONE ENCOUNTER
Caller: Nikolai Shaw    Relationship to patient: Self    Best call back number: [phone]    Chief complaint: BLOOD PRESSURE    Type of visit: NURSE VISIT FOR A BLOOD PRESSURE CHECK    Requested date: 4/9/2021       Additional notes:PATIENT STATES HIS DAUGHTER HAS AN APPOINTMENT ON 4/9/2021 .  HE WOULD LIKE TO HAVE HIS BLOOD PRESSURE CHECKED WHILE THERE. PLEASE CALL AND SCHEDULE     ATTEMPTED TO WARM TRANSFER

## 2021-04-08 ENCOUNTER — APPOINTMENT (OUTPATIENT)
Dept: GENERAL RADIOLOGY | Facility: HOSPITAL | Age: 51
End: 2021-04-08

## 2021-04-08 ENCOUNTER — HOSPITAL ENCOUNTER (EMERGENCY)
Facility: HOSPITAL | Age: 51
Discharge: HOME | End: 2021-04-08
Attending: EMERGENCY MEDICINE | Admitting: EMERGENCY MEDICINE

## 2021-04-08 VITALS
OXYGEN SATURATION: 96 % | HEART RATE: 66 BPM | WEIGHT: 271.2 LBS | DIASTOLIC BLOOD PRESSURE: 91 MMHG | BODY MASS INDEX: 41.1 KG/M2 | RESPIRATION RATE: 14 BRPM | SYSTOLIC BLOOD PRESSURE: 163 MMHG | HEIGHT: 68 IN | TEMPERATURE: 97.9 F

## 2021-04-08 DIAGNOSIS — R03.0 ELEVATED BLOOD PRESSURE READING: Primary | ICD-10-CM

## 2021-04-08 LAB
ALBUMIN SERPL-MCNC: 3.9 G/DL (ref 3.5–5.2)
ALBUMIN/GLOB SERPL: 1.2 G/DL
ALP SERPL-CCNC: 108 U/L (ref 39–117)
ALT SERPL W P-5'-P-CCNC: 33 U/L (ref 1–41)
ANION GAP SERPL CALCULATED.3IONS-SCNC: 9.9 MMOL/L (ref 5–15)
AST SERPL-CCNC: 41 U/L (ref 1–40)
BASOPHILS # BLD AUTO: 0.03 10*3/MM3 (ref 0–0.2)
BASOPHILS NFR BLD AUTO: 0.4 % (ref 0–1.5)
BILIRUB SERPL-MCNC: 0.4 MG/DL (ref 0–1.2)
BUN SERPL-MCNC: 10 MG/DL (ref 6–20)
BUN/CREAT SERPL: 13 (ref 7–25)
CALCIUM SPEC-SCNC: 8.4 MG/DL (ref 8.6–10.5)
CHLORIDE SERPL-SCNC: 100 MMOL/L (ref 98–107)
CO2 SERPL-SCNC: 31.1 MMOL/L (ref 22–29)
CREAT SERPL-MCNC: 0.77 MG/DL (ref 0.76–1.27)
DEPRECATED RDW RBC AUTO: 48 FL (ref 37–54)
EOSINOPHIL # BLD AUTO: 0.16 10*3/MM3 (ref 0–0.4)
EOSINOPHIL NFR BLD AUTO: 2.3 % (ref 0.3–6.2)
ERYTHROCYTE [DISTWIDTH] IN BLOOD BY AUTOMATED COUNT: 13.7 % (ref 12.3–15.4)
GFR SERPL CREATININE-BSD FRML MDRD: 107 ML/MIN/1.73
GLOBULIN UR ELPH-MCNC: 3.2 GM/DL
GLUCOSE SERPL-MCNC: 114 MG/DL (ref 65–99)
HCT VFR BLD AUTO: 44.9 % (ref 37.5–51)
HGB BLD-MCNC: 15.3 G/DL (ref 13–17.7)
IMM GRANULOCYTES # BLD AUTO: 0.01 10*3/MM3 (ref 0–0.05)
IMM GRANULOCYTES NFR BLD AUTO: 0.1 % (ref 0–0.5)
LYMPHOCYTES # BLD AUTO: 0.84 10*3/MM3 (ref 0.7–3.1)
LYMPHOCYTES NFR BLD AUTO: 12.1 % (ref 19.6–45.3)
MCH RBC QN AUTO: 32.3 PG (ref 26.6–33)
MCHC RBC AUTO-ENTMCNC: 34.1 G/DL (ref 31.5–35.7)
MCV RBC AUTO: 94.9 FL (ref 79–97)
MONOCYTES # BLD AUTO: 0.67 10*3/MM3 (ref 0.1–0.9)
MONOCYTES NFR BLD AUTO: 9.6 % (ref 5–12)
NEUTROPHILS NFR BLD AUTO: 5.24 10*3/MM3 (ref 1.7–7)
NEUTROPHILS NFR BLD AUTO: 75.5 % (ref 42.7–76)
NRBC BLD AUTO-RTO: 0 /100 WBC (ref 0–0.2)
PLATELET # BLD AUTO: 208 10*3/MM3 (ref 140–450)
PMV BLD AUTO: 10.6 FL (ref 6–12)
POTASSIUM SERPL-SCNC: 3.9 MMOL/L (ref 3.5–5.2)
PROT SERPL-MCNC: 7.1 G/DL (ref 6–8.5)
QT INTERVAL: 489 MS
RBC # BLD AUTO: 4.73 10*6/MM3 (ref 4.14–5.8)
SODIUM SERPL-SCNC: 141 MMOL/L (ref 136–145)
TROPONIN T SERPL-MCNC: <0.01 NG/ML (ref 0–0.03)
WBC # BLD AUTO: 6.95 10*3/MM3 (ref 3.4–10.8)

## 2021-04-08 PROCEDURE — 93005 ELECTROCARDIOGRAM TRACING: CPT | Performed by: PHYSICIAN ASSISTANT

## 2021-04-08 PROCEDURE — 84484 ASSAY OF TROPONIN QUANT: CPT | Performed by: PHYSICIAN ASSISTANT

## 2021-04-08 PROCEDURE — 71045 X-RAY EXAM CHEST 1 VIEW: CPT

## 2021-04-08 PROCEDURE — 99283 EMERGENCY DEPT VISIT LOW MDM: CPT

## 2021-04-08 PROCEDURE — 93010 ELECTROCARDIOGRAM REPORT: CPT | Performed by: INTERNAL MEDICINE

## 2021-04-08 PROCEDURE — 85025 COMPLETE CBC W/AUTO DIFF WBC: CPT | Performed by: PHYSICIAN ASSISTANT

## 2021-04-08 PROCEDURE — 80053 COMPREHEN METABOLIC PANEL: CPT | Performed by: PHYSICIAN ASSISTANT

## 2021-04-08 RX ORDER — LISINOPRIL AND HYDROCHLOROTHIAZIDE 20; 12.5 MG/1; MG/1
1 TABLET ORAL DAILY
COMMUNITY
Start: 2021-04-05 | End: 2021-06-28 | Stop reason: SDUPTHER

## 2021-04-08 RX ORDER — SODIUM CHLORIDE 0.9 % (FLUSH) 0.9 %
10 SYRINGE (ML) INJECTION AS NEEDED
Status: DISCONTINUED | OUTPATIENT
Start: 2021-04-08 | End: 2021-04-08 | Stop reason: HOSPADM

## 2021-04-08 NOTE — ED PROVIDER NOTES
Pt presents to the ED c/o  elevated blood pressure for approximately 2 days.  Patient states he has been on hydrochlorothiazide for approximately 4 weeks.  Was on Norvasc and HCTZ before and before that, he was on lisinopril/ HCTZ.  They checked his blood pressure this morning and it was 230/120 at home.  It was a wrist cuff blood pressure machine.  He states he has been having episodes of tingling down both arms and his neck.  He has seen a chiropractor but has not seen orthopedist with regards to his bilateral arm pain.  Denies chest pain, shortness of air, fever, chills, nausea or vomiting.     On exam,   His heart is regular rate and rhythm without murmur.  Lungs are clear to auscultation bilaterally.  His abdomen is normoactive bowel sounds, soft, nontender nondistended.  Extremities reveal trace edema bilaterally.     Plan: I agree with plan of checking baseline blood work and monitoring patient's blood pressure.  If everything looks normal here today, he needs to follow-up with his family doctor.  Of note, patient is wife states he is being evaluated for possible sleep study tomorrow.  I mentioned to them that if he does have sleep apnea and is placed on a CPAP machine, his blood pressure will go down.    EKG          EKG time: 0804  Rhythm/Rate: Normal sinus rhythm, rate 65  P waves and IA: normal  QRS, axis: LPFB, prolonged QT interval  ST and T waves: Nonspecific T wave changes    Interpreted Contemporaneously by me, independently viewed  changed compared to prior 02/19/2021      Patient was placed in face mask in first look. Patient was wearing facemask when I entered the room and throughout our encounter. I wore full protective equipment throughout this patient encounter including a face mask, eye shield and gloves. Hand hygiene was performed before donning protective equipment and after removal when leaving the room.       Attestation:  The ANUP and I have discussed this patient's history, physical exam,  and treatment plan.  I have reviewed the documentation and personally had a face to face interaction with the patient. I affirm the documentation and agree with the treatment and plan.  The attached note describes my personal findings.            Cortez Cortes MD  04/08/21 0819

## 2021-04-08 NOTE — TELEPHONE ENCOUNTER
ATTEMPTED TO REACH PATIENT NO ANSWER, PATIENT CAN GET HIS BP CHECK WHEN HE COMES IN FOR HER APPOINTMENT

## 2021-04-08 NOTE — ED TRIAGE NOTES
Pt to ER from home via PV with c/o hypertension. Pt states for the last approx 2 days his BP at home has been reading 220/130. Pt denies any cp, blurry vision, or headache. Pt states he does however have tingling to bilateral arms. Pt also states his medications have been recently changed.      Pt masked in triage, staff in appropriate ppe.

## 2021-04-08 NOTE — ED PROVIDER NOTES
EMERGENCY DEPARTMENT ENCOUNTER    Room Number:  08/08  Date of encounter:  4/8/2021  PCP: Ekta Zepeda APRN  Historian: Patient, spouse      I used full protective equipment while examining this patient.  This includes face mask, gloves and protective eyewear.  I washed my hands before entering the room and immediately upon leaving the room      HPI:  Chief Complaint: Elevated blood pressure reading  A complete HPI/ROS/PMH/PSH/SH/FH are unobtainable due to: Nothing    Context: Nikolai Shaw is a 50 y.o. male who presents to the ED c/o reports of elevated blood pressure readings over the past 2 days.  Patient states his blood pressure over the past 2 days has been close to 230/120.  This was taken on an automated wrist cuff at home.   Patient denies any chest pain, shortness of breath, diaphoresis, nausea, vomiting.  Patient does report neck pain with radiation down his right arm at times.  Patient states he has had blood pressure medication changes as of late.  Patient states he was told he had hyponatremia with lisinopril in the past, and states that Norvasc caused him to have chest pressure.     Review of Medical Records  I reviewed patient's last office visit with his family medicine nurse practitioner from 3/19/2021    PAST MEDICAL HISTORY  Active Ambulatory Problems     Diagnosis Date Noted   • History of rectal cancer 02/09/2018   • Hyponatremia 07/27/2018   • Parastomal hernia 02/05/2021   • Parastomal hernia without obstruction or gangrene 02/15/2021   • Polyp of ileum 02/15/2021   • Anemia 03/19/2021   • Essential hypertension 03/19/2021   • Vitamin D deficiency 03/19/2021   • Depression    • Anxiety      Resolved Ambulatory Problems     Diagnosis Date Noted   • Malignant neoplasm of rectum (CMS/HCC) 06/01/2016   • SBO (small bowel obstruction) (CMS/HCC) 01/19/2017   • Small bowel obstruction (CMS/HCC) 01/28/2017   • Pulmonary nodule 01/10/2020   • Syncope 03/19/2021     Past Medical History:    Diagnosis Date   • Adenocarcinoma of rectum (CMS/HCC) 06/2015   • Adenomatous polyposis    • At risk for sleep apnea    • Chicken pox    • History of anemia    • Hypertension    • Insomnia    • Renal calculi    • RLS (restless legs syndrome)    • Testicular hypofunction          PAST SURGICAL HISTORY  Past Surgical History:   Procedure Laterality Date   • ABDOMINOPERINEAL PROCTOCOLECTOMY  07/06/2015    DR PAL RUDOLPH   • COLONOSCOPY  06/16/1915    DR SHASTA HOWARD   • COLONOSCOPY N/A 2/23/2021    Procedure: ILEOSCOPY, EXCISION OF ILEOSTOMY NODULE;  Surgeon: Pal Rudolph MD;  Location: [facility] MAIN OR;  Service: General;  Laterality: N/A;   • ENDOSCOPY  06/23/2015    DR SHASTA HOWARD   • EXPLORATORY LAPAROTOMY N/A 2/3/2017    Procedure: LAPAROTOMY EXPLORATORY  SMALL BOWEL OBSTRUCTION;  Surgeon: Pal Rudolph MD;  Location: [facility] MAIN OR;  Service:    • ILEOSTOMY  07/06/2015   • MYRINGOTOMY W/ TUBES      2-3 times as child         FAMILY HISTORY  Family History   Problem Relation Age of Onset   • Colon cancer Father    • Aneurysm Maternal Grandfather 66        brain aneurysm   • Colon cancer Paternal Grandfather    • Hypertension Maternal Uncle    • Diabetes Maternal Uncle    • Malig Hyperthermia Neg Hx          SOCIAL HISTORY  Social History     Socioeconomic History   • Marital status:      Spouse name: Hansa   • Number of children: Not on file   • Years of education: college   • Highest education level: Not on file   Tobacco Use   • Smoking status: Never Smoker   • Smokeless tobacco: Current User     Types: Chew   • Tobacco comment: CHEWS TOBACCO   Vaping Use   • Vaping Use: Never used   Substance and Sexual Activity   • Alcohol use: Yes     Alcohol/week: 6.0 standard drinks     Types: 6 Cans of beer per week     Comment: DAILY CONSUMPTION LEVEL; drinks 6 beers daily   • Drug use: No   • Sexual activity: Defer         ALLERGIES  Patient has no known allergies.        REVIEW OF SYSTEMS  All  systems reviewed and negative except for those discussed in HPI.       PHYSICAL EXAM    I have reviewed the triage vital signs and nursing notes.    ED Triage Vitals   Temp Heart Rate Resp BP SpO2   04/08/21 0704 04/08/21 0704 04/08/21 0704 04/08/21 0730 04/08/21 0704   97.9 °F (36.6 °C) 70 18 (!) 157/102 96 %      Temp src Heart Rate Source Patient Position BP Location FiO2 (%)   -- -- -- -- --              Physical Exam  GENERAL: Alert, oriented, not distressed  HENT: head atraumatic, no nuchal rigidity  EYES: no scleral icterus, EOMI  CV: regular rhythm, regular rate, no murmur  RESPIRATORY: normal effort, CTA  ABDOMEN: soft, nontender  MUSCULOSKELETAL: no deformity, FROM, no calf swelling or tenderness  NEURO: alert, moves all extremities, follows commands  SKIN: warm, dry        LAB RESULTS  Recent Results (from the past 24 hour(s))   Comprehensive Metabolic Panel    Collection Time: 04/08/21  7:50 AM    Specimen: Blood   Result Value Ref Range    Glucose 114 (H) 65 - 99 mg/dL    BUN 10 6 - 20 mg/dL    Creatinine 0.77 0.76 - 1.27 mg/dL    Sodium 141 136 - 145 mmol/L    Potassium 3.9 3.5 - 5.2 mmol/L    Chloride 100 98 - 107 mmol/L    CO2 31.1 (H) 22.0 - 29.0 mmol/L    Calcium 8.4 (L) 8.6 - 10.5 mg/dL    Total Protein 7.1 6.0 - 8.5 g/dL    Albumin 3.90 3.50 - 5.20 g/dL    ALT (SGPT) 33 1 - 41 U/L    AST (SGOT) 41 (H) 1 - 40 U/L    Alkaline Phosphatase 108 39 - 117 U/L    Total Bilirubin 0.4 0.0 - 1.2 mg/dL    eGFR Non African Amer 107 >60 mL/min/1.73    Globulin 3.2 gm/dL    A/G Ratio 1.2 g/dL    BUN/Creatinine Ratio 13.0 7.0 - 25.0    Anion Gap 9.9 5.0 - 15.0 mmol/L   Troponin    Collection Time: 04/08/21  7:50 AM    Specimen: Blood   Result Value Ref Range    Troponin T <0.010 0.000 - 0.030 ng/mL   CBC Auto Differential    Collection Time: 04/08/21  7:50 AM    Specimen: Blood   Result Value Ref Range    WBC 6.95 3.40 - 10.80 10*3/mm3    RBC 4.73 4.14 - 5.80 10*6/mm3    Hemoglobin 15.3 13.0 - 17.7 g/dL     Hematocrit 44.9 37.5 - 51.0 %    MCV 94.9 79.0 - 97.0 fL    MCH 32.3 26.6 - 33.0 pg    MCHC 34.1 31.5 - 35.7 g/dL    RDW 13.7 12.3 - 15.4 %    RDW-SD 48.0 37.0 - 54.0 fl    MPV 10.6 6.0 - 12.0 fL    Platelets 208 140 - 450 10*3/mm3    Neutrophil % 75.5 42.7 - 76.0 %    Lymphocyte % 12.1 (L) 19.6 - 45.3 %    Monocyte % 9.6 5.0 - 12.0 %    Eosinophil % 2.3 0.3 - 6.2 %    Basophil % 0.4 0.0 - 1.5 %    Immature Grans % 0.1 0.0 - 0.5 %    Neutrophils, Absolute 5.24 1.70 - 7.00 10*3/mm3    Lymphocytes, Absolute 0.84 0.70 - 3.10 10*3/mm3    Monocytes, Absolute 0.67 0.10 - 0.90 10*3/mm3    Eosinophils, Absolute 0.16 0.00 - 0.40 10*3/mm3    Basophils, Absolute 0.03 0.00 - 0.20 10*3/mm3    Immature Grans, Absolute 0.01 0.00 - 0.05 10*3/mm3    nRBC 0.0 0.0 - 0.2 /100 WBC   ECG 12 Lead    Collection Time: 04/08/21  8:04 AM   Result Value Ref Range    QT Interval 489 ms       Ordered the above labs and independently reviewed the results.        RADIOLOGY  XR Chest 1 View    Result Date: 4/8/2021  XR CHEST 1 VW-  04/08/2021  HISTORY: Arm pain. Elevated blood pressure.  The heart size is at the upper limits of normal. Lungs are slightly underinflated but appear clear. Bones and soft tissues are unremarkable.      Borderline cardiomegaly.  This report was finalized on 4/8/2021 8:20 AM by Dr. Paresh Mitchell M.D.        I ordered the above noted radiological studies. Reviewed by me and discussed with radiologist.  See dictation for official radiology interpretation.      PROGRESS, DATA ANALYSIS, CONSULTS, AND MEDICAL DECISION MAKING    All labs have been independently reviewed by me.  All radiology studies have been reviewed by me and discussed with radiologist dictating the report.   EKG's independently viewed and interpreted by me.  Discussion below represents my analysis of pertinent findings related to patient's condition, differential diagnosis, treatment plan and final disposition.    I have discussed case with Dr. Cortes,  emergency room physician.  He has performed his own bedside examination and agrees with treatment plan.    ED Course as of Apr 08 1528   Thu Apr 08, 2021   0738 Patient presents with reports of elevated blood pressure at home, as well as neck pain with radiation down his arms.  Patient denies any chest pain, shortness of breath.  Patient's blood pressure at home was reportedly 230/120.  Blood pressure here is 157/102.  Plan to check basic labs, EKG, reevaluate.    [EE]   0818 Chest x-ray interpreted by myself shows no acute infiltrate or effusion.  I will await final radiologist interpretation.    [EE]   0823 WBC: 6.95 [EE]   0823 Hemoglobin: 15.3 [EE]   0823 Creatinine: 0.77 [EE]   0823 Troponin T: <0.010 [EE]   0908 Recheck of patient, blood pressure 150/80.  Patient is asymptomatic here.  Patient does report he is due to see a sleep specialist tomorrow for possible sleep apnea.  His wife reports that he likely does have sleep apnea.  I suspect this will also help with his hypertension.  I asked him not to use his wrist blood pressure cuff at home.  He may buy another 1 and have it checked at his family doctor's office.  No medication changes at this time.  He understands follow-up with his family doctor.    [EE]      ED Course User Index  [EE] Adam Silva PA       AS OF 15:28 EDT VITALS:    BP - 163/91  HR - 66  TEMP - 97.9 °F (36.6 °C)  O2 SATS - 96%        DIAGNOSIS  Final diagnoses:   Elevated blood pressure reading         DISPOSITION  Discharged           Adam Silva PA  04/08/21 4782

## 2021-04-09 ENCOUNTER — TELEPHONE (OUTPATIENT)
Dept: FAMILY MEDICINE CLINIC | Facility: CLINIC | Age: 51
End: 2021-04-09

## 2021-04-09 ENCOUNTER — OFFICE VISIT (OUTPATIENT)
Dept: SLEEP MEDICINE | Facility: HOSPITAL | Age: 51
End: 2021-04-09

## 2021-04-09 VITALS
DIASTOLIC BLOOD PRESSURE: 78 MMHG | HEART RATE: 93 BPM | OXYGEN SATURATION: 96 % | WEIGHT: 273 LBS | HEIGHT: 68 IN | BODY MASS INDEX: 41.37 KG/M2 | SYSTOLIC BLOOD PRESSURE: 152 MMHG

## 2021-04-09 DIAGNOSIS — E66.9 OBESITY (BMI 30-39.9): ICD-10-CM

## 2021-04-09 DIAGNOSIS — I10 ESSENTIAL HYPERTENSION: ICD-10-CM

## 2021-04-09 DIAGNOSIS — R06.81 WITNESSED EPISODE OF APNEA: ICD-10-CM

## 2021-04-09 DIAGNOSIS — G47.10 HYPERSOMNIA: Primary | ICD-10-CM

## 2021-04-09 DIAGNOSIS — R06.83 SNORING: ICD-10-CM

## 2021-04-09 PROCEDURE — G0463 HOSPITAL OUTPT CLINIC VISIT: HCPCS

## 2021-04-09 NOTE — TELEPHONE ENCOUNTER
Spoke with patient over the phone; pt BP has been increased the last few days; pt has been taking HCTZ 25 mg daily; pt BP was really high yesterday, so patient went to ER; negative work up in ER; pt bought another BP cuff for use on upper arm instead of wrist cuff; BP was 174/104 this morning; no headache; no chest pain; pt took his previous Lisinopril/HCTZ this morning and pt is feeling better; pt just left appointment with sleep specialist and will be having home sleep study; pt BP was 152/78 at sleep specialist this afternoon; instructed to stop HCTZ and take Lisinopril/HCTZ 20/12.5 has been on in past; pt had problems with hyponatremia in past when taking Lisinopril/HCTZ; pt previously taking 2 tablets daily; instructed to only take 1 tablet daily; instructed to continue to monitor BP and call if BP remains increased; to follow up in 1 week; will recheck BMP at follow up.

## 2021-04-09 NOTE — PROGRESS NOTES
"[facility]  Telephone: [phone] / Fax: [phone] [place]  Telephone: [phone] / Fax: [phone] Tiffany Li    Referring Physician: Ekta Zepeda APRN  PCP: Ekta Zepeda APRN    Reason for consult:  sleep apnea    Nikolai Shaw is a 50 y.o.male  was seen in the Sleep Disorders Center today for evaluation of sleep apnea. He had procedure done at [facility] recently-and was noted to have apneas. Wife reports apneas and loud snoring. He is awaken a lot at night-has ileostomy for colon cancer.  His sleep schedule 10:30 and 6:10 am. He wakes up tired in the morning. He gained 20 lbs in the past 5 years.   He reports restlessness of te legs at night. He has excessive fatigue/drowsiness during the day.    SH- chews tobacco, no alcohol, no drugs, 4 coffee per day.    ROS- +nasal congestion, +anxiety, rest is negative.      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    Current Medications:    Current Outpatient Medications:   •  D3-50 1.25 MG (78166 UT) capsule, Take 1 capsule by mouth 1 (One) Time Per Week. MONDAY, Disp: 12 capsule, Rfl: 1  •  lisinopril-hydrochlorothiazide (PRINZIDE,ZESTORETIC) 20-12.5 MG per tablet, Take 1 tablet by mouth Daily., Disp: , Rfl:   •  sertraline (ZOLOFT) 100 MG tablet, Take 150 mg by mouth Daily. Patient take 1.5 tablets, Disp: , Rfl:     I have reviewed Past Medical History, Past Surgical History, Medication List, Social History and Family History as entered in Sleep Questionnaire and EPIC.    ESS  8   Vital Signs /78   Pulse 93   Ht 172.7 cm (68\")   Wt 124 kg (273 lb)   SpO2 " 96%   BMI 41.51 kg/m²  Body mass index is 41.51 kg/m².    General Alert and oriented. No acute distress noted   Pharynx/Throat Class IV  Mallampati airway, large tongue, no evidence of redundant lateral pharyngeal tissue. No oral lesions. No thrush. Moist mucous membranes.   Head Normocephalic. Symmetrical. Atraumatic.    Nose No septal deviation. No drainage   Chest Wall Normal shape. Symmetric expansion with respiration. No tenderness.   Neck Trachea midline, no thyromegaly or adenopathy    Lungs Clear to auscultation bilaterally. No wheezes. No rhonchi. No rales. Respirations regular, even and unlabored.   Heart Regular rhythm and normal rate. Normal S1 and S2. No murmur   Abdomen Soft, non-tender and non-distended. Normal bowel sounds. No masses.   Extremities Moves all extremities well. No edema   Psychiatric Normal mood and affect.        Impression:  1. Hypersomnia    2. Snoring    3. Witnessed episode of apnea    4. Obesity (BMI 30-39.9)          Plan:  I discussed the pathophysiology of obstructive sleep apnea with the patient.  We discussed the adverse outcomes associated with untreated sleep-disordered breathing.  We discussed treatment modalities of obstructive sleep apnea including CPAP device. Sleep study will be scheduled to establish a definitive diagnosis of sleep disorder breathing.  Weight loss will be strongly beneficial in order to reduce the severity of sleep-disordered breathing.  Patient has narrow oropharyngeal structure.  Caution during activities that require prolonged concentration is strongly advised.  After sleep study results are available, patient will be notified, and appointment will be scheduled to discuss sleep study results and treatment recommendations.        I appreciate the opportunity to participate in this patient's care.      YOUNG Henry  [facility]  Phone: [phone]      Part of this note may be an electronic transcription/translation of  spoken language to printed text using the Dragon Dictation System. Some errors may exist even though the document was edited.

## 2021-04-12 DIAGNOSIS — I10 ESSENTIAL HYPERTENSION: ICD-10-CM

## 2021-04-12 RX ORDER — HYDROCHLOROTHIAZIDE 25 MG/1
TABLET ORAL
Qty: 30 TABLET | Refills: 0 | OUTPATIENT
Start: 2021-04-12

## 2021-04-12 NOTE — TELEPHONE ENCOUNTER
Pt should not be on this med - LM to let pt know he should not be on this medication, he is on lisinopril with HCTZ and to call if he has any questions.

## 2021-04-16 ENCOUNTER — OFFICE VISIT (OUTPATIENT)
Dept: FAMILY MEDICINE CLINIC | Facility: CLINIC | Age: 51
End: 2021-04-16

## 2021-04-16 VITALS
HEIGHT: 68 IN | TEMPERATURE: 98.4 F | SYSTOLIC BLOOD PRESSURE: 160 MMHG | DIASTOLIC BLOOD PRESSURE: 90 MMHG | BODY MASS INDEX: 41.68 KG/M2 | HEART RATE: 70 BPM | OXYGEN SATURATION: 95 % | WEIGHT: 275 LBS

## 2021-04-16 DIAGNOSIS — G47.30 SLEEP APNEA, UNSPECIFIED TYPE: ICD-10-CM

## 2021-04-16 DIAGNOSIS — Z11.59 NEED FOR HEPATITIS C SCREENING TEST: ICD-10-CM

## 2021-04-16 DIAGNOSIS — F33.41 RECURRENT MAJOR DEPRESSIVE DISORDER, IN PARTIAL REMISSION (HCC): ICD-10-CM

## 2021-04-16 DIAGNOSIS — I10 ESSENTIAL HYPERTENSION: ICD-10-CM

## 2021-04-16 DIAGNOSIS — Z00.00 ENCOUNTER FOR ANNUAL PHYSICAL EXAM: Primary | ICD-10-CM

## 2021-04-16 DIAGNOSIS — Z12.5 PROSTATE CANCER SCREENING: ICD-10-CM

## 2021-04-16 DIAGNOSIS — F41.9 ANXIETY: ICD-10-CM

## 2021-04-16 PROCEDURE — 90471 IMMUNIZATION ADMIN: CPT | Performed by: NURSE PRACTITIONER

## 2021-04-16 PROCEDURE — 99396 PREV VISIT EST AGE 40-64: CPT | Performed by: NURSE PRACTITIONER

## 2021-04-16 PROCEDURE — 90715 TDAP VACCINE 7 YRS/> IM: CPT | Performed by: NURSE PRACTITIONER

## 2021-04-16 NOTE — ASSESSMENT & PLAN NOTE
Hypertension is improving with treatment.  Weight loss.  Regular aerobic exercise.  Medication changes per orders.  Ambulatory blood pressure monitoring.  Blood pressure will be reassessed in 4 weeks.  Continue Lisinopril/HCTZ daily.

## 2021-04-16 NOTE — PATIENT INSTRUCTIONS
Continue to monitor your blood pressure periodically and record results.  Continue to work on healthy diet and exercise.  Follow up pending lab results.  Follow up in 1 month, or sooner if problems or concerns.

## 2021-04-16 NOTE — PROGRESS NOTES
Subjective   Nikolai Shaw is a 50 y.o. male.     Chief Complaint   Patient presents with   • Annual Exam     blood work    • Hypertension     still running high        History of Present Illness   Patient presents for CPE with fasting labs; no new problems or concerns today; not very healthy diet, eats vegetables; no formal exercise, walks a lot at work; no recent dental visits; last eye exam about 1.5 years ago; no problems hearing, gets hearing checked at work; immunizations: needs Tdap; colonoscopy 2/23/21 through stoma due to lump; history familial polyposis.    F/U HTN: recently taking just HCTZ daily and BP was increased; changed back to Lisinopril/HCTZ and BP has been running 140s/90s; no headaches; has had some orthostasis, particularly if stands up too fast; no swelling.    F/U anxiety: takes Sertraline daily and works well; gets up during night to check ileostomy bag, sets alarm to get up; no SI/HI.    Saw sleep specialist due to apnea noted during recent surgery; has upcoming sleep study on 5/7/21.    The following portions of the patient's history were reviewed and updated as appropriate: allergies, current medications, past family history, past medical history, past social history, past surgical history and problem list.    Current Outpatient Medications on File Prior to Visit   Medication Sig   • B Complex Vitamins (VITAMIN-B COMPLEX PO) Take 1 tablet by mouth Daily.   • D3-50 1.25 MG (40498 UT) capsule Take 1 capsule by mouth 1 (One) Time Per Week. MONDAY   • lisinopril-hydrochlorothiazide (PRINZIDE,ZESTORETIC) 20-12.5 MG per tablet Take 1 tablet by mouth Daily.   • sertraline (ZOLOFT) 100 MG tablet Take 150 mg by mouth Daily. Patient take 1.5 tablets     No current facility-administered medications on file prior to visit.        Past Medical History:   Diagnosis Date   • Adenocarcinoma of rectum (CMS/HCC) 06/2015    invasive adenocarcinoma of rectum w 59 benign lymph nodes   • Adenomatous polyposis      FAMILIAL    • Anxiety    • At risk for sleep apnea    • Chicken pox     patient reports having as a child   • Depression    • History of anemia    • Hypertension    • Hyponatremia    • Insomnia    • Malignant neoplasm of rectum (CMS/HCC) 6/1/2016   • Pulmonary nodule 1/10/2020    6/26/20 CT chest: 6 mm nodule resolved.   • Renal calculi    • RLS (restless legs syndrome)    • Syncope 03/19/2021    prolonged QT interval   • Testicular hypofunction    • Vitamin D deficiency        Past Surgical History:   Procedure Laterality Date   • ABDOMINOPERINEAL PROCTOCOLECTOMY  07/06/2015    DR PAL RUDOLPH   • COLONOSCOPY  06/16/1915    DR SHASTA HOWARD   • COLONOSCOPY N/A 2/23/2021    Procedure: ILEOSCOPY, EXCISION OF ILEOSTOMY NODULE;  Surgeon: Pal Rudolhp MD;  Location: [facility] OR;  Service: General;  Laterality: N/A;   • ENDOSCOPY  06/23/2015    DR SHASTA HOWARD   • EXPLORATORY LAPAROTOMY N/A 2/3/2017    Procedure: LAPAROTOMY EXPLORATORY  SMALL BOWEL OBSTRUCTION;  Surgeon: Pal Rudolph MD;  Location: [facility] OR;  Service:    • ILEOSTOMY  07/06/2015   • MYRINGOTOMY W/ TUBES      2-3 times as child       Family History   Problem Relation Age of Onset   • Colon cancer Father         familial polyposis   • Aneurysm Maternal Grandfather 66        brain aneurysm   • Colon cancer Paternal Grandfather         familial polyposis   • Hypertension Maternal Uncle    • Diabetes Maternal Uncle    • Malig Hyperthermia Neg Hx        Social History     Socioeconomic History   • Marital status:      Spouse name: Hansa   • Number of children: Not on file   • Years of education: college   • Highest education level: Not on file   Tobacco Use   • Smoking status: Never Smoker   • Smokeless tobacco: Current User     Types: Chew   • Tobacco comment: CHEWS TOBACCO   Vaping Use   • Vaping Use: Never used   Substance and Sexual Activity   • Alcohol use: Yes     Alcohol/week: 6.0 standard drinks     Types: 6 Cans of  "beer per week     Comment: DAILY CONSUMPTION LEVEL; drinks 6 beers daily   • Drug use: No   • Sexual activity: Defer       Review of Systems   Constitutional: Positive for fatigue. Negative for appetite change, chills, fever, unexpected weight gain and unexpected weight loss.   HENT: Negative for ear pain (some discomfort in left ear on occasion, particularly if sleeps with fan on), sinus pressure, sore throat and trouble swallowing.    Eyes: Negative for blurred vision and pain.   Respiratory: Negative for cough, chest tightness and shortness of breath.    Cardiovascular: Negative for chest pain and palpitations.   Gastrointestinal: Negative for abdominal pain, blood in stool, constipation, diarrhea and GERD. Indigestion: heartburn on occasion, takes TUMs as needed and helps.   Endocrine: Negative for cold intolerance, heat intolerance and polydipsia (has always drank a lot of water).   Genitourinary: Negative for dysuria and frequency. Nocturia: some decrease in urinary stream.   Musculoskeletal: Negative for arthralgias and back pain (some discomfort in upper back for last few weeks, has been doing stretches and helps).   Skin: Negative for rash and skin lesions.   Neurological: Negative for syncope and weakness. Light-headedness: mild at times. Numbness: some in bilateral index fingers since has had discomfort in upper back; saw chiropractor and cervical spine straightening; exercises are helping.   Hematological: Does not bruise/bleed easily.   Psychiatric/Behavioral: Negative for depressed mood. Sleep disturbance: see HPI. The patient is not nervous/anxious.        Objective   Vitals:    04/16/21 0915   BP: 160/90   BP Location: Left arm   Patient Position: Sitting   Cuff Size: Adult   Pulse: 70   Temp: 98.4 °F (36.9 °C)   SpO2: 95%   Weight: 125 kg (275 lb)   Height: 172.7 cm (68\")     Body mass index is 41.81 kg/m².    Physical Exam  Vitals and nursing note reviewed.   Constitutional:       General: He is " not in acute distress.     Appearance: He is well-developed and well-groomed. He is not diaphoretic.   HENT:      Head: Normocephalic and atraumatic.      Jaw: No tenderness or pain on movement.      Right Ear: Tympanic membrane and external ear normal. No decreased hearing noted.      Left Ear: External ear normal. No decreased hearing noted. Tympanic membrane is scarred. Tympanic membrane is not erythematous.      Nose: Nose normal.      Right Sinus: No maxillary sinus tenderness or frontal sinus tenderness.      Left Sinus: No maxillary sinus tenderness or frontal sinus tenderness.      Mouth/Throat:      Mouth: Mucous membranes are moist.      Pharynx: No oropharyngeal exudate or posterior oropharyngeal erythema.   Eyes:      Extraocular Movements: Extraocular movements intact.      Conjunctiva/sclera: Conjunctivae normal.      Pupils: Pupils are equal, round, and reactive to light.   Neck:      Thyroid: No thyroid mass or thyromegaly.      Vascular: No carotid bruit.      Trachea: No tracheal deviation.   Cardiovascular:      Rate and Rhythm: Normal rate and regular rhythm.      Pulses: Normal pulses.      Heart sounds: Normal heart sounds. No murmur heard.     Pulmonary:      Effort: Pulmonary effort is normal. No respiratory distress.      Breath sounds: Normal breath sounds.   Abdominal:      General: Bowel sounds are normal.      Palpations: Abdomen is soft. There is no hepatomegaly or splenomegaly.      Tenderness: There is no abdominal tenderness.       Musculoskeletal:         General: Normal range of motion.      Cervical back: Normal range of motion and neck supple. No bony tenderness.      Thoracic back: No bony tenderness.      Lumbar back: No bony tenderness.      Right lower leg: No edema.      Left lower leg: No edema.   Lymphadenopathy:      Cervical: No cervical adenopathy.   Skin:     General: Skin is warm and dry.      Findings: No rash.   Neurological:      Mental Status: He is alert and  oriented to person, place, and time.      Cranial Nerves: No cranial nerve deficit.      Motor: Motor function is intact.      Coordination: Coordination normal.      Gait: Gait normal.      Deep Tendon Reflexes: Reflexes are normal and symmetric.   Psychiatric:         Mood and Affect: Mood normal.         Behavior: Behavior normal.         Thought Content: Thought content normal.         Cognition and Memory: Cognition normal.         Judgment: Judgment normal.         Lab Results   Component Value Date    WBC 6.95 04/08/2021    RBC 4.73 04/08/2021    HGB 15.3 04/08/2021    HCT 44.9 04/08/2021    MCV 94.9 04/08/2021    MCH 32.3 04/08/2021    MCHC 34.1 04/08/2021    RDW 13.7 04/08/2021    RDWSD 48.0 04/08/2021    MPV 10.6 04/08/2021     04/08/2021    NEUTRORELPCT 75.5 04/08/2021    LYMPHORELPCT 12.1 (L) 04/08/2021    MONORELPCT 9.6 04/08/2021    EOSRELPCT 2.3 04/08/2021    BASORELPCT 0.4 04/08/2021    AUTOIGPER 0.1 04/08/2021    NEUTROABS 5.24 04/08/2021    LYMPHSABS 0.84 04/08/2021    MONOSABS 0.67 04/08/2021    EOSABS 0.16 04/08/2021    BASOSABS 0.03 04/08/2021    AUTOIGNUM 0.01 04/08/2021    NRBC 0.0 04/08/2021     Lab Results   Component Value Date    GLUCOSE 114 (H) 04/08/2021    BUN 10 04/08/2021    CREATININE 0.77 04/08/2021    EGFRIFNONA 107 04/08/2021    EGFRIFAFRI 132 03/19/2021    BCR 13.0 04/08/2021    K 3.9 04/08/2021    CO2 31.1 (H) 04/08/2021    CALCIUM 8.4 (L) 04/08/2021    PROTENTOTREF 6.8 03/19/2021    ALBUMIN 3.90 04/08/2021    LABIL2 1.5 03/19/2021    AST 41 (H) 04/08/2021    ALT 33 04/08/2021      Lab Results   Component Value Date    TSH 0.94 07/11/2015     Lab Results   Component Value Date    WBCU 0-2 07/12/2015    RBCUA 0-2 07/12/2015    BACTERIA None Seen 07/12/2015    LABPH 6.0 07/12/2015    COLORU Yellow 02/05/2021    CLARITYU Clear 02/05/2021    LEUKOCYTESUR Negative 02/05/2021    GLUCOSEU Negative 02/05/2021    BLOODU Negative 02/05/2021    BILIRUBINUR Negative 02/05/2021     NITRITEU Negative 02/05/2021        Assessment/Plan .  Problem List Items Addressed This Visit     Essential hypertension    Current Assessment & Plan     Hypertension is improving with treatment.  Weight loss.  Regular aerobic exercise.  Medication changes per orders.  Ambulatory blood pressure monitoring.  Blood pressure will be reassessed in 4 weeks.  Continue Lisinopril/HCTZ daily.         Relevant Orders    Basic Metabolic Panel    Lipid Panel With LDL / HDL Ratio    Depression    Current Assessment & Plan     Patient's depression is recurrent and is moderate without psychosis. Their depression is currently in partial remission and the condition is stable. This will be reassessed at the next regular appointment. F/U as described:patient will continue current medication therapy.  Continue Sertraline daily.         Anxiety    Current Assessment & Plan     Stable on Sertraline daily.         Sleep apnea    Current Assessment & Plan     Follow up as scheduled for sleep study.           Other Visit Diagnoses     Encounter for annual physical exam    -  Primary    Relevant Orders    Tdap Vaccine Greater Than or Equal To 6yo IM (Completed)    Basic Metabolic Panel    Lipid Panel With LDL / HDL Ratio    Hepatitis C Antibody    PSA Screen    Need for hepatitis C screening test        Relevant Orders    Hepatitis C Antibody    Prostate cancer screening        Relevant Orders    PSA Screen          Return in about 1 month (around 5/16/2021) for Recheck.or sooner if symptoms persist or worsen.  Impression: Health maintenance visit.  Currently, eats a somewhat healthy diet and has a fair exercise routine.  Colorectal cancer screening: UTD.  Prostate cancer screening: PSA today.  Screening lab work includes: BMP, lipid.  Immunizations: Tdap today; risks and benefits of immunizations were discussed with patient.  Patient was advised to be evaluated by ophthalmology and dentist.  Advice and education were given regarding  nutrition and aerobic exercise.  Will consider Lisinopril 40 mg with HCTZ 12.5 mg daily pending lab results; had hyponatremia in past taking Lisinopril/HCTZ 20/12.5 mg 2 tabs daily.       COVID-19 Precautions - Patient was compliant in wearing a mask. When I saw the patient, I used appropriate personal protective equipment (PPE) including mask, gloves, and eye shield (standard procedure).  Hand hygiene was completed before and after seeing the patient.

## 2021-04-16 NOTE — ASSESSMENT & PLAN NOTE
Patient's depression is recurrent and is moderate without psychosis. Their depression is currently in partial remission and the condition is stable. This will be reassessed at the next regular appointment. F/U as described:patient will continue current medication therapy.  Continue Sertraline daily.

## 2021-04-17 LAB
BUN SERPL-MCNC: 7 MG/DL (ref 6–20)
BUN/CREAT SERPL: 9.3 (ref 7–25)
CALCIUM SERPL-MCNC: 9.5 MG/DL (ref 8.6–10.5)
CHLORIDE SERPL-SCNC: 99 MMOL/L (ref 98–107)
CHOLEST SERPL-MCNC: 189 MG/DL (ref 0–200)
CO2 SERPL-SCNC: 28 MMOL/L (ref 22–29)
CREAT SERPL-MCNC: 0.75 MG/DL (ref 0.76–1.27)
GLUCOSE SERPL-MCNC: 98 MG/DL (ref 65–99)
HCV AB S/CO SERPL IA: <0.1 S/CO RATIO (ref 0–0.9)
HDLC SERPL-MCNC: 69 MG/DL (ref 40–60)
LDLC SERPL CALC-MCNC: 99 MG/DL (ref 0–100)
LDLC/HDLC SERPL: 1.39 {RATIO}
POTASSIUM SERPL-SCNC: 4.5 MMOL/L (ref 3.5–5.2)
PSA SERPL-MCNC: 0.47 NG/ML (ref 0–4)
SODIUM SERPL-SCNC: 139 MMOL/L (ref 136–145)
TRIGL SERPL-MCNC: 120 MG/DL (ref 0–150)
VLDLC SERPL CALC-MCNC: 21 MG/DL (ref 5–40)

## 2021-04-20 DIAGNOSIS — I10 ESSENTIAL HYPERTENSION: Primary | ICD-10-CM

## 2021-04-20 RX ORDER — LISINOPRIL 20 MG/1
20 TABLET ORAL DAILY
Qty: 90 TABLET | Refills: 0 | Status: SHIPPED | OUTPATIENT
Start: 2021-04-20 | End: 2021-06-28 | Stop reason: SDUPTHER

## 2021-05-14 ENCOUNTER — OFFICE VISIT (OUTPATIENT)
Dept: FAMILY MEDICINE CLINIC | Facility: CLINIC | Age: 51
End: 2021-05-14

## 2021-05-14 VITALS
DIASTOLIC BLOOD PRESSURE: 70 MMHG | HEART RATE: 73 BPM | OXYGEN SATURATION: 97 % | HEIGHT: 68 IN | SYSTOLIC BLOOD PRESSURE: 132 MMHG | BODY MASS INDEX: 40.92 KG/M2 | TEMPERATURE: 98 F | WEIGHT: 270 LBS

## 2021-05-14 DIAGNOSIS — I10 ESSENTIAL HYPERTENSION: Primary | ICD-10-CM

## 2021-05-14 DIAGNOSIS — R12 HEARTBURN: ICD-10-CM

## 2021-05-14 PROCEDURE — 99213 OFFICE O/P EST LOW 20 MIN: CPT | Performed by: NURSE PRACTITIONER

## 2021-05-14 NOTE — ASSESSMENT & PLAN NOTE
Hypertension is stable.  Regular aerobic exercise.  Continue current medications.  Ambulatory blood pressure monitoring.  Blood pressure will be reassessed at the next regular appointment.  Continue Lisinopril and Lisinopril/HCTZ daily.

## 2021-05-14 NOTE — PATIENT INSTRUCTIONS
Continue to monitor your blood pressure periodically and record results.  Continue to work on healthy diet and exercise.  Try Pepcid for heartburn.  Follow up pending lab results.  Follow up in 6 months, or sooner if problems or concerns.

## 2021-05-14 NOTE — PROGRESS NOTES
Subjective   Nikolai Shaw is a 51 y.o. male.     Chief Complaint   Patient presents with   • Hypertension     follow up        History of Present Illness   Patient presents for follow up HTN: takes Lisinopril/HCTZ and Lisinopril daily; monitors BP, typically running 130s/70-80s; no headaches; no orthostasis; dizziness resolved with better control of BP; no swelling; has physical job, no formal exercise; does a lot of walking with job.    The following portions of the patient's history were reviewed and updated as appropriate: allergies, current medications, past family history, past medical history, past social history, past surgical history and problem list.    Current Outpatient Medications on File Prior to Visit   Medication Sig   • B Complex Vitamins (VITAMIN-B COMPLEX PO) Take 1 tablet by mouth Daily.   • D3-50 1.25 MG (90651 UT) capsule Take 1 capsule by mouth 1 (One) Time Per Week. MONDAY   • lisinopril-hydrochlorothiazide (PRINZIDE,ZESTORETIC) 20-12.5 MG per tablet Take 1 tablet by mouth Daily.   • sertraline (ZOLOFT) 100 MG tablet Take 150 mg by mouth Daily. Patient take 1.5 tablets   • lisinopril (PRINIVIL,ZESTRIL) 20 MG tablet Take 1 tablet by mouth Daily.     No current facility-administered medications on file prior to visit.        Past Medical History:   Diagnosis Date   • Adenocarcinoma of rectum (CMS/HCC) 06/2015    invasive adenocarcinoma of rectum w 59 benign lymph nodes   • Adenomatous polyposis     FAMILIAL    • Anxiety    • At risk for sleep apnea    • Chicken pox     patient reports having as a child   • Depression    • History of anemia    • Hypertension    • Hyponatremia    • Insomnia    • Malignant neoplasm of rectum (CMS/HCC) 6/1/2016   • Pulmonary nodule 1/10/2020    6/26/20 CT chest: 6 mm nodule resolved.   • Renal calculi    • RLS (restless legs syndrome)    • Syncope 03/19/2021    prolonged QT interval   • Testicular hypofunction    • Vitamin D deficiency        Past Surgical  History:   Procedure Laterality Date   • ABDOMINOPERINEAL PROCTOCOLECTOMY  07/06/2015    DR MEET RUDOLPH   • COLONOSCOPY  06[birth year]16[birth year]1915    DR SHASTA HOWARD   • COLONOSCOPY N/A 2/23/2021    Procedure: ILEOSCOPY, EXCISION OF ILEOSTOMY NODULE;  Surgeon: Meet Rudolph MD;  Location: [facility] MAIN OR;  Service: General;  Laterality: N/A;   • ENDOSCOPY  06/23/2015    DR SHASTA HOWARD   • EXPLORATORY LAPAROTOMY N/A 2/3/2017    Procedure: LAPAROTOMY EXPLORATORY  SMALL BOWEL OBSTRUCTION;  Surgeon: Meet Rudolph MD;  Location[facility] MA[facility] OR;  Service:    • ILEOSTOMY  07/06/2015   • MYRINGOTOMY W/ TUBES      2-3 times as child       Family History   Problem Relation Age of Onset   • Colon cancer Father         familial polyposis   • Aneurysm Maternal Grandfather 66        brain aneurysm   • Colon cancer Paternal Grandfather         familial polyposis   • Hypertension Maternal Uncle    • Diabetes Maternal Uncle    • Malig Hyperthermia Neg Hx        Social History     Socioeconomic History   • Marital status:      Spouse name: Hansa   • Number of children: Not on file   • Years of education: college   • Highest education level: Not on file   Tobacco Use   • Smoking status: Never Smoker   • Smokeless tobacco: Current User     Types: Chew   • Tobacco comment: CHEWS TOBACCO   Vaping Use   • Vaping Use: Never used   Substance and Sexual Activity   • Alcohol use: Yes     Alcohol/week: 6.0 standard drinks     Types: 6 Cans of beer per week     Comment: DAILY CONSUMPTION LEVEL; drinks 6 beers daily   • Drug use: No   • Sexual activity: Defer       Review of Systems   Constitutional: Negative for appetite change, fatigue, unexpected weight gain and unexpected weight loss.   Eyes: Negative for blurred vision and pain.   Respiratory: Negative for cough, chest tightness and shortness of breath.    Cardiovascular: Negative for chest pain and palpitations.   Gastrointestinal: Negative for abdominal pain, blood in stool,  "constipation, diarrhea and GERD. Indigestion: rare heartburn, takes TUMs on occasion.   Genitourinary: Negative for dysuria and frequency.   Skin: Negative for rash.   Neurological: Negative for syncope and weakness.       Objective   Vitals:    05/14/21 0756   BP: 132/70   BP Location: Left arm   Patient Position: Sitting   Cuff Size: Adult   Pulse: 73   Temp: 98 °F (36.7 °C)   SpO2: 97%   Weight: 122 kg (270 lb)   Height: 172.7 cm (68\")     Body mass index is 41.05 kg/m².    Physical Exam  Vitals and nursing note reviewed.   Constitutional:       General: He is not in acute distress.     Appearance: He is well-developed and well-groomed. He is not diaphoretic.   HENT:      Head: Normocephalic.      Right Ear: External ear normal.      Left Ear: External ear normal.   Eyes:      Conjunctiva/sclera: Conjunctivae normal.   Neck:      Vascular: No carotid bruit.   Cardiovascular:      Rate and Rhythm: Normal rate and regular rhythm.      Pulses: Normal pulses.      Heart sounds: Normal heart sounds. No murmur heard.     Pulmonary:      Effort: Pulmonary effort is normal. No respiratory distress.      Breath sounds: Normal breath sounds.   Abdominal:      General: Bowel sounds are normal.      Palpations: Abdomen is soft. There is no hepatomegaly or splenomegaly.      Tenderness: There is no abdominal tenderness.   Musculoskeletal:      Cervical back: Normal range of motion and neck supple.      Right lower leg: No edema.      Left lower leg: No edema.   Skin:     General: Skin is warm and dry.      Findings: No rash.   Neurological:      Mental Status: He is alert and oriented to person, place, and time.      Gait: Gait is intact.   Psychiatric:         Mood and Affect: Mood normal.         Behavior: Behavior normal.         Thought Content: Thought content normal.         Cognition and Memory: Cognition normal.         Judgment: Judgment normal.         Lab Results   Component Value Date    WBC 6.95 04/08/2021    RBC " 4.73 04/08/2021    HGB 15.3 04/08/2021    HCT 44.9 04/08/2021    MCV 94.9 04/08/2021    MCH 32.3 04/08/2021    MCHC 34.1 04/08/2021    RDW 13.7 04/08/2021    RDWSD 48.0 04/08/2021    MPV 10.6 04/08/2021     04/08/2021    NEUTRORELPCT 75.5 04/08/2021    LYMPHORELPCT 12.1 (L) 04/08/2021    MONORELPCT 9.6 04/08/2021    EOSRELPCT 2.3 04/08/2021    BASORELPCT 0.4 04/08/2021    AUTOIGPER 0.1 04/08/2021    NEUTROABS 5.24 04/08/2021    LYMPHSABS 0.84 04/08/2021    MONOSABS 0.67 04/08/2021    EOSABS 0.16 04/08/2021    BASOSABS 0.03 04/08/2021    AUTOIGNUM 0.01 04/08/2021    NRBC 0.0 04/08/2021     Lab Results   Component Value Date    GLUCOSE 114 (H) 04/08/2021    BUN 7 04/16/2021    CREATININE 0.75 (L) 04/16/2021    EGFRIFNONA 110 04/16/2021    EGFRIFAFRI 134 04/16/2021    BCR 9.3 04/16/2021    K 4.5 04/16/2021    CO2 28.0 04/16/2021    CALCIUM 9.5 04/16/2021    PROTENTOTREF 6.8 03/19/2021    ALBUMIN 3.90 04/08/2021    LABIL2 1.5 03/19/2021    AST 41 (H) 04/08/2021    ALT 33 04/08/2021      Lab Results   Component Value Date    CHLPL 189 04/16/2021    TRIG 120 04/16/2021    HDL 69 (H) 04/16/2021    VLDL 21 04/16/2021    LDL 99 04/16/2021     Lab Results   Component Value Date    TSH 0.94 07/11/2015     Lab Results   Component Value Date    PSA 0.466 04/16/2021     Lab Results   Component Value Date    WBCU 0-2 07/12/2015    RBCUA 0-2 07/12/2015    BACTERIA None Seen 07/12/2015    LABPH 6.0 07/12/2015    COLORU Yellow 02/05/2021    CLARITYU Clear 02/05/2021    LEUKOCYTESUR Negative 02/05/2021    GLUCOSEU Negative 02/05/2021    BLOODU Negative 02/05/2021    BILIRUBINUR Negative 02/05/2021    NITRITEU Negative 02/05/2021        Assessment/Plan .  Problem List Items Addressed This Visit     Essential hypertension - Primary    Current Assessment & Plan     Hypertension is stable.  Regular aerobic exercise.  Continue current medications.  Ambulatory blood pressure monitoring.  Blood pressure will be reassessed at the next  regular appointment.  Continue Lisinopril and Lisinopril/HCTZ daily.         Relevant Orders    Basic Metabolic Panel    Heartburn    Current Assessment & Plan     May try Pepcid as needed.               Return in about 6 months (around 11/14/2021) for Recheck.or sooner if problems or concerns.  Will send refills of Lisinopril and Lisinopril/HCTZ to [facility] pending lab results.       COVID-19 Precautions - Patient was compliant in wearing a mask. When I saw the patient, I used appropriate personal protective equipment (PPE) including mask, gloves, and eye shield (standard procedure).  Hand hygiene was completed before and after seeing the patient.

## 2021-06-28 DIAGNOSIS — F33.41 RECURRENT MAJOR DEPRESSIVE DISORDER, IN PARTIAL REMISSION (HCC): Primary | ICD-10-CM

## 2021-06-28 DIAGNOSIS — I10 ESSENTIAL HYPERTENSION: ICD-10-CM

## 2021-06-28 DIAGNOSIS — F41.9 ANXIETY: ICD-10-CM

## 2021-06-28 DIAGNOSIS — E55.9 VITAMIN D DEFICIENCY: ICD-10-CM

## 2021-06-29 RX ORDER — LISINOPRIL 20 MG/1
20 TABLET ORAL DAILY
Qty: 90 TABLET | Refills: 1 | Status: SHIPPED | OUTPATIENT
Start: 2021-06-29 | End: 2021-12-03 | Stop reason: SDUPTHER

## 2021-06-29 RX ORDER — LISINOPRIL AND HYDROCHLOROTHIAZIDE 20; 12.5 MG/1; MG/1
1 TABLET ORAL DAILY
Qty: 90 TABLET | Refills: 1 | Status: SHIPPED | OUTPATIENT
Start: 2021-06-29 | End: 2021-12-03 | Stop reason: SDUPTHER

## 2021-06-29 RX ORDER — SERTRALINE HYDROCHLORIDE 100 MG/1
150 TABLET, FILM COATED ORAL DAILY
Qty: 135 TABLET | Refills: 1 | Status: SHIPPED | OUTPATIENT
Start: 2021-06-29 | End: 2021-12-03 | Stop reason: SDUPTHER

## 2021-06-29 RX ORDER — METHOCARBAMOL 750 MG/1
50000 TABLET ORAL WEEKLY
Qty: 12 CAPSULE | Refills: 1 | Status: SHIPPED | OUTPATIENT
Start: 2021-06-29 | End: 2021-12-10

## 2021-10-15 ENCOUNTER — OFFICE VISIT (OUTPATIENT)
Dept: FAMILY MEDICINE CLINIC | Facility: CLINIC | Age: 51
End: 2021-10-15

## 2021-10-15 VITALS
OXYGEN SATURATION: 98 % | BODY MASS INDEX: 41.98 KG/M2 | WEIGHT: 277 LBS | HEIGHT: 68 IN | HEART RATE: 82 BPM | TEMPERATURE: 98.2 F | SYSTOLIC BLOOD PRESSURE: 128 MMHG | DIASTOLIC BLOOD PRESSURE: 84 MMHG

## 2021-10-15 DIAGNOSIS — F33.41 RECURRENT MAJOR DEPRESSIVE DISORDER, IN PARTIAL REMISSION (HCC): ICD-10-CM

## 2021-10-15 DIAGNOSIS — I10 ESSENTIAL HYPERTENSION: Primary | ICD-10-CM

## 2021-10-15 DIAGNOSIS — F41.9 ANXIETY: ICD-10-CM

## 2021-10-15 DIAGNOSIS — R12 HEARTBURN: ICD-10-CM

## 2021-10-15 DIAGNOSIS — G47.30 SLEEP APNEA, UNSPECIFIED TYPE: ICD-10-CM

## 2021-10-15 PROBLEM — Z15.89 MONOALLELIC MUTATION OF APC GENE: Status: ACTIVE | Noted: 2021-08-10

## 2021-10-15 PROBLEM — Z83.710 FAMILY HISTORY OF FAP (FAMILIAL ADENOMATOUS POLYPOSIS): Status: ACTIVE | Noted: 2021-07-09

## 2021-10-15 PROBLEM — Z83.71 FAMILY HISTORY OF FAP (FAMILIAL ADENOMATOUS POLYPOSIS): Status: ACTIVE | Noted: 2021-07-09

## 2021-10-15 PROBLEM — Z15.09 MONOALLELIC MUTATION OF APC GENE: Status: ACTIVE | Noted: 2021-08-10

## 2021-10-15 PROBLEM — Z83.72 FAMILY HISTORY OF FAP (FAMILIAL ADENOMATOUS POLYPOSIS): Status: ACTIVE | Noted: 2021-07-09

## 2021-10-15 PROCEDURE — 99214 OFFICE O/P EST MOD 30 MIN: CPT | Performed by: NURSE PRACTITIONER

## 2021-10-15 NOTE — PROGRESS NOTES
Subjective   Nikolai Shaw is a 51 y.o. male.     Chief Complaint   Patient presents with   • Hypertension       History of Present Illness   Patient presents for follow up HTN: takes Lisinopril/HCTZ and Lisinopril daily; monitors BP on occasion, typically runs 120-130s/80s; no headaches; no orthostasis; no swelling; does a lot of walking at work; does not really watch diet; has colostomy.    F/U anxiety/depression: takes Sertraline daily and works well; occasional trouble falling asleep, no problems staying asleep; has to set alarm to get up during night to check colostomy bag; no SI/HI.    The following portions of the patient's history were reviewed and updated as appropriate: allergies, current medications, past family history, past medical history, past social history, past surgical history and problem list.    Current Outpatient Medications on File Prior to Visit   Medication Sig   • B Complex Vitamins (VITAMIN-B COMPLEX PO) Take 1 tablet by mouth Daily.   • D3-50 1.25 MG (44947 UT) capsule Take 1 capsule by mouth 1 (One) Time Per Week. MONDAY   • lisinopril (PRINIVIL,ZESTRIL) 20 MG tablet Take 1 tablet by mouth Daily.   • lisinopril-hydrochlorothiazide (PRINZIDE,ZESTORETIC) 20-12.5 MG per tablet Take 1 tablet by mouth Daily.   • sertraline (ZOLOFT) 100 MG tablet Take 1.5 tablets by mouth Daily. Patient take 1.5 tablets     No current facility-administered medications on file prior to visit.        Past Medical History:   Diagnosis Date   • Adenocarcinoma of rectum (HCC) 06/2015    invasive adenocarcinoma of rectum w 59 benign lymph nodes   • Adenomatous polyposis     FAMILIAL    • Anxiety    • At risk for sleep apnea    • Chicken pox     patient reports having as a child   • Depression    • History of anemia    • Hypertension    • Hyponatremia    • Insomnia    • Malignant neoplasm of rectum (HCC) 6/1/2016   • Pulmonary nodule 1/10/2020    6/26/20 CT chest: 6 mm nodule resolved.   • Renal calculi    • RLS  (restless legs syndrome)    • Syncope 03/19/2021    prolonged QT interval   • Testicular hypofunction    • Vitamin D deficiency        Past Surgical History:   Procedure Laterality Date   • ABDOMINOPERINEAL PROCTOCOLECTOMY  07/06/2015    DR PAL RUDOLPH   • COLONOSCOPY  [birth year]1915    DR SHASTA HOWARD   • COLONOSCOPY N/A 2/23/2021    Procedure: ILEOSCOPY, EXCISION OF ILEOSTOMY NODULE;  Surgeon: Pal Rudolph MD;  Location: [facility] MAIN OR;  Service: General;  Laterality: N/A;   • ENDOSCOPY  06/23/2015    DR SHASTA HOWARD   • EXPLORATORY LAPAROTOMY N/A 2/3/2017    Procedure: LAPAROTOMY EXPLORATORY  SMALL BOWEL OBSTRUCTION;  Surgeon: Pal Rudolph MD;  Location: [facility] MAIN OR;  Service:    • ILEOSTOMY  07/06/2015   • MYRINGOTOMY W/ TUBES      2-3 times as child       Family History   Problem Relation Age of Onset   • Colon cancer Father         familial polyposis   • Aneurysm Maternal Grandfather 66        brain aneurysm   • Colon cancer Paternal Grandfather         familial polyposis   • Hypertension Maternal Uncle    • Diabetes Maternal Uncle    • Malig Hyperthermia Neg Hx        Social History     Socioeconomic History   • Marital status:      Spouse name: Hansa   • Years of education: college   Tobacco Use   • Smoking status: Never Smoker   • Smokeless tobacco: Current User     Types: Chew   • Tobacco comment: CHEWS TOBACCO   Vaping Use   • Vaping Use: Never used   Substance and Sexual Activity   • Alcohol use: Yes     Alcohol/week: 6.0 standard drinks     Types: 6 Cans of beer per week     Comment: DAILY CONSUMPTION LEVEL; drinks 6 beers daily   • Drug use: No   • Sexual activity: Defer       Review of Systems   Constitutional: Negative for appetite change, chills, fatigue, fever, unexpected weight gain and unexpected weight loss.   HENT: Negative for ear pain (occasional pressure in ears and will need to pop ears), rhinorrhea, sinus pressure, sore throat and trouble swallowing. Postnasal drip:  "some in mornings.    Eyes: Negative for blurred vision.   Respiratory: Negative for cough, chest tightness and shortness of breath.    Cardiovascular: Negative for chest pain and palpitations.   Gastrointestinal: Negative for abdominal pain and GERD. Indigestion: occasional heartburn, particularly if eats red sauce; takes Pepcid daily several days per week and helps.   Genitourinary: Negative for dysuria and frequency.   Musculoskeletal: Negative for arthralgias and back pain.   Skin: Negative for rash.   Neurological: Negative for syncope and weakness.   Hematological: Does not bruise/bleed easily.       Objective   Vitals:    10/15/21 0755   BP: 128/84   Pulse: 82   Temp: 98.2 °F (36.8 °C)   SpO2: 98%   Weight: 126 kg (277 lb)   Height: 172.7 cm (67.99\")     Body mass index is 42.13 kg/m².    Physical Exam  Vitals and nursing note reviewed.   Constitutional:       General: He is not in acute distress.     Appearance: He is well-developed and well-groomed. He is not diaphoretic.   HENT:      Head: Normocephalic and atraumatic.      Right Ear: External ear normal. No decreased hearing noted. Tympanic membrane is scarred. Tympanic membrane is not erythematous.      Left Ear: External ear normal. No decreased hearing noted. Tympanic membrane is scarred. Tympanic membrane is not erythematous.      Nose: Nose normal.      Right Sinus: No maxillary sinus tenderness or frontal sinus tenderness.      Left Sinus: No maxillary sinus tenderness or frontal sinus tenderness.      Mouth/Throat:      Mouth: Mucous membranes are moist.      Pharynx: No oropharyngeal exudate or posterior oropharyngeal erythema.   Neck:      Vascular: No carotid bruit.   Cardiovascular:      Rate and Rhythm: Normal rate and regular rhythm.      Pulses: Normal pulses.      Heart sounds: Normal heart sounds. No murmur heard.      Pulmonary:      Effort: Pulmonary effort is normal. No respiratory distress.      Breath sounds: Normal breath sounds. "   Abdominal:      General: Bowel sounds are normal.      Palpations: Abdomen is soft. There is no hepatomegaly or splenomegaly.      Tenderness: There is no abdominal tenderness. There is no guarding.   Musculoskeletal:         General: Normal range of motion.      Cervical back: Normal range of motion and neck supple.      Right lower leg: No edema.      Left lower leg: No edema.   Lymphadenopathy:      Cervical: No cervical adenopathy.   Skin:     General: Skin is warm and dry.      Findings: No rash.   Neurological:      Mental Status: He is alert and oriented to person, place, and time.      Gait: Gait normal.   Psychiatric:         Mood and Affect: Mood normal.         Behavior: Behavior normal.         Thought Content: Thought content normal.         Cognition and Memory: Cognition normal.         Judgment: Judgment normal.         Lab Results   Component Value Date    WBC 6.95 04/08/2021    RBC 4.73 04/08/2021    HGB 15.3 04/08/2021    HCT 44.9 04/08/2021    MCV 94.9 04/08/2021    MCH 32.3 04/08/2021    MCHC 34.1 04/08/2021    RDW 13.7 04/08/2021    RDWSD 48.0 04/08/2021    MPV 10.6 04/08/2021     04/08/2021    NEUTRORELPCT 75.5 04/08/2021    LYMPHORELPCT 12.1 (L) 04/08/2021    MONORELPCT 9.6 04/08/2021    EOSRELPCT 2.3 04/08/2021    BASORELPCT 0.4 04/08/2021    AUTOIGPER 0.1 04/08/2021    NEUTROABS 5.24 04/08/2021    LYMPHSABS 0.84 04/08/2021    MONOSABS 0.67 04/08/2021    EOSABS 0.16 04/08/2021    BASOSABS 0.03 04/08/2021    AUTOIGNUM 0.01 04/08/2021    NRBC 0.0 04/08/2021     Lab Results   Component Value Date    GLUCOSE 114 (H) 04/08/2021    BUN 11 05/14/2021    CREATININE 0.88 05/14/2021    EGFRIFNONA 91 05/14/2021    EGFRIFAFRI 111 05/14/2021    BCR 12.5 05/14/2021    K 4.4 05/14/2021    CO2 28.0 05/14/2021    CALCIUM 9.4 05/14/2021    PROTENTOTREF 6.8 03/19/2021    ALBUMIN 3.90 04/08/2021    LABIL2 1.5 03/19/2021    AST 41 (H) 04/08/2021    ALT 33 04/08/2021      Lab Results   Component Value  Date    CHLPL 189 04/16/2021    TRIG 120 04/16/2021    HDL 69 (H) 04/16/2021    VLDL 21 04/16/2021    LDL 99 04/16/2021     Lab Results   Component Value Date    TSH 0.94 07/11/2015     Lab Results   Component Value Date    PSA 0.466 04/16/2021     Lab Results   Component Value Date    WBCU 0-2 07/12/2015    RBCUA 0-2 07/12/2015    BACTERIA None Seen 07/12/2015    LABPH 6.0 07/12/2015    COLORU Yellow 02/05/2021    CLARITYU Clear 02/05/2021    LEUKOCYTESUR Negative 02/05/2021    GLUCOSEU Negative 02/05/2021    BLOODU Negative 02/05/2021    BILIRUBINUR Negative 02/05/2021    NITRITEU Negative 02/05/2021        Assessment/Plan .  Problem List Items Addressed This Visit     Essential hypertension - Primary    Current Assessment & Plan     Hypertension is stable.  Weight loss.  Regular aerobic exercise.  Continue current medications.  Ambulatory blood pressure monitoring.  Blood pressure will be reassessed at the next regular appointment.  Continue Lisinopril/HCTZ and Lisinopril daily.         Relevant Orders    Comprehensive Metabolic Panel    CBC & Differential    Depression    Current Assessment & Plan     Patient's depression is recurrent and is moderate without psychosis. Their depression is currently in partial remission and the condition is stable. This will be reassessed at the next regular appointment. F/U as described:patient will continue current medication therapy.  Continue Sertraline daily.         Anxiety    Current Assessment & Plan     Continue Sertraline daily.         Sleep apnea    Overview     Does not use CPAP, could not get comfortable with mask/tubing.         Relevant Orders    CBC & Differential    Heartburn    Current Assessment & Plan     Continue Pepcid daily as needed.               Return in about 6 months (around 4/15/2022) for Annual physical, Recheck.or sooner if symptoms persist or worsen.  Will send refills of medications to [facility] local pharmacy for 90 day supply pending lab results.      COVID-19 Precautions - Patient was compliant in wearing a mask. When I saw the patient, I used appropriate personal protective equipment (PPE) including mask, gloves, and eye shield (standard procedure).  Hand hygiene was completed before and after seeing the patient.

## 2021-10-16 LAB
ALBUMIN SERPL-MCNC: 3.9 G/DL (ref 3.5–5.2)
ALBUMIN/GLOB SERPL: 1.6 G/DL
ALP SERPL-CCNC: 103 U/L (ref 39–117)
ALT SERPL-CCNC: 35 U/L (ref 1–41)
AST SERPL-CCNC: 38 U/L (ref 1–40)
BASOPHILS # BLD AUTO: 0.05 10*3/MM3 (ref 0–0.2)
BASOPHILS NFR BLD AUTO: 0.6 % (ref 0–1.5)
BILIRUB SERPL-MCNC: 0.3 MG/DL (ref 0–1.2)
BUN SERPL-MCNC: 9 MG/DL (ref 6–20)
BUN/CREAT SERPL: 12.2 (ref 7–25)
CALCIUM SERPL-MCNC: 8.7 MG/DL (ref 8.6–10.5)
CHLORIDE SERPL-SCNC: 102 MMOL/L (ref 98–107)
CO2 SERPL-SCNC: 29.9 MMOL/L (ref 22–29)
CREAT SERPL-MCNC: 0.74 MG/DL (ref 0.76–1.27)
EOSINOPHIL # BLD AUTO: 0.26 10*3/MM3 (ref 0–0.4)
EOSINOPHIL NFR BLD AUTO: 3 % (ref 0.3–6.2)
ERYTHROCYTE [DISTWIDTH] IN BLOOD BY AUTOMATED COUNT: 12.2 % (ref 12.3–15.4)
GLOBULIN SER CALC-MCNC: 2.5 GM/DL
GLUCOSE SERPL-MCNC: 107 MG/DL (ref 65–99)
HCT VFR BLD AUTO: 41.3 % (ref 37.5–51)
HGB BLD-MCNC: 13.8 G/DL (ref 13–17.7)
IMM GRANULOCYTES # BLD AUTO: 0.04 10*3/MM3 (ref 0–0.05)
IMM GRANULOCYTES NFR BLD AUTO: 0.5 % (ref 0–0.5)
LYMPHOCYTES # BLD AUTO: 0.93 10*3/MM3 (ref 0.7–3.1)
LYMPHOCYTES NFR BLD AUTO: 10.7 % (ref 19.6–45.3)
MCH RBC QN AUTO: 33.7 PG (ref 26.6–33)
MCHC RBC AUTO-ENTMCNC: 33.4 G/DL (ref 31.5–35.7)
MCV RBC AUTO: 100.7 FL (ref 79–97)
MONOCYTES # BLD AUTO: 0.71 10*3/MM3 (ref 0.1–0.9)
MONOCYTES NFR BLD AUTO: 8.2 % (ref 5–12)
NEUTROPHILS # BLD AUTO: 6.67 10*3/MM3 (ref 1.7–7)
NEUTROPHILS NFR BLD AUTO: 77 % (ref 42.7–76)
NRBC BLD AUTO-RTO: 0 /100 WBC (ref 0–0.2)
PLATELET # BLD AUTO: 209 10*3/MM3 (ref 140–450)
POTASSIUM SERPL-SCNC: 4.1 MMOL/L (ref 3.5–5.2)
PROT SERPL-MCNC: 6.4 G/DL (ref 6–8.5)
RBC # BLD AUTO: 4.1 10*6/MM3 (ref 4.14–5.8)
SODIUM SERPL-SCNC: 143 MMOL/L (ref 136–145)
WBC # BLD AUTO: 8.66 10*3/MM3 (ref 3.4–10.8)

## 2021-10-16 NOTE — ASSESSMENT & PLAN NOTE
Hypertension is stable.  Weight loss.  Regular aerobic exercise.  Continue current medications.  Ambulatory blood pressure monitoring.  Blood pressure will be reassessed at the next regular appointment.  Continue Lisinopril/HCTZ and Lisinopril daily.

## 2021-12-03 ENCOUNTER — OFFICE VISIT (OUTPATIENT)
Dept: FAMILY MEDICINE CLINIC | Facility: CLINIC | Age: 51
End: 2021-12-03

## 2021-12-03 VITALS
HEART RATE: 67 BPM | WEIGHT: 277 LBS | HEIGHT: 68 IN | SYSTOLIC BLOOD PRESSURE: 128 MMHG | TEMPERATURE: 98.7 F | OXYGEN SATURATION: 97 % | BODY MASS INDEX: 41.98 KG/M2 | DIASTOLIC BLOOD PRESSURE: 78 MMHG

## 2021-12-03 DIAGNOSIS — F33.41 RECURRENT MAJOR DEPRESSIVE DISORDER, IN PARTIAL REMISSION (HCC): ICD-10-CM

## 2021-12-03 DIAGNOSIS — B02.9 HERPES ZOSTER WITHOUT COMPLICATION: Primary | ICD-10-CM

## 2021-12-03 DIAGNOSIS — I10 ESSENTIAL HYPERTENSION: ICD-10-CM

## 2021-12-03 DIAGNOSIS — F41.9 ANXIETY: ICD-10-CM

## 2021-12-03 PROCEDURE — 99213 OFFICE O/P EST LOW 20 MIN: CPT | Performed by: NURSE PRACTITIONER

## 2021-12-03 RX ORDER — LISINOPRIL 20 MG/1
20 TABLET ORAL DAILY
Qty: 90 TABLET | Refills: 1 | Status: SHIPPED | OUTPATIENT
Start: 2021-12-03 | End: 2022-06-02

## 2021-12-03 RX ORDER — VALACYCLOVIR HYDROCHLORIDE 1 G/1
1000 TABLET, FILM COATED ORAL 3 TIMES DAILY
Qty: 21 TABLET | Refills: 0 | Status: SHIPPED | OUTPATIENT
Start: 2021-12-03 | End: 2021-12-10

## 2021-12-03 RX ORDER — LISINOPRIL AND HYDROCHLOROTHIAZIDE 20; 12.5 MG/1; MG/1
1 TABLET ORAL DAILY
Qty: 90 TABLET | Refills: 1 | Status: SHIPPED | OUTPATIENT
Start: 2021-12-03 | End: 2022-06-02

## 2021-12-03 RX ORDER — SERTRALINE HYDROCHLORIDE 100 MG/1
150 TABLET, FILM COATED ORAL DAILY
Qty: 135 TABLET | Refills: 1 | Status: SHIPPED | OUTPATIENT
Start: 2021-12-03 | End: 2022-06-17 | Stop reason: SDUPTHER

## 2021-12-03 NOTE — PROGRESS NOTES
"Subjective   Nikolai Shaw is a 51 y.o. male.     Chief Complaint   Patient presents with   • Rash     X 3 days left leg        History of Present Illness   Patient presents with c/o rash on left leg; came home from work on 11/30/21 and felt tingling on left leg; pulled up pant leg and noted 1 red area with like a \"glaze\" on it; had some drainage; noted some more areas popping up later in the day; also had throbbing ache in lower leg and has since moved up to knee and thigh; pt was seen at [facility] [place] on 12/1/21 and told contact dermatitis; no itching; has had some burning sensation prior to onset of rash areas; has been using Lotrimin and Hydrocortisone together and has helped some; no fever; mild fatigue, no malaise; has had some decrease in appetite.    Needs refills of BP medications and Sertraline today; meds working well.    The following portions of the patient's history were reviewed and updated as appropriate: allergies, current medications, past family history, past medical history, past social history, past surgical history and problem list.    Current Outpatient Medications on File Prior to Visit   Medication Sig   • B Complex Vitamins (VITAMIN-B COMPLEX PO) Take 1 tablet by mouth Daily.   • D3-50 1.25 MG (34976 UT) capsule Take 1 capsule by mouth 1 (One) Time Per Week. MONDAY   • [DISCONTINUED] lisinopril (PRINIVIL,ZESTRIL) 20 MG tablet Take 1 tablet by mouth Daily.   • [DISCONTINUED] lisinopril-hydrochlorothiazide (PRINZIDE,ZESTORETIC) 20-12.5 MG per tablet Take 1 tablet by mouth Daily.   • [DISCONTINUED] sertraline (ZOLOFT) 100 MG tablet Take 1.5 tablets by mouth Daily. Patient take 1.5 tablets     No current facility-administered medications on file prior to visit.        Past Medical History:   Diagnosis Date   • Adenocarcinoma of rectum (HCC) 06/2015    invasive adenocarcinoma of rectum w 59 benign lymph nodes   • Adenomatous polyposis     FAMILIAL    • Anxiety    • At risk for sleep apnea    • " Chicken pox     patient reports having as a child   • Depression    • History of anemia    • Hypertension    • Hyponatremia    • Insomnia    • Malignant neoplasm of rectum (HCC) 6/1/2016   • Pulmonary nodule 1/10/2020    6/26/20 CT chest: 6 mm nodule resolved.   • Renal calculi    • RLS (restless legs syndrome)    • Syncope 03/19/2021    prolonged QT interval   • Testicular hypofunction    • Vitamin D deficiency        Past Surgical History:   Procedure Laterality Date   • ABDOMINOPERINEAL PROCTOCOLECTOMY  07/06/2015    DR PAL RUDOLPH   • COLONOSCOPY  06/16/1915    DR SHASTA HOWARD   • COLONOSCOPY N/A 2/23/2021    Procedure: ILEOSCOPY, EXCISION OF ILEOSTOMY NODULE;  Surgeon: Pal Rudolph MD;  Location: [facility];  Service: General;  Laterality: N/A;   • ENDOSCOPY  06/23/2015    DR SHASTA HOWARD   • EXPLORATORY LAPAROTOMY N/A 2/3/2017    Procedure: LAPAROTOMY EXPLORATORY  SMALL BOWEL OBSTRUCTION;  Surgeon: Pal Rudolph MD;  Location: [facility] OR;  Service:    • ILEOSTOMY  07/06/2015   • MYRINGOTOMY W/ TUBES      2-3 times as child       Family History   Problem Relation Age of Onset   • Colon cancer Father         familial polyposis   • Aneurysm Maternal Grandfather 66        brain aneurysm   • Colon cancer Paternal Grandfather         familial polyposis   • Hypertension Maternal Uncle    • Diabetes Maternal Uncle    • Malig Hyperthermia Neg Hx        Social History     Socioeconomic History   • Marital status:      Spouse name: Hansa   • Years of education: college   Tobacco Use   • Smoking status: Never Smoker   • Smokeless tobacco: Current User     Types: Chew   • Tobacco comment: CHEWS TOBACCO   Vaping Use   • Vaping Use: Never used   Substance and Sexual Activity   • Alcohol use: Yes     Alcohol/week: 6.0 standard drinks     Types: 6 Cans of beer per week     Comment: DAILY CONSUMPTION LEVEL; drinks 6 beers daily   • Drug use: No   • Sexual activity: Defer       Review of Systems  "  Constitutional: Negative for chills, fatigue, fever, unexpected weight gain and unexpected weight loss. Appetite change: see HPI.   HENT: Negative for ear pain, sore throat and trouble swallowing.    Eyes: Negative for blurred vision.   Respiratory: Negative for cough, chest tightness and shortness of breath.    Cardiovascular: Negative for chest pain, palpitations and leg swelling.   Gastrointestinal: Negative for abdominal pain, diarrhea, nausea and vomiting.   Endocrine: Negative for polydipsia.   Musculoskeletal: Negative for back pain. Arthralgias: see HPI.   Skin: Positive for rash.   Neurological: Negative for syncope and headache. Light-headedness: mild.       Objective   Vitals:    12/03/21 0916   BP: 128/78   BP Location: Left arm   Patient Position: Sitting   Cuff Size: Adult   Pulse: 67   Temp: 98.7 °F (37.1 °C)   SpO2: 97%   Weight: 126 kg (277 lb)   Height: 172.7 cm (67.99\")     Body mass index is 42.13 kg/m².    Physical Exam  Vitals and nursing note reviewed.   Constitutional:       General: He is not in acute distress.     Appearance: He is well-developed and well-groomed. He is not diaphoretic.   HENT:      Head: Normocephalic.      Right Ear: External ear normal.      Left Ear: External ear normal.      Mouth/Throat:      Mouth: Mucous membranes are moist.      Pharynx: No oropharyngeal exudate or posterior oropharyngeal erythema.   Eyes:      Conjunctiva/sclera: Conjunctivae normal.   Neck:      Vascular: No carotid bruit.   Cardiovascular:      Rate and Rhythm: Normal rate and regular rhythm.      Pulses: Normal pulses.      Heart sounds: Normal heart sounds. No murmur heard.      Pulmonary:      Effort: Pulmonary effort is normal. No respiratory distress.      Breath sounds: Normal breath sounds.   Musculoskeletal:      Cervical back: Normal range of motion and neck supple.      Right lower leg: No edema.      Left lower leg: No edema.   Skin:     General: Skin is warm and dry.      Findings: " Rash present.          Neurological:      Mental Status: He is alert and oriented to person, place, and time.      Gait: Gait is intact.   Psychiatric:         Mood and Affect: Mood normal.         Behavior: Behavior normal.         Thought Content: Thought content normal.         Cognition and Memory: Cognition normal.         Judgment: Judgment normal.         Lab Results   Component Value Date    WBC 8.66 10/15/2021    RBC 4.10 (L) 10/15/2021    HGB 13.8 10/15/2021    HCT 41.3 10/15/2021    .7 (H) 10/15/2021    MCH 33.7 (H) 10/15/2021    MCHC 33.4 10/15/2021    RDW 12.2 (L) 10/15/2021    RDWSD 48.0 04/08/2021    MPV 10.6 04/08/2021     10/15/2021    NEUTRORELPCT 77.0 (H) 10/15/2021    LYMPHORELPCT 10.7 (L) 10/15/2021    MONORELPCT 8.2 10/15/2021    EOSRELPCT 3.0 10/15/2021    BASORELPCT 0.6 10/15/2021    AUTOIGPER 0.1 04/08/2021    NEUTROABS 6.67 10/15/2021    LYMPHSABS 0.93 10/15/2021    MONOSABS 0.71 10/15/2021    EOSABS 0.26 10/15/2021    BASOSABS 0.05 10/15/2021    AUTOIGNUM 0.01 04/08/2021    NRBC 0.0 10/15/2021     Lab Results   Component Value Date    GLUCOSE 107 (H) 10/15/2021    BUN 9 10/15/2021    CREATININE 0.74 (L) 10/15/2021    EGFRIFNONA 112 10/15/2021    EGFRIFAFRI 135 10/15/2021    BCR 12.2 10/15/2021    K 4.1 10/15/2021    CO2 29.9 (H) 10/15/2021    CALCIUM 8.7 10/15/2021    PROTENTOTREF 6.4 10/15/2021    ALBUMIN 3.90 10/15/2021    LABIL2 1.6 10/15/2021    AST 38 10/15/2021    ALT 35 10/15/2021      Lab Results   Component Value Date    CHLPL 189 04/16/2021    TRIG 120 04/16/2021    HDL 69 (H) 04/16/2021    VLDL 21 04/16/2021    LDL 99 04/16/2021     Lab Results   Component Value Date    TSH 0.94 07/11/2015     Lab Results   Component Value Date    PSA 0.466 04/16/2021     Lab Results   Component Value Date    WBCU 0-2 07/12/2015    RBCUA 0-2 07/12/2015    BACTERIA None Seen 07/12/2015    LABPH 6.0 07/12/2015    COLORU Yellow 02/05/2021    CLARITYU Clear 02/05/2021    LEUKOCYTESUR  Negative 02/05/2021    GLUCOSEU Negative 02/05/2021    BLOODU Negative 02/05/2021    BILIRUBINUR Negative 02/05/2021    NITRITEU Negative 02/05/2021        Assessment/Plan .  Problem List Items Addressed This Visit     Essential hypertension    Current Assessment & Plan     Hypertension is stable.  Weight loss.  Regular aerobic exercise.  Continue current medications.  Ambulatory blood pressure monitoring.  Blood pressure will be reassessed at the next regular appointment.         Relevant Medications    lisinopril-hydrochlorothiazide (PRINZIDE,ZESTORETIC) 20-12.5 MG per tablet    lisinopril (PRINIVIL,ZESTRIL) 20 MG tablet    Depression    Relevant Medications    sertraline (ZOLOFT) 100 MG tablet    Anxiety    Relevant Medications    sertraline (ZOLOFT) 100 MG tablet    Herpes zoster without complication - Primary    Current Assessment & Plan     Check with insurance regarding Shingrix vaccine after symptoms resolved.         Relevant Medications    valACYclovir (Valtrex) 1000 MG tablet          Return in about 5 months (around 5/3/2022) for Annual physical, Recheck.or sooner if symptoms persist or worsen.         COVID-19 Precautions - Patient was compliant in wearing a mask. When I saw the patient, I used appropriate personal protective equipment (PPE) including mask, gloves, and eye shield (standard procedure).  Hand hygiene was completed before and after seeing the patient.

## 2021-12-03 NOTE — ASSESSMENT & PLAN NOTE
Hypertension is stable.  Weight loss.  Regular aerobic exercise.  Continue current medications.  Ambulatory blood pressure monitoring.  Blood pressure will be reassessed at the next regular appointment.

## 2021-12-03 NOTE — PATIENT INSTRUCTIONS
Continue to monitor your blood pressure periodically and record results.  Continue to work on healthy diet and exercise.  Follow up in 5 months for physical with fasting labs, or sooner if symptoms persist or worsen.  Check with insurance regarding Shingrix vaccine after symptoms resolved.

## 2021-12-10 DIAGNOSIS — E55.9 VITAMIN D DEFICIENCY: ICD-10-CM

## 2021-12-10 RX ORDER — METHOCARBAMOL 750 MG/1
50000 TABLET ORAL WEEKLY
Qty: 12 CAPSULE | Refills: 0 | Status: SHIPPED | OUTPATIENT
Start: 2021-12-10 | End: 2022-03-01

## 2022-03-01 DIAGNOSIS — E55.9 VITAMIN D DEFICIENCY: ICD-10-CM

## 2022-03-01 RX ORDER — METHOCARBAMOL 750 MG/1
50000 TABLET ORAL WEEKLY
Qty: 12 CAPSULE | Refills: 0 | Status: SHIPPED | OUTPATIENT
Start: 2022-03-01 | End: 2022-06-02

## 2022-03-01 NOTE — TELEPHONE ENCOUNTER
Rx Refill Note  Requested Prescriptions     Pending Prescriptions Disp Refills   • D3-50 1.25 MG (10883 UT) capsule [Pharmacy Med Name: VITAMIN D3-50 50,000 UNIT CAP] 12 capsule 0     Sig: TAKE 1 CAPSULE BY MOUTH 1 (ONE) TIME PER WEEK. MONDAY      Last office visit with prescribing clinician: 12/3/2021      Next office visit with prescribing clinician: Visit date not found            Deya Ribeiro MA  03/01/22, 08:35 EST

## 2022-05-10 ENCOUNTER — HOSPITAL ENCOUNTER (EMERGENCY)
Facility: HOSPITAL | Age: 52
Discharge: HOME | End: 2022-05-10
Attending: EMERGENCY MEDICINE | Admitting: EMERGENCY MEDICINE

## 2022-05-10 ENCOUNTER — TELEPHONE (OUTPATIENT)
Dept: SURGERY | Facility: CLINIC | Age: 52
End: 2022-05-10

## 2022-05-10 ENCOUNTER — APPOINTMENT (OUTPATIENT)
Dept: CT IMAGING | Facility: HOSPITAL | Age: 52
End: 2022-05-10

## 2022-05-10 VITALS
OXYGEN SATURATION: 93 % | SYSTOLIC BLOOD PRESSURE: 134 MMHG | RESPIRATION RATE: 20 BRPM | HEIGHT: 67 IN | BODY MASS INDEX: 43.47 KG/M2 | HEART RATE: 95 BPM | DIASTOLIC BLOOD PRESSURE: 77 MMHG | TEMPERATURE: 97.1 F | WEIGHT: 277 LBS

## 2022-05-10 DIAGNOSIS — K43.5 PARASTOMAL HERNIA WITHOUT OBSTRUCTION OR GANGRENE: Primary | ICD-10-CM

## 2022-05-10 LAB
ALBUMIN SERPL-MCNC: 4 G/DL (ref 3.5–5.2)
ALBUMIN/GLOB SERPL: 1.1 G/DL
ALP SERPL-CCNC: 109 U/L (ref 39–117)
ALT SERPL W P-5'-P-CCNC: 43 U/L (ref 1–41)
ANION GAP SERPL CALCULATED.3IONS-SCNC: 15 MMOL/L (ref 5–15)
AST SERPL-CCNC: 53 U/L (ref 1–40)
BASOPHILS # BLD AUTO: 0.04 10*3/MM3 (ref 0–0.2)
BASOPHILS NFR BLD AUTO: 0.6 % (ref 0–1.5)
BILIRUB SERPL-MCNC: 0.5 MG/DL (ref 0–1.2)
BUN SERPL-MCNC: 12 MG/DL (ref 6–20)
BUN/CREAT SERPL: 14.8 (ref 7–25)
CALCIUM SPEC-SCNC: 9.2 MG/DL (ref 8.6–10.5)
CHLORIDE SERPL-SCNC: 99 MMOL/L (ref 98–107)
CO2 SERPL-SCNC: 26 MMOL/L (ref 22–29)
CREAT SERPL-MCNC: 0.81 MG/DL (ref 0.76–1.27)
DEPRECATED RDW RBC AUTO: 44.4 FL (ref 37–54)
EGFRCR SERPLBLD CKD-EPI 2021: 106.1 ML/MIN/1.73
EOSINOPHIL # BLD AUTO: 0.08 10*3/MM3 (ref 0–0.4)
EOSINOPHIL NFR BLD AUTO: 1.1 % (ref 0.3–6.2)
ERYTHROCYTE [DISTWIDTH] IN BLOOD BY AUTOMATED COUNT: 12.6 % (ref 12.3–15.4)
GLOBULIN UR ELPH-MCNC: 3.6 GM/DL
GLUCOSE SERPL-MCNC: 130 MG/DL (ref 65–99)
HCT VFR BLD AUTO: 44 % (ref 37.5–51)
HGB BLD-MCNC: 15.3 G/DL (ref 13–17.7)
IMM GRANULOCYTES # BLD AUTO: 0.03 10*3/MM3 (ref 0–0.05)
IMM GRANULOCYTES NFR BLD AUTO: 0.4 % (ref 0–0.5)
LYMPHOCYTES # BLD AUTO: 0.82 10*3/MM3 (ref 0.7–3.1)
LYMPHOCYTES NFR BLD AUTO: 11.4 % (ref 19.6–45.3)
MCH RBC QN AUTO: 33.8 PG (ref 26.6–33)
MCHC RBC AUTO-ENTMCNC: 34.8 G/DL (ref 31.5–35.7)
MCV RBC AUTO: 97.1 FL (ref 79–97)
MONOCYTES # BLD AUTO: 0.5 10*3/MM3 (ref 0.1–0.9)
MONOCYTES NFR BLD AUTO: 7 % (ref 5–12)
NEUTROPHILS NFR BLD AUTO: 5.71 10*3/MM3 (ref 1.7–7)
NEUTROPHILS NFR BLD AUTO: 79.5 % (ref 42.7–76)
NRBC BLD AUTO-RTO: 0 /100 WBC (ref 0–0.2)
PLATELET # BLD AUTO: 191 10*3/MM3 (ref 140–450)
PMV BLD AUTO: 11.2 FL (ref 6–12)
POTASSIUM SERPL-SCNC: 5 MMOL/L (ref 3.5–5.2)
PROT SERPL-MCNC: 7.6 G/DL (ref 6–8.5)
RBC # BLD AUTO: 4.53 10*6/MM3 (ref 4.14–5.8)
SODIUM SERPL-SCNC: 140 MMOL/L (ref 136–145)
WBC NRBC COR # BLD: 7.18 10*3/MM3 (ref 3.4–10.8)

## 2022-05-10 PROCEDURE — 96375 TX/PRO/DX INJ NEW DRUG ADDON: CPT

## 2022-05-10 PROCEDURE — 99283 EMERGENCY DEPT VISIT LOW MDM: CPT

## 2022-05-10 PROCEDURE — 25010000002 HYDROMORPHONE PER 4 MG: Performed by: EMERGENCY MEDICINE

## 2022-05-10 PROCEDURE — 36415 COLL VENOUS BLD VENIPUNCTURE: CPT

## 2022-05-10 PROCEDURE — 96374 THER/PROPH/DIAG INJ IV PUSH: CPT

## 2022-05-10 PROCEDURE — 25010000002 ONDANSETRON PER 1 MG: Performed by: EMERGENCY MEDICINE

## 2022-05-10 PROCEDURE — 25010000002 IOPAMIDOL 61 % SOLUTION: Performed by: EMERGENCY MEDICINE

## 2022-05-10 PROCEDURE — 85025 COMPLETE CBC W/AUTO DIFF WBC: CPT | Performed by: EMERGENCY MEDICINE

## 2022-05-10 PROCEDURE — 74177 CT ABD & PELVIS W/CONTRAST: CPT

## 2022-05-10 PROCEDURE — 80053 COMPREHEN METABOLIC PANEL: CPT | Performed by: EMERGENCY MEDICINE

## 2022-05-10 PROCEDURE — 96376 TX/PRO/DX INJ SAME DRUG ADON: CPT

## 2022-05-10 RX ORDER — ONDANSETRON 2 MG/ML
4 INJECTION INTRAMUSCULAR; INTRAVENOUS ONCE
Status: COMPLETED | OUTPATIENT
Start: 2022-05-10 | End: 2022-05-10

## 2022-05-10 RX ORDER — HYDROMORPHONE HYDROCHLORIDE 1 MG/ML
0.5 INJECTION, SOLUTION INTRAMUSCULAR; INTRAVENOUS; SUBCUTANEOUS ONCE
Status: COMPLETED | OUTPATIENT
Start: 2022-05-10 | End: 2022-05-10

## 2022-05-10 RX ORDER — HYDROCODONE BITARTRATE AND ACETAMINOPHEN 5; 325 MG/1; MG/1
1 TABLET ORAL EVERY 6 HOURS PRN
Qty: 12 TABLET | Refills: 0 | Status: SHIPPED | OUTPATIENT
Start: 2022-05-10 | End: 2022-05-16

## 2022-05-10 RX ORDER — SODIUM CHLORIDE 0.9 % (FLUSH) 0.9 %
10 SYRINGE (ML) INJECTION AS NEEDED
Status: DISCONTINUED | OUTPATIENT
Start: 2022-05-10 | End: 2022-05-10 | Stop reason: HOSPADM

## 2022-05-10 RX ADMIN — HYDROMORPHONE HYDROCHLORIDE 0.5 MG: 1 INJECTION, SOLUTION INTRAMUSCULAR; INTRAVENOUS; SUBCUTANEOUS at 16:18

## 2022-05-10 RX ADMIN — IOPAMIDOL 85 ML: 612 INJECTION, SOLUTION INTRAVENOUS at 17:08

## 2022-05-10 RX ADMIN — SODIUM CHLORIDE 1000 ML: 9 INJECTION, SOLUTION INTRAVENOUS at 13:12

## 2022-05-10 RX ADMIN — HYDROMORPHONE HYDROCHLORIDE 0.5 MG: 1 INJECTION, SOLUTION INTRAMUSCULAR; INTRAVENOUS; SUBCUTANEOUS at 13:14

## 2022-05-10 RX ADMIN — ONDANSETRON 4 MG: 2 INJECTION INTRAMUSCULAR; INTRAVENOUS at 13:13

## 2022-05-10 NOTE — DISCHARGE INSTRUCTIONS
Soft, bland diet until you are seen by Dr. Crane.  Rest at home-no heavy lifting.  Return to the emergency department for any problems.

## 2022-05-10 NOTE — TELEPHONE ENCOUNTER
Provider:   Caller: KISHAN PERES  Relationship to Patient: DAUGHTER    Phone Number: [phone]  Reason for Call: HOSPITALIZATION  When was the patient last seen: 3.8.2021  When did it start: 5.10.22; PT IS ON THE WAY @ 1200 NOON    Characteristics of symptom/severity: NAUSEA, HEAVY SWEATING, INCREASE IN BODY TEMP; UNABLE TO WALK    Timing- Is it constant or intermittent: CONSTANT; PT HAS BEEN HAVING SYMPTOMS SINCE THIS MORNING AND THEY HAVE WORSENED; DAUGHTER STATED HE HAD BEEN COMPLAINING OF CONCERNS WITH ILEOSTOMY AND HE ASKED HER TO INFORM  OF THIS EMERGENCY.

## 2022-05-10 NOTE — ED PROVIDER NOTES
EMERGENCY DEPARTMENT ENCOUNTER    Room Number:  44/44  Date of encounter:  5/10/2022  PCP: Ekta Zepeda APRN  Historian: Patient      HPI:  Chief Complaint: Abdominal pain  A complete HPI/ROS/PMH/PSH/SH/FH are unobtainable due to: N/A    Context: Nikolai Shaw is a 52 y.o. male who presents to the ED c/o sudden onset of right lower quadrant/parastomal abdominal pain that started this morning.  He states the pain was severe, located superior to his stoma with bulging.  He had associated nausea, diaphoresis.  Pain has since improved but is still present.  He gets this pain from time to time but never to this severity.  No black or bloody stools.  No vomiting.  No fevers or chills.      Duration: Approximately 45 minutes  Onset: Sudden  Timing: Constant  Location: Right lower quadrant  Radiation: None  Quality: Cramping  Intensity/Severity: Severity  Progression: Improving  Associated Symptoms: Nausea, diaphoresis  Aggravating Factors: None  Alleviating Factors: Time  Previous Episodes: On occasion  Treatment before arrival: None    The patient was placed in a mask in triage, hand hygiene was performed before and after my interaction with the patient.  I wore a mask, safety glasses and gloves during my entire interaction with the patient.    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   • Family history of FAP (familial adenomatous polyposis) 07/09/2021   • Herpes zoster without complication 12/03/2021     Resolved Ambulatory Problems     Diagnosis Date Noted   •  Malignant neoplasm of rectum (HCC) 06/01/2016   • SBO (small bowel obstruction) (HCC) 01/19/2017   • Small bowel obstruction (HCC) 01/28/2017   • Pulmonary nodule 01/10/2020   • Syncope 03/19/2021     Past Medical History:   Diagnosis Date   • Adenocarcinoma of rectum (HCC) 06/2015   • Adenomatous polyposis    • At risk for sleep apnea    • Chicken pox    • History of anemia    • Hypertension    • Insomnia    • Renal calculi    • RLS (restless legs syndrome)    • Testicular hypofunction          PAST SURGICAL HISTORY  Past Surgical History:   Procedure Laterality Date   • ABDOMINOPERINEAL PROCTOCOLECTOMY  07/06/2015    DR PAL RUDOLPH   • COLONOSCOPY  06/16/1915    DR SHASTA HOWARD   • COLONOSCOPY N/A 2/23/2021    Procedure: ILEOSCOPY, EXCISION OF ILEOSTOMY NODULE;  Surgeon: Pal Rudolph MD;  Location: [facility] OR;  Service: General;  Laterality: N/A;   • ENDOSCOPY  06/23/2015    DR SHASTA HOWARD   • EXPLORATORY LAPAROTOMY N/A 2/3/2017    Procedure: LAPAROTOMY EXPLORATORY  SMALL BOWEL OBSTRUCTION;  Surgeon: Pal Rudolph MD;  Location: [facility] OR;  Service:    • ILEOSTOMY  07/06/2015   • MYRINGOTOMY W/ TUBES      2-3 times as child         FAMILY HISTORY  Family History   Problem Relation Age of Onset   • Colon cancer Father         familial polyposis   • Aneurysm Maternal Grandfather 66        brain aneurysm   • Colon cancer Paternal Grandfather         familial polyposis   • Hypertension Maternal Uncle    • Diabetes Maternal Uncle    • Malig Hyperthermia Neg Hx          SOCIAL HISTORY  Social History     Socioeconomic History   • Marital status:      Spouse name: Hansa   • Years of education: college   Tobacco Use   • Smoking status: Never Smoker   • Smokeless tobacco: Current User     Types: Chew   • Tobacco comment: CHEWS TOBACCO   Vaping Use   • Vaping Use: Never used   Substance and Sexual Activity   • Alcohol use: Yes     Alcohol/week: 6.0 standard drinks     Types: 6 Cans of beer  per week     Comment: DAILY CONSUMPTION LEVEL; drinks 6 beers daily   • Drug use: No   • Sexual activity: Defer         ALLERGIES  Patient has no known allergies.        REVIEW OF SYSTEMS  Review of Systems   Constitutional: Positive for diaphoresis. Negative for fever.   Respiratory: Negative for chest tightness and shortness of breath.    Cardiovascular: Negative for chest pain.   Gastrointestinal: Positive for abdominal pain and nausea. Negative for vomiting.   Genitourinary: Negative for dysuria and hematuria.        All systems reviewed and negative except for those discussed in HPI.       PHYSICAL EXAM    I have reviewed the triage vital signs and nursing notes.    ED Triage Vitals   Temp Heart Rate Resp BP SpO2   05/10/22 1224 05/10/22 1224 05/10/22 1224 05/10/22 1236 05/10/22 1224   97.1 °F (36.2 °C) 51 20 137/76 95 %      Temp src Heart Rate Source Patient Position BP Location FiO2 (%)   05/10/22 1224 05/10/22 1224 -- -- --   Tympanic Monitor          Physical Exam   Constitutional: Pt. is oriented to person, place, and time and well-developed, well-nourished, and in no distress.   HENT: Normocephalic and atraumatic.   Neck: Normal range of motion. Neck supple. No JVD present.   Cardiovascular: Normal rate, regular rhythm and normal heart sounds. No murmur heard.  Pulmonary/Chest: Effort normal and breath sounds normal. No stridor. No respiratory distress. No wheezes, no rales.   Abdominal: Soft. Bowel sounds are normal.  Ostomy is present in the right lower quadrant with brownish stool in the appliance.  There is tenderness in the left upper quadrant but no palpable hernia.  Musculoskeletal: Normal range of motion. No edema, tenderness or deformity.   Neurological: Pt. is alert and oriented to person, place, and time.  He has no focal neurologic deficits  Skin: Skin is warm and dry. No rash noted. Pt. is not diaphoretic. No erythema.   Psychiatric: Mood, affect and judgment normal.  He is pleasant and  [facility].  Nursing note and vitals reviewed.        LAB RESULTS  Recent Results (from the past 24 hour(s))   Comprehensive Metabolic Panel    Collection Time: 05/10/22  1:11 PM    Specimen: Blood   Result Value Ref Range    Glucose 130 (H) 65 - 99 mg/dL    BUN 12 6 - 20 mg/dL    Creatinine 0.81 0.76 - 1.27 mg/dL    Sodium 157 (H) 136 - 145 mmol/L    Potassium 5.0 3.5 - 5.2 mmol/L    Chloride 111 (H) 98 - 107 mmol/L    CO2 26.0 22.0 - 29.0 mmol/L    Calcium 9.2 8.6 - 10.5 mg/dL    Total Protein 7.6 6.0 - 8.5 g/dL    Albumin 4.00 3.50 - 5.20 g/dL    ALT (SGPT) 43 (H) 1 - 41 U/L    AST (SGOT) 53 (H) 1 - 40 U/L    Alkaline Phosphatase 109 39 - 117 U/L    Total Bilirubin 0.5 0.0 - 1.2 mg/dL    Globulin 3.6 gm/dL    A/G Ratio 1.1 g/dL    BUN/Creatinine Ratio 14.8 7.0 - 25.0    Anion Gap 20.0 (H) 5.0 - 15.0 mmol/L    eGFR 106.1 >60.0 mL/min/1.73   CBC Auto Differential    Collection Time: 05/10/22  1:11 PM    Specimen: Blood   Result Value Ref Range    WBC 7.18 3.40 - 10.80 10*3/mm3    RBC 4.53 4.14 - 5.80 10*6/mm3    Hemoglobin 15.3 13.0 - 17.7 g/dL    Hematocrit 44.0 37.5 - 51.0 %    MCV 97.1 (H) 79.0 - 97.0 fL    MCH 33.8 (H) 26.6 - 33.0 pg    MCHC 34.8 31.5 - 35.7 g/dL    RDW 12.6 12.3 - 15.4 %    RDW-SD 44.4 37.0 - 54.0 fl    MPV 11.2 6.0 - 12.0 fL    Platelets 191 140 - 450 10*3/mm3    Neutrophil % 79.5 (H) 42.7 - 76.0 %    Lymphocyte % 11.4 (L) 19.6 - 45.3 %    Monocyte % 7.0 5.0 - 12.0 %    Eosinophil % 1.1 0.3 - 6.2 %    Basophil % 0.6 0.0 - 1.5 %    Immature Grans % 0.4 0.0 - 0.5 %    Neutrophils, Absolute 5.71 1.70 - 7.00 10*3/mm3    Lymphocytes, Absolute 0.82 0.70 - 3.10 10*3/mm3    Monocytes, Absolute 0.50 0.10 - 0.90 10*3/mm3    Eosinophils, Absolute 0.08 0.00 - 0.40 10*3/mm3    Basophils, Absolute 0.04 0.00 - 0.20 10*3/mm3    Immature Grans, Absolute 0.03 0.00 - 0.05 10*3/mm3    nRBC 0.0 0.0 - 0.2 /100 WBC       Ordered the above labs and independently reviewed the results.        RADIOLOGY  CT Abdomen  Pelvis With Contrast    Result Date: 5/10/2022  CT ABDOMEN PELVIS W CONTRAST-  CLINICAL HISTORY: Acute onset of peristomal pain and bulging.  TECHNIQUE: Spiral CT images were obtained through the abdomen and pelvis with IV contrast only and were reconstructed in 3 mm thick slices.  Radiation dose reduction techniques were utilized, including automated exposure control and exposure modulation based on body size.  COMPARISON: CT scan of the abdomen and pelvis dated 02/05/2021.  FINDINGS: There is diminished attenuation throughout the liver parenchyma consistent with moderate hepatic steatosis that appears similar on the previous CT scan. This produces mild hepatomegaly. The right lobe of the liver measures 21.7 cm in greatest cephalocaudad dimension. Minimal sparing of fatty infiltration is evident in the anterior and inferior aspect of the liver. No discrete hepatic masses are identified. The spleen and pancreas and adrenal glands appear within normal limits. There is a tiny cortical cyst in the lower pole of the left kidney. The kidneys are otherwise unremarkable.  The patient is status post colectomy with a right lower quadrant ileostomy. The previous CT scan demonstrated a large paraostomy hernia containing inflamed appearing omental fat. This has resolved. However, there is now a loop of small bowel herniated through the ileostomy. The herniated small bowel is moderately dilated and fluid-filled. Several segments of mildly dilated fluid-filled small bowel are also present within the left side of the abdomen. However, there is no definite evidence of bowel obstruction. Omental fat is also herniated through the opening into the subcutaneous fat. No abnormal masses or fluid collections are identified in the abdominal cavity. Images through the lung bases demonstrate no significant abnormality.      Right lower quadrant ileostomy with a wide abdominal wall orifice. There is a large paraostomy hernia containing  omental fat and also a loop of dilated small bowel. There is no definite evidence of small bowel obstruction. Moderate hepatic steatosis and mild hepatomegaly.  This report was finalized on 5/10/2022 5:57 PM by Dr. Mark Roberts M.D.        I ordered the above noted radiological studies. Reviewed by me and discussed with radiologist.  See dictation for official radiology interpretation.      PROCEDURES    Procedures      MEDICATIONS GIVEN IN ER    Medications   sodium chloride 0.9 % flush 10 mL (has no administration in time range)   sodium chloride 0.9 % bolus 1,000 mL (0 mL Intravenous Stopped 5/10/22 1618)   ondansetron (ZOFRAN) injection 4 mg (4 mg Intravenous Given 5/10/22 1313)   HYDROmorphone (DILAUDID) injection 0.5 mg (0.5 mg Intravenous Given 5/10/22 1314)   HYDROmorphone (DILAUDID) injection 0.5 mg (0.5 mg Intravenous Given 5/10/22 1618)   iopamidol (ISOVUE-300) 61 % injection 100 mL (85 mL Intravenous Given 5/10/22 1708)         PROGRESS, DATA ANALYSIS, CONSULTS, AND MEDICAL DECISION MAKING    Any/all labs have been independently reviewed by me.  Any/all radiology studies have been reviewed by me and discussed with radiologist dictating the report.   EKG's independently viewed and interpreted by me.  Discussion below represents my analysis of pertinent findings related to patient's condition, differential diagnosis, treatment plan and final disposition.    Number of Diagnoses or Management Options     Amount and/or Complexity of Data Reviewed  Clinical lab tests:  Yes  Tests in the radiology section of CPT®:  Yes  Tests in the medicine section of CPT®:  Yes  Review and summarize past medical records:  (See HPI)  Independent visualization of images, tracings, or specimens: (See below)      ED Course as of 05/10/22 1824   Tue May 10, 2022   1257 Prior record review-patient has a prior history of total colon resection and rectal cancer in 2015.  In February of this year, he underwent ileoscopy with cecal  mass with Dr. Crane. [WC]   1307 Abd Pain MDM includes but is not limited to: Cholecystitis, choledocholithiasis, cholangitis, peritonitis, pancreatitis/pseudocyst, peptic ulcer, bowel obstruction/ileus, constipation, diverticulitis/perforation/abscess, inflammatory bowel disease, renal colic/stone, urinary tract infection/pyelonephritis, prostatitis, mesenteric ischemia, expanding/leaking AAA, testicular/ovarian torsion. [WC]   1818 CT of the abdomen and pelvis was independently visualized by me and discussed with/interpreted by Dr. Roberts (radiology)-there is a right lower quadrant ileostomy with a wide abdominal wall orifice and a large parastomal hernia.  There is no small bowel obstruction or incarceration noted.  For official interpretation, see dictated report. [WC]   1820 Patient's pain has improved now that he has passed some gas into his ostomy bag.  He has some liquid stool in there as well.  I discussed the case with Dr. Crane who will see him in the office on Monday.  Until then, I suggested the patient do a very soft, bland diet.  He will return to the emergency department should his pain recur or for any new problems. [WC]   1823 Discussed all lab and/or imaging with the patient.  The patient's abdominal exam has improved.  I explained that the patient should return to the emergency department should the pain recur or for any new symptoms (fever, blood in emesis/stool).  I also advised the patient to follow up with their PMD for further evaluation.  There is nothing on workup to suggest appendicitis, diverticulitis, cholecystitis, pancreatitis, peritonitis, mesenteric ischemia, ovarian/testicular torsion, ureteral stones or any other emergent intra-abdominal process.  My clinical suspicion for serious intra-abdominal pathology is low, and the patient can be safely discharged home for outpatient follow up.   [WC]      ED Course User Index  [WC] Curtis Rosales MD       AS OF 18:24 EDT  VITALS:    BP - 129/89  HR - 69  TEMP - 97.1 °F (36.2 °C) (Tympanic)  02 SATS - 92%        DIAGNOSIS  Final diagnoses:   Parastomal hernia without obstruction or gangrene         DISPOSITION  Discharged           Curtis Rosales MD  05/10/22 6312

## 2022-05-10 NOTE — ED NOTES
Pt presents to ED with complaints of abd pain. PT has stoma, and reports pain and swelling to the area. Pt is A&OX4, able to ambulate to wheelchair, and in a mask at this time.     Patient was placed in face mask during first look triage.  Patient was wearing a face mask throughout encounter.  I wore personal protective equipment throughout the encounter.  Hand hygiene was performed before and after patient encounter.

## 2022-05-16 ENCOUNTER — OFFICE VISIT (OUTPATIENT)
Dept: SURGERY | Facility: CLINIC | Age: 52
End: 2022-05-16

## 2022-05-16 VITALS — WEIGHT: 270 LBS | BODY MASS INDEX: 42.38 KG/M2 | HEIGHT: 67 IN

## 2022-05-16 DIAGNOSIS — K43.5 PARASTOMAL HERNIA WITHOUT OBSTRUCTION OR GANGRENE: Primary | ICD-10-CM

## 2022-05-16 PROCEDURE — 99214 OFFICE O/P EST MOD 30 MIN: CPT | Performed by: SURGERY

## 2022-05-17 NOTE — PROGRESS NOTES
ASSESSMENT:    52-year-old gentleman with large right parastomal hernia.  Additionally, he has previously had resection of a polypoid mass on his ileostomy that proved to be tubular adenoma.  The ileostomy is also partially prolapsed.    PLAN:  Given the previous adenomatous lesion on his ileostomy and partial prolapse and current presentation of large parastomal hernia with partial obstruction, I think his best option would be to resect portion of terminal ileum including abnormal ileostomy, relocate ileostomy to left abdomen, and close the right side fascial defect.  I explained to him in detail the rationale for the recommendation, the nature of the procedure, and the risks including but not limited to bleeding and infection.  Increased surgical and anesthetic risk is present secondary to morbid obesity and comorbidities including hypertension and sleep apnea.  Strongly encouraged him to modify dietary intake between now and the fall (which is when he wants to have the surgery) to lose weight and decrease his risks associated with surgery and long-term recurrence of parastomal hernias and other problems.    CC:     Parastomal hernia    HPI:    52-year-old gentleman who underwent laparoscopic total proctocolectomy for familial adenomatous polyposis and rectal cancer in 2015.  He then underwent open small bowel resection in 2017 for adhesive small bowel obstruction.  Most recently last week he presented to the emergency room on 5/10/2022 with severe right-sided abdominal pain associated with nausea and emesis.  Work-up revealed large parastomal hernia.  He felt much better during the course of his emergency room stay and was discharged home.    RADIOLOGY:   • CT abdomen pelvis 5/10/2022: Right lower quadrant ileostomy with a wide abdominal wall orifice.  Large parastomal hernia containing omental fat and loop of dilated small bowel.  Report indicates no definite evidence of small bowel obstruction.  On my review of  images, there is distention of small bowel within the parastomal hernia and I think at least some evidence of partial small bowel obstruction.    LABS:    • CBC 5/10/2022: WBC 7.2, hemoglobin 15.3, platelets 191  • CMP's 5/10/2022: Glucose 130, ALT 43, AST 53    SOCIAL HISTORY:   • Denies tobacco use  • Occasional alcohol use    FAMILY HISTORY:    • Colorectal cancer: Father    PREVIOUS ABDOMINAL SURGERY    • Ileoscopy with biopsy/removal of terminal ileal mass 2/23/2021, pathology revealed anastomosis with ulcerated and cauterized tubular adenoma with adenomatous change extending to margin  • Open small bowel resection 2/3/2017 for small bowel obstruction secondary to adhesions, pathology revealed segment of small intestine with focal mucosal ulceration  • Laparoscopic total proctocolectomy 7/6/2015: Invasive adenocarcinoma of rectum (T3N0 lesion) with numerous additional adenomatous polyps including 1 of rectum with high-grade dysplasia    PAST MEDICAL HISTORY:    • Familial adenomatous polyposis  • Hypertension  • Morbid obesity  • Sleep apnea  • History of rectal cancer 2016    MEDICATIONS:   • Lisinopril-hydrochlorothiazide  • Zoloft    ALLERGIES:   • None    PHYSICAL EXAM:   • Constitutional: Well-developed well-nourished, no acute distress  • Vital signs:   o Weight 270 pounds  o Height 67 inches  o BMI 42.3  • Neck: Supple, no palpable mass, trachea midline  • Respiratory: Clear to auscultation, normal inspiratory effort  • Cardiovascular: Regular rate  • Gastrointestinal: Soft, nontender, large right mid abdomen parastomal hernia with moderate ileostomy prolapse, does not appear to have significant recurrence of polypoid lesion previously excised  • Psychiatric: Alert and oriented ×3, normal affect     MEET RUDOLPH M.D.

## 2022-05-29 DIAGNOSIS — E55.9 VITAMIN D DEFICIENCY: ICD-10-CM

## 2022-06-02 DIAGNOSIS — I10 ESSENTIAL HYPERTENSION: ICD-10-CM

## 2022-06-02 RX ORDER — CHOLECALCIFEROL (VITAMIN D3) 1250 MCG
CAPSULE ORAL
Qty: 12 CAPSULE | Refills: 0 | Status: SHIPPED | OUTPATIENT
Start: 2022-06-02 | End: 2022-06-20 | Stop reason: ALTCHOICE

## 2022-06-02 RX ORDER — LISINOPRIL AND HYDROCHLOROTHIAZIDE 20; 12.5 MG/1; MG/1
TABLET ORAL
Qty: 30 TABLET | Refills: 0 | Status: SHIPPED | OUTPATIENT
Start: 2022-06-02 | End: 2022-06-09 | Stop reason: SDUPTHER

## 2022-06-02 RX ORDER — LISINOPRIL 20 MG/1
TABLET ORAL
Qty: 30 TABLET | Refills: 0 | Status: SHIPPED | OUTPATIENT
Start: 2022-06-02 | End: 2022-06-09 | Stop reason: SDUPTHER

## 2022-06-02 NOTE — TELEPHONE ENCOUNTER
Can you take a look at this patients meds. It looks like there is two fdifferent types of Lisinopril in the chart.     -Lisinopril 20mg  -Lisinopril HCTZ 20-12.5    Please advise and I will correct at the pharmacy

## 2022-06-09 DIAGNOSIS — I10 ESSENTIAL HYPERTENSION: ICD-10-CM

## 2022-06-09 RX ORDER — LISINOPRIL AND HYDROCHLOROTHIAZIDE 20; 12.5 MG/1; MG/1
1 TABLET ORAL DAILY
Qty: 90 TABLET | Refills: 0 | Status: SHIPPED | OUTPATIENT
Start: 2022-06-09 | End: 2022-06-17 | Stop reason: SDUPTHER

## 2022-06-09 RX ORDER — LISINOPRIL 20 MG/1
20 TABLET ORAL DAILY
Qty: 90 TABLET | Refills: 0 | Status: SHIPPED | OUTPATIENT
Start: 2022-06-09 | End: 2022-06-09 | Stop reason: SDUPTHER

## 2022-06-16 NOTE — PROGRESS NOTES
Subjective   Nikolai Shaw is a 52 y.o. male.     Chief Complaint   Patient presents with   • Hypertension     Follow up          History of Present Illness   Patient presents for follow up HTN: takes Lisinopril/HCTZ and Lisinopril daily; does not typically monitor BP; no headaches; no swelling; no orthostasis; has physical job, no formal exercise.    F/U anxiety/depression: takes Sertraline daily and works well; no problems with sleep, sets alarm to get up during the night to check ostomy bag; no SI/HI.    Had recent parastomal hernia; sees Dr. Crane; symptoms resolved; considering moving stoma later this year.    F/U Vitamin D deficiency: would like to change Vitamin D to OTC daily due to cost.    F/U Vitamin B12 deficiency: takes OTC Vitamin B12 or B complex daily.    The following portions of the patient's history were reviewed and updated as appropriate: allergies, current medications, past family history, past medical history, past social history, past surgical history and problem list.      Current Outpatient Medications   Medication Sig Dispense Refill   • B Complex Vitamins (VITAMIN-B COMPLEX PO) Take 1 tablet by mouth Daily.     • Cholecalciferol (Vitamin D3) 1.25 MG (33588 UT) capsule TAKE ONE CAPSULE BY MOUTH ONE TIME PER WEEK ON MONDAY 12 capsule 0   • lisinopril (PRINIVIL,ZESTRIL) 20 MG tablet Take 1 tablet by mouth Daily. 90 tablet 1   • lisinopril-hydrochlorothiazide (PRINZIDE,ZESTORETIC) 20-12.5 MG per tablet Take 1 tablet by mouth Daily. 90 tablet 1   • sertraline (ZOLOFT) 100 MG tablet Take 1.5 tablets by mouth Daily. 135 tablet 1     No current facility-administered medications for this visit.       Past Medical History:   Diagnosis Date   • Adenocarcinoma of rectum (HCC) 06/2015    invasive adenocarcinoma of rectum w 59 benign lymph nodes   • Adenomatous polyposis     FAMILIAL    • Anxiety    • At risk for sleep apnea    • Chicken pox     patient reports having as a child   • Depression    •  Herpes zoster without complication 12/3/2021   • History of anemia    • Hypertension    • Hyponatremia    • Insomnia    • Malignant neoplasm of rectum (HCC) 6/1/2016   • Pulmonary nodule 1/10/2020    6/26/20 CT chest: 6 mm nodule resolved.   • Renal calculi    • RLS (restless legs syndrome)    • Syncope 03/19/2021    prolonged QT interval   • Testicular hypofunction    • Vitamin D deficiency        Past Surgical History:   Procedure Laterality Date   • ABDOMINOPERINEAL PROCTOCOLECTOMY  07/06/2015    DR PAL RUDOLPH   • COLONOSCOPY  06[birth year]16/1915    DR SHASTA HOWARD   • COLONOSCOPY N/A 2/23/2021    Procedure: ILEOSCOPY, EXCISION OF ILEOSTOMY NODULE;  Surgeon: Pal Rudolph MD;  Location: [facility]  Service: General;  Laterality: N/A;   • ENDOSCOPY  06/23/2015    DR SHASTA HOWARD   • EXPLORATORY LAPAROTOMY N/A 2/3/2017    Procedure: LAPAROTOMY EXPLORATORY  SMALL BOWEL OBSTRUCTION;  Surgeon: Pal Rudolph MD;  Location: [facility] OR;  Service:    • ILEOSTOMY  07/06/2015   • MYRINGOTOMY W/ TUBES      2-3 times as child       Family History   Problem Relation Age of Onset   • Colon cancer Father         familial polyposis   • Aneurysm Maternal Grandfather 66        brain aneurysm   • Colon cancer Paternal Grandfather         familial polyposis   • Hypertension Maternal Uncle    • Diabetes Maternal Uncle    • Malig Hyperthermia Neg Hx        Social History     Socioeconomic History   • Marital status:      Spouse name: Hansa   • Years of education: college   Tobacco Use   • Smoking status: Never Smoker   • Smokeless tobacco: Current User     Types: Chew   • Tobacco comment: CHEWS TOBACCO   Vaping Use   • Vaping Use: Never used   Substance and Sexual Activity   • Alcohol use: Yes     Alcohol/week: 6.0 standard drinks     Types: 6 Cans of beer per week     Comment: DAILY CONSUMPTION LEVEL; drinks 6 beers daily   • Drug use: No   • Sexual activity: Defer       Review of Systems   Constitutional:  "Negative for appetite change, chills, fatigue, fever, unexpected weight gain and unexpected weight loss.   HENT: Positive for postnasal drip (in mornings, no OTC treatment). Negative for sinus pressure, sore throat and trouble swallowing. Ear pain: ears feel full much of the time.    Eyes: Negative for blurred vision.   Respiratory: Negative for cough, chest tightness and shortness of breath.    Cardiovascular: Negative for chest pain and palpitations.   Gastrointestinal: Negative for abdominal pain, blood in stool and indigestion. Acid reflux: takes Pantoprazole rarely if eats certain foods.   Endocrine: Negative for polydipsia.   Genitourinary: Negative for dysuria. Frequency: no change. Nocturia: see HPI; some decrease in urinary stream.   Musculoskeletal: Negative for arthralgias and back pain.   Skin: Negative for rash.   Neurological: Negative for syncope and weakness.   Hematological: Does not bruise/bleed easily.       Objective   Vitals:    06/17/22 0754   BP: 122/74   BP Location: Left arm   Patient Position: Sitting   Cuff Size: Adult   Pulse: 63   Temp: 98.3 °F (36.8 °C)   SpO2: 96%   Weight: 122 kg (269 lb 4.8 oz)   Height: 170.2 cm (67\")     Body mass index is 42.18 kg/m².    Physical Exam  Vitals and nursing note reviewed.   Constitutional:       General: He is not in acute distress.     Appearance: He is well-developed and well-groomed. He is not diaphoretic.   HENT:      Head: Normocephalic.      Right Ear: External ear normal. No decreased hearing noted.      Left Ear: External ear normal. Left ear decreased hearing: slight compared to right. Left ear middle ear effusion: mild. Tympanic membrane is not erythematous.      Nose: Nose normal.      Right Sinus: No maxillary sinus tenderness or frontal sinus tenderness.      Left Sinus: No maxillary sinus tenderness or frontal sinus tenderness.      Mouth/Throat:      Mouth: Mucous membranes are moist.      Pharynx: No oropharyngeal exudate or posterior " oropharyngeal erythema.   Eyes:      Conjunctiva/sclera: Conjunctivae normal.   Neck:      Vascular: No carotid bruit.   Cardiovascular:      Rate and Rhythm: Normal rate and regular rhythm.      Pulses: Normal pulses.      Heart sounds: Normal heart sounds. No murmur heard.  Pulmonary:      Effort: Pulmonary effort is normal. No respiratory distress.      Breath sounds: Normal breath sounds.   Abdominal:      General: Bowel sounds are normal.      Palpations: Abdomen is soft. There is no hepatomegaly or splenomegaly.      Tenderness: There is no abdominal tenderness. There is no guarding.       Musculoskeletal:      Cervical back: Normal range of motion and neck supple.      Right lower leg: Right lower leg edema: trace ankle.      Left lower leg: Left lower leg edema: trace ankle.   Lymphadenopathy:      Cervical: No cervical adenopathy.   Skin:     General: Skin is warm and dry.      Findings: No rash.   Neurological:      Mental Status: He is alert and oriented to person, place, and time.      Gait: Gait is intact.   Psychiatric:         Mood and Affect: Mood normal.         Behavior: Behavior normal.         Thought Content: Thought content normal.         Cognition and Memory: Cognition normal.         Judgment: Judgment normal.         Lab Results   Component Value Date    WBC 7.18 05/10/2022    RBC 4.53 05/10/2022    HGB 15.3 05/10/2022    HCT 44.0 05/10/2022    MCV 97.1 (H) 05/10/2022    MCH 33.8 (H) 05/10/2022    MCHC 34.8 05/10/2022    RDW 12.6 05/10/2022    RDWSD 44.4 05/10/2022    MPV 11.2 05/10/2022     05/10/2022    NEUTRORELPCT 79.5 (H) 05/10/2022    LYMPHORELPCT 11.4 (L) 05/10/2022    MONORELPCT 7.0 05/10/2022    EOSRELPCT 1.1 05/10/2022    BASORELPCT 0.6 05/10/2022    AUTOIGPER 0.4 05/10/2022    NEUTROABS 5.71 05/10/2022    LYMPHSABS 0.82 05/10/2022    MONOSABS 0.50 05/10/2022    EOSABS 0.08 05/10/2022    BASOSABS 0.04 05/10/2022    AUTOIGNUM 0.03 05/10/2022    [facility] 0.0 05/10/2022     Lab  Results   Component Value Date    GLUCOSE 130 (H) 05/10/2022    BUN 12 05/10/2022    CREATININE 0.81 05/10/2022    EGFRIFNONA 112 10/15/2021    EGFRIFAFRI 135 10/15/2021    BCR 14.8 05/10/2022    K 5.0 05/10/2022    CO2 26.0 05/10/2022    CALCIUM 9.2 05/10/2022    PROTENTOTREF 6.4 10/15/2021    ALBUMIN 4.00 05/10/2022    LABIL2 1.6 10/15/2021    AST 53 (H) 05/10/2022    ALT 43 (H) 05/10/2022      Lab Results   Component Value Date    CHLPL 189 04/16/2021    TRIG 120 04/16/2021    HDL 69 (H) 04/16/2021    VLDL 21 04/16/2021    LDL 99 04/16/2021     Lab Results   Component Value Date    TSH 0.94 07/11/2015     Lab Results   Component Value Date    PSA 0.466 04/16/2021         Assessment    Problem List Items Addressed This Visit     Essential hypertension - Primary    Current Assessment & Plan     Hypertension is stable.  Weight loss.  Regular aerobic exercise.  Continue current medications.  Ambulatory blood pressure monitoring.  Blood pressure will be reassessed at the next regular appointment.  Continue Lisinopril/HCTZ and Lisinopril daily.           Relevant Medications    lisinopril (PRINIVIL,ZESTRIL) 20 MG tablet    lisinopril-hydrochlorothiazide (PRINZIDE,ZESTORETIC) 20-12.5 MG per tablet    Other Relevant Orders    Lipid Panel With LDL / HDL Ratio    Comprehensive Metabolic Panel    CBC & Differential    Vitamin D deficiency    Current Assessment & Plan     Try over the counter Vitamin D 5000 units daily instead of weekly prescription.           Relevant Orders    Vitamin D 25 Hydroxy    Depression    Current Assessment & Plan     Patient's depression is recurrent and is moderate without psychosis. Their depression is currently active and the condition is stable. This will be reassessed at the next regular appointment. F/U as described:patient will continue current medication therapy.  Continue Sertraline daily.           Relevant Medications    sertraline (ZOLOFT) 100 MG tablet    Anxiety    Current  Assessment & Plan     Stable.  Continue Sertraline daily.           Relevant Medications    sertraline (ZOLOFT) 100 MG tablet    Allergic rhinitis    Current Assessment & Plan     May try over the counter nasal steroid, such as Flonase and Nasacort.           Vitamin B12 deficiency    Relevant Orders    Vitamin B12 & Folate      Other Visit Diagnoses     Prostate cancer screening        Relevant Orders    PSA Screen          Return in about 6 months (around 12/17/2022) for Annual physical, Recheck.or sooner if problems or concerns.         COVID-19 Precautions - Patient was compliant in wearing a mask. When I saw the patient, I used appropriate personal protective equipment (PPE) including mask, gloves, and eye shield (standard procedure).  Hand hygiene was completed before and after seeing the patient.

## 2022-06-17 ENCOUNTER — OFFICE VISIT (OUTPATIENT)
Dept: FAMILY MEDICINE CLINIC | Facility: CLINIC | Age: 52
End: 2022-06-17

## 2022-06-17 VITALS
WEIGHT: 269.3 LBS | TEMPERATURE: 98.3 F | HEART RATE: 63 BPM | DIASTOLIC BLOOD PRESSURE: 74 MMHG | BODY MASS INDEX: 42.27 KG/M2 | HEIGHT: 67 IN | SYSTOLIC BLOOD PRESSURE: 122 MMHG | OXYGEN SATURATION: 96 %

## 2022-06-17 DIAGNOSIS — I10 ESSENTIAL HYPERTENSION: Primary | ICD-10-CM

## 2022-06-17 DIAGNOSIS — F41.9 ANXIETY: ICD-10-CM

## 2022-06-17 DIAGNOSIS — J30.9 ALLERGIC RHINITIS, UNSPECIFIED SEASONALITY, UNSPECIFIED TRIGGER: ICD-10-CM

## 2022-06-17 DIAGNOSIS — E55.9 VITAMIN D DEFICIENCY: ICD-10-CM

## 2022-06-17 DIAGNOSIS — E53.8 VITAMIN B12 DEFICIENCY: ICD-10-CM

## 2022-06-17 DIAGNOSIS — Z12.5 PROSTATE CANCER SCREENING: ICD-10-CM

## 2022-06-17 DIAGNOSIS — F33.41 RECURRENT MAJOR DEPRESSIVE DISORDER, IN PARTIAL REMISSION: ICD-10-CM

## 2022-06-17 PROBLEM — B02.9 HERPES ZOSTER WITHOUT COMPLICATION: Status: RESOLVED | Noted: 2021-12-03 | Resolved: 2022-06-17

## 2022-06-17 PROCEDURE — 99214 OFFICE O/P EST MOD 30 MIN: CPT | Performed by: NURSE PRACTITIONER

## 2022-06-17 RX ORDER — LISINOPRIL AND HYDROCHLOROTHIAZIDE 20; 12.5 MG/1; MG/1
1 TABLET ORAL DAILY
Qty: 90 TABLET | Refills: 1 | Status: SHIPPED | OUTPATIENT
Start: 2022-06-17 | End: 2022-12-06

## 2022-06-17 RX ORDER — SERTRALINE HYDROCHLORIDE 100 MG/1
150 TABLET, FILM COATED ORAL DAILY
Qty: 135 TABLET | Refills: 1 | Status: SHIPPED | OUTPATIENT
Start: 2022-06-17

## 2022-06-17 RX ORDER — LISINOPRIL 20 MG/1
20 TABLET ORAL DAILY
Qty: 90 TABLET | Refills: 1 | Status: SHIPPED | OUTPATIENT
Start: 2022-06-17 | End: 2023-02-22

## 2022-06-17 RX ORDER — LISINOPRIL 20 MG/1
20 TABLET ORAL DAILY
COMMUNITY
Start: 2022-06-09 | End: 2022-06-17 | Stop reason: SDUPTHER

## 2022-06-17 NOTE — ASSESSMENT & PLAN NOTE
Patient's depression is recurrent and is moderate without psychosis. Their depression is currently active and the condition is stable. This will be reassessed at the next regular appointment. F/U as described:patient will continue current medication therapy.  Continue Sertraline daily.

## 2022-06-17 NOTE — PATIENT INSTRUCTIONS
Try over the counter Vitamin D 5000 units daily instead of weekly prescription.  Continue to monitor your blood pressure periodically and record results.  Continue to work on healthy diet and exercise.  May try over the counter nasal steroid, such as Flonase and Nasacort.  Follow up pending lab results.  Follow up in 6 months for physical with fasting labs, or sooner if problems or concerns.

## 2022-06-18 LAB
25(OH)D3+25(OH)D2 SERPL-MCNC: 54.2 NG/ML (ref 30–100)
ALBUMIN SERPL-MCNC: 4.2 G/DL (ref 3.8–4.9)
ALBUMIN/GLOB SERPL: 1.5 {RATIO} (ref 1.2–2.2)
ALP SERPL-CCNC: 130 IU/L (ref 44–121)
ALT SERPL-CCNC: 36 IU/L (ref 0–44)
AST SERPL-CCNC: 48 IU/L (ref 0–40)
BASOPHILS # BLD AUTO: 0 X10E3/UL (ref 0–0.2)
BASOPHILS NFR BLD AUTO: 1 %
BILIRUB SERPL-MCNC: 0.3 MG/DL (ref 0–1.2)
BUN SERPL-MCNC: 6 MG/DL (ref 6–24)
BUN/CREAT SERPL: 7 (ref 9–20)
CALCIUM SERPL-MCNC: 9.6 MG/DL (ref 8.7–10.2)
CHLORIDE SERPL-SCNC: 99 MMOL/L (ref 96–106)
CHOLEST SERPL-MCNC: 183 MG/DL (ref 100–199)
CO2 SERPL-SCNC: 26 MMOL/L (ref 20–29)
CREAT SERPL-MCNC: 0.86 MG/DL (ref 0.76–1.27)
EGFRCR SERPLBLD CKD-EPI 2021: 104 ML/MIN/1.73
EOSINOPHIL # BLD AUTO: 0.2 X10E3/UL (ref 0–0.4)
EOSINOPHIL NFR BLD AUTO: 4 %
ERYTHROCYTE [DISTWIDTH] IN BLOOD BY AUTOMATED COUNT: 12.9 % (ref 11.6–15.4)
FOLATE SERPL-MCNC: 13.6 NG/ML
GLOBULIN SER CALC-MCNC: 2.8 G/DL (ref 1.5–4.5)
GLUCOSE SERPL-MCNC: 111 MG/DL (ref 65–99)
HCT VFR BLD AUTO: 41.4 % (ref 37.5–51)
HDLC SERPL-MCNC: 91 MG/DL
HGB BLD-MCNC: 13.9 G/DL (ref 13–17.7)
IMM GRANULOCYTES # BLD AUTO: 0 X10E3/UL (ref 0–0.1)
IMM GRANULOCYTES NFR BLD AUTO: 0 %
LDLC SERPL CALC-MCNC: 75 MG/DL (ref 0–99)
LDLC/HDLC SERPL: 0.8 RATIO (ref 0–3.6)
LYMPHOCYTES # BLD AUTO: 0.9 X10E3/UL (ref 0.7–3.1)
LYMPHOCYTES NFR BLD AUTO: 17 %
MCH RBC QN AUTO: 32.9 PG (ref 26.6–33)
MCHC RBC AUTO-ENTMCNC: 33.6 G/DL (ref 31.5–35.7)
MCV RBC AUTO: 98 FL (ref 79–97)
MONOCYTES # BLD AUTO: 0.5 X10E3/UL (ref 0.1–0.9)
MONOCYTES NFR BLD AUTO: 10 %
NEUTROPHILS # BLD AUTO: 3.6 X10E3/UL (ref 1.4–7)
NEUTROPHILS NFR BLD AUTO: 68 %
PLATELET # BLD AUTO: 189 X10E3/UL (ref 150–450)
POTASSIUM SERPL-SCNC: 5.1 MMOL/L (ref 3.5–5.2)
PROT SERPL-MCNC: 7 G/DL (ref 6–8.5)
PSA SERPL-MCNC: 0.4 NG/ML (ref 0–4)
RBC # BLD AUTO: 4.22 X10E6/UL (ref 4.14–5.8)
SODIUM SERPL-SCNC: 140 MMOL/L (ref 134–144)
TRIGL SERPL-MCNC: 95 MG/DL (ref 0–149)
VIT B12 SERPL-MCNC: 376 PG/ML (ref 232–1245)
VLDLC SERPL CALC-MCNC: 17 MG/DL (ref 5–40)
WBC # BLD AUTO: 5.3 X10E3/UL (ref 3.4–10.8)

## 2022-06-20 DIAGNOSIS — R73.01 ELEVATED FASTING BLOOD SUGAR: Primary | ICD-10-CM

## 2022-06-20 DIAGNOSIS — E55.9 VITAMIN D DEFICIENCY: ICD-10-CM

## 2022-06-20 DIAGNOSIS — E55.9 VITAMIN D DEFICIENCY: Primary | ICD-10-CM

## 2022-06-23 PROBLEM — R73.03 PREDIABETES: Status: ACTIVE | Noted: 2022-06-23

## 2022-06-23 LAB
HBA1C MFR BLD: 5.7 % (ref 4.8–5.6)
Lab: NORMAL
WRITTEN AUTHORIZATION: NORMAL

## 2022-09-01 ENCOUNTER — TELEPHONE (OUTPATIENT)
Dept: SURGERY | Facility: CLINIC | Age: 52
End: 2022-09-01

## 2022-09-01 NOTE — TELEPHONE ENCOUNTER
Caller: Nikolai Shaw    Relationship: Self    Best call back number: [phone]    What form or medical record are you requesting: [facility] PAPERWORK FOR WIFE'S EMPLOYER    Who is requesting this form or medical record from you: [employer]    Timeframe paperwork needed: AS SOON AS POSSIBLE    Additional notes: PT IS CALLING TO FOLLOW UP ON THE [place] PAPERWORK FOR HIS WIFE'S EMPLOYER THROUGH [employer]. PT STATED [employer] HAS NOT RECEIVED THE PAPERWORK YET.    PT CAN BE REACHED ANYTIME; OKAY TO LEAVE MESSAGE IF NO ANSWER

## 2022-09-09 NOTE — TELEPHONE ENCOUNTER
[employer] message sent to patient that forms have been completed and faxed to [employer].

## 2022-11-28 ENCOUNTER — TELEPHONE (OUTPATIENT)
Dept: FAMILY MEDICINE CLINIC | Facility: CLINIC | Age: 52
End: 2022-11-28

## 2022-11-29 ENCOUNTER — TELEPHONE (OUTPATIENT)
Dept: FAMILY MEDICINE CLINIC | Facility: CLINIC | Age: 52
End: 2022-11-29

## 2022-11-29 ENCOUNTER — HOSPITAL ENCOUNTER (EMERGENCY)
Facility: HOSPITAL | Age: 52
Discharge: HOME | End: 2022-11-29
Attending: EMERGENCY MEDICINE | Admitting: EMERGENCY MEDICINE

## 2022-11-29 VITALS
DIASTOLIC BLOOD PRESSURE: 69 MMHG | SYSTOLIC BLOOD PRESSURE: 112 MMHG | BODY MASS INDEX: 39.4 KG/M2 | OXYGEN SATURATION: 96 % | WEIGHT: 260 LBS | HEIGHT: 68 IN | RESPIRATION RATE: 16 BRPM | TEMPERATURE: 98.3 F | HEART RATE: 67 BPM

## 2022-11-29 DIAGNOSIS — E86.0 DEHYDRATION: Primary | ICD-10-CM

## 2022-11-29 DIAGNOSIS — N28.9 ACUTE RENAL INSUFFICIENCY: ICD-10-CM

## 2022-11-29 DIAGNOSIS — I95.9 HYPOTENSION, UNSPECIFIED HYPOTENSION TYPE: ICD-10-CM

## 2022-11-29 LAB
ALBUMIN SERPL-MCNC: 4.3 G/DL (ref 3.5–5.2)
ALBUMIN/GLOB SERPL: 1.2 G/DL
ALP SERPL-CCNC: 123 U/L (ref 39–117)
ALT SERPL W P-5'-P-CCNC: 27 U/L (ref 1–41)
ANION GAP SERPL CALCULATED.3IONS-SCNC: 13 MMOL/L (ref 5–15)
AST SERPL-CCNC: 29 U/L (ref 1–40)
BASOPHILS # BLD AUTO: 0.04 10*3/MM3 (ref 0–0.2)
BASOPHILS NFR BLD AUTO: 0.3 % (ref 0–1.5)
BILIRUB SERPL-MCNC: 0.6 MG/DL (ref 0–1.2)
BUN SERPL-MCNC: 27 MG/DL (ref 6–20)
BUN/CREAT SERPL: 13.6 (ref 7–25)
CALCIUM SPEC-SCNC: 9.1 MG/DL (ref 8.6–10.5)
CHLORIDE SERPL-SCNC: 87 MMOL/L (ref 98–107)
CO2 SERPL-SCNC: 24 MMOL/L (ref 22–29)
CREAT SERPL-MCNC: 1.99 MG/DL (ref 0.76–1.27)
D-LACTATE SERPL-SCNC: 0.9 MMOL/L (ref 0.5–2)
DEPRECATED RDW RBC AUTO: 44 FL (ref 37–54)
EGFRCR SERPLBLD CKD-EPI 2021: 39.7 ML/MIN/1.73
EOSINOPHIL # BLD AUTO: 0.08 10*3/MM3 (ref 0–0.4)
EOSINOPHIL NFR BLD AUTO: 0.6 % (ref 0.3–6.2)
ERYTHROCYTE [DISTWIDTH] IN BLOOD BY AUTOMATED COUNT: 12.3 % (ref 12.3–15.4)
FLUAV SUBTYP SPEC NAA+PROBE: NOT DETECTED
FLUBV RNA ISLT QL NAA+PROBE: NOT DETECTED
GLOBULIN UR ELPH-MCNC: 3.7 GM/DL
GLUCOSE SERPL-MCNC: 89 MG/DL (ref 65–99)
HCT VFR BLD AUTO: 41 % (ref 37.5–51)
HGB BLD-MCNC: 14.2 G/DL (ref 13–17.7)
HOLD SPECIMEN: NORMAL
HOLD SPECIMEN: NORMAL
IMM GRANULOCYTES # BLD AUTO: 0.09 10*3/MM3 (ref 0–0.05)
IMM GRANULOCYTES NFR BLD AUTO: 0.7 % (ref 0–0.5)
LYMPHOCYTES # BLD AUTO: 1.04 10*3/MM3 (ref 0.7–3.1)
LYMPHOCYTES NFR BLD AUTO: 8.1 % (ref 19.6–45.3)
MAGNESIUM SERPL-MCNC: 1.8 MG/DL (ref 1.6–2.6)
MCH RBC QN AUTO: 33.6 PG (ref 26.6–33)
MCHC RBC AUTO-ENTMCNC: 34.6 G/DL (ref 31.5–35.7)
MCV RBC AUTO: 96.9 FL (ref 79–97)
MONOCYTES # BLD AUTO: 1.02 10*3/MM3 (ref 0.1–0.9)
MONOCYTES NFR BLD AUTO: 8 % (ref 5–12)
NEUTROPHILS NFR BLD AUTO: 10.55 10*3/MM3 (ref 1.7–7)
NEUTROPHILS NFR BLD AUTO: 82.3 % (ref 42.7–76)
NRBC BLD AUTO-RTO: 0 /100 WBC (ref 0–0.2)
PLATELET # BLD AUTO: 251 10*3/MM3 (ref 140–450)
PMV BLD AUTO: 9.9 FL (ref 6–12)
POTASSIUM SERPL-SCNC: 4.1 MMOL/L (ref 3.5–5.2)
PROT SERPL-MCNC: 8 G/DL (ref 6–8.5)
QT INTERVAL: 424 MS
RBC # BLD AUTO: 4.23 10*6/MM3 (ref 4.14–5.8)
SARS-COV-2 RNA PNL SPEC NAA+PROBE: NOT DETECTED
SODIUM SERPL-SCNC: 124 MMOL/L (ref 136–145)
TROPONIN T SERPL-MCNC: <0.01 NG/ML (ref 0–0.03)
WBC NRBC COR # BLD: 12.82 10*3/MM3 (ref 3.4–10.8)
WHOLE BLOOD HOLD COAG: NORMAL
WHOLE BLOOD HOLD SPECIMEN: NORMAL

## 2022-11-29 PROCEDURE — 84145 PROCALCITONIN (PCT): CPT | Performed by: PHYSICIAN ASSISTANT

## 2022-11-29 PROCEDURE — C9803 HOPD COVID-19 SPEC COLLECT: HCPCS

## 2022-11-29 PROCEDURE — 80053 COMPREHEN METABOLIC PANEL: CPT

## 2022-11-29 PROCEDURE — 87636 SARSCOV2 & INF A&B AMP PRB: CPT | Performed by: NURSE PRACTITIONER

## 2022-11-29 PROCEDURE — 93005 ELECTROCARDIOGRAM TRACING: CPT | Performed by: EMERGENCY MEDICINE

## 2022-11-29 PROCEDURE — 83735 ASSAY OF MAGNESIUM: CPT

## 2022-11-29 PROCEDURE — 84484 ASSAY OF TROPONIN QUANT: CPT

## 2022-11-29 PROCEDURE — 85025 COMPLETE CBC W/AUTO DIFF WBC: CPT

## 2022-11-29 PROCEDURE — 93005 ELECTROCARDIOGRAM TRACING: CPT

## 2022-11-29 PROCEDURE — 99284 EMERGENCY DEPT VISIT MOD MDM: CPT

## 2022-11-29 PROCEDURE — 99283 EMERGENCY DEPT VISIT LOW MDM: CPT

## 2022-11-29 PROCEDURE — 93010 ELECTROCARDIOGRAM REPORT: CPT | Performed by: INTERNAL MEDICINE

## 2022-11-29 PROCEDURE — 36415 COLL VENOUS BLD VENIPUNCTURE: CPT

## 2022-11-29 PROCEDURE — 83605 ASSAY OF LACTIC ACID: CPT | Performed by: PHYSICIAN ASSISTANT

## 2022-11-29 RX ORDER — SODIUM CHLORIDE 0.9 % (FLUSH) 0.9 %
10 SYRINGE (ML) INJECTION AS NEEDED
Status: DISCONTINUED | OUTPATIENT
Start: 2022-11-29 | End: 2022-11-30 | Stop reason: HOSPADM

## 2022-11-29 RX ADMIN — SODIUM CHLORIDE 1000 ML: 9 INJECTION, SOLUTION INTRAVENOUS at 19:52

## 2022-11-29 NOTE — TELEPHONE ENCOUNTER
The patient's wife called. The patient is scheduled to have a video visit with Ekta today at 4:30 pm. However, his blood pressure is down to 72/42. And every time he stands up he feels like he is going to pass out. So I recommended she take him to the ER. She verbalized understanding and indicated she would take him.

## 2022-11-30 ENCOUNTER — TELEPHONE (OUTPATIENT)
Dept: FAMILY MEDICINE CLINIC | Facility: CLINIC | Age: 52
End: 2022-11-30

## 2022-11-30 LAB — PROCALCITONIN SERPL-MCNC: 0.11 NG/ML (ref 0–0.25)

## 2022-11-30 NOTE — TELEPHONE ENCOUNTER
Caller: Nikolai Shaw    Relationship: Self    Best call back number: [phone]    What is the best time to reach you: ANY    Who are you requesting to speak with (clinical staff, provider,  specific staff member): ANGELINA LONG      Do you know the name of the person who called:     What was the call regarding: WHEN HE SHOULD DO A FOLLOW UP FROM THE ER    Do you require a callback: YES

## 2022-12-01 NOTE — PROGRESS NOTES
Subjective   Nikolai Shaw is a 52 y.o. male.     Chief Complaint   Patient presents with   • er follow up      Low BP        History of Present Illness   Patient presents for ER follow up; patient went to [facility] on 11/29/22 with c/o dizziness and low BP; pt had been having near syncope for several days; diagnosed with dehydration; stopped HCTZ; no vomiting or diarrhea; no change in bowels with ileostomy; had been having some acid reflux symptoms so had not been eating as much; no blood in BM; symptoms of lightheadedness improved with IVFs and discharged home; needs repeat labs today.    F/U HTN: has not been taking any BP medications for last 3 days; previously taking Lisinopril and Lisinopril/HCTZ; has monitored BP and has been 117/74, 116/73, 120/66, 119/74, 112/69, 123/81, 125/80, 133/88; no headaches; no swelling; no orthostasis.    F/U anxiety: takes Sertraline daily and works well; has not taken in last couple of days; no problems with sleep; gets up during the night to check ileostomy bag; has been able to let things go at work and has helped mood; no SI/HI.    Also, c/o ingrown toenail on left foot; has problems at times; some tenderness; no fever.      The following portions of the patient's history were reviewed and updated as appropriate: allergies, current medications, past family history, past medical history, past social history, past surgical history and problem list.      Current Outpatient Medications   Medication Sig Dispense Refill   • Cholecalciferol (Vitamin D-3) 125 MCG (5000 UT) tablet Take 1 tablet by mouth Daily. 90 tablet 1   • B Complex Vitamins (VITAMIN-B COMPLEX PO) Take 1 tablet by mouth Daily.     • lisinopril (PRINIVIL,ZESTRIL) 20 MG tablet Take 1 tablet by mouth Daily. 90 tablet 1   • lisinopril-hydrochlorothiazide (PRINZIDE,ZESTORETIC) 20-12.5 MG per tablet Take 1 tablet by mouth Daily. 90 tablet 1   • sertraline (ZOLOFT) 100 MG tablet Take 1.5 tablets by mouth Daily. 135  tablet 1     No current facility-administered medications for this visit.       Past Medical History:   Diagnosis Date   • Adenocarcinoma of rectum (HCC) 06/2015    invasive adenocarcinoma of rectum w 59 benign lymph nodes   • Adenomatous polyposis     FAMILIAL    • Anxiety    • At risk for sleep apnea    • Chicken pox     patient reports having as a child   • Depression    • Herpes zoster without complication 12/3/2021   • History of anemia    • Hypertension    • Hyponatremia    • Insomnia    • Malignant neoplasm of rectum (HCC) 6/1/2016   • Parastomal hernia without obstruction or gangrene 2/15/2021    Added automatically from request for surgery [id number]   • Pulmonary nodule 1/10/2020    6/26/20 CT chest: 6 mm nodule resolved.   • Renal calculi    • RLS (restless legs syndrome)    • Syncope 03/19/2021    prolonged QT interval   • Testicular hypofunction    • Vitamin D deficiency        Past Surgical History:   Procedure Laterality Date   • ABDOMINOPERINEAL PROCTOCOLECTOMY  07/06/2015    DR PAL RUDOLPH   • COLONOSCOPY  06[birth year]1915    DR SHASTA HOWARD   • COLONOSCOPY N/A 2/23/2021    Procedure: ILEOSCOPY, EXCISION OF ILEOSTOMY NODULE;  Surgeon: Pal Rudolph MD;  Location: [facility];  Service: General;  Laterality: N/A;   • ENDOSCOPY  06/23/2015    DR SHASTA HOWARD   • EXPLORATORY LAPAROTOMY N/A 2/3/2017    Procedure: LAPAROTOMY EXPLORATORY  SMALL BOWEL OBSTRUCTION;  Surgeon: Pal Rudolph MD;  Location: [facility] OR;  Service:    • ILEOSTOMY  07/06/2015   • MYRINGOTOMY W/ TUBES      2-3 times as child       Family History   Problem Relation Age of Onset   • Colon cancer Father         familial polyposis   • Aneurysm Maternal Grandfather 66        brain aneurysm   • Colon cancer Paternal Grandfather         familial polyposis   • Hypertension Maternal Uncle    • Diabetes Maternal Uncle    • Malig Hyperthermia Neg Hx        Social History     Socioeconomic History   • Marital status:       "Spouse name: Hansa   • Years of education: college   Tobacco Use   • Smoking status: Never   • Smokeless tobacco: Current     Types: Chew   • Tobacco comments:     CHEWS TOBACCO   Vaping Use   • Vaping Use: Never used   Substance and Sexual Activity   • Alcohol use: Yes     Alcohol/week: 6.0 standard drinks     Types: 6 Cans of beer per week     Comment: DAILY CONSUMPTION LEVEL; drinks 6 beers daily   • Drug use: No   • Sexual activity: Defer       Review of Systems   Constitutional: Negative for chills, fever and unexpected weight gain. Appetite change: see HPI. Fatigue: some, has improved with BP better. Weight loss: has lost 10 pounds since June, had not been eating as much recently.   HENT: Negative for ear pain, sinus pressure, sore throat and trouble swallowing.    Eyes: Negative for blurred vision.   Respiratory: Negative for cough, chest tightness and shortness of breath.    Cardiovascular: Negative for chest pain and palpitations.   Gastrointestinal: Negative for abdominal pain, blood in stool, GERD (took Pantoprazole for 3 days and resolved) and indigestion.   Endocrine: Negative for polydipsia.   Genitourinary: Negative for dysuria and frequency.   Musculoskeletal: Negative for arthralgias and back pain.   Skin: Negative for rash.   Neurological: Negative for syncope (see HPI) and weakness.   Hematological: Does not bruise/bleed easily.   Psychiatric/Behavioral: Negative for suicidal ideas.       Objective   Vitals:    12/02/22 1014 12/02/22 1056   BP: 110/74 122/80   BP Location: Left arm Left arm   Patient Position: Sitting Sitting   Cuff Size: Adult    Pulse: 64    Temp: 98.6 °F (37 °C)    SpO2: 95%    Weight: 118 kg (259 lb 9.6 oz)    Height: 172.7 cm (68\")      Body mass index is 39.47 kg/m².    Physical Exam  Vitals and nursing note reviewed.   Constitutional:       General: He is not in acute distress.     Appearance: He is well-developed and well-groomed. He is not diaphoretic.   HENT:      Head: " Normocephalic.      Jaw: No tenderness or pain on movement.      Right Ear: External ear normal. No decreased hearing noted. Right ear middle ear effusion: TM dull. Tympanic membrane is not erythematous.      Left Ear: External ear normal. No decreased hearing noted. Left ear middle ear effusion: mild. Tympanic membrane is not erythematous.      Nose: Nose normal.      Right Sinus: No maxillary sinus tenderness or frontal sinus tenderness.      Left Sinus: No maxillary sinus tenderness or frontal sinus tenderness.      Mouth/Throat:      Mouth: Mucous membranes are moist.      Pharynx: No oropharyngeal exudate or posterior oropharyngeal erythema.   Eyes:      Extraocular Movements: Extraocular movements intact.      Conjunctiva/sclera: Conjunctivae normal.      Pupils: Pupils are equal, round, and reactive to light.   Neck:      Thyroid: No thyromegaly.      Vascular: No carotid bruit.   Cardiovascular:      Rate and Rhythm: Normal rate and regular rhythm.      Pulses: Normal pulses.      Heart sounds: Normal heart sounds. No murmur heard.  Pulmonary:      Effort: Pulmonary effort is normal. No respiratory distress.      Breath sounds: Normal breath sounds.   Abdominal:      General: Bowel sounds are normal.      Palpations: Abdomen is soft. There is no hepatomegaly or splenomegaly.      Tenderness: There is no abdominal tenderness. There is no right CVA tenderness, left CVA tenderness or guarding.   Musculoskeletal:      Cervical back: Normal range of motion and neck supple.      Right lower leg: No edema.      Left lower leg: No edema.        Feet:    Lymphadenopathy:      Cervical: No cervical adenopathy.   Skin:     General: Skin is warm and dry.      Findings: No rash.   Neurological:      Mental Status: He is alert and oriented to person, place, and time.      Cranial Nerves: Cranial nerves 2-12 are intact.      Gait: Gait is intact.   Psychiatric:         Mood and Affect: Mood normal.         Behavior:  Behavior normal.         Thought Content: Thought content normal.         Cognition and Memory: Cognition normal.         Judgment: Judgment normal.          Lab Results   Component Value Date    CHLPL 183 06/17/2022    TRIG 95 06/17/2022    HDL 91 06/17/2022    VLDL 17 06/17/2022    LDL 75 06/17/2022     Lab Results   Component Value Date    TSH 0.94 07/11/2015     Lab Results   Component Value Date    HGBA1C 5.7 (H) 06/17/2022     Lab Results   Component Value Date    PSA 0.4 06/17/2022        Records reviewed from [facility] ER on 11/29/22; pt presented with c/o dizziness and low blood pressure over the last several days; no chest pain or SOA; ostomy output unchanged from baseline; creatinine elevated at 1.99; BP improved with IVFs; stopped HCTZ due to elevated creatinine; ECG sinus rhythm, no change from previous; to follow up with PCP.  11/29/22 CMP WNL except BUN 27, creat 1.99, sodium 124, chloride 87, eGFR 39.7;  CBC WNL except WBC 12.82, neutrophil 82.3, lymph 8.1; mag 1.8; troponin <0.010; procalcitonin 0.11; lactic acid 0.9    Assessment    Diagnoses and all orders for this visit:    1. Essential hypertension (Primary)  Assessment & Plan:  Hypertension is stable off medications.  Ambulatory blood pressure monitoring.  Blood pressure will be reassessed in 4 weeks.  Stay off Lisinopril and Lisinopril/HCTZ for now.  Resume Sertraline at 100 mg daily.  Continue to monitor your blood pressure periodically and record results.    Call if your blood pressure is consistently greater than 140/90.  Instructed to resume Lisinopril 20 mg daily without HCTZ if BP is consistently increased.    Orders:  -     Comprehensive Metabolic Panel  -     CBC & Differential    2. Vitamin D deficiency  -     Cholecalciferol (Vitamin D-3) 125 MCG (5000 UT) tablet; Take 1 tablet by mouth Daily.  Dispense: 90 tablet; Refill: 1  -     Vitamin D,25-Hydroxy    3. Ingrown toenail of left foot  -     Ambulatory Referral to  Podiatry    4. Hyponatremia  -     Comprehensive Metabolic Panel    5. Leukocytosis, unspecified type  -     CBC & Differential    6. Vitamin B12 deficiency  -     Vitamin B12 & Folate    7. Anxiety  Assessment & Plan:  Stable.  Resume Sertraline at 100 mg daily, lower dose.      8. Recurrent major depressive disorder, in partial remission (HCC)  Assessment & Plan:  Patient's depression is recurrent and is mild without psychosis. Their depression is currently in partial remission and the condition is stable. This will be reassessed in 4 weeks. F/U as described:patient will continue current medication therapy.  Resume Sertraline at 100 mg daily.         Return in about 1 month (around 1/2/2023) for Recheck.or sooner if problems or concerns.  Will resume only Lisinopril 20 mg daily if BP increased.  Patient has not taken Sertraline the last few days without problems; will resume Sertraline at lower dose.       COVID-19 Precautions - Patient was compliant in wearing a mask. When I saw the patient, I used appropriate personal protective equipment (PPE) including mask, gloves, and eye shield (standard procedure).  Hand hygiene was completed before and after seeing the patient.

## 2022-12-02 ENCOUNTER — OFFICE VISIT (OUTPATIENT)
Dept: FAMILY MEDICINE CLINIC | Facility: CLINIC | Age: 52
End: 2022-12-02

## 2022-12-02 VITALS
HEART RATE: 64 BPM | DIASTOLIC BLOOD PRESSURE: 80 MMHG | BODY MASS INDEX: 39.34 KG/M2 | HEIGHT: 68 IN | TEMPERATURE: 98.6 F | WEIGHT: 259.6 LBS | SYSTOLIC BLOOD PRESSURE: 122 MMHG | OXYGEN SATURATION: 95 %

## 2022-12-02 DIAGNOSIS — E53.8 VITAMIN B12 DEFICIENCY: ICD-10-CM

## 2022-12-02 DIAGNOSIS — D72.829 LEUKOCYTOSIS, UNSPECIFIED TYPE: ICD-10-CM

## 2022-12-02 DIAGNOSIS — F41.9 ANXIETY: ICD-10-CM

## 2022-12-02 DIAGNOSIS — E55.9 VITAMIN D DEFICIENCY: ICD-10-CM

## 2022-12-02 DIAGNOSIS — I10 ESSENTIAL HYPERTENSION: Primary | ICD-10-CM

## 2022-12-02 DIAGNOSIS — L60.0 INGROWN TOENAIL OF LEFT FOOT: ICD-10-CM

## 2022-12-02 DIAGNOSIS — E87.1 HYPONATREMIA: ICD-10-CM

## 2022-12-02 DIAGNOSIS — F33.41 RECURRENT MAJOR DEPRESSIVE DISORDER, IN PARTIAL REMISSION: ICD-10-CM

## 2022-12-02 PROCEDURE — 99214 OFFICE O/P EST MOD 30 MIN: CPT | Performed by: NURSE PRACTITIONER

## 2022-12-02 NOTE — PATIENT INSTRUCTIONS
Resume Sertraline at 100 mg daily.  Continue to monitor your blood pressure periodically and record results.  Call if your blood pressure is consistently greater than 140/90.  Continue to work on healthy diet and exercise.  Follow up pending lab results.  Follow up in 1 month for physical, or sooner if problems or concerns.

## 2022-12-03 PROBLEM — L60.0 INGROWN TOENAIL OF LEFT FOOT: Status: ACTIVE | Noted: 2022-12-03

## 2022-12-03 PROBLEM — K43.5 PARASTOMAL HERNIA: Status: RESOLVED | Noted: 2021-02-05 | Resolved: 2022-12-03

## 2022-12-03 PROBLEM — K43.5 PARASTOMAL HERNIA WITHOUT OBSTRUCTION OR GANGRENE: Status: RESOLVED | Noted: 2021-02-15 | Resolved: 2022-12-03

## 2022-12-03 LAB
25(OH)D3+25(OH)D2 SERPL-MCNC: 47 NG/ML (ref 30–100)
ALBUMIN SERPL-MCNC: 4.6 G/DL (ref 3.5–5.2)
ALBUMIN/GLOB SERPL: 1.8 G/DL
ALP SERPL-CCNC: 117 U/L (ref 39–117)
ALT SERPL-CCNC: 33 U/L (ref 1–41)
AST SERPL-CCNC: 35 U/L (ref 1–40)
BASOPHILS # BLD AUTO: 0.05 10*3/MM3 (ref 0–0.2)
BASOPHILS NFR BLD AUTO: 0.6 % (ref 0–1.5)
BILIRUB SERPL-MCNC: <0.2 MG/DL (ref 0–1.2)
BUN SERPL-MCNC: 14 MG/DL (ref 6–20)
BUN/CREAT SERPL: 12.1 (ref 7–25)
CALCIUM SERPL-MCNC: 9.7 MG/DL (ref 8.6–10.5)
CHLORIDE SERPL-SCNC: 98 MMOL/L (ref 98–107)
CO2 SERPL-SCNC: 29.5 MMOL/L (ref 22–29)
CREAT SERPL-MCNC: 1.16 MG/DL (ref 0.76–1.27)
EGFRCR SERPLBLD CKD-EPI 2021: 75.8 ML/MIN/1.73
EOSINOPHIL # BLD AUTO: 0.16 10*3/MM3 (ref 0–0.4)
EOSINOPHIL NFR BLD AUTO: 2 % (ref 0.3–6.2)
ERYTHROCYTE [DISTWIDTH] IN BLOOD BY AUTOMATED COUNT: 12.1 % (ref 12.3–15.4)
FOLATE SERPL-MCNC: 13 NG/ML (ref 4.78–24.2)
GLOBULIN SER CALC-MCNC: 2.6 GM/DL
GLUCOSE SERPL-MCNC: 100 MG/DL (ref 65–99)
HCT VFR BLD AUTO: 39.7 % (ref 37.5–51)
HGB BLD-MCNC: 13.9 G/DL (ref 13–17.7)
IMM GRANULOCYTES # BLD AUTO: 0.04 10*3/MM3 (ref 0–0.05)
IMM GRANULOCYTES NFR BLD AUTO: 0.5 % (ref 0–0.5)
LYMPHOCYTES # BLD AUTO: 0.81 10*3/MM3 (ref 0.7–3.1)
LYMPHOCYTES NFR BLD AUTO: 10 % (ref 19.6–45.3)
MCH RBC QN AUTO: 33.6 PG (ref 26.6–33)
MCHC RBC AUTO-ENTMCNC: 35 G/DL (ref 31.5–35.7)
MCV RBC AUTO: 95.9 FL (ref 79–97)
MONOCYTES # BLD AUTO: 0.7 10*3/MM3 (ref 0.1–0.9)
MONOCYTES NFR BLD AUTO: 8.7 % (ref 5–12)
NEUTROPHILS # BLD AUTO: 6.31 10*3/MM3 (ref 1.7–7)
NEUTROPHILS NFR BLD AUTO: 78.2 % (ref 42.7–76)
NRBC BLD AUTO-RTO: 0 /100 WBC (ref 0–0.2)
PLATELET # BLD AUTO: 237 10*3/MM3 (ref 140–450)
POTASSIUM SERPL-SCNC: 5.4 MMOL/L (ref 3.5–5.2)
PROT SERPL-MCNC: 7.2 G/DL (ref 6–8.5)
RBC # BLD AUTO: 4.14 10*6/MM3 (ref 4.14–5.8)
SODIUM SERPL-SCNC: 136 MMOL/L (ref 136–145)
VIT B12 SERPL-MCNC: 538 PG/ML (ref 211–946)
WBC # BLD AUTO: 8.07 10*3/MM3 (ref 3.4–10.8)

## 2022-12-03 NOTE — ASSESSMENT & PLAN NOTE
Patient's depression is recurrent and is mild without psychosis. Their depression is currently in partial remission and the condition is stable. This will be reassessed in 4 weeks. F/U as described:patient will continue current medication therapy.  Resume Sertraline at 100 mg daily.

## 2022-12-03 NOTE — ASSESSMENT & PLAN NOTE
Hypertension is stable off medications.  Ambulatory blood pressure monitoring.  Blood pressure will be reassessed in 4 weeks.  Stay off Lisinopril and Lisinopril/HCTZ for now.  Resume Sertraline at 100 mg daily.  Continue to monitor your blood pressure periodically and record results.    Call if your blood pressure is consistently greater than 140/90.

## 2022-12-06 DIAGNOSIS — I10 ESSENTIAL HYPERTENSION: Primary | ICD-10-CM

## 2022-12-06 DIAGNOSIS — E55.9 VITAMIN D DEFICIENCY: ICD-10-CM

## 2022-12-12 DIAGNOSIS — I10 ESSENTIAL HYPERTENSION: ICD-10-CM

## 2022-12-12 RX ORDER — LISINOPRIL AND HYDROCHLOROTHIAZIDE 20; 12.5 MG/1; MG/1
1 TABLET ORAL DAILY
Start: 2022-12-12 | End: 2023-02-22

## 2023-02-22 DIAGNOSIS — I10 ESSENTIAL HYPERTENSION: ICD-10-CM

## 2023-02-22 RX ORDER — LISINOPRIL AND HYDROCHLOROTHIAZIDE 20; 12.5 MG/1; MG/1
TABLET ORAL
Qty: 90 TABLET | Refills: 1 | Status: SHIPPED | OUTPATIENT
Start: 2023-02-22

## 2023-02-22 RX ORDER — LISINOPRIL 20 MG/1
TABLET ORAL
Qty: 90 TABLET | Refills: 1 | Status: SHIPPED | OUTPATIENT
Start: 2023-02-22

## 2023-05-26 ENCOUNTER — OFFICE VISIT (OUTPATIENT)
Dept: FAMILY MEDICINE CLINIC | Facility: CLINIC | Age: 53
End: 2023-05-26
Payer: COMMERCIAL

## 2023-05-26 VITALS
BODY MASS INDEX: 39.4 KG/M2 | SYSTOLIC BLOOD PRESSURE: 138 MMHG | HEIGHT: 68 IN | HEART RATE: 68 BPM | OXYGEN SATURATION: 95 % | TEMPERATURE: 98 F | DIASTOLIC BLOOD PRESSURE: 88 MMHG | WEIGHT: 260 LBS

## 2023-05-26 DIAGNOSIS — J30.9 ALLERGIC RHINITIS, UNSPECIFIED SEASONALITY, UNSPECIFIED TRIGGER: ICD-10-CM

## 2023-05-26 DIAGNOSIS — E55.9 VITAMIN D DEFICIENCY: ICD-10-CM

## 2023-05-26 DIAGNOSIS — I10 ESSENTIAL HYPERTENSION: Primary | ICD-10-CM

## 2023-05-26 DIAGNOSIS — R73.03 PREDIABETES: ICD-10-CM

## 2023-05-26 DIAGNOSIS — R12 HEARTBURN: ICD-10-CM

## 2023-05-26 DIAGNOSIS — F33.41 RECURRENT MAJOR DEPRESSIVE DISORDER, IN PARTIAL REMISSION: ICD-10-CM

## 2023-05-26 DIAGNOSIS — F41.9 ANXIETY: ICD-10-CM

## 2023-05-26 DIAGNOSIS — G47.30 SLEEP APNEA, UNSPECIFIED TYPE: ICD-10-CM

## 2023-05-26 PROCEDURE — 99214 OFFICE O/P EST MOD 30 MIN: CPT | Performed by: NURSE PRACTITIONER

## 2023-05-26 RX ORDER — LISINOPRIL AND HYDROCHLOROTHIAZIDE 20; 12.5 MG/1; MG/1
1 TABLET ORAL DAILY
Qty: 90 TABLET | Refills: 1 | Status: SHIPPED | OUTPATIENT
Start: 2023-05-26

## 2023-05-26 RX ORDER — LISINOPRIL 20 MG/1
20 TABLET ORAL DAILY
Qty: 90 TABLET | Refills: 1 | Status: SHIPPED | OUTPATIENT
Start: 2023-05-26

## 2023-05-26 RX ORDER — PANTOPRAZOLE SODIUM 20 MG/1
20 TABLET, DELAYED RELEASE ORAL DAILY
COMMUNITY
End: 2023-05-26 | Stop reason: SDUPTHER

## 2023-05-26 RX ORDER — SERTRALINE HYDROCHLORIDE 100 MG/1
150 TABLET, FILM COATED ORAL DAILY
Qty: 135 TABLET | Refills: 1 | Status: SHIPPED | OUTPATIENT
Start: 2023-05-26

## 2023-05-26 RX ORDER — PANTOPRAZOLE SODIUM 20 MG/1
20 TABLET, DELAYED RELEASE ORAL DAILY
Qty: 90 TABLET | Refills: 1 | Status: SHIPPED | OUTPATIENT
Start: 2023-05-26

## 2023-05-26 NOTE — PROGRESS NOTES
Subjective   Nikolai Shaw is a 53 y.o. male.     Chief Complaint   Patient presents with   • Hypertension     6 mo follow up      • Anxiety       History of Present Illness   Patient presents for follow up HTN: takes Lisinopril/HCTZ and Lisinopril daily; does not typically monitor BP; no headaches; no orthostasis unless gets up too fast; mild swelling in legs if has been on feet more.    F/U anxiety: takes Setraline daily and works well; no problems with sleep; no SI/HI.    F/U Vitamin D deficiency: takes Vitamin D daily.    Also, c/o heartburn; has been taking daughter's left over Pantoprazole daily and has been working well for heartburn when eats certain foods; has had a couple of episodes of reflux as well; had tried TUMs and did not help much; needs Rx for Pantoprazole.    Has had some popping in right ear for last week; no pain.      The following portions of the patient's history were reviewed and updated as appropriate: allergies, current medications, past family history, past medical history, past social history, past surgical history and problem list.      Current Outpatient Medications   Medication Sig Dispense Refill   • B Complex Vitamins (VITAMIN-B COMPLEX PO) Take 1 tablet by mouth Daily.     • Cholecalciferol (Vitamin D-3) 125 MCG (5000 UT) tablet Take 1 tablet by mouth Daily. 90 tablet 1   • lisinopril (PRINIVIL,ZESTRIL) 20 MG tablet Take 1 tablet by mouth Daily. 90 tablet 1   • lisinopril-hydrochlorothiazide (PRINZIDE,ZESTORETIC) 20-12.5 MG per tablet Take 1 tablet by mouth Daily. 90 tablet 1   • pantoprazole (PROTONIX) 20 MG EC tablet Take 1 tablet by mouth Daily. 90 tablet 1   • sertraline (ZOLOFT) 100 MG tablet Take 1.5 tablets by mouth Daily. 135 tablet 1     No current facility-administered medications for this visit.       Past Medical History:   Diagnosis Date   • Adenocarcinoma of rectum 06/2015    invasive adenocarcinoma of rectum w 59 benign lymph nodes   • Adenomatous polyposis      FAMILIAL    • Anxiety    • At risk for sleep apnea    • Chicken pox     patient reports having as a child   • Depression    • Herpes zoster without complication 12/3/2021   • History of anemia    • Hypertension    • Hyponatremia    • Insomnia    • Malignant neoplasm of rectum 6/1/2016   • Parastomal hernia without obstruction or gangrene 2/15/2021    Added automatically from request for surgery [id number]   • Pulmonary nodule 1/10/2020    6/26/20 CT chest: 6 mm nodule resolved.   • Renal calculi    • RLS (restless legs syndrome)    • Syncope 03/19/2021    prolonged QT interval   • Testicular hypofunction    • Vitamin D deficiency        Past Surgical History:   Procedure Laterality Date   • ABDOMINOPERINEAL PROCTOCOLECTOMY  07/06/2015    DR PAL RUDOLPH   • COLONOSCOPY  06/16/1915    DR SHASTA HOWARD   • COLONOSCOPY N/A 2/23/2021    Procedure: ILEOSCOPY, EXCISION OF ILEOSTOMY NODULE;  Surgeon: Pal Rudolph MD;  Location: [facility];  Service: General;  Laterality: N/A;   • ENDOSCOPY  06/23/2015    DR SHASTA HOWARD   • EXPLORATORY LAPAROTOMY N/A 2/3/2017    Procedure: LAPAROTOMY EXPLORATORY  SMALL BOWEL OBSTRUCTION;  Surgeon: Pal Rudolph MD;  Location: [facility] OR;  Service:    • ILEOSTOMY  07/06/2015   • MYRINGOTOMY W/ TUBES      2-3 times as child       Family History   Problem Relation Age of Onset   • Colon cancer Father         familial polyposis   • Aneurysm Maternal Grandfather 66        brain aneurysm   • Colon cancer Paternal Grandfather         familial polyposis   • Hypertension Maternal Uncle    • Diabetes Maternal Uncle    • Malig Hyperthermia Neg Hx        Social History     Socioeconomic History   • Marital status:      Spouse name: Hansa   • Years of education: college   Tobacco Use   • Smoking status: Never   • Smokeless tobacco: Current     Types: Chew   • Tobacco comments:     CHEWS TOBACCO   Vaping Use   • Vaping Use: Never used   Substance and Sexual Activity   • Alcohol  "use: Yes     Alcohol/week: 6.0 standard drinks     Types: 6 Cans of beer per week     Comment: DAILY CONSUMPTION LEVEL; drinks 6 beers daily   • Drug use: No   • Sexual activity: Defer       Review of Systems   Constitutional: Negative for appetite change, chills, fatigue, fever, unexpected weight gain and unexpected weight loss.   HENT: Positive for postnasal drip (has a lot in mornings and will trigger cough; no OTC treatment). Negative for ear pain (see HPI), sinus pressure and trouble swallowing.    Respiratory: Negative for chest tightness and shortness of breath. Cough: only in mornings.    Cardiovascular: Negative for chest pain and palpitations.   Gastrointestinal: Negative for abdominal pain, blood in stool and constipation. Diarrhea: no change with colostomy.   Genitourinary: Negative for dysuria and frequency.   Musculoskeletal: Negative for arthralgias and back pain.   Skin: Negative for rash.   Neurological: Negative for syncope and weakness.   Hematological: Does not bruise/bleed easily.       Objective   Vitals:    05/26/23 1243   BP: 138/88   BP Location: Left arm   Patient Position: Sitting   Cuff Size: Adult   Pulse: 68   Temp: 98 °F (36.7 °C)   SpO2: 95%   Weight: 118 kg (260 lb)   Height: 172.7 cm (68\")     Body mass index is 39.53 kg/m².    Physical Exam  Vitals and nursing note reviewed.   Constitutional:       General: He is not in acute distress.     Appearance: He is well-developed and well-groomed. He is not diaphoretic.   HENT:      Head: Normocephalic.      Right Ear: Tympanic membrane and external ear normal. No decreased hearing noted.      Left Ear: Tympanic membrane and external ear normal. No decreased hearing noted.      Nose: Nose normal.      Right Sinus: No maxillary sinus tenderness or frontal sinus tenderness.      Left Sinus: No maxillary sinus tenderness or frontal sinus tenderness.      Mouth/Throat:      Mouth: Mucous membranes are moist.      Pharynx: No oropharyngeal " exudate or posterior oropharyngeal erythema.   Eyes:      Conjunctiva/sclera: Conjunctivae normal.   Neck:      Vascular: No carotid bruit.   Cardiovascular:      Rate and Rhythm: Normal rate and regular rhythm.      Pulses: Normal pulses.      Heart sounds: Normal heart sounds. No murmur heard.  Pulmonary:      Effort: Pulmonary effort is normal. No respiratory distress.      Breath sounds: Normal breath sounds.   Abdominal:      General: Bowel sounds are normal.      Palpations: Abdomen is soft. There is no hepatomegaly or splenomegaly.      Tenderness: There is no abdominal tenderness. There is no guarding.   Musculoskeletal:      Cervical back: Normal range of motion and neck supple.      Right lower leg: No edema.      Left lower leg: No edema.   Lymphadenopathy:      Cervical: No cervical adenopathy.   Skin:     General: Skin is warm and dry.      Findings: No rash.   Neurological:      Mental Status: He is alert and oriented to person, place, and time.      Gait: Gait normal.   Psychiatric:         Mood and Affect: Mood normal.         Behavior: Behavior normal.         Thought Content: Thought content normal.         Cognition and Memory: Cognition normal.         Judgment: Judgment normal.      12/9/23 BMP WNL except glucose 151.    Lab Results   Component Value Date    TSH 0.94 07/11/2015     Lab Results   Component Value Date    PSA 0.4 06/17/2022     Lab Results   Component Value Date    WBCU 0-2 07/12/2015    RBCUA 0-2 07/12/2015    BACTERIA None Seen 07/12/2015    LABPH 6.0 07/12/2015    COLORU Yellow 02/05/2021    CLARITYU Clear 02/05/2021    LEUKOCYTESUR Negative 02/05/2021    GLUCOSEU Negative 02/05/2021    BLOODU Negative 02/05/2021    BILIRUBINUR Negative 02/05/2021    NITRITEU Negative 02/05/2021          Assessment    Problem List Items Addressed This Visit     Essential hypertension - Primary    Current Assessment & Plan     Hypertension is a little increased today.  Weight loss.  Regular  aerobic exercise.  Continue current medications.  Ambulatory blood pressure monitoring.  Blood pressure will be reassessed in 2 weeks.  Continue Lisinopril/HCTZand Lisinopril daily.  Send [facility] message with blood pressure readings in 2 weeks.         Relevant Medications    lisinopril-hydrochlorothiazide (PRINZIDE,ZESTORETIC) 20-12.5 MG per tablet    lisinopril (PRINIVIL,ZESTRIL) 20 MG tablet    Other Relevant Orders    Lipid Panel With LDL / HDL Ratio (Completed)    Comprehensive Metabolic Panel (Completed)    Vitamin D deficiency    Current Assessment & Plan     Continue Vitamin D daily.         Depression    Current Assessment & Plan     Patient's depression is recurrent and is moderate without psychosis. Their depression is currently active and the condition is stable. This will be reassessed at the next regular appointment. F/U as described:patient will continue current medication therapy.  Continue Sertraline daily.         Relevant Medications    sertraline (ZOLOFT) 100 MG tablet    Anxiety    Current Assessment & Plan     Stable.  Continue Sertraline daily.         Relevant Medications    sertraline (ZOLOFT) 100 MG tablet    Sleep apnea    Overview     Does not use CPAP, could not get comfortable with mask/tubing.         Heartburn    Current Assessment & Plan     Stable.  Continue Pantoprazole daily.         Relevant Orders    CBC & Differential (Completed)    Allergic rhinitis    Current Assessment & Plan     Start over the counter antihistamine, such as Claritin or Allegra.         Prediabetes    Current Assessment & Plan     Continue to decrease carbs/sugars in diet.         Relevant Orders    Hemoglobin A1c (Completed)        Return in about 6 months (around 11/26/2023) for Annual physical, Recheck.or sooner if problems or concerns.

## 2023-05-26 NOTE — LETTER
May 26, 2023     Patient: Nikolai Shaw   YOB: 1970   Date of Visit: 5/26/2023       To Whom It May Concern:    Mr. Nikolai Shaw was seen in office today.         Sincerely,        YOUNG Lopez

## 2023-05-26 NOTE — PATIENT INSTRUCTIONS
Start over the counter antihistamine, such as Claritin or Allegra.  Continue to monitor your blood pressure periodically and record results.    Send [employer] message with blood pressure readings in 2 weeks.  Continue to work on healthy diet and exercise.  Follow up pending lab results.  Follow up in 6 months for physical with fasting labs, or sooner if problems or concerns.

## 2023-05-27 LAB
ALBUMIN SERPL-MCNC: 4.3 G/DL (ref 3.8–4.9)
ALBUMIN/GLOB SERPL: 1.7 {RATIO} (ref 1.2–2.2)
ALP SERPL-CCNC: 106 IU/L (ref 44–121)
ALT SERPL-CCNC: 33 IU/L (ref 0–44)
AST SERPL-CCNC: 51 IU/L (ref 0–40)
BASOPHILS # BLD AUTO: 0 X10E3/UL (ref 0–0.2)
BASOPHILS NFR BLD AUTO: 1 %
BILIRUB SERPL-MCNC: 0.3 MG/DL (ref 0–1.2)
BUN SERPL-MCNC: 8 MG/DL (ref 6–24)
BUN/CREAT SERPL: 9 (ref 9–20)
CALCIUM SERPL-MCNC: 9.5 MG/DL (ref 8.7–10.2)
CHLORIDE SERPL-SCNC: 103 MMOL/L (ref 96–106)
CHOLEST SERPL-MCNC: 195 MG/DL (ref 100–199)
CO2 SERPL-SCNC: 27 MMOL/L (ref 20–29)
CREAT SERPL-MCNC: 0.86 MG/DL (ref 0.76–1.27)
EGFRCR SERPLBLD CKD-EPI 2021: 104 ML/MIN/1.73
EOSINOPHIL # BLD AUTO: 0.2 X10E3/UL (ref 0–0.4)
EOSINOPHIL NFR BLD AUTO: 3 %
ERYTHROCYTE [DISTWIDTH] IN BLOOD BY AUTOMATED COUNT: 12.7 % (ref 11.6–15.4)
GLOBULIN SER CALC-MCNC: 2.6 G/DL (ref 1.5–4.5)
GLUCOSE SERPL-MCNC: 107 MG/DL (ref 70–99)
HBA1C MFR BLD: 5.6 % (ref 4.8–5.6)
HCT VFR BLD AUTO: 41.7 % (ref 37.5–51)
HDLC SERPL-MCNC: 91 MG/DL
HGB BLD-MCNC: 14.1 G/DL (ref 13–17.7)
IMM GRANULOCYTES # BLD AUTO: 0 X10E3/UL (ref 0–0.1)
IMM GRANULOCYTES NFR BLD AUTO: 0 %
LDLC SERPL CALC-MCNC: 91 MG/DL (ref 0–99)
LDLC/HDLC SERPL: 1 RATIO (ref 0–3.6)
LYMPHOCYTES # BLD AUTO: 1 X10E3/UL (ref 0.7–3.1)
LYMPHOCYTES NFR BLD AUTO: 16 %
MCH RBC QN AUTO: 33.7 PG (ref 26.6–33)
MCHC RBC AUTO-ENTMCNC: 33.8 G/DL (ref 31.5–35.7)
MCV RBC AUTO: 100 FL (ref 79–97)
MONOCYTES # BLD AUTO: 0.6 X10E3/UL (ref 0.1–0.9)
MONOCYTES NFR BLD AUTO: 10 %
NEUTROPHILS # BLD AUTO: 4.3 X10E3/UL (ref 1.4–7)
NEUTROPHILS NFR BLD AUTO: 70 %
PLATELET # BLD AUTO: 158 X10E3/UL (ref 150–450)
POTASSIUM SERPL-SCNC: 5.1 MMOL/L (ref 3.5–5.2)
PROT SERPL-MCNC: 6.9 G/DL (ref 6–8.5)
RBC # BLD AUTO: 4.18 X10E6/UL (ref 4.14–5.8)
SODIUM SERPL-SCNC: 144 MMOL/L (ref 134–144)
TRIGL SERPL-MCNC: 72 MG/DL (ref 0–149)
VLDLC SERPL CALC-MCNC: 13 MG/DL (ref 5–40)
WBC # BLD AUTO: 6 X10E3/UL (ref 3.4–10.8)

## 2023-05-28 NOTE — ASSESSMENT & PLAN NOTE
Hypertension is a little increased today.  Weight loss.  Regular aerobic exercise.  Continue current medications.  Ambulatory blood pressure monitoring.  Blood pressure will be reassessed in 2 weeks.  Continue Lisinopril/HCTZand Lisinopril daily.  Send [facility] message with blood pressure readings in 2 weeks.

## 2023-06-10 ENCOUNTER — APPOINTMENT (OUTPATIENT)
Dept: CT IMAGING | Facility: HOSPITAL | Age: 53
End: 2023-06-10
Payer: COMMERCIAL

## 2023-06-10 ENCOUNTER — HOSPITAL ENCOUNTER (EMERGENCY)
Facility: HOSPITAL | Age: 53
Discharge: HOME | End: 2023-06-10
Attending: EMERGENCY MEDICINE
Payer: COMMERCIAL

## 2023-06-10 VITALS
TEMPERATURE: 98 F | SYSTOLIC BLOOD PRESSURE: 119 MMHG | OXYGEN SATURATION: 92 % | RESPIRATION RATE: 18 BRPM | HEIGHT: 68 IN | WEIGHT: 260 LBS | DIASTOLIC BLOOD PRESSURE: 76 MMHG | BODY MASS INDEX: 39.4 KG/M2 | HEART RATE: 66 BPM

## 2023-06-10 DIAGNOSIS — F10.920 ACUTE ALCOHOLIC INTOXICATION WITHOUT COMPLICATION: ICD-10-CM

## 2023-06-10 DIAGNOSIS — S09.90XA MINOR HEAD INJURY, INITIAL ENCOUNTER: Primary | ICD-10-CM

## 2023-06-10 PROCEDURE — 99284 EMERGENCY DEPT VISIT MOD MDM: CPT

## 2023-06-10 PROCEDURE — 70450 CT HEAD/BRAIN W/O DYE: CPT

## 2023-06-10 PROCEDURE — 72125 CT NECK SPINE W/O DYE: CPT

## 2023-06-10 NOTE — DISCHARGE INSTRUCTIONS
Home, rest, home medicine as prescribed, follow up with PCP for recheck. Return to care with further concerns.  Follow-up with the resources available to you for help with alcohol use.

## 2023-06-10 NOTE — ED PROVIDER NOTES
EMERGENCY DEPARTMENT ENCOUNTER    Room Number:  07/07  Date of encounter:  6/10/2023  PCP: Ekta Zepeda APRN  Patient Care Team:  Ekta Zepeda APRN as PCP - General (Family Medicine)  Jacob hSelby MD as Consulting Physician (Hematology and Oncology)  Pal Crane MD as Referring Physician (General Surgery)   Independent Historians: Patient    HPI:  Chief Complaint: Head injury  A complete HPI/ROS/PMH/PSH/SH/FH are unobtainable due to: N/A    Chronic or social conditions impacting patient care (social determinants of health): None    Context: Nikolai Shaw is a 53 y.o. male with past medical history of hypertension, RLS, and history of colon cancer s/p colectomy who arrives to the ED with complaint of head injury after passing out while intoxicated.  Patient states that he was drinking heavily tonight, as he does every night, when he passed on the bathroom and struck the wall with his head or shoulder.  Patient denies any injury, no headache, neck pain, chest pain, shortness of breath, or any other injuries.  Denies any suicidal ideation, but does drink heavily.    Review of prior external notes (non-ED): Most recent PCP visit on 5/26/2023 for follow-up related to hypertension.    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    Family history of FAP (familial adenomatous polyposis) 07/09/2021    Allergic rhinitis 06/17/2022    Vitamin B12 deficiency 06/17/2022    Prediabetes 06/23/2022    Ingrown toenail of left foot 12/03/2022     Resolved Ambulatory Problems     Diagnosis Date Noted    Malignant  neoplasm of rectum 06/01/2016    SBO (small bowel obstruction) 01/19/2017    Small bowel obstruction 01/28/2017    Pulmonary nodule 01/10/2020    Parastomal hernia 02/05/2021    Parastomal hernia without obstruction or gangrene 02/15/2021    Syncope 03/19/2021    Herpes zoster without complication 12/03/2021     Past Medical History:   Diagnosis Date    Adenocarcinoma of rectum 06/2015    Adenomatous polyposis     At risk for sleep apnea     Chicken pox     History of anemia     Hypertension     Insomnia     Renal calculi     RLS (restless legs syndrome)     Testicular hypofunction        The patient has started, but not completed, their COVID-19 vaccination series.    PAST SURGICAL HISTORY  Past Surgical History:   Procedure Laterality Date    ABDOMINOPERINEAL PROCTOCOLECTOMY  07/06/2015    DR PAL RUDOLPH    COLONOSCOPY  06/16/1915    DR SHASTA HOWARD    COLONOSCOPY N/A 2/23/2021    Procedure: ILEOSCOPY, EXCISION OF ILEOSTOMY NODULE;  Surgeon: Pal Rudolph MD;  Location: [facility] MAIN OR;  Service: General;  Laterality: N/A;    ENDOSCOPY  06/23/2015    DR SHASTA HOWARD    EXPLORATORY LAPAROTOMY N/A 2/3/2017    Procedure: LAPAROTOMY EXPLORATORY  SMALL BOWEL OBSTRUCTION;  Surgeon: Pal Rudolph MD;  Location: [facility] MAIN OR;  Service:     ILEOSTOMY  07/06/2015    MYRINGOTOMY W/ TUBES      2-3 times as child         FAMILY HISTORY  Family History   Problem Relation Age of Onset    Colon cancer Father         familial polyposis    Aneurysm Maternal Grandfather 66        brain aneurysm    Colon cancer Paternal Grandfather         familial polyposis    Hypertension Maternal Uncle     Diabetes Maternal Uncle     Malig Hyperthermia Neg Hx          SOCIAL HISTORY  Social History     Socioeconomic History    Marital status:      Spouse name: Hansa    Years of education: college   Tobacco Use    Smoking status: Never    Smokeless tobacco: Current     Types: Chew    Tobacco comments:     CHEWS TOBACCO    Vaping Use    Vaping Use: Never used   Substance and Sexual Activity    Alcohol use: Yes     Alcohol/week: 6.0 standard drinks     Types: 6 Cans of beer per week     Comment: DAILY CONSUMPTION LEVEL; drinks 6 beers daily    Drug use: No    Sexual activity: Defer         ALLERGIES  Patient has no known allergies.        REVIEW OF SYSTEMS  Review of Systems     All systems reviewed and negative except for those discussed in HPI.       PHYSICAL EXAM    I have reviewed the triage vital signs and nursing notes.    ED Triage Vitals [06/10/23 0023]   Temp Heart Rate Resp BP SpO2   98 °F (36.7 °C) 68 18 126/80 99 %      Temp src Heart Rate Source Patient Position BP Location FiO2 (%)   Tympanic Monitor Lying Right arm --       Physical Exam    GENERAL: alert and oriented x4, smells of alcohol, not distressed  HENT: normocephalic, atraumatic, no hemotympanum, no dental injury or malocclusion, no C-spine tenderness  EYES: no scleral icterus, PERRL, EOMI  CV: regular rhythm, regular rate, intact distal perfusion  RESPIRATORY: normal effort, CTAB  ABDOMEN: soft/nontender, ileostomy in place  MUSCULOSKELETAL: no deformity  NEURO: alert, moves all extremities, no focal neuro deficits, follows commands  SKIN: warm, dry, no rash, no abrasions or contusions  Psych: Appropriate mood and affect      Nursing notes and vital signs reviewed      LAB RESULTS  No results found for this or any previous visit (from the past 24 hour(s)).    Ordered the above labs and independently reviewed and interpreted the results by me.        RADIOLOGY  CT Head Without Contrast    Result Date: 6/10/2023  CT OF THE HEAD WITHOUT CONTRAST  HISTORY: Head injury  COMPARISON: None available.  TECHNIQUE: Axial CT imaging was obtained through the brain. No IV contrast was administered.  FINDINGS: No acute intracranial hemorrhage is seen. Ventricles are normal in size. There is no midline shift or mass effect. No calvarial fracture is seen. The paranasal  sinuses and mastoid air cells appear clear.      No acute intracranial findings.  Radiation dose reduction techniques were utilized, including automated exposure control and exposure modulation based on body size.  This report was finalized on 6/10/2023 2:29 AM by Dr. Susan Carrero M.D.      CT Cervical Spine Without Contrast    Result Date: 6/10/2023  CT OF THE CERVICAL SPINE  HISTORY: Fall. Patient is intoxicated.  COMPARISON: None available.  TECHNIQUE: Axial CT imaging was obtained through the cervical spine. Coronal and sagittal reformatted images were obtained.  FINDINGS: Examination is degraded by mottle artifact. No acute fracture or subluxation of the cervical spine is seen. There is retrolisthesis of C6 on C7. There is no prevertebral soft tissue swelling. Intervertebral disc space narrowing is most significant at C6-C7. Degenerative changes are most significant at C6-C7. The patient has at least moderate canal stenosis, secondary to a central disc osteophyte complex. There is also moderate to severe bilateral neural foraminal narrowing, left greater than right. No acute abnormalities are seen at the lung apices.      No acute fracture or subluxation identified.  Radiation dose reduction techniques were utilized, including automated exposure control and exposure modulation based on body size.  This report was finalized on 6/10/2023 2:34 AM by Dr. Susan Carrero M.D.         I ordered the above noted radiological studies.  These were independently interpreted and reviewed by me.  See dictation for official radiology interpretation.      PROCEDURES    Procedures      MEDICATIONS GIVEN IN ER    Medications - No data to display      PROGRESS, DATA ANALYSIS, CONSULTS, AND MEDICAL DECISION MAKING    All labs have been independently reviewed by me.  All radiology studies have been reviewed by me and discussed with radiologist dictating the report.   EKG's independently viewed and interpreted by me.   Discussion below represents my analysis of pertinent findings related to patient's condition, differential diagnosis, treatment plan and final disposition.    DDx:  Includes, but is not limited to minor head injury, concussion, skull fracture, intracranial hemorrhage, C-spine injury, acute alcohol intoxication      ED Course as of 06/10/23 0426   Sat Kanu 10, 2023   0422 Patient rechecked, resting comfortably in bed, arouses easily and has clear speech.  Patient has ambulated to the bathroom without difficulty and is requesting discharge home.  Patient's spouse is at the bedside and feels comfortable with this disposition. [CASSIDY]      ED Course User Index  [CASSIDY] John Desir, PA       MDM: Patient CT scan showed no evidence for an acute intracranial abnormality or C-spine injury, patient has a responsible adult to go home with and watch him, he is able to ambulate, not suicidal, vital signs are stable and he is safe for discharge home.    PPE:  The patient wore a mask and I wore a mask and all appropriate PPE throughout the entire patient encounter.      AS OF 04:26 EDT VITALS:    BP - 119/76  HR - 66  TEMP - 98 °F (36.7 °C) (Tympanic)  O2 SATS - 92%      DIAGNOSIS  Final diagnoses:   Minor head injury, initial encounter   Acute alcoholic intoxication without complication         DISPOSITION  DISCHARGE    Patient discharged in stable condition.    Reviewed implications of results, diagnosis, meds, responsibility to follow up, warning signs and symptoms of possible worsening, potential complications and reasons to return to ER.    Patient/Family voiced understanding of above instructions.    Discussed plan for discharge, as there is no emergent indication for admission. Patient referred to primary care provider for BP management due to today's BP. Pt/family is agreeable and understands need for follow up and repeat testing.  Pt is aware that discharge does not mean that nothing is wrong but it indicates no emergency  is present that requires admission and they must continue care with follow-up as given below or physician of their choice.     FOLLOW-UP  Ekta Zepeda, APRN  [address] [facility] [address] [phone]    Schedule an appointment as soon as possible for a visit            Medication List      No changes were made to your prescriptions during this visit.           Note Disclaimer: At [facility], we believe that sharing information builds trust and better relationships. You are receiving this note because you recently visited [facility]. It is possible you will see health information before a provider has talked with you about it. This kind of information can be easy to misunderstand. To help you fully understand what it means for your health, we urge you to discuss this note with your provider.     John Desir PA  06/10/23 0426

## 2023-06-10 NOTE — ED TRIAGE NOTES
Pt from home via EMS, pt has ETOH on board, pt had 10ea 16oz beers tonight.  Pt got up and then fell, striking his head on the wall and putting a hole in the drywall.  Pt's spouse states he had a +LOC for a few seconds, he denies pain to head, neck, or shoulders.  He is AOx4 in triage.  No open areas noted.  Pt denies blood thinners.

## 2023-08-01 ENCOUNTER — OFFICE VISIT (OUTPATIENT)
Dept: FAMILY MEDICINE CLINIC | Facility: CLINIC | Age: 53
End: 2023-08-01
Payer: COMMERCIAL

## 2023-08-01 VITALS
OXYGEN SATURATION: 98 % | HEIGHT: 68 IN | WEIGHT: 236.6 LBS | HEART RATE: 60 BPM | TEMPERATURE: 98 F | DIASTOLIC BLOOD PRESSURE: 68 MMHG | SYSTOLIC BLOOD PRESSURE: 110 MMHG | BODY MASS INDEX: 35.86 KG/M2

## 2023-08-01 DIAGNOSIS — R42 DIZZINESS: ICD-10-CM

## 2023-08-01 DIAGNOSIS — E55.9 VITAMIN D DEFICIENCY: ICD-10-CM

## 2023-08-01 DIAGNOSIS — G47.9 SLEEP DISTURBANCES: ICD-10-CM

## 2023-08-01 DIAGNOSIS — I10 ESSENTIAL HYPERTENSION: Primary | ICD-10-CM

## 2023-08-01 PROCEDURE — 99214 OFFICE O/P EST MOD 30 MIN: CPT | Performed by: NURSE PRACTITIONER

## 2023-08-02 PROBLEM — R42 DIZZINESS: Status: ACTIVE | Noted: 2023-08-02

## 2023-08-02 PROBLEM — G47.9 SLEEP DISTURBANCES: Status: ACTIVE | Noted: 2023-08-02

## 2023-08-02 LAB
25(OH)D3+25(OH)D2 SERPL-MCNC: 46.1 NG/ML (ref 30–100)
ALBUMIN SERPL-MCNC: 4.3 G/DL (ref 3.5–5.2)
ALBUMIN/GLOB SERPL: 1.6 G/DL
ALP SERPL-CCNC: 135 U/L (ref 39–117)
ALT SERPL-CCNC: 25 U/L (ref 1–41)
AST SERPL-CCNC: 23 U/L (ref 1–40)
BASOPHILS # BLD AUTO: 0.02 10*3/MM3 (ref 0–0.2)
BASOPHILS NFR BLD AUTO: 0.2 % (ref 0–1.5)
BILIRUB SERPL-MCNC: 0.4 MG/DL (ref 0–1.2)
BUN SERPL-MCNC: 27 MG/DL (ref 6–20)
BUN/CREAT SERPL: 26 (ref 7–25)
CALCIUM SERPL-MCNC: 9.8 MG/DL (ref 8.6–10.5)
CHLORIDE SERPL-SCNC: 105 MMOL/L (ref 98–107)
CO2 SERPL-SCNC: 22.6 MMOL/L (ref 22–29)
CREAT SERPL-MCNC: 1.04 MG/DL (ref 0.76–1.27)
EGFRCR SERPLBLD CKD-EPI 2021: 85.9 ML/MIN/1.73
EOSINOPHIL # BLD AUTO: 0.33 10*3/MM3 (ref 0–0.4)
EOSINOPHIL NFR BLD AUTO: 3.9 % (ref 0.3–6.2)
ERYTHROCYTE [DISTWIDTH] IN BLOOD BY AUTOMATED COUNT: 12.1 % (ref 12.3–15.4)
FERRITIN SERPL-MCNC: 300 NG/ML (ref 30–400)
GLOBULIN SER CALC-MCNC: 2.7 GM/DL
GLUCOSE SERPL-MCNC: 77 MG/DL (ref 65–99)
HCT VFR BLD AUTO: 40.9 % (ref 37.5–51)
HGB BLD-MCNC: 13.8 G/DL (ref 13–17.7)
IMM GRANULOCYTES # BLD AUTO: 0.03 10*3/MM3 (ref 0–0.05)
IMM GRANULOCYTES NFR BLD AUTO: 0.4 % (ref 0–0.5)
IRON SERPL-MCNC: 83 MCG/DL (ref 59–158)
LYMPHOCYTES # BLD AUTO: 1.31 10*3/MM3 (ref 0.7–3.1)
LYMPHOCYTES NFR BLD AUTO: 15.4 % (ref 19.6–45.3)
MCH RBC QN AUTO: 32.6 PG (ref 26.6–33)
MCHC RBC AUTO-ENTMCNC: 33.7 G/DL (ref 31.5–35.7)
MCV RBC AUTO: 96.7 FL (ref 79–97)
MONOCYTES # BLD AUTO: 0.64 10*3/MM3 (ref 0.1–0.9)
MONOCYTES NFR BLD AUTO: 7.5 % (ref 5–12)
NEUTROPHILS # BLD AUTO: 6.17 10*3/MM3 (ref 1.7–7)
NEUTROPHILS NFR BLD AUTO: 72.6 % (ref 42.7–76)
NRBC BLD AUTO-RTO: 0 /100 WBC (ref 0–0.2)
PLATELET # BLD AUTO: 242 10*3/MM3 (ref 140–450)
POTASSIUM SERPL-SCNC: 4.7 MMOL/L (ref 3.5–5.2)
PROT SERPL-MCNC: 7 G/DL (ref 6–8.5)
RBC # BLD AUTO: 4.23 10*6/MM3 (ref 4.14–5.8)
SODIUM SERPL-SCNC: 139 MMOL/L (ref 136–145)
WBC # BLD AUTO: 8.5 10*3/MM3 (ref 3.4–10.8)

## 2023-08-27 ENCOUNTER — APPOINTMENT (OUTPATIENT)
Dept: CT IMAGING | Facility: HOSPITAL | Age: 53
DRG: 193 | End: 2023-08-27
Payer: COMMERCIAL

## 2023-08-27 ENCOUNTER — APPOINTMENT (OUTPATIENT)
Dept: GENERAL RADIOLOGY | Facility: HOSPITAL | Age: 53
DRG: 193 | End: 2023-08-27
Payer: COMMERCIAL

## 2023-08-27 ENCOUNTER — HOSPITAL ENCOUNTER (INPATIENT)
Facility: HOSPITAL | Age: 53
LOS: 2 days | Discharge: HOME | DRG: 193 | End: 2023-08-29
Attending: EMERGENCY MEDICINE | Admitting: STUDENT IN AN ORGANIZED HEALTH CARE EDUCATION/TRAINING PROGRAM
Payer: COMMERCIAL

## 2023-08-27 ENCOUNTER — APPOINTMENT (OUTPATIENT)
Dept: CARDIOLOGY | Facility: HOSPITAL | Age: 53
DRG: 193 | End: 2023-08-27
Payer: COMMERCIAL

## 2023-08-27 DIAGNOSIS — I26.99 OTHER ACUTE PULMONARY EMBOLISM WITHOUT ACUTE COR PULMONALE: ICD-10-CM

## 2023-08-27 DIAGNOSIS — J18.9 PNEUMONIA OF RIGHT LOWER LOBE DUE TO INFECTIOUS ORGANISM: ICD-10-CM

## 2023-08-27 DIAGNOSIS — R09.02 HYPOXIA: Primary | ICD-10-CM

## 2023-08-27 DIAGNOSIS — J18.9 PNEUMONIA DUE TO INFECTIOUS ORGANISM, UNSPECIFIED LATERALITY, UNSPECIFIED PART OF LUNG: ICD-10-CM

## 2023-08-27 PROBLEM — E66.9 OBESITY (BMI 30-39.9): Status: ACTIVE | Noted: 2023-08-27

## 2023-08-27 PROBLEM — J96.01 ACUTE RESPIRATORY FAILURE WITH HYPOXIA: Status: ACTIVE | Noted: 2023-08-27

## 2023-08-27 LAB
ALBUMIN SERPL-MCNC: 4.1 G/DL (ref 3.5–5.2)
ALBUMIN/GLOB SERPL: 1.3 G/DL
ALP SERPL-CCNC: 105 U/L (ref 39–117)
ALT SERPL W P-5'-P-CCNC: 19 U/L (ref 1–41)
ANION GAP SERPL CALCULATED.3IONS-SCNC: 11 MMOL/L (ref 5–15)
APTT PPP: 25.7 SECONDS (ref 22.7–35.4)
ARTERIAL PATENCY WRIST A: POSITIVE
AST SERPL-CCNC: 28 U/L (ref 1–40)
ATMOSPHERIC PRESS: 747.6 MMHG
B PARAPERT DNA SPEC QL NAA+PROBE: NOT DETECTED
B PERT DNA SPEC QL NAA+PROBE: NOT DETECTED
BACTERIA SPEC RESP CULT: NORMAL
BASE EXCESS BLDA CALC-SCNC: -2.5 MMOL/L (ref 0–2)
BASOPHILS # BLD AUTO: 0.05 10*3/MM3 (ref 0–0.2)
BASOPHILS NFR BLD AUTO: 0.3 % (ref 0–1.5)
BDY SITE: ABNORMAL
BH CV LOWER VASCULAR LEFT COMMON FEMORAL AUGMENT: NORMAL
BH CV LOWER VASCULAR LEFT COMMON FEMORAL COMPETENT: NORMAL
BH CV LOWER VASCULAR LEFT COMMON FEMORAL COMPRESS: NORMAL
BH CV LOWER VASCULAR LEFT COMMON FEMORAL PHASIC: NORMAL
BH CV LOWER VASCULAR LEFT COMMON FEMORAL SPONT: NORMAL
BH CV LOWER VASCULAR LEFT DISTAL FEMORAL COMPRESS: NORMAL
BH CV LOWER VASCULAR LEFT GASTRONEMIUS COMPRESS: NORMAL
BH CV LOWER VASCULAR LEFT GREATER SAPH AK COMPRESS: NORMAL
BH CV LOWER VASCULAR LEFT GREATER SAPH BK COMPRESS: NORMAL
BH CV LOWER VASCULAR LEFT LESSER SAPH COMPRESS: NORMAL
BH CV LOWER VASCULAR LEFT MID FEMORAL AUGMENT: NORMAL
BH CV LOWER VASCULAR LEFT MID FEMORAL COMPETENT: NORMAL
BH CV LOWER VASCULAR LEFT MID FEMORAL COMPRESS: NORMAL
BH CV LOWER VASCULAR LEFT MID FEMORAL PHASIC: NORMAL
BH CV LOWER VASCULAR LEFT MID FEMORAL SPONT: NORMAL
BH CV LOWER VASCULAR LEFT PERONEAL COMPRESS: NORMAL
BH CV LOWER VASCULAR LEFT POPLITEAL AUGMENT: NORMAL
BH CV LOWER VASCULAR LEFT POPLITEAL COMPETENT: NORMAL
BH CV LOWER VASCULAR LEFT POPLITEAL COMPRESS: NORMAL
BH CV LOWER VASCULAR LEFT POPLITEAL PHASIC: NORMAL
BH CV LOWER VASCULAR LEFT POPLITEAL SPONT: NORMAL
BH CV LOWER VASCULAR LEFT POSTERIOR TIBIAL COMPRESS: NORMAL
BH CV LOWER VASCULAR LEFT PROFUNDA FEMORAL COMPRESS: NORMAL
BH CV LOWER VASCULAR LEFT PROXIMAL FEMORAL COMPRESS: NORMAL
BH CV LOWER VASCULAR LEFT SAPHENOFEMORAL JUNCTION COMPRESS: NORMAL
BH CV LOWER VASCULAR RIGHT COMMON FEMORAL AUGMENT: NORMAL
BH CV LOWER VASCULAR RIGHT COMMON FEMORAL COMPETENT: NORMAL
BH CV LOWER VASCULAR RIGHT COMMON FEMORAL COMPRESS: NORMAL
BH CV LOWER VASCULAR RIGHT COMMON FEMORAL PHASIC: NORMAL
BH CV LOWER VASCULAR RIGHT COMMON FEMORAL SPONT: NORMAL
BH CV LOWER VASCULAR RIGHT DISTAL FEMORAL COMPRESS: NORMAL
BH CV LOWER VASCULAR RIGHT GASTRONEMIUS COMPRESS: NORMAL
BH CV LOWER VASCULAR RIGHT GREATER SAPH AK COMPRESS: NORMAL
BH CV LOWER VASCULAR RIGHT GREATER SAPH BK COMPRESS: NORMAL
BH CV LOWER VASCULAR RIGHT LESSER SAPH COMPRESS: NORMAL
BH CV LOWER VASCULAR RIGHT MID FEMORAL AUGMENT: NORMAL
BH CV LOWER VASCULAR RIGHT MID FEMORAL COMPETENT: NORMAL
BH CV LOWER VASCULAR RIGHT MID FEMORAL COMPRESS: NORMAL
BH CV LOWER VASCULAR RIGHT MID FEMORAL PHASIC: NORMAL
BH CV LOWER VASCULAR RIGHT MID FEMORAL SPONT: NORMAL
BH CV LOWER VASCULAR RIGHT PERONEAL COMPRESS: NORMAL
BH CV LOWER VASCULAR RIGHT POPLITEAL AUGMENT: NORMAL
BH CV LOWER VASCULAR RIGHT POPLITEAL COMPETENT: NORMAL
BH CV LOWER VASCULAR RIGHT POPLITEAL COMPRESS: NORMAL
BH CV LOWER VASCULAR RIGHT POPLITEAL PHASIC: NORMAL
BH CV LOWER VASCULAR RIGHT POPLITEAL SPONT: NORMAL
BH CV LOWER VASCULAR RIGHT POSTERIOR TIBIAL COMPRESS: NORMAL
BH CV LOWER VASCULAR RIGHT PROFUNDA FEMORAL COMPRESS: NORMAL
BH CV LOWER VASCULAR RIGHT PROXIMAL FEMORAL COMPRESS: NORMAL
BH CV LOWER VASCULAR RIGHT SAPHENOFEMORAL JUNCTION COMPRESS: NORMAL
BILIRUB SERPL-MCNC: 0.5 MG/DL (ref 0–1.2)
BUN SERPL-MCNC: 14 MG/DL (ref 6–20)
BUN/CREAT SERPL: 14.1 (ref 7–25)
C PNEUM DNA NPH QL NAA+NON-PROBE: NOT DETECTED
CALCIUM SPEC-SCNC: 9.3 MG/DL (ref 8.6–10.5)
CHLORIDE SERPL-SCNC: 104 MMOL/L (ref 98–107)
CO2 SERPL-SCNC: 26 MMOL/L (ref 22–29)
CREAT SERPL-MCNC: 0.99 MG/DL (ref 0.76–1.27)
D-LACTATE SERPL-SCNC: 2 MMOL/L (ref 0.5–2)
DEPRECATED RDW RBC AUTO: 43 FL (ref 37–54)
EGFRCR SERPLBLD CKD-EPI 2021: 91.1 ML/MIN/1.73
EOSINOPHIL # BLD AUTO: 0.83 10*3/MM3 (ref 0–0.4)
EOSINOPHIL NFR BLD AUTO: 5.4 % (ref 0.3–6.2)
ERYTHROCYTE [DISTWIDTH] IN BLOOD BY AUTOMATED COUNT: 12.4 % (ref 12.3–15.4)
FLUAV SUBTYP SPEC NAA+PROBE: NOT DETECTED
FLUBV RNA ISLT QL NAA+PROBE: NOT DETECTED
GAS FLOW AIRWAY: 11 LPM
GEN 5 2HR TROPONIN T REFLEX: 11 NG/L
GLOBULIN UR ELPH-MCNC: 3.2 GM/DL
GLUCOSE SERPL-MCNC: 122 MG/DL (ref 65–99)
GRAM STN SPEC: NORMAL
GRAM STN SPEC: NORMAL
HADV DNA SPEC NAA+PROBE: NOT DETECTED
HCO3 BLDA-SCNC: 23.6 MMOL/L (ref 22–28)
HCOV 229E RNA SPEC QL NAA+PROBE: NOT DETECTED
HCOV HKU1 RNA SPEC QL NAA+PROBE: NOT DETECTED
HCOV NL63 RNA SPEC QL NAA+PROBE: NOT DETECTED
HCOV OC43 RNA SPEC QL NAA+PROBE: NOT DETECTED
HCT VFR BLD AUTO: 39.5 % (ref 37.5–51)
HGB BLD-MCNC: 13.4 G/DL (ref 13–17.7)
HMPV RNA NPH QL NAA+NON-PROBE: NOT DETECTED
HPIV1 RNA ISLT QL NAA+PROBE: NOT DETECTED
HPIV2 RNA SPEC QL NAA+PROBE: NOT DETECTED
HPIV3 RNA NPH QL NAA+PROBE: NOT DETECTED
HPIV4 P GENE NPH QL NAA+PROBE: NOT DETECTED
IMM GRANULOCYTES # BLD AUTO: 0.04 10*3/MM3 (ref 0–0.05)
IMM GRANULOCYTES NFR BLD AUTO: 0.3 % (ref 0–0.5)
INR PPP: 0.99 (ref 0.9–1.1)
LYMPHOCYTES # BLD AUTO: 1.98 10*3/MM3 (ref 0.7–3.1)
LYMPHOCYTES NFR BLD AUTO: 12.8 % (ref 19.6–45.3)
M PNEUMO IGG SER IA-ACNC: NOT DETECTED
MAGNESIUM SERPL-MCNC: 1.9 MG/DL (ref 1.6–2.6)
MCH RBC QN AUTO: 31.9 PG (ref 26.6–33)
MCHC RBC AUTO-ENTMCNC: 33.9 G/DL (ref 31.5–35.7)
MCV RBC AUTO: 94 FL (ref 79–97)
MODALITY: ABNORMAL
MONOCYTES # BLD AUTO: 1.67 10*3/MM3 (ref 0.1–0.9)
MONOCYTES NFR BLD AUTO: 10.8 % (ref 5–12)
NEUTROPHILS NFR BLD AUTO: 10.91 10*3/MM3 (ref 1.7–7)
NEUTROPHILS NFR BLD AUTO: 70.4 % (ref 42.7–76)
NRBC BLD AUTO-RTO: 0 /100 WBC (ref 0–0.2)
NT-PROBNP SERPL-MCNC: 72.3 PG/ML (ref 0–900)
PCO2 BLDA: 45.1 MM HG (ref 35–45)
PH BLDA: 7.33 PH UNITS (ref 7.35–7.45)
PLATELET # BLD AUTO: 232 10*3/MM3 (ref 140–450)
PMV BLD AUTO: 11.1 FL (ref 6–12)
PO2 BLDA: 124.4 MM HG (ref 80–100)
POTASSIUM SERPL-SCNC: 3.9 MMOL/L (ref 3.5–5.2)
PROCALCITONIN SERPL-MCNC: 0.06 NG/ML (ref 0–0.25)
PROT SERPL-MCNC: 7.3 G/DL (ref 6–8.5)
PROTHROMBIN TIME: 13.2 SECONDS (ref 11.7–14.2)
QT INTERVAL: 689 MS
QTC INTERVAL: 717 MS
RBC # BLD AUTO: 4.2 10*6/MM3 (ref 4.14–5.8)
RHINOVIRUS RNA SPEC NAA+PROBE: NOT DETECTED
RSV RNA NPH QL NAA+NON-PROBE: NOT DETECTED
SAO2 % BLDCOA: 98.5 % (ref 92–99)
SARS-COV-2 RNA NPH QL NAA+NON-PROBE: NOT DETECTED
SODIUM SERPL-SCNC: 141 MMOL/L (ref 136–145)
TOTAL RATE: 18 BREATHS/MINUTE
TROPONIN T DELTA: -2 NG/L
TROPONIN T SERPL HS-MCNC: 13 NG/L
WBC NRBC COR # BLD: 15.48 10*3/MM3 (ref 3.4–10.8)

## 2023-08-27 PROCEDURE — 71045 X-RAY EXAM CHEST 1 VIEW: CPT

## 2023-08-27 PROCEDURE — 93005 ELECTROCARDIOGRAM TRACING: CPT | Performed by: EMERGENCY MEDICINE

## 2023-08-27 PROCEDURE — 87040 BLOOD CULTURE FOR BACTERIA: CPT | Performed by: EMERGENCY MEDICINE

## 2023-08-27 PROCEDURE — 84484 ASSAY OF TROPONIN QUANT: CPT | Performed by: EMERGENCY MEDICINE

## 2023-08-27 PROCEDURE — 71275 CT ANGIOGRAPHY CHEST: CPT

## 2023-08-27 PROCEDURE — 36415 COLL VENOUS BLD VENIPUNCTURE: CPT

## 2023-08-27 PROCEDURE — 93970 EXTREMITY STUDY: CPT

## 2023-08-27 PROCEDURE — 85730 THROMBOPLASTIN TIME PARTIAL: CPT | Performed by: EMERGENCY MEDICINE

## 2023-08-27 PROCEDURE — 85025 COMPLETE CBC W/AUTO DIFF WBC: CPT | Performed by: EMERGENCY MEDICINE

## 2023-08-27 PROCEDURE — 25010000002 ENOXAPARIN PER 10 MG: Performed by: EMERGENCY MEDICINE

## 2023-08-27 PROCEDURE — 25010000002 CEFTRIAXONE PER 250 MG: Performed by: HOSPITALIST

## 2023-08-27 PROCEDURE — 0 CEFEPIME PER 500 MG: Performed by: EMERGENCY MEDICINE

## 2023-08-27 PROCEDURE — 83880 ASSAY OF NATRIURETIC PEPTIDE: CPT | Performed by: EMERGENCY MEDICINE

## 2023-08-27 PROCEDURE — 25510000001 IOPAMIDOL PER 1 ML: Performed by: EMERGENCY MEDICINE

## 2023-08-27 PROCEDURE — 85610 PROTHROMBIN TIME: CPT | Performed by: EMERGENCY MEDICINE

## 2023-08-27 PROCEDURE — 87205 SMEAR GRAM STAIN: CPT | Performed by: INTERNAL MEDICINE

## 2023-08-27 PROCEDURE — 0202U NFCT DS 22 TRGT SARS-COV-2: CPT | Performed by: EMERGENCY MEDICINE

## 2023-08-27 PROCEDURE — 82803 BLOOD GASES ANY COMBINATION: CPT

## 2023-08-27 PROCEDURE — 25010000002 AZITHROMYCIN PER 500 MG: Performed by: HOSPITALIST

## 2023-08-27 PROCEDURE — 36600 WITHDRAWAL OF ARTERIAL BLOOD: CPT

## 2023-08-27 PROCEDURE — 99285 EMERGENCY DEPT VISIT HI MDM: CPT

## 2023-08-27 PROCEDURE — 80053 COMPREHEN METABOLIC PANEL: CPT | Performed by: EMERGENCY MEDICINE

## 2023-08-27 PROCEDURE — 83735 ASSAY OF MAGNESIUM: CPT | Performed by: EMERGENCY MEDICINE

## 2023-08-27 PROCEDURE — 93010 ELECTROCARDIOGRAM REPORT: CPT | Performed by: INTERNAL MEDICINE

## 2023-08-27 PROCEDURE — 84145 PROCALCITONIN (PCT): CPT | Performed by: EMERGENCY MEDICINE

## 2023-08-27 PROCEDURE — 83605 ASSAY OF LACTIC ACID: CPT | Performed by: EMERGENCY MEDICINE

## 2023-08-27 RX ORDER — NITROGLYCERIN 0.4 MG/1
0.4 TABLET SUBLINGUAL
Status: DISCONTINUED | OUTPATIENT
Start: 2023-08-27 | End: 2023-08-29 | Stop reason: HOSPADM

## 2023-08-27 RX ORDER — UREA 10 %
3 LOTION (ML) TOPICAL NIGHTLY PRN
Status: DISCONTINUED | OUTPATIENT
Start: 2023-08-27 | End: 2023-08-29 | Stop reason: HOSPADM

## 2023-08-27 RX ORDER — HEPARIN SODIUM 5000 [USP'U]/ML
80 INJECTION, SOLUTION INTRAVENOUS; SUBCUTANEOUS ONCE
Status: DISCONTINUED | OUTPATIENT
Start: 2023-08-27 | End: 2023-08-27

## 2023-08-27 RX ORDER — GUAIFENESIN 600 MG/1
1200 TABLET, EXTENDED RELEASE ORAL EVERY 12 HOURS SCHEDULED
Status: DISCONTINUED | OUTPATIENT
Start: 2023-08-27 | End: 2023-08-29 | Stop reason: HOSPADM

## 2023-08-27 RX ORDER — PANTOPRAZOLE SODIUM 40 MG/1
40 TABLET, DELAYED RELEASE ORAL DAILY
Status: DISCONTINUED | OUTPATIENT
Start: 2023-08-27 | End: 2023-08-29 | Stop reason: HOSPADM

## 2023-08-27 RX ORDER — HEPARIN SODIUM 5000 [USP'U]/ML
40-80 INJECTION, SOLUTION INTRAVENOUS; SUBCUTANEOUS EVERY 6 HOURS PRN
Status: DISCONTINUED | OUTPATIENT
Start: 2023-08-27 | End: 2023-08-27

## 2023-08-27 RX ORDER — ONDANSETRON 4 MG/1
4 TABLET, FILM COATED ORAL EVERY 6 HOURS PRN
Status: DISCONTINUED | OUTPATIENT
Start: 2023-08-27 | End: 2023-08-29 | Stop reason: HOSPADM

## 2023-08-27 RX ORDER — ONDANSETRON 2 MG/ML
4 INJECTION INTRAMUSCULAR; INTRAVENOUS EVERY 6 HOURS PRN
Status: DISCONTINUED | OUTPATIENT
Start: 2023-08-27 | End: 2023-08-29 | Stop reason: HOSPADM

## 2023-08-27 RX ORDER — ENOXAPARIN SODIUM 100 MG/ML
1 INJECTION SUBCUTANEOUS ONCE
Status: COMPLETED | OUTPATIENT
Start: 2023-08-27 | End: 2023-08-27

## 2023-08-27 RX ORDER — DEXTROMETHORPHAN POLISTIREX 30 MG/5ML
60 SUSPENSION ORAL EVERY 12 HOURS SCHEDULED
Status: DISCONTINUED | OUTPATIENT
Start: 2023-08-27 | End: 2023-08-29 | Stop reason: HOSPADM

## 2023-08-27 RX ORDER — PANTOPRAZOLE SODIUM 20 MG/1
20 TABLET, DELAYED RELEASE ORAL DAILY
Status: DISCONTINUED | OUTPATIENT
Start: 2023-08-27 | End: 2023-08-27 | Stop reason: CLARIF

## 2023-08-27 RX ORDER — IPRATROPIUM BROMIDE AND ALBUTEROL SULFATE 2.5; .5 MG/3ML; MG/3ML
3 SOLUTION RESPIRATORY (INHALATION) EVERY 4 HOURS PRN
Status: DISCONTINUED | OUTPATIENT
Start: 2023-08-27 | End: 2023-08-29 | Stop reason: HOSPADM

## 2023-08-27 RX ORDER — ENOXAPARIN SODIUM 150 MG/ML
1 INJECTION SUBCUTANEOUS EVERY 12 HOURS
Status: DISCONTINUED | OUTPATIENT
Start: 2023-08-27 | End: 2023-08-29 | Stop reason: HOSPADM

## 2023-08-27 RX ORDER — ACETAMINOPHEN 325 MG/1
650 TABLET ORAL EVERY 4 HOURS PRN
Status: DISCONTINUED | OUTPATIENT
Start: 2023-08-27 | End: 2023-08-29 | Stop reason: HOSPADM

## 2023-08-27 RX ORDER — SODIUM CHLORIDE 0.9 % (FLUSH) 0.9 %
10 SYRINGE (ML) INJECTION AS NEEDED
Status: DISCONTINUED | OUTPATIENT
Start: 2023-08-27 | End: 2023-08-27

## 2023-08-27 RX ORDER — HEPARIN SODIUM 10000 [USP'U]/100ML
14 INJECTION, SOLUTION INTRAVENOUS
Status: DISCONTINUED | OUTPATIENT
Start: 2023-08-27 | End: 2023-08-27

## 2023-08-27 RX ADMIN — PANTOPRAZOLE SODIUM 40 MG: 40 TABLET, DELAYED RELEASE ORAL at 10:53

## 2023-08-27 RX ADMIN — GUAIFENESIN 1200 MG: 600 TABLET, EXTENDED RELEASE ORAL at 21:02

## 2023-08-27 RX ADMIN — CEFTRIAXONE SODIUM 1000 MG: 1 INJECTION, POWDER, FOR SOLUTION INTRAMUSCULAR; INTRAVENOUS at 10:52

## 2023-08-27 RX ADMIN — AZITHROMYCIN MONOHYDRATE 500 MG: 500 INJECTION, POWDER, LYOPHILIZED, FOR SOLUTION INTRAVENOUS at 10:47

## 2023-08-27 RX ADMIN — CEFEPIME 2000 MG: 2 INJECTION, POWDER, FOR SOLUTION INTRAVENOUS at 06:37

## 2023-08-27 RX ADMIN — GUAIFENESIN 1200 MG: 600 TABLET, EXTENDED RELEASE ORAL at 10:53

## 2023-08-27 RX ADMIN — DEXTROMETHORPHAN 60 MG: 30 SUSPENSION, EXTENDED RELEASE ORAL at 21:02

## 2023-08-27 RX ADMIN — IOPAMIDOL 95 ML: 755 INJECTION, SOLUTION INTRAVENOUS at 05:46

## 2023-08-27 RX ADMIN — ENOXAPARIN SODIUM 110 MG: 100 INJECTION SUBCUTANEOUS at 06:46

## 2023-08-27 RX ADMIN — SERTRALINE 150 MG: 100 TABLET, FILM COATED ORAL at 10:48

## 2023-08-27 NOTE — H&P
"    Patient Name:  Nikolai Shaw  YOB: 1970  MRN:  [id number]  Admit Date:  8/27/2023  Patient Care Team:  Ekta Zepeda APRN as PCP - General (Family Medicine)  Jacob Shelby MD as Consulting Physician (Hematology and Oncology)  Pal Crane MD as Referring Physician (General Surgery)      Subjective   History Present Illness     Chief Complaint   Patient presents with    Shortness of Breath       Mr. Shaw is a 53 y.o. male with a history of rectal cancer s/p total colectomy with ileostomy, HTN and anxiety that presents to [facility] complaining of shortness of breath.  He has felt poorly for at least a week now.  He was prescribed amoxicillin in urgent care last week and completed therapy.  Still has significant cough and shortness of breath.  Has had some subjective fever and chills but never a documented fever.  Last night he was getting out of bed to empty his ileostomy but he came very lightheaded seemingly diaphoretic and had an episode of vomiting.  Family states he was \"out of it\" for a brief period of time.  He denied having any chest pain with this and really at no point has he had any pleuritic chest pain.  No pain or swelling his lower extremities.  No prior history of blood clot.  He was severely hypoxic in the ED but improved with treatment.      Shortness of Breath    Review of Systems   Constitutional: Negative.    HENT: Negative.     Eyes: Negative.    Respiratory:  Positive for shortness of breath.    Gastrointestinal: Negative.    Endocrine: Negative.    Genitourinary: Negative.    Musculoskeletal: Negative.    Skin: Negative.    Neurological: Negative.    Hematological: Negative.    Psychiatric/Behavioral: Negative.        Personal History     Past Medical History:   Diagnosis Date    Adenocarcinoma of rectum 06/2015    invasive adenocarcinoma of rectum w 59 benign lymph nodes    Adenomatous polyposis     FAMILIAL     Anxiety     At risk for sleep apnea  "    Chicken pox     patient reports having as a child    Depression     Herpes zoster without complication 12/3/2021    History of anemia     Hypertension     Hyponatremia     Insomnia     Malignant neoplasm of rectum 6/1/2016    Parastomal hernia without obstruction or gangrene 2/15/2021    Added automatically from request for surgery [id number]    Pulmonary nodule 1/10/2020    6/26/20 CT chest: 6 mm nodule resolved.    Renal calculi     RLS (restless legs syndrome)     Syncope 03/19/2021    prolonged QT interval    Testicular hypofunction     Vitamin D deficiency      Past Surgical History:   Procedure Laterality Date    ABDOMINOPERINEAL PROCTOCOLECTOMY  07/06/2015    DR PAL RUDOLPH    COLONOSCOPY  06/16/1915    DR SHASTA HOWARD    COLONOSCOPY N/A 2/23/2021    Procedure: ILEOSCOPY, EXCISION OF ILEOSTOMY NODULE;  Surgeon: Pal Rudolph MD;  Location: [facility] OR;  Service: General;  Laterality: N/A;    ENDOSCOPY  06/23/2015    DR SHASTA HOWARD    EXPLORATORY LAPAROTOMY N/A 2/3/2017    Procedure: LAPAROTOMY EXPLORATORY  SMALL BOWEL OBSTRUCTION;  Surgeon: Pal Rudolph MD;  Location: [facility] OR;  Service:     ILEOSTOMY  07/06/2015    MYRINGOTOMY W/ TUBES      2-3 times as child     Family History   Problem Relation Age of Onset    Colon cancer Father         familial polyposis    Aneurysm Maternal Grandfather 66        brain aneurysm    Colon cancer Paternal Grandfather         familial polyposis    Hypertension Maternal Uncle     Diabetes Maternal Uncle     Malig Hyperthermia Neg Hx      Social History     Tobacco Use    Smoking status: Never    Smokeless tobacco: Current     Types: Chew    Tobacco comments:     CHEWS TOBACCO   Vaping Use    Vaping Use: Never used   Substance Use Topics    Alcohol use: Yes     Alcohol/week: 24.0 standard drinks     Types: 24 Cans of beer per week     Comment: Some days 24 beers a day. Notmally  at least 18; stopped drinking 6/10/23    Drug use: No     No current  facility-administered medications on file prior to encounter.     Current Outpatient Medications on File Prior to Encounter   Medication Sig Dispense Refill    B Complex Vitamins (VITAMIN-B COMPLEX PO) Take 1 tablet by mouth Daily.      Cholecalciferol (Vitamin D-3) 125 MCG (5000 UT) tablet Take 1 tablet by mouth Daily. 90 tablet 1    pantoprazole (PROTONIX) 20 MG EC tablet Take 1 tablet by mouth Daily. 90 tablet 1    sertraline (ZOLOFT) 100 MG tablet Take 1.5 tablets by mouth Daily. 135 tablet 1    lisinopril (PRINIVIL,ZESTRIL) 20 MG tablet Take 1 tablet by mouth Daily. (Patient not taking: Reported on 8/1/2023) 90 tablet 1    lisinopril-hydrochlorothiazide (PRINZIDE,ZESTORETIC) 20-12.5 MG per tablet Take 1 tablet by mouth Daily. (Patient not taking: Reported on 8/1/2023) 90 tablet 1     No Known Allergies    Objective    Objective     Vital Signs  Temp:  [99.1 °F (37.3 °C)-99.7 °F (37.6 °C)] 99.7 °F (37.6 °C)  Heart Rate:  [65-70] 70  Resp:  [20-24] 20  BP: ()/(62-76) 129/76  SpO2:  [88 %-97 %] 97 %  on  Flow (L/min):  [2-10] 2;   Device (Oxygen Therapy): nasal cannula  Body mass index is 35.88 kg/m².    Physical Exam  Vitals and nursing note reviewed.   Constitutional:       Appearance: He is well-developed. He is ill-appearing.   HENT:      Head: Normocephalic and atraumatic.   Eyes:      General: No scleral icterus.  Cardiovascular:      Rate and Rhythm: Normal rate and regular rhythm.   Pulmonary:      Effort: Pulmonary effort is normal.      Breath sounds: Normal breath sounds.   Abdominal:      General: Bowel sounds are normal. There is no distension.      Palpations: Abdomen is soft.      Tenderness: There is no abdominal tenderness.      Comments: Ileostomy RUQ   Skin:     General: Skin is warm and dry.   Neurological:      Mental Status: He is alert. Mental status is at baseline.       Results Review:  I reviewed the patient's new clinical results.  I reviewed the patient's new imaging results and  agree with the interpretation.  I reviewed the patient's other test results and agree with the interpretation  I personally viewed and interpreted the patient's EKG/Telemetry data  Discussed with ED provider.    Lab Results (last 24 hours)       Procedure Component Value Units Date/Time    CBC & Differential [[id number]]  (Abnormal) Collected: 08/27/23 0354    Specimen: Blood Updated: 08/27/23 0407    Narrative:      The following orders were created for panel order CBC & Differential.  Procedure                               Abnormality         Status                     ---------                               -----------         ------                     CBC Auto Differential[[id number]]        Abnormal            Final result                 Please view results for these tests on the individual orders.    Comprehensive Metabolic Panel [[id number]]  (Abnormal) Collected: 08/27/23 0354    Specimen: Blood Updated: 08/27/23 0431     Glucose 122 mg/dL      BUN 14 mg/dL      Creatinine 0.99 mg/dL      Sodium 141 mmol/L      Potassium 3.9 mmol/L      Comment: Slight hemolysis detected by analyzer. Results may be affected.        Chloride 104 mmol/L      CO2 26.0 mmol/L      Calcium 9.3 mg/dL      Total Protein 7.3 g/dL      Albumin 4.1 g/dL      ALT (SGPT) 19 U/L      AST (SGOT) 28 U/L      Comment: Slight hemolysis detected by analyzer. Results may be affected.        Alkaline Phosphatase 105 U/L      Total Bilirubin 0.5 mg/dL      Globulin 3.2 gm/dL      A/G Ratio 1.3 g/dL      BUN/Creatinine Ratio 14.1     Anion Gap 11.0 mmol/L      eGFR 91.1 mL/min/1.73     Narrative:      GFR Normal >60  Chronic Kidney Disease <60  Kidney Failure <15      Protime-INR [[id number]]  (Normal) Collected: 08/27/23 0354    Specimen: Blood Updated: 08/27/23 0436     Protime 13.2 Seconds      INR 0.99    aPTT [[id number]]  (Normal) Collected: 08/27/23 0354    Specimen: Blood Updated: 08/27/23 0436     PTT 25.7 seconds     High Sensitivity  Troponin T [[id number]]  (Normal) Collected: 08/27/23 0354    Specimen: Blood Updated: 08/27/23 0434     HS Troponin T 13 ng/L     Narrative:      High Sensitive Troponin T Reference Range:  <10.0 ng/L- Negative Female for AMI  <15.0 ng/L- Negative Male for AMI  >=10 - Abnormal Female indicating possible myocardial injury.  >=15 - Abnormal Male indicating possible myocardial injury.   Clinicians would have to utilize clinical acumen, EKG, Troponin, and serial changes to determine if it is an Acute Myocardial Infarction or myocardial injury due to an underlying chronic condition.         BNP [[id number]]  (Normal) Collected: 08/27/23 0354    Specimen: Blood Updated: 08/27/23 0434     proBNP 72.3 pg/mL     Narrative:      Among patients with dyspnea, NT-proBNP is highly sensitive for the detection of acute congestive heart failure. In addition NT-proBNP of <300 pg/ml effectively rules out acute congestive heart failure with 99% negative predictive value.      Blood Culture - Blood, Arm, Left [[id number]] Collected: 08/27/23 0354    Specimen: Blood from Arm, Left Updated: 08/27/23 0400    Blood Culture - Blood, Arm, Right [[id number]] Collected: 08/27/23 0354    Specimen: Blood from Arm, Right Updated: 08/27/23 0400    Lactic Acid, Plasma [[id number]]  (Normal) Collected: 08/27/23 0354    Specimen: Blood Updated: 08/27/23 0426     Lactate 2.0 mmol/L     Procalcitonin [[id number]]  (Normal) Collected: 08/27/23 0354    Specimen: Blood Updated: 08/27/23 0434     Procalcitonin 0.06 ng/mL     Narrative:      As a Marker for Sepsis (Non-Neonates):    1. <0.5 ng/mL represents a low risk of severe sepsis and/or septic shock.  2. >2 ng/mL represents a high risk of severe sepsis and/or septic shock.    As a Marker for Lower Respiratory Tract Infections that require antibiotic therapy:    PCT on Admission    Antibiotic Therapy       6-12 Hrs later    >0.5                Strongly Recommended  >0.25 - <0.5        Recommended   0.1 - 0.25  "         Discouraged              Remeasure/reassess PCT  <0.1                Strongly Discouraged     Remeasure/reassess PCT    As 28 day mortality risk marker: \"Change in Procalcitonin Result\" (>80% or <=80%) if Day 0 (or Day 1) and Day 4 values are available. Refer to http://www.[facility]-pct-calculator.com    Change in PCT <=80%  A decrease of PCT levels below or equal to 80% defines a positive change in PCT test result representing a higher risk for 28-day all-cause mortality of patients diagnosed with severe sepsis for septic shock.    Change in PCT >80%  A decrease of PCT levels of more than 80% defines a negative change in PCT result representing a lower risk for 28-day all-cause mortality of patients diagnosed with severe sepsis or septic shock.       Magnesium [[id number]]  (Normal) Collected: 08/27/23 0354    Specimen: Blood Updated: 08/27/23 0428     Magnesium 1.9 mg/dL     CBC Auto Differential [[id number]]  (Abnormal) Collected: 08/27/23 0354    Specimen: Blood Updated: 08/27/23 0407     WBC 15.48 10*3/mm3      RBC 4.20 10*6/mm3      Hemoglobin 13.4 g/dL      Hematocrit 39.5 %      MCV 94.0 fL      MCH 31.9 pg      MCHC 33.9 g/dL      RDW 12.4 %      RDW-SD 43.0 fl      MPV 11.1 fL      Platelets 232 10*3/mm3      Neutrophil % 70.4 %      Lymphocyte % 12.8 %      Monocyte % 10.8 %      Eosinophil % 5.4 %      Basophil % 0.3 %      Immature Grans % 0.3 %      Neutrophils, Absolute 10.91 10*3/mm3      Lymphocytes, Absolute 1.98 10*3/mm3      Monocytes, Absolute 1.67 10*3/mm3      Eosinophils, Absolute 0.83 10*3/mm3      Basophils, Absolute 0.05 10*3/mm3      Immature Grans, Absolute 0.04 10*3/mm3      nRBC 0.0 /100 WBC     Respiratory Panel PCR w/COVID-19(SARS-CoV-2) ABRAN/GINNY/FIDE/PAD/COR/MAD/SAUD In-House, NP Swab in UTM/VTM, 3-4 HR TAT - Swab, Nasopharynx [[id number]]  (Normal) Collected: 08/27/23 0355    Specimen: Swab from Nasopharynx Updated: 08/27/23 0518     ADENOVIRUS, PCR Not Detected     Coronavirus " 229E Not Detected     Coronavirus HKU1 Not Detected     Coronavirus NL63 Not Detected     Coronavirus OC43 Not Detected     COVID19 Not Detected     Human Metapneumovirus Not Detected     Human Rhinovirus/Enterovirus Not Detected     Influenza A PCR Not Detected     Influenza B PCR Not Detected     Parainfluenza Virus 1 Not Detected     Parainfluenza Virus 2 Not Detected     Parainfluenza Virus 3 Not Detected     Parainfluenza Virus 4 Not Detected     RSV, PCR Not Detected     Bordetella pertussis pcr Not Detected     Bordetella parapertussis PCR Not Detected     Chlamydophila pneumoniae PCR Not Detected     Mycoplasma pneumo by PCR Not Detected    Narrative:      In the setting of a positive respiratory panel with a viral infection PLUS a negative procalcitonin without other underlying concern for bacterial infection, consider observing off antibiotics or discontinuation of antibiotics and continue supportive care. If the respiratory panel is positive for atypical bacterial infection (Bordetella pertussis, Chlamydophila pneumoniae, or Mycoplasma pneumoniae), consider antibiotic de-escalation to target atypical bacterial infection.    Blood Gas, Arterial - [[id number]]  (Abnormal) Collected: 08/27/23 0557    Specimen: Arterial Blood Updated: 08/27/23 0600     Site Arterial: right radial     Rohit's Test Positive     pH, Arterial 7.328 pH units      pCO2, Arterial 45.1 mm Hg      pO2, Arterial 124.4 mm Hg      HCO3, Arterial 23.6 mmol/L      Base Excess, Arterial -2.5 mmol/L      O2 Saturation Calculated 98.5 %      Barometric Pressure for Blood Gas 747.6 mmHg      Modality HFNC     Flow Rate 11 lpm      Rate 18 Breaths/minute     High Sensitivity Troponin T 2Hr [[id number]]  (Normal) Collected: 08/27/23 0641    Specimen: Blood Updated: 08/27/23 0708     HS Troponin T 11 ng/L      Troponin T Delta -2 ng/L     Narrative:      High Sensitive Troponin T Reference Range:  <10.0 ng/L- Negative Female for AMI  <15.0 ng/L-  Negative Male for AMI  >=10 - Abnormal Female indicating possible myocardial injury.  >=15 - Abnormal Male indicating possible myocardial injury.   Clinicians would have to utilize clinical acumen, EKG, Troponin, and serial changes to determine if it is an Acute Myocardial Infarction or myocardial injury due to an underlying chronic condition.                 Imaging Results (Last 24 Hours)       Procedure Component Value Units Date/Time    CT Angiogram Chest [[id number]] Collected: 08/27/23 0605     Updated: 08/27/23 0619    Narrative:      CT ANGIOGRAM CHEST-     INDICATIONS: Short of breath     TECHNIQUE: Radiation dose reduction techniques were utilized, including  automated exposure control and exposure modulation based on body size.  CT angiography of the chest. Three-dimensional reconstructions.     COMPARISON: Chest CT from 6/26/2020     FINDINGS:     Pulmonary embolic disease is seen in left upper lobe segmental branch  artery, for example axial image 54, sagittal image 173. The RV LV ratio  is 1.4.     No aortic dissection.     The heart size is enlarged without pericardial effusion. A few small  subcentimeter short axis mediastinal lymph nodes are seen that are not  significant by size criteria.     The airways appear clear.     No pleural effusion or pneumothorax.     The lungs show reticulonodular infiltrates throughout the right lung,  suggesting pneumonia, CT follow-up advised to exclude the possibility of  underlying lesion. Largest nodular densities in this region are seen in  the right lower lobe, for example 1.2 cm on axial image 63, 1.3 cm on  image 88     Upper abdominal structures show no acute findings. Minimal hiatal hernia  is present.     Degenerative changes are seen in the spine. No acute fracture is  identified.       Impression:         Critical test result. Small left upper lobe pulmonary embolic disease.     Reticulonodular infiltrates throughout the right lung, suggesting  pneumonia,  CT follow-up advised.     Discussed by telephone with Dr. Hu at 0614, 8/27/2023.           This report was finalized on 8/27/2023 6:16 AM by Dr. Naveed Pickett M.D.       XR Chest 1 View [[id number]] Resulted: 08/27/23 0413     Updated: 08/27/23 0413              ECG 12 Lead Dyspnea   Preliminary Result   HEART RATE= 65  bpm   RR Interval= 923  ms   DE Interval= 156  ms   P Horizontal Axis= 12  deg   P Front Axis= 34  deg   QRSD Interval= 120  ms   QT Interval= 689  ms   QTcB= 717  ms   QRS Axis= -57  deg   T Wave Axis= 172  deg   - ABNORMAL ECG -   Sinus rhythm   IVCD, consider atypical RBBB   Nonspecific T abnormalities, lateral leads   Electronically Signed By:    Date and Time of Study: 2023-08-27 04:03:18        Assessment/Plan   Assessment & Plan   Active Hospital Problems    Diagnosis  POA    **Pneumonia involving right lung [J18.9]  Yes    Acute respiratory failure with hypoxia [J96.01]  Yes    Acute pulmonary embolism without acute cor pulmonale [I26.99]  Yes    Obesity (BMI 30-39.9) [E66.9]  Yes    Prediabetes [R73.03]  Yes    Sleep apnea [G47.30]  Yes    Essential hypertension [I10]  Yes    History of rectal cancer [Z85.048]  Yes      Resolved Hospital Problems   No resolved problems to display.       53 y.o. male admitted with Pneumonia involving right lung.    Patient presents with acute hypoxic respiratory failure after being treated for pneumonia over the last week.  It seems he had a rather sudden deterioration last evening possibly brought on by pulmonary embolus seen on CT scan.  He is hemodynamically stable with normal troponin and no evidence of cor pulmonale.  He was given Lovenox in the ED which will be continued.  CT scan shows right-sided infiltrates for which we will continue antibiotics.  His respiratory failure is improving.  We will ask pulmonology to weigh in.  Patient also has suspected sleep apnea and previously no showed a office visit for sleep apnea testing.     His rectal  cancer has been felt to be in remission.  He underwent total colectomy with ileostomy in 2015 and declined adjuvant chemotherapy at that time.  Previously seen by Dr. Shelby.  Consider hematology consultation before discharge.      Full code.  Discussed with patient, spouse, nursing staff, and ED provider.      Rmeigio Bee MD  [facility]  08/27/23  09:00 EDT

## 2023-08-27 NOTE — CONSULTS
Group: [facility]         CONSULT NOTE    Patient Identification:  Nikolai Shaw  53 y.o.  male  1970  [id number]            Requesting physician: Dr. Remigio Bee    Reason for Consultation: Pneumonia, pulmonary embolism    CC: Cough and vomiting    History of Present Illness:  53-year-old male patient who yesterday in the middle of the night woke up to 10 to his ileostomy and became lightheaded, short of breath and vomited.  He presented with significant coughing and bronchospasm.  EMS gave him nebulized albuterol and he felt much better.  He was hypoxic on room air and he needed supplemental oxygen upon EMS arrival.  He says for the past 11 days he has been coughing.  Producing yellow sputum and went to urgent care.  They prescribed Augmentin but he has not improved despite taking almost 1 week of Augmentin.  He denies any pulmonary history.  Has never smoked.  He denies any fever or chills but has noticed yellow sputum with ongoing cough despite Augmentin.  CT chest with angiogram here shows a very small left upper lobe pulmonary embolism.  He has never had any history of DVT or PE.  No family history of thrombophilia.      Review of Systems   Constitutional:  Positive for fatigue. Negative for diaphoresis and fever.   HENT:  Negative for ear pain and sore throat.    Eyes:  Negative for pain and visual disturbance.   Respiratory:  Positive for cough and shortness of breath.    Gastrointestinal:  Positive for vomiting. Negative for abdominal pain and diarrhea.   Endocrine: Negative for cold intolerance and polyuria.   Genitourinary:  Negative for dysuria and hematuria.   Musculoskeletal:  Negative for joint swelling and myalgias.   Skin:  Negative for rash and wound.   Neurological:  Negative for speech difficulty and numbness.   Hematological:  Negative for adenopathy. Does not bruise/bleed easily.   Psychiatric/Behavioral:  Negative for agitation and confusion.      Past Medical  History:  Past Medical History:   Diagnosis Date    Adenocarcinoma of rectum 06/2015    invasive adenocarcinoma of rectum w 59 benign lymph nodes    Adenomatous polyposis     FAMILIAL     Anxiety     At risk for sleep apnea     Chicken pox     patient reports having as a child    Depression     Herpes zoster without complication 12/3/2021    History of anemia     Hypertension     Hyponatremia     Insomnia     Malignant neoplasm of rectum 6/1/2016    Parastomal hernia without obstruction or gangrene 2/15/2021    Added automatically from request for surgery [id number]    Pulmonary nodule 1/10/2020    6/26/20 CT chest: 6 mm nodule resolved.    Renal calculi     RLS (restless legs syndrome)     Syncope 03/19/2021    prolonged QT interval    Testicular hypofunction     Vitamin D deficiency        Past Surgical History:  Past Surgical History:   Procedure Laterality Date    ABDOMINOPERINEAL PROCTOCOLECTOMY  07/06/2015    DR PAL RUDOLPH    COLONOSCOPY  06/16/1915    DR SHASTA HOWARD    COLONOSCOPY N/A 2/23/2021    Procedure: ILEOSCOPY, EXCISION OF ILEOSTOMY NODULE;  Surgeon: Pal Rudolph MD;  Location: [facility] MAIN OR;  Service: General;  Laterality: N/A;    ENDOSCOPY  06/23/2015    DR SHASTA HOWARD    EXPLORATORY LAPAROTOMY N/A 2/3/2017    Procedure: LAPAROTOMY EXPLORATORY  SMALL BOWEL OBSTRUCTION;  Surgeon: Pal Rudolph MD;  Location: [facility] MAIN OR;  Service:     ILEOSTOMY  07/06/2015    MYRINGOTOMY W/ TUBES      2-3 times as child        Home Meds:  Medications Prior to Admission   Medication Sig Dispense Refill Last Dose    B Complex Vitamins (VITAMIN-B COMPLEX PO) Take 1 tablet by mouth Daily.   8/26/2023    Cholecalciferol (Vitamin D-3) 125 MCG (5000 UT) tablet Take 1 tablet by mouth Daily. 90 tablet 1 8/26/2023    pantoprazole (PROTONIX) 20 MG EC tablet Take 1 tablet by mouth Daily. 90 tablet 1 Past Week    sertraline (ZOLOFT) 100 MG tablet Take 1.5 tablets by mouth Daily. 135 tablet 1 8/26/2023  "      Allergies:  No Known Allergies    Social History:   Social History     Socioeconomic History    Marital status:      Spouse name: Hansa    Years of education: college   Tobacco Use    Smoking status: Never    Smokeless tobacco: Current     Types: Chew    Tobacco comments:     CHEWS TOBACCO   Vaping Use    Vaping Use: Never used   Substance and Sexual Activity    Alcohol use: Yes     Alcohol/week: 24.0 standard drinks     Types: 24 Cans of beer per week     Comment: Some days 24 beers a day. Notmally  at least 18; stopped drinking 6/10/23    Drug use: No    Sexual activity: Not Currently     Partners: Female       Family History:  Family History   Problem Relation Age of Onset    Colon cancer Father         familial polyposis    Aneurysm Maternal Grandfather 66        brain aneurysm    Colon cancer Paternal Grandfather         familial polyposis    Hypertension Maternal Uncle     Diabetes Maternal Uncle     Malig Hyperthermia Neg Hx        Physical Exam:  /70 (BP Location: Right arm, Patient Position: Sitting)   Pulse 70   Temp 99.7 °F (37.6 °C) (Oral)   Resp 20   Ht 172.7 cm (68\")   Wt 108 kg (237 lb 9.6 oz)   SpO2 97%   BMI 36.13 kg/m²  Body mass index is 36.13 kg/m². 97% 108 kg (237 lb 9.6 oz)  Physical Exam  Constitutional:       General: He is not in acute distress.     Appearance: He is well-developed.   HENT:      Right Ear: External ear normal.      Left Ear: External ear normal.      Nose: Nose normal.   Eyes:      General: No scleral icterus.     Conjunctiva/sclera: Conjunctivae normal.      Pupils: Pupils are equal, round, and reactive to light.   Neck:      Thyroid: No thyromegaly.      Vascular: No JVD.   Cardiovascular:      Rate and Rhythm: Normal rate and regular rhythm.      Heart sounds: No murmur heard.     Comments: No edema  Pulmonary:      Effort: Pulmonary effort is normal.      Breath sounds: Rhonchi present. No wheezing or rales.   Abdominal:      General: There is " no distension.      Palpations: Abdomen is soft.      Comments: Ileostomy bag in right lower quadrant, I cannot palpate any enlarged organs   Musculoskeletal:         General: No deformity. Normal range of motion.      Cervical back: Neck supple. No rigidity.      Comments: No deformity in all 4 extrem   Skin:     Findings: No erythema or rash.      Comments: No palpable nodules   Neurological:      General: No focal deficit present.      Mental Status: He is alert and oriented to person, place, and time.      Cranial Nerves: No cranial nerve deficit.      Motor: No weakness.   Psychiatric:         Mood and Affect: Mood normal.         Thought Content: Thought content normal.       LABS:        Results from last 7 days   Lab Units 08/27/23  0354   MAGNESIUM mg/dL 1.9   SODIUM mmol/L 141   POTASSIUM mmol/L 3.9   CHLORIDE mmol/L 104   CO2 mmol/L 26.0   BUN mg/dL 14   CREATININE mg/dL 0.99   GLUCOSE mg/dL 122*   CALCIUM mg/dL 9.3   WBC 10*3/mm3 15.48*   HEMOGLOBIN g/dL 13.4   PLATELETS 10*3/mm3 232   ALT (SGPT) U/L 19   AST (SGOT) U/L 28   PROBNP pg/mL 72.3   PROCALCITONIN ng/mL 0.06     Lab Results   Component Value Date    TROPONINT 11 08/27/2023     Results from last 7 days   Lab Units 08/27/23  0641 08/27/23  0354   HSTROP T ng/L 11 13         Results from last 7 days   Lab Units 08/27/23  0354   PROCALCITONIN ng/mL 0.06   LACTATE mmol/L 2.0     Results from last 7 days   Lab Units 08/27/23  0557   PH, ARTERIAL pH units 7.328*   PCO2, ARTERIAL mm Hg 45.1*   PO2 ART mm Hg 124.4*   FLOW RATE lpm 11   MODALITY  HFNC   O2 SATURATION CALC % 98.5     Results from last 7 days   Lab Units 08/27/23  0355   ADENOVIRUS DETECTION BY PCR  Not Detected   CORONAVIRUS 229E  Not Detected   CORONAVIRUS HKU1  Not Detected   CORONAVIRUS NL63  Not Detected   CORONAVIRUS OC43  Not Detected   HUMAN METAPNEUMOVIRUS  Not Detected   HUMAN RHINOVIRUS/ENTEROVIRUS  Not Detected   INFLUENZA B PCR  Not Detected   PARAINFLUENZA 1  Not Detected    PARAINFLUENZA VIRUS 2  Not Detected   PARAINFLUENZA VIRUS 3  Not Detected   PARAINFLUENZA VIRUS 4  Not Detected   BORDETELLA PERTUSSIS PCR  Not Detected   BORDETELLA PARAPERTUSSIS PCR  Not Detected   CHLAMYDOPHILA PNEUMONIAE PCR  Not Detected   MYCOPLAMA PNEUMO PCR  Not Detected   RSV, PCR  Not Detected     Results from last 7 days   Lab Units 08/27/23  0354   INR  0.99         Lab Results   Component Value Date    TSH 0.94 07/11/2015     Estimated Creatinine Clearance: 102.8 mL/min (by C-G formula based on SCr of 0.99 mg/dL).         Imaging: I personally visualized the images of CT angiogram of the chest showing bilateral, but predominant right lower lobe reticulonodular and groundglass infiltrates suggesting pneumonia.  There is also small subsegmental left upper lobe pulmonary artery filling defects suggestive of small embolism.      Assessment:  Pneumonia involving right lung  Pulmonary embolism  Acute hypoxemia  Ileostomy      Recommendations:  Clinically the patient is requiring some oxygen and CT shows bilateral reticular nodular infiltrates with some groundglass opacity suggesting pneumonia.  He took 1 week of Augmentin as outpatient but his symptoms are exactly the same.  Initially I suspected aspiration pneumonia, but his cough precedes his episode of vomiting by at least 10 days.  He will need bronchoscopy for further microbiologic studies of this pulmonary infection.  Continue weaning oxygen as tolerated.  His left upper lobe pulmonary embolism is small, likely incidental and of minor clinical significance, however we cannot assume he will not develop larger emboli without anticoagulation.  Therefore I recommend 6 months of oral DOAC therapy.  The patient expressed understanding.  He has never smoked.  Does not usually have any pulmonary disease.  He will have to follow-up with his primary care physician to manage 6 months of anticoagulation.  We discussed risks, benefits and alternatives to  bronchoscopy with lavage for tomorrow.  I have placed orders for n.p.o. after midnight and obtained his consent.  He is willing to proceed.  I spoke to  and reserved bronchoscopy slot in endoscopy suite tomorrow at 10 AM.  Hold off on all antibiotics at this time.  This will improve the yield of the bronchoscopy.        Rubin Sood MD  8/27/2023  14:40 EDT      Much of this encounter note is an electronic transcription/translation of spoken language to printed text using Dragon Software.

## 2023-08-27 NOTE — PROGRESS NOTES
"[facility] Clinical Pharmacy Services: Enoxaparin Consult    Nikolai Shaw has a pharmacy consult to dose full-dose enoxaparin per Dr. Bee's request.     Indication: DVT/PE (active thrombosis)       Relevant clinical data and objective history reviewed:  53 y.o. male 172.7 cm (68\") 107 kg (236 lb)   Body mass index is 35.88 kg/m².   Results from last 7 days   Lab Units 08/27/23  0354   PLATELETS 10*3/mm3 232     Estimated Creatinine Clearance: 102.3 mL/min (by C-G formula based on SCr of 0.99 mg/dL).    Assessment/Plan    Will start patient on  105mg (1mg/kg) subcutaneous every 12 hours, adjusted for renal function. Consult order will be discontinued but pharmacy will continue to follow.     Lauryn Poole, [facility]  Clinical Pharmacist    "

## 2023-08-27 NOTE — ED NOTES
.Nursing report ED to floor  Nikolai Shaw  53 y.o.  male    HPI :   Chief Complaint   Patient presents with    Shortness of Breath       Admitting doctor:   Vasquez Dan MD    Admitting diagnosis:   The primary encounter diagnosis was Hypoxia. Diagnoses of Pneumonia due to infectious organism, unspecified laterality, unspecified part of lung and Other acute pulmonary embolism without acute cor pulmonale were also pertinent to this visit.    Code status:   Current Code Status       Date Active Code Status Order ID Comments User Context       Prior            Allergies:   Patient has no known allergies.    Isolation:   No active isolations    Intake and Output    Intake/Output Summary (Last 24 hours) at 8/27/2023 0640  Last data filed at 8/27/2023 0343  Gross per 24 hour   Intake 400 ml   Output --   Net 400 ml       Weight:       08/27/23  0348   Weight: 107 kg (236 lb)       Most recent vitals:   Vitals:    08/27/23 0517 08/27/23 0531 08/27/23 0532 08/27/23 0631   BP:  119/75     BP Location:       Patient Position:       Pulse: 66  67 70   Resp:       Temp:       TempSrc:       SpO2: 95%  94% 97%   Weight:       Height:           Active LDAs/IV Access:   Lines, Drains & Airways       Active LDAs       Name Placement date Placement time Site Days    Peripheral IV 08/27/23 0344 Left Antecubital 08/27/23 0344  Antecubital  less than 1    Ileostomy RUQ --  --  RUQ  --                    Labs (abnormal labs have a star):   Labs Reviewed   COMPREHENSIVE METABOLIC PANEL - Abnormal; Notable for the following components:       Result Value    Glucose 122 (*)     All other components within normal limits    Narrative:     GFR Normal >60  Chronic Kidney Disease <60  Kidney Failure <15     CBC WITH AUTO DIFFERENTIAL - Abnormal; Notable for the following components:    WBC 15.48 (*)     Lymphocyte % 12.8 (*)     Neutrophils, Absolute 10.91 (*)     Monocytes, Absolute 1.67 (*)     Eosinophils, Absolute 0.83 (*)     All other  components within normal limits   BLOOD GAS, ARTERIAL - Abnormal; Notable for the following components:    pH, Arterial 7.328 (*)     pCO2, Arterial 45.1 (*)     pO2, Arterial 124.4 (*)     Base Excess, Arterial -2.5 (*)     All other components within normal limits   RESPIRATORY PANEL PCR W/ COVID-19 (SARS-COV-2) ABRAN/GINNY/FIDE/PAD/COR/MAD/SAUD IN-HOUSE, NP SWAB IN UTM/VTP, 3-4 HR TAT - Normal    Narrative:     In the setting of a positive respiratory panel with a viral infection PLUS a negative procalcitonin without other underlying concern for bacterial infection, consider observing off antibiotics or discontinuation of antibiotics and continue supportive care. If the respiratory panel is positive for atypical bacterial infection (Bordetella pertussis, Chlamydophila pneumoniae, or Mycoplasma pneumoniae), consider antibiotic de-escalation to target atypical bacterial infection.   PROTIME-INR - Normal   APTT - Normal   TROPONIN - Normal    Narrative:     High Sensitive Troponin T Reference Range:  <10.0 ng/L- Negative Female for AMI  <15.0 ng/L- Negative Male for AMI  >=10 - Abnormal Female indicating possible myocardial injury.  >=15 - Abnormal Male indicating possible myocardial injury.   Clinicians would have to utilize clinical acumen, EKG, Troponin, and serial changes to determine if it is an Acute Myocardial Infarction or myocardial injury due to an underlying chronic condition.        BNP (IN-HOUSE) - Normal    Narrative:     Among patients with dyspnea, NT-proBNP is highly sensitive for the detection of acute congestive heart failure. In addition NT-proBNP of <300 pg/ml effectively rules out acute congestive heart failure with 99% negative predictive value.     LACTIC ACID, PLASMA - Normal   PROCALCITONIN - Normal    Narrative:     As a Marker for Sepsis (Non-Neonates):    1. <0.5 ng/mL represents a low risk of severe sepsis and/or septic shock.  2. >2 ng/mL represents a high risk of severe sepsis and/or septic  "shock.    As a Marker for Lower Respiratory Tract Infections that require antibiotic therapy:    PCT on Admission    Antibiotic Therapy       6-12 Hrs later    >0.5                Strongly Recommended  >0.25 - <0.5        Recommended   0.1 - 0.25          Discouraged              Remeasure/reassess PCT  <0.1                Strongly Discouraged     Remeasure/reassess PCT    As 28 day mortality risk marker: \"Change in Procalcitonin Result\" (>80% or <=80%) if Day 0 (or Day 1) and Day 4 values are available. Refer to http://[email or web address]-pct-calculator.[email or web address]    Change in PCT <=80%  A decrease of PCT levels below or equal to 80% defines a positive change in PCT test result representing a higher risk for 28-day all-cause mortality of patients diagnosed with severe sepsis for septic shock.    Change in PCT >80%  A decrease of PCT levels of more than 80% defines a negative change in PCT result representing a lower risk for 28-day all-cause mortality of patients diagnosed with severe sepsis or septic shock.      MAGNESIUM - Normal   BLOOD CULTURE   BLOOD CULTURE   BLOOD GAS, ARTERIAL   HIGH SENSITIVITIY TROPONIN T 2HR   CBC AND DIFFERENTIAL    Narrative:     The following orders were created for panel order CBC & Differential.  Procedure                               Abnormality         Status                     ---------                               -----------         ------                     CBC Auto Differential[[id number]]        Abnormal            Final result                 Please view results for these tests on the individual orders.       EKG:   ECG 12 Lead Dyspnea   Preliminary Result   HEART RATE= 65  bpm   RR Interval= 923  ms   AK Interval= 156  ms   P Horizontal Axis= 12  deg   P Front Axis= 34  deg   QRSD Interval= 120  ms   QT Interval= 689  ms   QTcB= 717  ms   QRS Axis= -57  deg   T Wave Axis= 172  deg   - ABNORMAL ECG -   Sinus rhythm   IVCD, consider atypical RBBB   Nonspecific T abnormalities, lateral " leads   Electronically Signed By:    Date and Time of Study: 2023-08-27 04:03:18          Meds given in ED:   Medications   sodium chloride 0.9 % flush 10 mL (has no administration in time range)   cefepime 2 gm IVPB in 100 ml NS (VTB) (2,000 mg Intravenous New Bag 8/27/23 0637)   Enoxaparin Sodium (LOVENOX) syringe 110 mg (has no administration in time range)   iopamidol (ISOVUE-370) 76 % injection 100 mL (95 mL Intravenous Given 8/27/23 0546)       Imaging results:  CT Angiogram Chest    Result Date: 8/27/2023   Critical test result. Small left upper lobe pulmonary embolic disease.  Reticulonodular infiltrates throughout the right lung, suggesting pneumonia, CT follow-up advised.  Discussed by telephone with Dr. Hu at 0614, 8/27/2023.    This report was finalized on 8/27/2023 6:16 AM by Dr. Naveed Pickett M.D.       Ambulatory status:   - up with assistance    Social issues:   Social History     Socioeconomic History    Marital status:      Spouse name: Hansa    Years of education: college   Tobacco Use    Smoking status: Never    Smokeless tobacco: Current     Types: Chew    Tobacco comments:     CHEWS TOBACCO   Vaping Use    Vaping Use: Never used   Substance and Sexual Activity    Alcohol use: Yes     Alcohol/week: 24.0 standard drinks     Types: 24 Cans of beer per week     Comment: Some days 24 beers a day. Notmally  at least 18; stopped drinking 6/10/23    Drug use: No    Sexual activity: Not Currently     Partners: Female       NIH Stroke Scale:       Perlita Huggins RN  08/27/23 06:40 EDT

## 2023-08-27 NOTE — H&P (VIEW-ONLY)
Group: [facility]         CONSULT NOTE    Patient Identification:  Nikolai Shaw  53 y.o.  male  1970  [id number]            Requesting physician: Dr. Remigio Bee    Reason for Consultation: Pneumonia, pulmonary embolism    CC: Cough and vomiting    History of Present Illness:  53-year-old male patient who yesterday in the middle of the night woke up to 10 to his ileostomy and became lightheaded, short of breath and vomited.  He presented with significant coughing and bronchospasm.  EMS gave him nebulized albuterol and he felt much better.  He was hypoxic on room air and he needed supplemental oxygen upon EMS arrival.  He says for the past 11 days he has been coughing.  Producing yellow sputum and went to urgent care.  They prescribed Augmentin but he has not improved despite taking almost 1 week of Augmentin.  He denies any pulmonary history.  Has never smoked.  He denies any fever or chills but has noticed yellow sputum with ongoing cough despite Augmentin.  CT chest with angiogram here shows a very small left upper lobe pulmonary embolism.  He has never had any history of DVT or PE.  No family history of thrombophilia.      Review of Systems   Constitutional:  Positive for fatigue. Negative for diaphoresis and fever.   HENT:  Negative for ear pain and sore throat.    Eyes:  Negative for pain and visual disturbance.   Respiratory:  Positive for cough and shortness of breath.    Gastrointestinal:  Positive for vomiting. Negative for abdominal pain and diarrhea.   Endocrine: Negative for cold intolerance and polyuria.   Genitourinary:  Negative for dysuria and hematuria.   Musculoskeletal:  Negative for joint swelling and myalgias.   Skin:  Negative for rash and wound.   Neurological:  Negative for speech difficulty and numbness.   Hematological:  Negative for adenopathy. Does not bruise/bleed easily.   Psychiatric/Behavioral:  Negative for agitation and confusion.      Past Medical  History:  Past Medical History:   Diagnosis Date    Adenocarcinoma of rectum 06/2015    invasive adenocarcinoma of rectum w 59 benign lymph nodes    Adenomatous polyposis     FAMILIAL     Anxiety     At risk for sleep apnea     Chicken pox     patient reports having as a child    Depression     Herpes zoster without complication 12/3/2021    History of anemia     Hypertension     Hyponatremia     Insomnia     Malignant neoplasm of rectum 6/1/2016    Parastomal hernia without obstruction or gangrene 2/15/2021    Added automatically from request for surgery [id number]    Pulmonary nodule 1/10/2020    6/26/20 CT chest: 6 mm nodule resolved.    Renal calculi     RLS (restless legs syndrome)     Syncope 03/19/2021    prolonged QT interval    Testicular hypofunction     Vitamin D deficiency        Past Surgical History:  Past Surgical History:   Procedure Laterality Date    ABDOMINOPERINEAL PROCTOCOLECTOMY  07/06/2015    DR PAL RUDOLPH    COLONOSCOPY  06/16/1915    DR SHASTA HOWARD    COLONOSCOPY N/A 2/23/2021    Procedure: ILEOSCOPY, EXCISION OF ILEOSTOMY NODULE;  Surgeon: Pal Rudolph MD;  Location: [facility] MAIN OR;  Service: General;  Laterality: N/A;    ENDOSCOPY  06/23/2015    DR SHASAT HOWARD    EXPLORATORY LAPAROTOMY N/A 2/3/2017    Procedure: LAPAROTOMY EXPLORATORY  SMALL BOWEL OBSTRUCTION;  Surgeon: Pal Rudolph MD;  Location: [facility] MAIN OR;  Service:     ILEOSTOMY  07/06/2015    MYRINGOTOMY W/ TUBES      2-3 times as child        Home Meds:  Medications Prior to Admission   Medication Sig Dispense Refill Last Dose    B Complex Vitamins (VITAMIN-B COMPLEX PO) Take 1 tablet by mouth Daily.   8/26/2023    Cholecalciferol (Vitamin D-3) 125 MCG (5000 UT) tablet Take 1 tablet by mouth Daily. 90 tablet 1 8/26/2023    pantoprazole (PROTONIX) 20 MG EC tablet Take 1 tablet by mouth Daily. 90 tablet 1 Past Week    sertraline (ZOLOFT) 100 MG tablet Take 1.5 tablets by mouth Daily. 135 tablet 1 8/26/2023  "      Allergies:  No Known Allergies    Social History:   Social History     Socioeconomic History    Marital status:      Spouse name: Hansa    Years of education: college   Tobacco Use    Smoking status: Never    Smokeless tobacco: Current     Types: Chew    Tobacco comments:     CHEWS TOBACCO   Vaping Use    Vaping Use: Never used   Substance and Sexual Activity    Alcohol use: Yes     Alcohol/week: 24.0 standard drinks     Types: 24 Cans of beer per week     Comment: Some days 24 beers a day. Notmally  at least 18; stopped drinking 6/10/23    Drug use: No    Sexual activity: Not Currently     Partners: Female       Family History:  Family History   Problem Relation Age of Onset    Colon cancer Father         familial polyposis    Aneurysm Maternal Grandfather 66        brain aneurysm    Colon cancer Paternal Grandfather         familial polyposis    Hypertension Maternal Uncle     Diabetes Maternal Uncle     Malig Hyperthermia Neg Hx        Physical Exam:  /70 (BP Location: Right arm, Patient Position: Sitting)   Pulse 70   Temp 99.7 °F (37.6 °C) (Oral)   Resp 20   Ht 172.7 cm (68\")   Wt 108 kg (237 lb 9.6 oz)   SpO2 97%   BMI 36.13 kg/m²  Body mass index is 36.13 kg/m². 97% 108 kg (237 lb 9.6 oz)  Physical Exam  Constitutional:       General: He is not in acute distress.     Appearance: He is well-developed.   HENT:      Right Ear: External ear normal.      Left Ear: External ear normal.      Nose: Nose normal.   Eyes:      General: No scleral icterus.     Conjunctiva/sclera: Conjunctivae normal.      Pupils: Pupils are equal, round, and reactive to light.   Neck:      Thyroid: No thyromegaly.      Vascular: No JVD.   Cardiovascular:      Rate and Rhythm: Normal rate and regular rhythm.      Heart sounds: No murmur heard.     Comments: No edema  Pulmonary:      Effort: Pulmonary effort is normal.      Breath sounds: Rhonchi present. No wheezing or rales.   Abdominal:      General: There is " no distension.      Palpations: Abdomen is soft.      Comments: Ileostomy bag in right lower quadrant, I cannot palpate any enlarged organs   Musculoskeletal:         General: No deformity. Normal range of motion.      Cervical back: Neck supple. No rigidity.      Comments: No deformity in all 4 extrem   Skin:     Findings: No erythema or rash.      Comments: No palpable nodules   Neurological:      General: No focal deficit present.      Mental Status: He is alert and oriented to person, place, and time.      Cranial Nerves: No cranial nerve deficit.      Motor: No weakness.   Psychiatric:         Mood and Affect: Mood normal.         Thought Content: Thought content normal.       LABS:        Results from last 7 days   Lab Units 08/27/23  0354   MAGNESIUM mg/dL 1.9   SODIUM mmol/L 141   POTASSIUM mmol/L 3.9   CHLORIDE mmol/L 104   CO2 mmol/L 26.0   BUN mg/dL 14   CREATININE mg/dL 0.99   GLUCOSE mg/dL 122*   CALCIUM mg/dL 9.3   WBC 10*3/mm3 15.48*   HEMOGLOBIN g/dL 13.4   PLATELETS 10*3/mm3 232   ALT (SGPT) U/L 19   AST (SGOT) U/L 28   PROBNP pg/mL 72.3   PROCALCITONIN ng/mL 0.06     Lab Results   Component Value Date    TROPONINT 11 08/27/2023     Results from last 7 days   Lab Units 08/27/23  0641 08/27/23  0354   HSTROP T ng/L 11 13         Results from last 7 days   Lab Units 08/27/23  0354   PROCALCITONIN ng/mL 0.06   LACTATE mmol/L 2.0     Results from last 7 days   Lab Units 08/27/23  0557   PH, ARTERIAL pH units 7.328*   PCO2, ARTERIAL mm Hg 45.1*   PO2 ART mm Hg 124.4*   FLOW RATE lpm 11   MODALITY  HFNC   O2 SATURATION CALC % 98.5     Results from last 7 days   Lab Units 08/27/23  0355   ADENOVIRUS DETECTION BY PCR  Not Detected   CORONAVIRUS 229E  Not Detected   CORONAVIRUS HKU1  Not Detected   CORONAVIRUS NL63  Not Detected   CORONAVIRUS OC43  Not Detected   HUMAN METAPNEUMOVIRUS  Not Detected   HUMAN RHINOVIRUS/ENTEROVIRUS  Not Detected   INFLUENZA B PCR  Not Detected   PARAINFLUENZA 1  Not Detected    PARAINFLUENZA VIRUS 2  Not Detected   PARAINFLUENZA VIRUS 3  Not Detected   PARAINFLUENZA VIRUS 4  Not Detected   BORDETELLA PERTUSSIS PCR  Not Detected   BORDETELLA PARAPERTUSSIS PCR  Not Detected   CHLAMYDOPHILA PNEUMONIAE PCR  Not Detected   MYCOPLAMA PNEUMO PCR  Not Detected   RSV, PCR  Not Detected     Results from last 7 days   Lab Units 08/27/23  0354   INR  0.99         Lab Results   Component Value Date    TSH 0.94 07/11/2015     Estimated Creatinine Clearance: 102.8 mL/min (by C-G formula based on SCr of 0.99 mg/dL).         Imaging: I personally visualized the images of CT angiogram of the chest showing bilateral, but predominant right lower lobe reticulonodular and groundglass infiltrates suggesting pneumonia.  There is also small subsegmental left upper lobe pulmonary artery filling defects suggestive of small embolism.      Assessment:  Pneumonia involving right lung  Pulmonary embolism  Acute hypoxemia  Ileostomy      Recommendations:  Clinically the patient is requiring some oxygen and CT shows bilateral reticular nodular infiltrates with some groundglass opacity suggesting pneumonia.  He took 1 week of Augmentin as outpatient but his symptoms are exactly the same.  Initially I suspected aspiration pneumonia, but his cough precedes his episode of vomiting by at least 10 days.  He will need bronchoscopy for further microbiologic studies of this pulmonary infection.  Continue weaning oxygen as tolerated.  His left upper lobe pulmonary embolism is small, likely incidental and of minor clinical significance, however we cannot assume he will not develop larger emboli without anticoagulation.  Therefore I recommend 6 weeks of oral DOAC therapy.  The patient expressed understanding.  He has never smoked.  Does not usually have any pulmonary disease.  He will have to follow-up with his primary care physician to manage 6 months of anticoagulation.  We discussed risks, benefits and alternatives to  bronchoscopy with lavage for tomorrow.  I have placed orders for n.p.o. after midnight and obtained his consent.  He is willing to proceed.  I spoke to  and reserved bronchoscopy slot in endoscopy suite tomorrow at 10 AM.  Hold off on all antibiotics at this time.  This will improve the yield of the bronchoscopy.        Rubin Sood MD  8/27/2023  14:40 EDT      Much of this encounter note is an electronic transcription/translation of spoken language to printed text using Dragon Software.

## 2023-08-27 NOTE — PLAN OF CARE
Goal Outcome Evaluation:           Progress: no change  Outcome Evaluation: pt admitted this  morning with hypoxia, ALENA PE, and RL PNA, pt maintains low grade fever of 99.7, plan is for broncoscopy tomorrow with lavage, consent signed, pt off floor for last part of day down in vascular checking for LE bloodclots,A/O x4, pt on RA, VSS, safety maintained, will CTM cl

## 2023-08-27 NOTE — ED TRIAGE NOTES
Pt presents to ED via [facility] from home with complaints of increased SOA, dizziness, nausea an vomiting since 0230 this morning. Pt states he recently finished amoxicillin from his PCP with no relief.    Denies respiratory history.

## 2023-08-27 NOTE — ED PROVIDER NOTES
EMERGENCY DEPARTMENT ENCOUNTER    Room Number:  09/09  PCP: Ekta Zepeda APRN  Patient Care Team:  Ekta Zepeda APRN as PCP - General (Family Medicine)  Jacob Shelby MD as Consulting Physician (Hematology and Oncology)  Pal Crane MD as Referring Physician (General Surgery)   Independent Historians: Patient    HPI:  Chief Complaint: Dyspnea    A complete HPI/ROS/PMH/PSH/SH/FH are unobtainable due to: None    Chronic or social conditions impacting patient care (Social Determinants of Health): None  (Financial Resource Strain / Food Insecurity / Transportation Needs / Physical Activity / Stress / Social Connections / Intimate Partner Violence / Housing Stability)    Context: Nikolai Shaw is a 53 y.o. male who presents to the ED c/o acute dyspnea since about 230 this morning.  Patient also reports he felt little bit dizzy.  Patient had just finished a brief course of amoxicillin that he was given by his PMD for presumed pneumonia.  Patient had a history of cough and congestion for the past week or so.  Symptoms are gradually getting worse.  For EMS he was satting in the low 80s on room air.  They placed him on 5 L nasal cannula which improved his O2 sats to 86 to 88%.  Patient denies chest pain or abdominal pain.  No prior history of respiratory issues.    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    Family history of FAP (familial adenomatous polyposis)  07/09/2021    Allergic rhinitis 06/17/2022    Vitamin B12 deficiency 06/17/2022    Prediabetes 06/23/2022    Ingrown toenail of left foot 12/03/2022    Dizziness 08/02/2023    Sleep disturbances 08/02/2023     Resolved Ambulatory Problems     Diagnosis Date Noted    Malignant neoplasm of rectum 06/01/2016    SBO (small bowel obstruction) 01/19/2017    Small bowel obstruction 01/28/2017    Pulmonary nodule 01/10/2020    Parastomal hernia 02/05/2021    Parastomal hernia without obstruction or gangrene 02/15/2021    Syncope 03/19/2021    Herpes zoster without complication 12/03/2021     Past Medical History:   Diagnosis Date    Adenocarcinoma of rectum 06/2015    Adenomatous polyposis     At risk for sleep apnea     Chicken pox     History of anemia     Hypertension     Insomnia     Renal calculi     RLS (restless legs syndrome)     Testicular hypofunction          PAST SURGICAL HISTORY  Past Surgical History:   Procedure Laterality Date    ABDOMINOPERINEAL PROCTOCOLECTOMY  07/06/2015    DR PAL RUDOLPH    COLONOSCOPY  06/16/1915    DR SHASTA HOWARD    COLONOSCOPY N/A 2/23/2021    Procedure: ILEOSCOPY, EXCISION OF ILEOSTOMY NODULE;  Surgeon: Pal Rudolph MD;  Location: [facility] MAIN OR;  Service: General;  Laterality: N/A;    ENDOSCOPY  06/23/2015    DR SHASTA HOWARD    EXPLORATORY LAPAROTOMY N/A 2/3/2017    Procedure: LAPAROTOMY EXPLORATORY  SMALL BOWEL OBSTRUCTION;  Surgeon: Pal Rudolph MD;  Location: [facility] MAIN OR;  Service:     ILEOSTOMY  07/06/2015    MYRINGOTOMY W/ TUBES      2-3 times as child         FAMILY HISTORY  Family History   Problem Relation Age of Onset    Colon cancer Father         familial polyposis    Aneurysm Maternal Grandfather 66        brain aneurysm    Colon cancer Paternal Grandfather         familial polyposis    Hypertension Maternal Uncle     Diabetes Maternal Uncle     Malig Hyperthermia Neg Hx          SOCIAL HISTORY  Social History     Socioeconomic History    Marital  status:      Spouse name: Hansa    Years of education: college   Tobacco Use    Smoking status: Never    Smokeless tobacco: Current     Types: Chew    Tobacco comments:     CHEWS TOBACCO   Vaping Use    Vaping Use: Never used   Substance and Sexual Activity    Alcohol use: Yes     Alcohol/week: 24.0 standard drinks     Types: 24 Cans of beer per week     Comment: Some days 24 beers a day. Notmally  at least 18; stopped drinking 6/10/23    Drug use: No    Sexual activity: Not Currently     Partners: Female         ALLERGIES  Patient has no known allergies.        REVIEW OF SYSTEMS  Review of Systems  Included in HPI  All systems reviewed and negative except for those discussed in HPI.      PHYSICAL EXAM    I have reviewed the triage vital signs and nursing notes.    ED Triage Vitals   Temp Heart Rate Resp BP SpO2   08/27/23 0407 08/27/23 0348 08/27/23 0348 08/27/23 0348 08/27/23 0348   99.1 °F (37.3 °C) 68 24 99/62 (!) 88 %      Temp src Heart Rate Source Patient Position BP Location FiO2 (%)   08/27/23 0407 08/27/23 0348 08/27/23 0348 08/27/23 0348 --   Tympanic Monitor Sitting Right arm        Physical Exam  GENERAL: alert, direct acute distress  SKIN: Warm, dry  HENT: Normocephalic, atraumatic  EYES: no scleral icterus  CV: regular rhythm, regular rate  RESPIRATORY: Patient mild respiratory distress increased work of breathing diminished lung sounds bilaterally  ABDOMEN: soft, nontender, nondistended  MUSCULOSKELETAL: no deformity  NEURO: alert, moves all extremities, follows commands                                                               LAB RESULTS  Recent Results (from the past 24 hour(s))   Comprehensive Metabolic Panel    Collection Time: 08/27/23  3:54 AM    Specimen: Blood   Result Value Ref Range    Glucose 122 (H) 65 - 99 mg/dL    BUN 14 6 - 20 mg/dL    Creatinine 0.99 0.76 - 1.27 mg/dL    Sodium 141 136 - 145 mmol/L    Potassium 3.9 3.5 - 5.2 mmol/L    Chloride 104 98 - 107 mmol/L    CO2  26.0 22.0 - 29.0 mmol/L    Calcium 9.3 8.6 - 10.5 mg/dL    Total Protein 7.3 6.0 - 8.5 g/dL    Albumin 4.1 3.5 - 5.2 g/dL    ALT (SGPT) 19 1 - 41 U/L    AST (SGOT) 28 1 - 40 U/L    Alkaline Phosphatase 105 39 - 117 U/L    Total Bilirubin 0.5 0.0 - 1.2 mg/dL    Globulin 3.2 gm/dL    A/G Ratio 1.3 g/dL    BUN/Creatinine Ratio 14.1 7.0 - 25.0    Anion Gap 11.0 5.0 - 15.0 mmol/L    eGFR 91.1 >60.0 mL/min/1.73   Protime-INR    Collection Time: 08/27/23  3:54 AM    Specimen: Blood   Result Value Ref Range    Protime 13.2 11.7 - 14.2 Seconds    INR 0.99 0.90 - 1.10   aPTT    Collection Time: 08/27/23  3:54 AM    Specimen: Blood   Result Value Ref Range    PTT 25.7 22.7 - 35.4 seconds   High Sensitivity Troponin T    Collection Time: 08/27/23  3:54 AM    Specimen: Blood   Result Value Ref Range    HS Troponin T 13 <15 ng/L   BNP    Collection Time: 08/27/23  3:54 AM    Specimen: Blood   Result Value Ref Range    proBNP 72.3 0.0 - 900.0 pg/mL   Lactic Acid, Plasma    Collection Time: 08/27/23  3:54 AM    Specimen: Blood   Result Value Ref Range    Lactate 2.0 0.5 - 2.0 mmol/L   Procalcitonin    Collection Time: 08/27/23  3:54 AM    Specimen: Blood   Result Value Ref Range    Procalcitonin 0.06 0.00 - 0.25 ng/mL   Magnesium    Collection Time: 08/27/23  3:54 AM    Specimen: Blood   Result Value Ref Range    Magnesium 1.9 1.6 - 2.6 mg/dL   CBC Auto Differential    Collection Time: 08/27/23  3:54 AM    Specimen: Blood   Result Value Ref Range    WBC 15.48 (H) 3.40 - 10.80 10*3/mm3    RBC 4.20 4.14 - 5.80 10*6/mm3    Hemoglobin 13.4 13.0 - 17.7 g/dL    Hematocrit 39.5 37.5 - 51.0 %    MCV 94.0 79.0 - 97.0 fL    MCH 31.9 26.6 - 33.0 pg    MCHC 33.9 31.5 - 35.7 g/dL    RDW 12.4 12.3 - 15.4 %    RDW-SD 43.0 37.0 - 54.0 fl    MPV 11.1 6.0 - 12.0 fL    Platelets 232 140 - 450 10*3/mm3    Neutrophil % 70.4 42.7 - 76.0 %    Lymphocyte % 12.8 (L) 19.6 - 45.3 %    Monocyte % 10.8 5.0 - 12.0 %    Eosinophil % 5.4 0.3 - 6.2 %    Basophil %  0.3 0.0 - 1.5 %    Immature Grans % 0.3 0.0 - 0.5 %    Neutrophils, Absolute 10.91 (H) 1.70 - 7.00 10*3/mm3    Lymphocytes, Absolute 1.98 0.70 - 3.10 10*3/mm3    Monocytes, Absolute 1.67 (H) 0.10 - 0.90 10*3/mm3    Eosinophils, Absolute 0.83 (H) 0.00 - 0.40 10*3/mm3    Basophils, Absolute 0.05 0.00 - 0.20 10*3/mm3    Immature Grans, Absolute 0.04 0.00 - 0.05 10*3/mm3    nRBC 0.0 0.0 - 0.2 /100 WBC   Respiratory Panel PCR w/COVID-19(SARS-CoV-2) [facility]/F[facility]/MAD/[facility]UD In-House, NP Swab in UT/VTM, 3-4 HR TAT - Swab, Nasopharynx    Collection Time: 08/27/23  3:55 AM    Specimen: Nasopharynx; Swab   Result Value Ref Range    ADENOVIRUS, PCR Not Detected Not Detected    Coronavirus 229E Not Detected Not Detected    Coronavirus HKU1 Not Detected Not Detected    Coronavirus NL63 Not Detected Not Detected    Coronavirus OC43 Not Detected Not Detected    COVID19 Not Detected Not Detected - Ref. Range    Human Metapneumovirus Not Detected Not Detected    Human Rhinovirus/Enterovirus Not Detected Not Detected    Influenza A PCR Not Detected Not Detected    Influenza B PCR Not Detected Not Detected    Parainfluenza Virus 1 Not Detected Not Detected    Parainfluenza Virus 2 Not Detected Not Detected    Parainfluenza Virus 3 Not Detected Not Detected    Parainfluenza Virus 4 Not Detected Not Detected    RSV, PCR Not Detected Not Detected    Bordetella pertussis pcr Not Detected Not Detected    Bordetella parapertussis PCR Not Detected Not Detected    Chlamydophila pneumoniae PCR Not Detected Not Detected    Mycoplasma pneumo by PCR Not Detected Not Detected   ECG 12 Lead Dyspnea    Collection Time: 08/27/23  4:03 AM   Result Value Ref Range    QT Interval 689 ms    QTC Interval 717 ms   Blood Gas, Arterial -    Collection Time: 08/27/23  5:57 AM    Specimen: Arterial Blood   Result Value Ref Range    Site Arterial: right radial     Rohit's Test Positive     pH, Arterial 7.328 (L) 7.350 - 7.450 pH units    pCO2, Arterial 45.1  (H) 35.0 - 45.0 mm Hg    pO2, Arterial 124.4 (H) 80.0 - 100.0 mm Hg    HCO3, Arterial 23.6 22.0 - 28.0 mmol/L    Base Excess, Arterial -2.5 (L) 0.0 - 2.0 mmol/L    O2 Saturation Calculated 98.5 92.0 - 99.0 %    Barometric Pressure for Blood Gas 747.6 mmHg    Modality HFNC     Flow Rate 11 lpm    Rate 18 Breaths/minute   High Sensitivity Troponin T 2Hr    Collection Time: 08/27/23  6:41 AM    Specimen: Blood   Result Value Ref Range    HS Troponin T 11 <15 ng/L    Troponin T Delta -2 >=-4 - <+4 ng/L       Ordered the above labs and independently reviewed the results.        RADIOLOGY  CT Angiogram Chest    Result Date: 8/27/2023  CT ANGIOGRAM CHEST-  INDICATIONS: Short of breath  TECHNIQUE: Radiation dose reduction techniques were utilized, including automated exposure control and exposure modulation based on body size. CT angiography of the chest. Three-dimensional reconstructions.  COMPARISON: Chest CT from 6/26/2020  FINDINGS:  Pulmonary embolic disease is seen in left upper lobe segmental branch artery, for example axial image 54, sagittal image 173. The RV LV ratio is 1.4.  No aortic dissection.  The heart size is enlarged without pericardial effusion. A few small subcentimeter short axis mediastinal lymph nodes are seen that are not significant by size criteria.  The airways appear clear.  No pleural effusion or pneumothorax.  The lungs show reticulonodular infiltrates throughout the right lung, suggesting pneumonia, CT follow-up advised to exclude the possibility of underlying lesion. Largest nodular densities in this region are seen in the right lower lobe, for example 1.2 cm on axial image 63, 1.3 cm on image 88  Upper abdominal structures show no acute findings. Minimal hiatal hernia is present.  Degenerative changes are seen in the spine. No acute fracture is identified.       Critical test result. Small left upper lobe pulmonary embolic disease.  Reticulonodular infiltrates throughout the right lung,  suggesting pneumonia, CT follow-up advised.  Discussed by telephone with Dr. Hu at 0614, 8/27/2023.    This report was finalized on 8/27/2023 6:16 AM by Dr. Naveed Pickett M.D.       I ordered the above noted radiological studies. Reviewed by me and discussed with radiologist.  See dictation for official radiology interpretation.      PROCEDURES    Critical Care  Performed by: Jerman Hu MD  Authorized by: Remigio eBe MD     Critical care provider statement:     Critical care time (minutes):  61    Critical care was necessary to treat or prevent imminent or life-threatening deterioration of the following conditions:  Respiratory failure    Critical care was time spent personally by me on the following activities:  Ordering and performing treatments and interventions, ordering and review of laboratory studies, ordering and review of radiographic studies, pulse oximetry, review of old charts, re-evaluation of patient's condition, development of treatment plan with patient or surrogate, evaluation of patient's response to treatment and obtaining history from patient or surrogate      MEDICATIONS GIVEN IN ER    Medications   sodium chloride 0.9 % flush 10 mL (has no administration in time range)   iopamidol (ISOVUE-370) 76 % injection 100 mL (95 mL Intravenous Given 8/27/23 0546)   cefepime 2 gm IVPB in 100 ml NS (VTB) (0 mg Intravenous Stopped 8/27/23 0727)   Enoxaparin Sodium (LOVENOX) syringe 110 mg (110 mg Subcutaneous Given 8/27/23 0646)         ORDERS PLACED DURING THIS VISIT:  Orders Placed This Encounter   Procedures    Critical Care    Respiratory Panel PCR w/COVID-19(SARS-CoV-2) [facility]AD/[facility]UD In-House, NP Swab in UTM/VTM, 3-4 HR TAT - Swab, Nasopharynx    Blood Culture - Blood,    Blood Culture - Blood,    XR Chest 1 View    CT Angiogram Chest    Comprehensive Metabolic Panel    Protime-INR    aPTT    High Sensitivity Troponin T    BNP    Lactic Acid, Plasma     Procalcitonin    Magnesium    CBC Auto Differential    High Sensitivity Troponin T 2Hr    Blood Gas, Arterial -    Blood Gas, Arterial -    Monitor Blood Pressure    Pulse Oximetry, Continuous    LHA (on-call MD unless specified) Details    ECG 12 Lead Dyspnea    Insert Peripheral IV    Inpatient Admission    CBC & Differential         PROGRESS, DATA ANALYSIS, CONSULTS, AND MEDICAL DECISION MAKING    All labs have been independently interpreted by me.  All radiology studies have been reviewed by me and discussed with radiologist dictating the report.   EKG's independently viewed and interpreted by me.  Discussion below represents my analysis of pertinent findings related to patient's condition, differential diagnosis, treatment plan and final disposition.    My differential diagnosis for dyspnea includes but is not limited to:  Asthma, COPD, pneumonia, pulmonary embolus, acute respiratory distress syndrome, pneumothorax, pleural effusion, pulmonary fibrosis, congestive heart failure, myocardial infarction, DKA, uremia, acidosis, sepsis, anemia, drug related, hyperventilation, CNS disease      ED Course as of 08/27/23 0736   Sun Aug 27, 2023   0407 EKG          EKG time: 0403  Rhythm/Rate: Sinus rhythm rate 65  P waves and IA: Normal  QRS, axis: IVCD  ST and T waves: Nonspecific    Interpreted Contemporaneously by me, independently viewed  No significant changed compared to prior EKG from 11/29/2022   [TJ]   0415 WBC(!): 15.48 [TJ]   0433 Lactate: 2.0 [TJ]   0433 Magnesium: 1.9 [TJ]   0433 WBC(!): 15.48 [TJ]   0433 Hemoglobin: 13.4 [TJ]   0434 Platelets: 232 [TJ]   0434 Creatinine: 0.99 [TJ]   0434 Potassium: 3.9 [TJ]   0530 Reevaluation: Patient's x-ray distress is resolved.  Patient is tolerating high flow O2 well.  We will attempt to titrate him down to check blood gas. [TJ]   0637 Discussed with Dr. Dan.  Will admit the patient.  Requesting Lovenox rather than heparin drip. [TJ]   8356 I have independently  reviewed patient's CTA chest; my interpretation is right upper lobe PE, pneumonia [TJ]      ED Course User Index  [TJ] Jerman Hu MD       PPE: The patient wore a mask and I wore an N95 mask throughout the entire patient encounter.       AS OF 07:36 EDT VITALS:    BP - 119/75  HR - 70  TEMP - 99.1 °F (37.3 °C) (Tympanic)  O2 SATS - 97%        DIAGNOSIS  Final diagnoses:   Hypoxia   Pneumonia due to infectious organism, unspecified laterality, unspecified part of lung   Other acute pulmonary embolism without acute cor pulmonale         DISPOSITION  ED Disposition       ED Disposition   Decision to Admit    Condition   --    Comment   Level of Care: Telemetry [5]   Diagnosis: Hypoxia [378953]   Admitting Physician: NATTY SABILLON [[id number]]   Certification: I Certify That Inpatient Hospital Services Are Medically Necessary For Greater Than 2 Midnights                    Note Disclaimer: At [facility], we believe that sharing information builds trust and better relationships. You are receiving this note because you recently visited [facility]. It is possible you will see health information before a provider has talked with you about it. This kind of information can be easy to misunderstand. To help you fully understand what it means for your health, we urge you to discuss this note with your provider.         Jerman Hu MD  08/27/23 7980

## 2023-08-28 ENCOUNTER — ANESTHESIA (OUTPATIENT)
Dept: GASTROENTEROLOGY | Facility: HOSPITAL | Age: 53
DRG: 193 | End: 2023-08-28
Payer: COMMERCIAL

## 2023-08-28 ENCOUNTER — ANESTHESIA EVENT (OUTPATIENT)
Dept: GASTROENTEROLOGY | Facility: HOSPITAL | Age: 53
DRG: 193 | End: 2023-08-28
Payer: COMMERCIAL

## 2023-08-28 LAB
ANION GAP SERPL CALCULATED.3IONS-SCNC: 8.5 MMOL/L (ref 5–15)
B PARAPERT DNA SPEC QL NAA+PROBE: NOT DETECTED
B PERT DNA SPEC QL NAA+PROBE: NOT DETECTED
BUN SERPL-MCNC: 14 MG/DL (ref 6–20)
BUN/CREAT SERPL: 16.3 (ref 7–25)
C PNEUM DNA NPH QL NAA+NON-PROBE: NOT DETECTED
CALCIUM SPEC-SCNC: 8.6 MG/DL (ref 8.6–10.5)
CHLORIDE SERPL-SCNC: 108 MMOL/L (ref 98–107)
CO2 SERPL-SCNC: 24.5 MMOL/L (ref 22–29)
CREAT SERPL-MCNC: 0.86 MG/DL (ref 0.76–1.27)
DEPRECATED RDW RBC AUTO: 43.8 FL (ref 37–54)
EGFRCR SERPLBLD CKD-EPI 2021: 103.5 ML/MIN/1.73
ERYTHROCYTE [DISTWIDTH] IN BLOOD BY AUTOMATED COUNT: 12.4 % (ref 12.3–15.4)
FLUAV SUBTYP SPEC NAA+PROBE: NOT DETECTED
FLUBV RNA ISLT QL NAA+PROBE: NOT DETECTED
GIE STN SPEC: NORMAL
GLUCOSE SERPL-MCNC: 115 MG/DL (ref 65–99)
HADV DNA SPEC NAA+PROBE: NOT DETECTED
HCOV 229E RNA SPEC QL NAA+PROBE: NOT DETECTED
HCOV HKU1 RNA SPEC QL NAA+PROBE: NOT DETECTED
HCOV NL63 RNA SPEC QL NAA+PROBE: NOT DETECTED
HCOV OC43 RNA SPEC QL NAA+PROBE: NOT DETECTED
HCT VFR BLD AUTO: 35.4 % (ref 37.5–51)
HGB BLD-MCNC: 11.9 G/DL (ref 13–17.7)
HMPV RNA NPH QL NAA+NON-PROBE: NOT DETECTED
HPIV1 RNA ISLT QL NAA+PROBE: NOT DETECTED
HPIV2 RNA SPEC QL NAA+PROBE: NOT DETECTED
HPIV3 RNA NPH QL NAA+PROBE: NOT DETECTED
HPIV4 P GENE NPH QL NAA+PROBE: NOT DETECTED
M PNEUMO IGG SER IA-ACNC: NOT DETECTED
MAGNESIUM SERPL-MCNC: 1.9 MG/DL (ref 1.6–2.6)
MCH RBC QN AUTO: 31.7 PG (ref 26.6–33)
MCHC RBC AUTO-ENTMCNC: 33.6 G/DL (ref 31.5–35.7)
MCV RBC AUTO: 94.4 FL (ref 79–97)
PHOSPHATE SERPL-MCNC: 2.9 MG/DL (ref 2.5–4.5)
PLATELET # BLD AUTO: 207 10*3/MM3 (ref 140–450)
PMV BLD AUTO: 11.3 FL (ref 6–12)
POTASSIUM SERPL-SCNC: 4.3 MMOL/L (ref 3.5–5.2)
PROCALCITONIN SERPL-MCNC: 0.1 NG/ML (ref 0–0.25)
RBC # BLD AUTO: 3.75 10*6/MM3 (ref 4.14–5.8)
RHINOVIRUS RNA SPEC NAA+PROBE: NOT DETECTED
RSV RNA NPH QL NAA+NON-PROBE: NOT DETECTED
SARS-COV-2 RNA NPH QL NAA+NON-PROBE: NOT DETECTED
SODIUM SERPL-SCNC: 141 MMOL/L (ref 136–145)
WBC NRBC COR # BLD: 14.5 10*3/MM3 (ref 3.4–10.8)

## 2023-08-28 PROCEDURE — 88312 SPECIAL STAINS GROUP 1: CPT | Performed by: INTERNAL MEDICINE

## 2023-08-28 PROCEDURE — 88305 TISSUE EXAM BY PATHOLOGIST: CPT | Performed by: INTERNAL MEDICINE

## 2023-08-28 PROCEDURE — 25010000002 ONDANSETRON PER 1 MG: Performed by: ANESTHESIOLOGY

## 2023-08-28 PROCEDURE — 0B9F8ZX DRAINAGE OF RIGHT LOWER LUNG LOBE, VIA NATURAL OR ARTIFICIAL OPENING ENDOSCOPIC, DIAGNOSTIC: ICD-10-PCS | Performed by: INTERNAL MEDICINE

## 2023-08-28 PROCEDURE — 83735 ASSAY OF MAGNESIUM: CPT | Performed by: INTERNAL MEDICINE

## 2023-08-28 PROCEDURE — 94799 UNLISTED PULMONARY SVC/PX: CPT

## 2023-08-28 PROCEDURE — 25010000002 ENOXAPARIN PER 10 MG: Performed by: INTERNAL MEDICINE

## 2023-08-28 PROCEDURE — 87205 SMEAR GRAM STAIN: CPT | Performed by: INTERNAL MEDICINE

## 2023-08-28 PROCEDURE — 87071 CULTURE AEROBIC QUANT OTHER: CPT | Performed by: INTERNAL MEDICINE

## 2023-08-28 PROCEDURE — 25010000002 FENTANYL CITRATE (PF) 50 MCG/ML SOLUTION: Performed by: ANESTHESIOLOGY

## 2023-08-28 PROCEDURE — 0202U NFCT DS 22 TRGT SARS-COV-2: CPT | Performed by: INTERNAL MEDICINE

## 2023-08-28 PROCEDURE — 25010000002 PROPOFOL 10 MG/ML EMULSION: Performed by: ANESTHESIOLOGY

## 2023-08-28 PROCEDURE — 87116 MYCOBACTERIA CULTURE: CPT | Performed by: INTERNAL MEDICINE

## 2023-08-28 PROCEDURE — 88112 CYTOPATH CELL ENHANCE TECH: CPT | Performed by: INTERNAL MEDICINE

## 2023-08-28 PROCEDURE — 80048 BASIC METABOLIC PNL TOTAL CA: CPT | Performed by: HOSPITALIST

## 2023-08-28 PROCEDURE — 84145 PROCALCITONIN (PCT): CPT | Performed by: INTERNAL MEDICINE

## 2023-08-28 PROCEDURE — 87102 FUNGUS ISOLATION CULTURE: CPT | Performed by: INTERNAL MEDICINE

## 2023-08-28 PROCEDURE — 87206 SMEAR FLUORESCENT/ACID STAI: CPT | Performed by: INTERNAL MEDICINE

## 2023-08-28 PROCEDURE — 85027 COMPLETE CBC AUTOMATED: CPT | Performed by: HOSPITALIST

## 2023-08-28 PROCEDURE — 84100 ASSAY OF PHOSPHORUS: CPT | Performed by: INTERNAL MEDICINE

## 2023-08-28 RX ORDER — HYDRALAZINE HYDROCHLORIDE 20 MG/ML
5 INJECTION INTRAMUSCULAR; INTRAVENOUS
Status: DISCONTINUED | OUTPATIENT
Start: 2023-08-28 | End: 2023-08-28 | Stop reason: HOSPADM

## 2023-08-28 RX ORDER — LABETALOL HYDROCHLORIDE 5 MG/ML
5 INJECTION, SOLUTION INTRAVENOUS
Status: DISCONTINUED | OUTPATIENT
Start: 2023-08-28 | End: 2023-08-28 | Stop reason: HOSPADM

## 2023-08-28 RX ORDER — LIDOCAINE HYDROCHLORIDE 10 MG/ML
INJECTION, SOLUTION EPIDURAL; INFILTRATION; INTRACAUDAL; PERINEURAL AS NEEDED
Status: DISCONTINUED | OUTPATIENT
Start: 2023-08-28 | End: 2023-08-28 | Stop reason: HOSPADM

## 2023-08-28 RX ORDER — PROMETHAZINE HYDROCHLORIDE 25 MG/1
25 TABLET ORAL ONCE AS NEEDED
Status: DISCONTINUED | OUTPATIENT
Start: 2023-08-28 | End: 2023-08-28 | Stop reason: HOSPADM

## 2023-08-28 RX ORDER — LIDOCAINE HYDROCHLORIDE 20 MG/ML
INJECTION, SOLUTION INFILTRATION; PERINEURAL AS NEEDED
Status: DISCONTINUED | OUTPATIENT
Start: 2023-08-28 | End: 2023-08-28 | Stop reason: SURG

## 2023-08-28 RX ORDER — ONDANSETRON 2 MG/ML
4 INJECTION INTRAMUSCULAR; INTRAVENOUS ONCE AS NEEDED
Status: COMPLETED | OUTPATIENT
Start: 2023-08-28 | End: 2023-08-28

## 2023-08-28 RX ORDER — OXYCODONE AND ACETAMINOPHEN 7.5; 325 MG/1; MG/1
1 TABLET ORAL EVERY 4 HOURS PRN
Status: DISCONTINUED | OUTPATIENT
Start: 2023-08-28 | End: 2023-08-28

## 2023-08-28 RX ORDER — DIPHENHYDRAMINE HYDROCHLORIDE 50 MG/ML
12.5 INJECTION INTRAMUSCULAR; INTRAVENOUS
Status: DISCONTINUED | OUTPATIENT
Start: 2023-08-28 | End: 2023-08-28 | Stop reason: HOSPADM

## 2023-08-28 RX ORDER — IPRATROPIUM BROMIDE AND ALBUTEROL SULFATE 2.5; .5 MG/3ML; MG/3ML
3 SOLUTION RESPIRATORY (INHALATION) ONCE
Status: DISCONTINUED | OUTPATIENT
Start: 2023-08-28 | End: 2023-08-29 | Stop reason: HOSPADM

## 2023-08-28 RX ORDER — GLYCOPYRROLATE 0.2 MG/ML
INJECTION INTRAMUSCULAR; INTRAVENOUS AS NEEDED
Status: DISCONTINUED | OUTPATIENT
Start: 2023-08-28 | End: 2023-08-28 | Stop reason: SURG

## 2023-08-28 RX ORDER — PROPOFOL 10 MG/ML
VIAL (ML) INTRAVENOUS AS NEEDED
Status: DISCONTINUED | OUTPATIENT
Start: 2023-08-28 | End: 2023-08-28 | Stop reason: SURG

## 2023-08-28 RX ORDER — EPHEDRINE SULFATE 50 MG/ML
5 INJECTION, SOLUTION INTRAVENOUS ONCE AS NEEDED
Status: DISCONTINUED | OUTPATIENT
Start: 2023-08-28 | End: 2023-08-28 | Stop reason: HOSPADM

## 2023-08-28 RX ORDER — FLUMAZENIL 0.1 MG/ML
0.2 INJECTION INTRAVENOUS AS NEEDED
Status: DISCONTINUED | OUTPATIENT
Start: 2023-08-28 | End: 2023-08-28 | Stop reason: HOSPADM

## 2023-08-28 RX ORDER — FENTANYL CITRATE 50 UG/ML
50 INJECTION, SOLUTION INTRAMUSCULAR; INTRAVENOUS
Status: DISCONTINUED | OUTPATIENT
Start: 2023-08-28 | End: 2023-08-28 | Stop reason: HOSPADM

## 2023-08-28 RX ORDER — IPRATROPIUM BROMIDE AND ALBUTEROL SULFATE 2.5; .5 MG/3ML; MG/3ML
3 SOLUTION RESPIRATORY (INHALATION) ONCE AS NEEDED
Status: DISCONTINUED | OUTPATIENT
Start: 2023-08-28 | End: 2023-08-28 | Stop reason: HOSPADM

## 2023-08-28 RX ORDER — NALOXONE HCL 0.4 MG/ML
0.2 VIAL (ML) INJECTION AS NEEDED
Status: DISCONTINUED | OUTPATIENT
Start: 2023-08-28 | End: 2023-08-28 | Stop reason: HOSPADM

## 2023-08-28 RX ORDER — DROPERIDOL 2.5 MG/ML
0.62 INJECTION, SOLUTION INTRAMUSCULAR; INTRAVENOUS
Status: DISCONTINUED | OUTPATIENT
Start: 2023-08-28 | End: 2023-08-28 | Stop reason: HOSPADM

## 2023-08-28 RX ORDER — PROMETHAZINE HYDROCHLORIDE 25 MG/1
25 SUPPOSITORY RECTAL ONCE AS NEEDED
Status: DISCONTINUED | OUTPATIENT
Start: 2023-08-28 | End: 2023-08-28 | Stop reason: HOSPADM

## 2023-08-28 RX ORDER — OXYCODONE AND ACETAMINOPHEN 7.5; 325 MG/1; MG/1
1 TABLET ORAL EVERY 8 HOURS PRN
Status: DISCONTINUED | OUTPATIENT
Start: 2023-08-28 | End: 2023-08-28 | Stop reason: HOSPADM

## 2023-08-28 RX ORDER — HYDROCODONE BITARTRATE AND ACETAMINOPHEN 7.5; 325 MG/1; MG/1
1 TABLET ORAL ONCE AS NEEDED
Status: DISCONTINUED | OUTPATIENT
Start: 2023-08-28 | End: 2023-08-28 | Stop reason: HOSPADM

## 2023-08-28 RX ORDER — FENTANYL CITRATE 50 UG/ML
INJECTION, SOLUTION INTRAMUSCULAR; INTRAVENOUS AS NEEDED
Status: DISCONTINUED | OUTPATIENT
Start: 2023-08-28 | End: 2023-08-28 | Stop reason: SURG

## 2023-08-28 RX ORDER — HYDROMORPHONE HYDROCHLORIDE 2 MG/ML
0.5 INJECTION, SOLUTION INTRAMUSCULAR; INTRAVENOUS; SUBCUTANEOUS
Status: DISCONTINUED | OUTPATIENT
Start: 2023-08-28 | End: 2023-08-28

## 2023-08-28 RX ORDER — SODIUM CHLORIDE 9 MG/ML
30 INJECTION, SOLUTION INTRAVENOUS CONTINUOUS PRN
Status: DISCONTINUED | OUTPATIENT
Start: 2023-08-28 | End: 2023-08-29 | Stop reason: HOSPADM

## 2023-08-28 RX ADMIN — GLYCOPYRROLATE 0.2 MG: 0.2 INJECTION INTRAMUSCULAR; INTRAVENOUS at 10:01

## 2023-08-28 RX ADMIN — ONDANSETRON 4 MG: 2 INJECTION INTRAMUSCULAR; INTRAVENOUS at 10:20

## 2023-08-28 RX ADMIN — GUAIFENESIN 1200 MG: 600 TABLET, EXTENDED RELEASE ORAL at 12:11

## 2023-08-28 RX ADMIN — ENOXAPARIN SODIUM 105 MG: 150 INJECTION SUBCUTANEOUS at 23:39

## 2023-08-28 RX ADMIN — SODIUM CHLORIDE 30 ML/HR: 9 INJECTION, SOLUTION INTRAVENOUS at 09:33

## 2023-08-28 RX ADMIN — PROPOFOL 200 MCG/KG/MIN: 10 INJECTION, EMULSION INTRAVENOUS at 10:13

## 2023-08-28 RX ADMIN — DEXTROMETHORPHAN 60 MG: 30 SUSPENSION, EXTENDED RELEASE ORAL at 21:49

## 2023-08-28 RX ADMIN — Medication 3 MG: at 00:08

## 2023-08-28 RX ADMIN — LIDOCAINE HYDROCHLORIDE 60 MG: 20 INJECTION, SOLUTION INFILTRATION; PERINEURAL at 10:09

## 2023-08-28 RX ADMIN — GUAIFENESIN 1200 MG: 600 TABLET, EXTENDED RELEASE ORAL at 21:49

## 2023-08-28 RX ADMIN — DEXTROMETHORPHAN 60 MG: 30 SUSPENSION, EXTENDED RELEASE ORAL at 12:10

## 2023-08-28 RX ADMIN — PANTOPRAZOLE SODIUM 40 MG: 40 TABLET, DELAYED RELEASE ORAL at 12:11

## 2023-08-28 RX ADMIN — ENOXAPARIN SODIUM 105 MG: 150 INJECTION SUBCUTANEOUS at 12:10

## 2023-08-28 RX ADMIN — PROPOFOL 200 MG: 10 INJECTION, EMULSION INTRAVENOUS at 10:10

## 2023-08-28 RX ADMIN — SERTRALINE 150 MG: 100 TABLET, FILM COATED ORAL at 12:11

## 2023-08-28 RX ADMIN — FENTANYL CITRATE 50 MCG: 50 INJECTION, SOLUTION INTRAMUSCULAR; INTRAVENOUS at 10:09

## 2023-08-28 NOTE — PROGRESS NOTES
"Dr. VIKRAM Sood    [facility] ENDO SUITES        Patient ID:  Name:  Nikolai Shaw  MRN:  [id number]  1970  53 y.o.  male            CC/Reason for visit: Testing testing    Interval hx: Patient is still coughing, still having secretions.  On a few liters of oxygen this morning, 2 L    ROS: No hemoptysis, no abdominal pain, no nausea    Vitals:  Vitals:    08/28/23 0736 08/28/23 0737 08/28/23 0851 08/28/23 0924   BP: 135/88   139/91   BP Location: Left arm   Right arm   Patient Position: Lying   Lying   Pulse: 62 62 80 58   Resp: 18   15   Temp:   98.1 °F (36.7 °C) 98.4 °F (36.9 °C)   TempSrc: Oral Oral Oral Oral   SpO2: 99%   95%   Weight:    107 kg (236 lb)   Height:    172.7 cm (68\")           Body mass index is 35.88 kg/m².    Intake/Output Summary (Last 24 hours) at 8/28/2023 1024  Last data filed at 8/28/2023 1018  Gross per 24 hour   Intake 640 ml   Output --   Net 640 ml       Exam:  GEN:  No distress  Alert, oriented x 3.   LUNGS: Scattered rhonchi bilat, no use of accessory muscles  CV:  Normal S1S2, without murmur, no edema  ABD:  Non tender, right ileostomy pouch appears clean dry and intact      Scheduled meds:  dextromethorphan polistirex ER, 60 mg, Oral, Q12H  enoxaparin, 1 mg/kg, Subcutaneous, Q12H  guaiFENesin, 1,200 mg, Oral, Q12H  pantoprazole, 40 mg, Oral, Daily  sertraline, 150 mg, Oral, Daily      IV meds:                      sodium chloride, 30 mL/hr, Last Rate: 30 mL/hr (08/28/23 0956)        Data Review:   I reviewed the patient's medications and new clinical results.            Results from last 7 days   Lab Units 08/28/23  0429 08/27/23  0354   SODIUM mmol/L 141 141   POTASSIUM mmol/L 4.3 3.9   CHLORIDE mmol/L 108* 104   CO2 mmol/L 24.5 26.0   BUN mg/dL 14 14   CREATININE mg/dL 0.86 0.99   CALCIUM mg/dL 8.6 9.3   BILIRUBIN mg/dL  --  0.5   ALK PHOS U/L  --  105   ALT (SGPT) U/L  --  19   AST (SGOT) U/L  --  28   GLUCOSE mg/dL 115* 122*   WBC 10*3/mm3 14.50* 15.48* "   HEMOGLOBIN g/dL 11.9* 13.4   PLATELETS 10*3/mm3 207 232   INR   --  0.99   PROBNP pg/mL  --  72.3   PROCALCITONIN ng/mL 0.10 0.06     Results from last 7 days   Lab Units 08/27/23  1719 08/27/23  0354   BLOODCX   --  No growth at 24 hours  No growth at 24 hours   RESPCX  Rejected  --            Results from last 7 days   Lab Units 08/27/23  0557   PH, ARTERIAL pH units 7.328*   PCO2, ARTERIAL mm Hg 45.1*   PO2 ART mm Hg 124.4*   FLOW RATE lpm 11   MODALITY  HFNC   O2 SATURATION CALC % 98.5       Estimated Creatinine Clearance: 117.7 mL/min (by C-G formula based on SCr of 0.86 mg/dL).      ASSESSMENT:     Pneumonia involving both lungs, organism unknown    History of rectal cancer    Essential hypertension    Sleep apnea    Prediabetes    Acute respiratory failure with hypoxia    Acute pulmonary embolism without acute cor pulmonale    Obesity (BMI 30-39.9)        PLAN:  Patient gave consent for bronchoscopy.  We will proceed with bronchoscopy based on his lack of response to Augmentin for the past 7 days.  He was diagnosed with community-acquired pneumonia but did not respond to strong antibiotics.  We will obtain bronchoalveolar lavage samples today for microbiology studies, including bacterial and viral cultures.  Continue to observe off of all antibiotics for now.  He has not had any fever.  His procalcitonin is normal x2.          Rubin Sood MD  8/28/2023

## 2023-08-28 NOTE — ANESTHESIA PROCEDURE NOTES
Airway  Urgency: elective    Date/Time: 8/28/2023 10:12 AM  Airway not difficult    General Information and Staff    Patient location during procedure: OR  Anesthesiologist: Anand Wolf MD    Indications and Patient Condition  Indications for airway management: airway protection    Preoxygenated: yes  MILS maintained throughout  Mask difficulty assessment: 1 - vent by mask    Final Airway Details  Final airway type: supraglottic airway      Successful airway: classic and bronch  Size 5     Number of attempts at approach: 1  Assessment: lips, teeth, and gum same as pre-op

## 2023-08-28 NOTE — PROGRESS NOTES
Name: Nikolai Shaw ADMIT: 2023   : 1970  PCP: Ekta Zepeda, YOUNG    MRN: [id number] LOS: 1 days   AGE/SEX: 53 y.o. male  ROOM: [facility]/     Subjective   Subjective   Still having shortness of breath.    Review of Systems     Objective   Objective   Temp:  [98.8 °F (37.1 °C)-99.7 °F (37.6 °C)] 98.8 °F (37.1 °C)  Heart Rate:  [54-72] 54  Resp:  [18-20] 18  BP: (122-133)/(70-76) 133/75  SpO2:  [96 %-98 %] 98 %  on  Flow (L/min):  [2] 2;   Device (Oxygen Therapy): nasal cannula  Body mass index is 36.13 kg/m².    Physical Exam  Vitals and nursing note reviewed.   Constitutional:       General: He is not in acute distress.  Cardiovascular:      Rate and Rhythm: Normal rate and regular rhythm.   Pulmonary:      Effort: Pulmonary effort is normal.      Breath sounds: Normal breath sounds.   Abdominal:      General: Bowel sounds are normal.      Palpations: Abdomen is soft.      Tenderness: There is no abdominal tenderness.      Comments: Ileostomy present   Musculoskeletal:         General: No swelling.   Skin:     General: Skin is warm and dry.   Neurological:      Mental Status: He is alert. Mental status is at baseline.       Results Review      I reviewed the patient's new clinical results.  Results from last 7 days   Lab Units 23  04223  0354   WBC 10*3/mm3 14.50* 15.48*   HEMOGLOBIN g/dL 11.9* 13.4   PLATELETS 10*3/mm3 207 232     Results from last 7 days   Lab Units 23  04223  0354   SODIUM mmol/L 141 141   POTASSIUM mmol/L 4.3 3.9   CHLORIDE mmol/L 108* 104   CO2 mmol/L 24.5 26.0   BUN mg/dL 14 14   CREATININE mg/dL 0.86 0.99   GLUCOSE mg/dL 115* 122*   EGFR mL/min/1.73 103.5 91.1     Results from last 7 days   Lab Units 23  04223  0354   CALCIUM mg/dL 8.6 9.3   ALBUMIN g/dL  --  4.1   MAGNESIUM mg/dL 1.9 1.9   PHOSPHORUS mg/dL 2.9  --      Results from last 7 days   Lab Units 23  0429 23  0354   PROCALCITONIN ng/mL 0.10 0.06   LACTATE mmol/L  --   2.0   No results found for: STREPPNEUAG, LEGANTIGENUR  Results from last 7 days   Lab Units 08/27/23  1719 08/27/23  0354   BLOODCX   --  No growth at 24 hours  No growth at 24 hours   RESPCX  Rejected  --      Results from last 7 days   Lab Units 08/27/23  0355   COVID19  Not Detected   ADENOVIRUS DETECTION BY PCR  Not Detected   CORONAVIRUS 229E  Not Detected   CORONAVIRUS HKU1  Not Detected   CORONAVIRUS NL63  Not Detected   CORONAVIRUS OC43  Not Detected   HUMAN METAPNEUMOVIRUS  Not Detected   HUMAN RHINOVIRUS/ENTEROVIRUS  Not Detected   INFLUENZA B PCR  Not Detected   PARAINFLUENZA 1  Not Detected   PARAINFLUENZA VIRUS 2  Not Detected   PARAINFLUENZA VIRUS 3  Not Detected   PARAINFLUENZA VIRUS 4  Not Detected   BORDETELLA PERTUSSIS PCR  Not Detected   CHLAMYDOPHILA PNEUMONIAE PCR  Not Detected   MYCOPLAMA PNEUMO PCR  Not Detected   INFLUENZA A PCR  Not Detected   RSV, PCR  Not Detected     Results from last 7 days   Lab Units 08/27/23  0557   PH, ARTERIAL pH units 7.328*   PCO2, ARTERIAL mm Hg 45.1*   PO2 ART mm Hg 124.4*   FLOW RATE lpm 11   MODALITY  HFNC   O2 SATURATION CALC % 98.5       I have personally reviewed all medications:  Scheduled Medications  dextromethorphan polistirex ER, 60 mg, Oral, Q12H  enoxaparin, 1 mg/kg, Subcutaneous, Q12H  guaiFENesin, 1,200 mg, Oral, Q12H  pantoprazole, 40 mg, Oral, Daily  sertraline, 150 mg, Oral, Daily    Infusions   Diet  NPO Diet NPO Type: Strict NPO    I have personally reviewed:  [x]  Laboratory   [x]  Microbiology   [x]  Radiology   [x]  EKG/Telemetry  [x]  Cardiology/Vascular   []  Pathology    []  Records         Assessment/Plan     Active Hospital Problems    Diagnosis  POA    **Pneumonia involving right lung [J18.9]  Yes    Acute respiratory failure with hypoxia [J96.01]  Yes    Acute pulmonary embolism without acute cor pulmonale [I26.99]  Yes    Obesity (BMI 30-39.9) [E66.9]  Yes    Prediabetes [R73.03]  Yes    Sleep apnea [G47.30]  Yes    Essential  hypertension [I10]  Yes    History of rectal cancer [Z85.048]  Yes      Resolved Hospital Problems   No resolved problems to display.       53 y.o. male with Pneumonia involving right lung.    Patient underwent bronchoscopy earlier today.  Antibiotics are on hold.  Follow-up results BAL.  Appreciate pulmonology assistance.  Continue Lovenox for treatment of pulmonary embolism.  Note lower extremity venous Doppler studies negative.    His rectal cancer has been felt to be in remission.  He underwent total colectomy with ileostomy in 2015 and declined adjuvant chemotherapy at that time.  Previously seen by Dr. Shelby.        Blood cultures x2 done in ED negative thus far.    VRP negative.  Supplemental oxygen to keep SaO2 > 92%  Physical therapy consulted.  Full code.  Discussed with patient and spouse.  Anticipate discharge home with family later this week.      Remigio Bee MD  [facility]  08/28/23  07:42 EDT

## 2023-08-28 NOTE — CASE MANAGEMENT/SOCIAL WORK
Discharge Planning Assessment  [facility]     Patient Name: Nikolai Shaw  MRN: [id number]  Today's Date: 8/28/2023    Admit Date: 8/27/2023    Plan: Home with family support, spouse will transport home   Discharge Needs Assessment       Row Name 08/28/23 1709       Living Environment    People in Home spouse    Name(s) of People in Home Hansa Whitman [phone]    Current Living Arrangements home    Potentially Unsafe Housing Conditions none    Primary Care Provided by self    Provides Primary Care For no one    Family Caregiver if Needed spouse    Family Caregiver Names Hansa Whitman    Quality of Family Relationships involved;helpful    Able to Return to Prior Arrangements yes       Resource/Environmental Concerns    Resource/Environmental Concerns none       Food Insecurity    Within the past 12 months, you worried that your food would run out before you got the money to buy more. Never true    Within the past 12 months, the food you bought just didn't last and you didn't have money to get more. Never true       Transition Planning    Patient/Family Anticipates Transition to home;home with family    Patient/Family Anticipated Services at Transition none    Transportation Anticipated car, drives self;family or friend will provide       Discharge Needs Assessment    Equipment Currently Used at Home none    Concerns to be Addressed no discharge needs identified;denies needs/concerns at this time    Anticipated Changes Related to Illness none    Equipment Needed After Discharge none    Provided Post Acute Provider List? N/A    Provided Post Acute Provider Quality & Resource List? N/A                   Discharge Plan       Row Name 08/28/23 1710       Plan    Plan Home with family support, spouse will transport home    Patient/Family in Agreement with Plan yes    Provided Post Acute Provider List? N/A    Provided Post Acute Provider Quality & Resource List? N/A    Plan Comments Met with pt at bedside.  "Introduced self and explained role of . Face sheet verified, PCP is Ekta Zepeda. Pt denies any difficutly paying for medications and he obtains his medications from [employer] [facility]. Pt stated that is any care needs arise, his wife could provide assistance. Pt stated that he had a \"few\" steps that he had to maneuver, but he does so without any difficulty. Pt is independent in ADL's, and does not use any assistive devices. Pt continues to work full time at [employer], drives, etc. Pt has never used home health or rehab services and denies the need for those services at discharge. Upon discharge, pt stated his spouse will transport home. Explained that CCP would follow to assess for discharge needs.                  Continued Care and Services - Admitted Since 8/27/2023    Coordination has not been started for this encounter.       Expected Discharge Date and Time       Expected Discharge Date Expected Discharge Time    Aug 31, 2023            Demographic Summary    No documentation.                  Functional Status    No documentation.                  Psychosocial    No documentation.                  Abuse/Neglect    No documentation.                  Legal    No documentation.                  Substance Abuse    No documentation.                  Patient Forms    No documentation.                     Shania Nicholson RN    "

## 2023-08-28 NOTE — ANESTHESIA POSTPROCEDURE EVALUATION
Patient: Nikolai Shaw    Procedure Summary       Date: 08/28/23 Room / Location: [facility] ENDOSCOPY 7 / [facility] ENDOSCOPY    Anesthesia Start: 0956 Anesthesia Stop: 1038    Procedure: BRONCHOSCOPY WITH BRONCHIAL ALVELOR LAVAGE (Bronchus) Diagnosis:       Pneumonia of right lower lobe due to infectious organism      (Pneumonia of right lower lobe due to infectious organism [J18.9])    Surgeons: Rubin Sood MD Provider: Anand Wolf MD    Anesthesia Type: general ASA Status: 3            Anesthesia Type: general    Vitals  Vitals Value Taken Time   /71 08/28/23 1036   Temp     Pulse 52 08/28/23 1036   Resp 16 08/28/23 1036   SpO2 97 % 08/28/23 1036           Post Anesthesia Care and Evaluation    Patient location during evaluation: PACU  Patient participation: complete - patient participated  Level of consciousness: awake and alert  Pain management: adequate    Airway patency: patent  Anesthetic complications: No anesthetic complications    Cardiovascular status: acceptable  Respiratory status: acceptable  Hydration status: acceptable    Comments: --------------------            08/28/23               1209     --------------------   BP:       122/76     Pulse:      52       Resp:       16       Temp:                SpO2:      93%      --------------------

## 2023-08-28 NOTE — PAYOR COMM NOTE
"Bob Peres (53 y.o. Male)                             ATTENTION; INITIAL CLINICALS CASE REF #[id number] -                             REPLY TO [facility] DEPT  [phone] OR CALL   DEEPTI CONTRERAS YOLIE             Date of Birth   1970    Social Security Number       Address   [address] [place] [address] [address] [place] [place] [address]    Home Phone   [phone]    MRN   [id number]       Scientology   [facility], [place], operated by [facility]    Marital Status                               Admission Date   8/27/23    Admission Type   Emergency    Admitting Provider   Vasquez Dan MD    Attending Provider   Remigio Bee MD    Department, Room/Bed   [address], [address]/1       Discharge Date       Discharge Disposition       Discharge Destination                                 Attending Provider: Remigio Bee MD    Allergies: No Known Allergies    Isolation: None   Infection: None   Code Status: CPR    Ht: 172.7 cm (68\")   Wt: 107 kg (236 lb)    Admission Cmt: None   Principal Problem: Pneumonia involving right lung [J18.9]                   Active Insurance as of 8/27/2023       Primary Coverage       Payor Plan Insurance Group Employer/Plan Group    [facility] BLUE CROSS ANTH BLUE CROSS BLUE SHIELD PPO [id number]       Payor Plan Address Payor Plan Phone Number Payor Plan Fax Number Effective Dates    [address] [phone]  6/1/2014 - None Entered    [place] [address]         Subscriber Name Subscriber Birth Date Member ID       BOB PERES 1970 [id number]                     Emergency Contacts        (Rel.) Home Phone Work Phone Mobile Phone    Hansa Whitman (Spouse) [phone] -- [phone]                 History & Physical        Rubin Sood MD at 08/28/23 1012            H&P reviewed. The patient was examined and there are no changes to the H&P.          Electronically signed by Rubin Sood MD at 08/28/23 1012   Source Note          Group: [facility]         CONSULT NOTE    Patient Identification:  Nikolai Shaw  53 y.o.  male  1970  [id number]            Requesting physician: Dr. Remigio Bee    Reason for Consultation: Pneumonia, pulmonary embolism    CC: Cough and vomiting    History of Present Illness:  53-year-old male patient who yesterday in the middle of the night woke up to 10 to his ileostomy and became lightheaded, short of breath and vomited.  He presented with significant coughing and bronchospasm.  EMS gave him nebulized albuterol and he felt much better.  He was hypoxic on room air and he needed supplemental oxygen upon EMS arrival.  He says for the past 11 days he has been coughing.  Producing yellow sputum and went to urgent care.  They prescribed Augmentin but he has not improved despite taking almost 1 week of Augmentin.  He denies any pulmonary history.  Has never smoked.  He denies any fever or chills but has noticed yellow sputum with ongoing cough despite Augmentin.  CT chest with angiogram here shows a very small left upper lobe pulmonary embolism.  He has never had any history of DVT or PE.  No family history of thrombophilia.      Review of Systems   Constitutional:  Positive for fatigue. Negative for diaphoresis and fever.   HENT:  Negative for ear pain and sore throat.    Eyes:  Negative for pain and visual disturbance.   Respiratory:  Positive for cough and shortness of breath.    Gastrointestinal:  Positive for vomiting. Negative for abdominal pain and diarrhea.   Endocrine: Negative for cold intolerance and polyuria.   Genitourinary:  Negative for dysuria and hematuria.   Musculoskeletal:  Negative for joint swelling and myalgias.   Skin:  Negative for rash and wound.   Neurological:  Negative for speech difficulty and numbness.   Hematological:  Negative for adenopathy. Does not bruise/bleed easily.   Psychiatric/Behavioral:  Negative for agitation and confusion.      Past Medical History:  Past Medical History:    Diagnosis Date    Adenocarcinoma of rectum 06/2015    invasive adenocarcinoma of rectum w 59 benign lymph nodes    Adenomatous polyposis     FAMILIAL     Anxiety     At risk for sleep apnea     Chicken pox     patient reports having as a child    Depression     Herpes zoster without complication 12/3/2021    History of anemia     Hypertension     Hyponatremia     Insomnia     Malignant neoplasm of rectum 6/1/2016    Parastomal hernia without obstruction or gangrene 2/15/2021    Added automatically from request for surgery [id number]    Pulmonary nodule 1/10/2020    6/26/20 CT chest: 6 mm nodule resolved.    Renal calculi     RLS (restless legs syndrome)     Syncope 03/19/2021    prolonged QT interval    Testicular hypofunction     Vitamin D deficiency      Past Surgical History:  Past Surgical History:   Procedure Laterality Date    ABDOMINOPERINEAL PROCTOCOLECTOMY  07/06/2015    DR PAL RUDOLPH    COLONOSCOPY  06/16/1915    DR SHASTA HOWARD    COLONOSCOPY N/A 2/23/2021    Procedure: ILEOSCOPY, EXCISION OF ILEOSTOMY NODULE;  Surgeon: Pal Rudolph MD;  Location: [facility] OR;  Service: General;  Laterality: N/A;    ENDOSCOPY  06/23/2015    DR SHASTA HOWARD    EXPLORATORY LAPAROTOMY N/A 2/3/2017    Procedure: LAPAROTOMY EXPLORATORY  SMALL BOWEL OBSTRUCTION;  Surgeon: Pal Rudolph MD;  Location: [facility] OR;  Service:     ILEOSTOMY  07/06/2015    MYRINGOTOMY W/ TUBES      2-3 times as child      Home Meds:  Medications Prior to Admission   Medication Sig Dispense Refill Last Dose    B Complex Vitamins (VITAMIN-B COMPLEX PO) Take 1 tablet by mouth Daily.   8/26/2023    Cholecalciferol (Vitamin D-3) 125 MCG (5000 UT) tablet Take 1 tablet by mouth Daily. 90 tablet 1 8/26/2023    pantoprazole (PROTONIX) 20 MG EC tablet Take 1 tablet by mouth Daily. 90 tablet 1 Past Week    sertraline (ZOLOFT) 100 MG tablet Take 1.5 tablets by mouth Daily. 135 tablet 1 8/26/2023     Allergies:  No Known Allergies    Social  "History:   Social History     Socioeconomic History    Marital status:      Spouse name: Hansa    Years of education: college   Tobacco Use    Smoking status: Never    Smokeless tobacco: Current     Types: Chew    Tobacco comments:     CHEWS TOBACCO   Vaping Use    Vaping Use: Never used   Substance and Sexual Activity    Alcohol use: Yes     Alcohol/week: 24.0 standard drinks     Types: 24 Cans of beer per week     Comment: Some days 24 beers a day. Notmally  at least 18; stopped drinking 6/10/23    Drug use: No    Sexual activity: Not Currently     Partners: Female     Family History:  Family History   Problem Relation Age of Onset    Colon cancer Father         familial polyposis    Aneurysm Maternal Grandfather 66        brain aneurysm    Colon cancer Paternal Grandfather         familial polyposis    Hypertension Maternal Uncle     Diabetes Maternal Uncle     Malig Hyperthermia Neg Hx      Physical Exam:  /70 (BP Location: Right arm, Patient Position: Sitting)   Pulse 70   Temp 99.7 °F (37.6 °C) (Oral)   Resp 20   Ht 172.7 cm (68\")   Wt 108 kg (237 lb 9.6 oz)   SpO2 97%   BMI 36.13 kg/m²  Body mass index is 36.13 kg/m². 97% 108 kg (237 lb 9.6 oz)  Physical Exam  Constitutional:       General: He is not in acute distress.     Appearance: He is well-developed.   HENT:      Right Ear: External ear normal.      Left Ear: External ear normal.      Nose: Nose normal.   Eyes:      General: No scleral icterus.     Conjunctiva/sclera: Conjunctivae normal.      Pupils: Pupils are equal, round, and reactive to light.   Neck:      Thyroid: No thyromegaly.      Vascular: No JVD.   Cardiovascular:      Rate and Rhythm: Normal rate and regular rhythm.      Heart sounds: No murmur heard.     Comments: No edema  Pulmonary:      Effort: Pulmonary effort is normal.      Breath sounds: Rhonchi present. No wheezing or rales.   Abdominal:      General: There is no distension.      Palpations: Abdomen is soft.    "   Comments: Ileostomy bag in right lower quadrant, I cannot palpate any enlarged organs   Musculoskeletal:         General: No deformity. Normal range of motion.      Cervical back: Neck supple. No rigidity.      Comments: No deformity in all 4 extrem   Skin:     Findings: No erythema or rash.      Comments: No palpable nodules   Neurological:      General: No focal deficit present.      Mental Status: He is alert and oriented to person, place, and time.      Cranial Nerves: No cranial nerve deficit.      Motor: No weakness.   Psychiatric:         Mood and Affect: Mood normal.         Thought Content: Thought content normal.       LABS:        Results from last 7 days   Lab Units 08/27/23  0354   MAGNESIUM mg/dL 1.9   SODIUM mmol/L 141   POTASSIUM mmol/L 3.9   CHLORIDE mmol/L 104   CO2 mmol/L 26.0   BUN mg/dL 14   CREATININE mg/dL 0.99   GLUCOSE mg/dL 122*   CALCIUM mg/dL 9.3   WBC 10*3/mm3 15.48*   HEMOGLOBIN g/dL 13.4   PLATELETS 10*3/mm3 232   ALT (SGPT) U/L 19   AST (SGOT) U/L 28   PROBNP pg/mL 72.3   PROCALCITONIN ng/mL 0.06     Lab Results   Component Value Date    TROPONINT 11 08/27/2023     Results from last 7 days   Lab Units 08/27/23  0641 08/27/23  0354   HSTROP T ng/L 11 13         Results from last 7 days   Lab Units 08/27/23  0354   PROCALCITONIN ng/mL 0.06   LACTATE mmol/L 2.0     Results from last 7 days   Lab Units 08/27/23  0557   PH, ARTERIAL pH units 7.328*   PCO2, ARTERIAL mm Hg 45.1*   PO2 ART mm Hg 124.4*   FLOW RATE lpm 11   MODALITY  HFNC   O2 SATURATION CALC % 98.5     Results from last 7 days   Lab Units 08/27/23  0355   ADENOVIRUS DETECTION BY PCR  Not Detected   CORONAVIRUS 229E  Not Detected   CORONAVIRUS HKU1  Not Detected   CORONAVIRUS NL63  Not Detected   CORONAVIRUS OC43  Not Detected   HUMAN METAPNEUMOVIRUS  Not Detected   HUMAN RHINOVIRUS/ENTEROVIRUS  Not Detected   INFLUENZA B PCR  Not Detected   PARAINFLUENZA 1  Not Detected   PARAINFLUENZA VIRUS 2  Not Detected   PARAINFLUENZA  VIRUS 3  Not Detected   PARAINFLUENZA VIRUS 4  Not Detected   BORDETELLA PERTUSSIS PCR  Not Detected   BORDETELLA PARAPERTUSSIS PCR  Not Detected   CHLAMYDOPHILA PNEUMONIAE PCR  Not Detected   MYCOPLAMA PNEUMO PCR  Not Detected   RSV, PCR  Not Detected     Results from last 7 days   Lab Units 08/27/23  0354   INR  0.99         Lab Results   Component Value Date    TSH 0.94 07/11/2015   Estimated Creatinine Clearance: 102.8 mL/min (by C-G formula based on SCr of 0.99 mg/dL).         Imaging: I personally visualized the images of CT angiogram of the chest showing bilateral, but predominant right lower lobe reticulonodular and groundglass infiltrates suggesting pneumonia.  There is also small subsegmental left upper lobe pulmonary artery filling defects suggestive of small embolism.      Assessment:  Pneumonia involving right lung  Pulmonary embolism  Acute hypoxemia  Ileostomy      Recommendations:  Clinically the patient is requiring some oxygen and CT shows bilateral reticular nodular infiltrates with some groundglass opacity suggesting pneumonia.  He took 1 week of Augmentin as outpatient but his symptoms are exactly the same.  Initially I suspected aspiration pneumonia, but his cough precedes his episode of vomiting by at least 10 days.  He will need bronchoscopy for further microbiologic studies of this pulmonary infection.  Continue weaning oxygen as tolerated.  His left upper lobe pulmonary embolism is small, likely incidental and of minor clinical significance, however we cannot assume he will not develop larger emboli without anticoagulation.  Therefore I recommend 6 weeks of oral DOAC therapy.  The patient expressed understanding.  He has never smoked.  Does not usually have any pulmonary disease.  He will have to follow-up with his primary care physician to manage 6 months of anticoagulation.  We discussed risks, benefits and alternatives to bronchoscopy with lavage for tomorrow.  I have placed orders for  n.p.o. after midnight and obtained his consent.  He is willing to proceed.  I spoke to  and reserved bronchoscopy slot in endoscopy suite tomorrow at 10 AM.  Hold off on all antibiotics at this time.  This will improve the yield of the bronchoscopy.        Rubin Sood MD  8/27/2023  14:40 EDT      Much of this encounter note is an electronic transcription/translation of spoken language to printed text using Dragon Software.    Electronically signed by Rubin Sood MD at 08/27/23 1446                 Rubin Sood MD at 08/27/23 1440          Group: [facility]         CONSULT NOTE    Patient Identification:  Nikolai Shaw  53 y.o.  male  1970  [id number]            Requesting physician: Dr. Remigio Bee    Reason for Consultation: Pneumonia, pulmonary embolism    CC: Cough and vomiting    History of Present Illness:  53-year-old male patient who yesterday in the middle of the night woke up to 10 to his ileostomy and became lightheaded, short of breath and vomited.  He presented with significant coughing and bronchospasm.  EMS gave him nebulized albuterol and he felt much better.  He was hypoxic on room air and he needed supplemental oxygen upon EMS arrival.  He says for the past 11 days he has been coughing.  Producing yellow sputum and went to urgent care.  They prescribed Augmentin but he has not improved despite taking almost 1 week of Augmentin.  He denies any pulmonary history.  Has never smoked.  He denies any fever or chills but has noticed yellow sputum with ongoing cough despite Augmentin.  CT chest with angiogram here shows a very small left upper lobe pulmonary embolism.  He has never had any history of DVT or PE.  No family history of thrombophilia.      Review of Systems   Constitutional:  Positive for fatigue. Negative for diaphoresis and fever.   HENT:  Negative for ear pain and sore throat.    Eyes:  Negative for pain and visual disturbance.   Respiratory:   Positive for cough and shortness of breath.    Gastrointestinal:  Positive for vomiting. Negative for abdominal pain and diarrhea.   Endocrine: Negative for cold intolerance and polyuria.   Genitourinary:  Negative for dysuria and hematuria.   Musculoskeletal:  Negative for joint swelling and myalgias.   Skin:  Negative for rash and wound.   Neurological:  Negative for speech difficulty and numbness.   Hematological:  Negative for adenopathy. Does not bruise/bleed easily.   Psychiatric/Behavioral:  Negative for agitation and confusion.      Past Medical History:  Past Medical History:   Diagnosis Date    Adenocarcinoma of rectum 06/2015    invasive adenocarcinoma of rectum w 59 benign lymph nodes    Adenomatous polyposis     FAMILIAL     Anxiety     At risk for sleep apnea     Chicken pox     patient reports having as a child    Depression     Herpes zoster without complication 12/3/2021    History of anemia     Hypertension     Hyponatremia     Insomnia     Malignant neoplasm of rectum 6/1/2016    Parastomal hernia without obstruction or gangrene 2/15/2021    Added automatically from request for surgery [id number]    Pulmonary nodule 1/10/2020    6/26/20 CT chest: 6 mm nodule resolved.    Renal calculi     RLS (restless legs syndrome)     Syncope 03/19/2021    prolonged QT interval    Testicular hypofunction     Vitamin D deficiency        Past Surgical History:  Past Surgical History:   Procedure Laterality Date    ABDOMINOPERINEAL PROCTOCOLECTOMY  07/06/2015    DR PAL RUDOLPH    COLONOSCOPY  06/16/1915    DR SHASTA HOWARD    COLONOSCOPY N/A 2/23/2021    Procedure: ILEOSCOPY, EXCISION OF ILEOSTOMY NODULE;  Surgeon: Pal Rudolph MD;  Location: [facility];  Service: General;  Laterality: N/A;    ENDOSCOPY  06/23/2015    DR SHASTA HOWARD    EXPLORATORY LAPAROTOMY N/A 2/3/2017    Procedure: LAPAROTOMY EXPLORATORY  SMALL BOWEL OBSTRUCTION;  Surgeon: Pal Rudolph MD;  Location: [facility] OR;  Service:   "   ILEOSTOMY  07/06/2015    MYRINGOTOMY W/ TUBES      2-3 times as child        Home Meds:  Medications Prior to Admission   Medication Sig Dispense Refill Last Dose    B Complex Vitamins (VITAMIN-B COMPLEX PO) Take 1 tablet by mouth Daily.   8/26/2023    Cholecalciferol (Vitamin D-3) 125 MCG (5000 UT) tablet Take 1 tablet by mouth Daily. 90 tablet 1 8/26/2023    pantoprazole (PROTONIX) 20 MG EC tablet Take 1 tablet by mouth Daily. 90 tablet 1 Past Week    sertraline (ZOLOFT) 100 MG tablet Take 1.5 tablets by mouth Daily. 135 tablet 1 8/26/2023       Allergies:  No Known Allergies    Social History:   Social History     Socioeconomic History    Marital status:      Spouse name: Hansa    Years of education: college   Tobacco Use    Smoking status: Never    Smokeless tobacco: Current     Types: Chew    Tobacco comments:     CHEWS TOBACCO   Vaping Use    Vaping Use: Never used   Substance and Sexual Activity    Alcohol use: Yes     Alcohol/week: 24.0 standard drinks     Types: 24 Cans of beer per week     Comment: Some days 24 beers a day. Notmally  at least 18; stopped drinking 6/10/23    Drug use: No    Sexual activity: Not Currently     Partners: Female       Family History:  Family History   Problem Relation Age of Onset    Colon cancer Father         familial polyposis    Aneurysm Maternal Grandfather 66        brain aneurysm    Colon cancer Paternal Grandfather         familial polyposis    Hypertension Maternal Uncle     Diabetes Maternal Uncle     Malig Hyperthermia Neg Hx        Physical Exam:  /70 (BP Location: Right arm, Patient Position: Sitting)   Pulse 70   Temp 99.7 °F (37.6 °C) (Oral)   Resp 20   Ht 172.7 cm (68\")   Wt 108 kg (237 lb 9.6 oz)   SpO2 97%   BMI 36.13 kg/m²  Body mass index is 36.13 kg/m². 97% 108 kg (237 lb 9.6 oz)  Physical Exam  Constitutional:       General: He is not in acute distress.     Appearance: He is well-developed.   HENT:      Right Ear: External ear " normal.      Left Ear: External ear normal.      Nose: Nose normal.   Eyes:      General: No scleral icterus.     Conjunctiva/sclera: Conjunctivae normal.      Pupils: Pupils are equal, round, and reactive to light.   Neck:      Thyroid: No thyromegaly.      Vascular: No JVD.   Cardiovascular:      Rate and Rhythm: Normal rate and regular rhythm.      Heart sounds: No murmur heard.     Comments: No edema  Pulmonary:      Effort: Pulmonary effort is normal.      Breath sounds: Rhonchi present. No wheezing or rales.   Abdominal:      General: There is no distension.      Palpations: Abdomen is soft.      Comments: Ileostomy bag in right lower quadrant, I cannot palpate any enlarged organs   Musculoskeletal:         General: No deformity. Normal range of motion.      Cervical back: Neck supple. No rigidity.      Comments: No deformity in all 4 extrem   Skin:     Findings: No erythema or rash.      Comments: No palpable nodules   Neurological:      General: No focal deficit present.      Mental Status: He is alert and oriented to person, place, and time.      Cranial Nerves: No cranial nerve deficit.      Motor: No weakness.   Psychiatric:         Mood and Affect: Mood normal.         Thought Content: Thought content normal.       LABS:        Results from last 7 days   Lab Units 08/27/23  0354   MAGNESIUM mg/dL 1.9   SODIUM mmol/L 141   POTASSIUM mmol/L 3.9   CHLORIDE mmol/L 104   CO2 mmol/L 26.0   BUN mg/dL 14   CREATININE mg/dL 0.99   GLUCOSE mg/dL 122*   CALCIUM mg/dL 9.3   WBC 10*3/mm3 15.48*   HEMOGLOBIN g/dL 13.4   PLATELETS 10*3/mm3 232   ALT (SGPT) U/L 19   AST (SGOT) U/L 28   PROBNP pg/mL 72.3   PROCALCITONIN ng/mL 0.06     Lab Results   Component Value Date    TROPONINT 11 08/27/2023     Results from last 7 days   Lab Units 08/27/23  0641 08/27/23  0354   HSTROP T ng/L 11 13         Results from last 7 days   Lab Units 08/27/23  0354   PROCALCITONIN ng/mL 0.06   LACTATE mmol/L 2.0     Results from last 7 days    Lab Units 08/27/23  0557   PH, ARTERIAL pH units 7.328*   PCO2, ARTERIAL mm Hg 45.1*   PO2 ART mm Hg 124.4*   FLOW RATE lpm 11   MODALITY  HFNC   O2 SATURATION CALC % 98.5     Results from last 7 days   Lab Units 08/27/23  0355   ADENOVIRUS DETECTION BY PCR  Not Detected   CORONAVIRUS 229E  Not Detected   CORONAVIRUS HKU1  Not Detected   CORONAVIRUS NL63  Not Detected   CORONAVIRUS OC43  Not Detected   HUMAN METAPNEUMOVIRUS  Not Detected   HUMAN RHINOVIRUS/ENTEROVIRUS  Not Detected   INFLUENZA B PCR  Not Detected   PARAINFLUENZA 1  Not Detected   PARAINFLUENZA VIRUS 2  Not Detected   PARAINFLUENZA VIRUS 3  Not Detected   PARAINFLUENZA VIRUS 4  Not Detected   BORDETELLA PERTUSSIS PCR  Not Detected   BORDETELLA PARAPERTUSSIS PCR  Not Detected   CHLAMYDOPHILA PNEUMONIAE PCR  Not Detected   MYCOPLAMA PNEUMO PCR  Not Detected   RSV, PCR  Not Detected     Results from last 7 days   Lab Units 08/27/23  0354   INR  0.99         Lab Results   Component Value Date    TSH 0.94 07/11/2015     Estimated Creatinine Clearance: 102.8 mL/min (by C-G formula based on SCr of 0.99 mg/dL).         Imaging: I personally visualized the images of CT angiogram of the chest showing bilateral, but predominant right lower lobe reticulonodular and groundglass infiltrates suggesting pneumonia.  There is also small subsegmental left upper lobe pulmonary artery filling defects suggestive of small embolism.      Assessment:  Pneumonia involving right lung  Pulmonary embolism  Acute hypoxemia  Ileostomy      Recommendations:  Clinically the patient is requiring some oxygen and CT shows bilateral reticular nodular infiltrates with some groundglass opacity suggesting pneumonia.  He took 1 week of Augmentin as outpatient but his symptoms are exactly the same.  Initially I suspected aspiration pneumonia, but his cough precedes his episode of vomiting by at least 10 days.  He will need bronchoscopy for further microbiologic studies of this pulmonary  infection.  Continue weaning oxygen as tolerated.  His left upper lobe pulmonary embolism is small, likely incidental and of minor clinical significance, however we cannot assume he will not develop larger emboli without anticoagulation.  Therefore I recommend 6 weeks of oral DOAC therapy.  The patient expressed understanding.  He has never smoked.  Does not usually have any pulmonary disease.  He will have to follow-up with his primary care physician to manage 6 months of anticoagulation.  We discussed risks, benefits and alternatives to bronchoscopy with lavage for tomorrow.  I have placed orders for n.p.o. after midnight and obtained his consent.  He is willing to proceed.  I spoke to  and reserved bronchoscopy slot in endoscopy suite tomorrow at 10 AM.  Hold off on all antibiotics at this time.  This will improve the yield of the bronchoscopy.        Rubin Sood MD  8/27/2023  14:40 EDT      Much of this encounter note is an electronic transcription/translation of spoken language to printed text using Dragon Software.    Electronically signed by Rubin Sood MD at 08/27/23 1446       Remigio Bee MD at 08/27/23 0809              Patient Name:  Nikolai Shaw  YOB: 1970  MRN:  [id number]  Admit Date:  8/27/2023  Patient Care Team:  Ekta Zepeda APRN as PCP - General (Family Medicine)  Jacob Shelby MD as Consulting Physician (Hematology and Oncology)  Pal Crane MD as Referring Physician (General Surgery)      Subjective   History Present Illness     Chief Complaint   Patient presents with    Shortness of Breath       Mr. Shaw is a 53 y.o. male with a history of rectal cancer s/p total colectomy with ileostomy, HTN and anxiety that presents to [facility] complaining of shortness of breath.  He has felt poorly for at least a week now.  He was prescribed amoxicillin in urgent care last week and completed therapy.  Still has significant cough and  "shortness of breath.  Has had some subjective fever and chills but never a documented fever.  Last night he was getting out of bed to empty his ileostomy but he came very lightheaded seemingly diaphoretic and had an episode of vomiting.  Family states he was \"out of it\" for a brief period of time.  He denied having any chest pain with this and really at no point has he had any pleuritic chest pain.  No pain or swelling his lower extremities.  No prior history of blood clot.  He was severely hypoxic in the ED but improved with treatment.      Shortness of Breath    Review of Systems   Constitutional: Negative.    HENT: Negative.     Eyes: Negative.    Respiratory:  Positive for shortness of breath.    Gastrointestinal: Negative.    Endocrine: Negative.    Genitourinary: Negative.    Musculoskeletal: Negative.    Skin: Negative.    Neurological: Negative.    Hematological: Negative.    Psychiatric/Behavioral: Negative.        Personal History     Past Medical History:   Diagnosis Date    Adenocarcinoma of rectum 06/2015    invasive adenocarcinoma of rectum w 59 benign lymph nodes    Adenomatous polyposis     FAMILIAL     Anxiety     At risk for sleep apnea     Chicken pox     patient reports having as a child    Depression     Herpes zoster without complication 12/3/2021    History of anemia     Hypertension     Hyponatremia     Insomnia     Malignant neoplasm of rectum 6/1/2016    Parastomal hernia without obstruction or gangrene 2/15/2021    Added automatically from request for surgery [id number]    Pulmonary nodule 1/10/2020    6/26/20 CT chest: 6 mm nodule resolved.    Renal calculi     RLS (restless legs syndrome)     Syncope 03/19/2021    prolonged QT interval    Testicular hypofunction     Vitamin D deficiency      Past Surgical History:   Procedure Laterality Date    ABDOMINOPERINEAL PROCTOCOLECTOMY  07/06/2015    DR MEET RUDOLPH    COLONOSCOPY  06/16/1915    DR SHASTA HOWARD    COLONOSCOPY N/A 2/23/2021    " Procedure: ILEOSCOPY, EXCISION OF ILEOSTOMY NODULE;  Surgeon: Pal Crane MD;  Location: [facility] MAIN OR;  Service: General;  Laterality: N/A;    ENDOSCOPY  06/23/2015    DR SHASTA HOWARD    EXPLORATORY LAPAROTOMY N/A 2/3/2017    Procedure: LAPAROTOMY EXPLORATORY  SMALL BOWEL OBSTRUCTION;  Surgeon: Pal Crane MD;  Location:  [facility] MAIN OR;  Service:     ILEOSTOMY  07/06/2015    MYRINGOTOMY W/ TUBES      2-3 times as child     Family History   Problem Relation Age of Onset    Colon cancer Father         familial polyposis    Aneurysm Maternal Grandfather 66        brain aneurysm    Colon cancer Paternal Grandfather         familial polyposis    Hypertension Maternal Uncle     Diabetes Maternal Uncle     Malig Hyperthermia Neg Hx      Social History     Tobacco Use    Smoking status: Never    Smokeless tobacco: Current     Types: Chew    Tobacco comments:     CHEWS TOBACCO   Vaping Use    Vaping Use: Never used   Substance Use Topics    Alcohol use: Yes     Alcohol/week: 24.0 standard drinks     Types: 24 Cans of beer per week     Comment: Some days 24 beers a day. Notmally  at least 18; stopped drinking 6/10/23    Drug use: No     No current facility-administered medications on file prior to encounter.     Current Outpatient Medications on File Prior to Encounter   Medication Sig Dispense Refill    B Complex Vitamins (VITAMIN-B COMPLEX PO) Take 1 tablet by mouth Daily.      Cholecalciferol (Vitamin D-3) 125 MCG (5000 UT) tablet Take 1 tablet by mouth Daily. 90 tablet 1    pantoprazole (PROTONIX) 20 MG EC tablet Take 1 tablet by mouth Daily. 90 tablet 1    sertraline (ZOLOFT) 100 MG tablet Take 1.5 tablets by mouth Daily. 135 tablet 1    lisinopril (PRINIVIL,ZESTRIL) 20 MG tablet Take 1 tablet by mouth Daily. (Patient not taking: Reported on 8/1/2023) 90 tablet 1    lisinopril-hydrochlorothiazide (PRINZIDE,ZESTORETIC) 20-12.5 MG per tablet Take 1 tablet by mouth Daily. (Patient not taking: Reported on  8/1/2023) 90 tablet 1     No Known Allergies    Objective    Objective     Vital Signs  Temp:  [99.1 °F (37.3 °C)-99.7 °F (37.6 °C)] 99.7 °F (37.6 °C)  Heart Rate:  [65-70] 70  Resp:  [20-24] 20  BP: ()/(62-76) 129/76  SpO2:  [88 %-97 %] 97 %  on  Flow (L/min):  [2-10] 2;   Device (Oxygen Therapy): nasal cannula  Body mass index is 35.88 kg/m².    Physical Exam  Vitals and nursing note reviewed.   Constitutional:       Appearance: He is well-developed. He is ill-appearing.   HENT:      Head: Normocephalic and atraumatic.   Eyes:      General: No scleral icterus.  Cardiovascular:      Rate and Rhythm: Normal rate and regular rhythm.   Pulmonary:      Effort: Pulmonary effort is normal.      Breath sounds: Normal breath sounds.   Abdominal:      General: Bowel sounds are normal. There is no distension.      Palpations: Abdomen is soft.      Tenderness: There is no abdominal tenderness.      Comments: Ileostomy RUQ   Skin:     General: Skin is warm and dry.   Neurological:      Mental Status: He is alert. Mental status is at baseline.       Results Review:  I reviewed the patient's new clinical results.  I reviewed the patient's new imaging results and agree with the interpretation.  I reviewed the patient's other test results and agree with the interpretation  I personally viewed and interpreted the patient's EKG/Telemetry data  Discussed with ED provider.    Lab Results (last 24 hours)       Procedure Component Value Units Date/Time    CBC & Differential [[id number]]  (Abnormal) Collected: 08/27/23 0354    Specimen: Blood Updated: 08/27/23 0407    Narrative:      The following orders were created for panel order CBC & Differential.  Procedure                               Abnormality         Status                     ---------                               -----------         ------                     CBC Auto Differential[[id number]]        Abnormal            Final result                 Please view results  for these tests on the individual orders.    Comprehensive Metabolic Panel [[id number]]  (Abnormal) Collected: 08/27/23 0354    Specimen: Blood Updated: 08/27/23 0431     Glucose 122 mg/dL      BUN 14 mg/dL      Creatinine 0.99 mg/dL      Sodium 141 mmol/L      Potassium 3.9 mmol/L      Comment: Slight hemolysis detected by analyzer. Results may be affected.        Chloride 104 mmol/L      CO2 26.0 mmol/L      Calcium 9.3 mg/dL      Total Protein 7.3 g/dL      Albumin 4.1 g/dL      ALT (SGPT) 19 U/L      AST (SGOT) 28 U/L      Comment: Slight hemolysis detected by analyzer. Results may be affected.        Alkaline Phosphatase 105 U/L      Total Bilirubin 0.5 mg/dL      Globulin 3.2 gm/dL      A/G Ratio 1.3 g/dL      BUN/Creatinine Ratio 14.1     Anion Gap 11.0 mmol/L      eGFR 91.1 mL/min/1.73     Narrative:      GFR Normal >60  Chronic Kidney Disease <60  Kidney Failure <15      Protime-INR [[id number]]  (Normal) Collected: 08/27/23 0354    Specimen: Blood Updated: 08/27/23 0436     Protime 13.2 Seconds      INR 0.99    aPTT [[id number]]  (Normal) Collected: 08/27/23 0354    Specimen: Blood Updated: 08/27/23 0436     PTT 25.7 seconds     High Sensitivity Troponin T [[id number]]  (Normal) Collected: 08/27/23 0354    Specimen: Blood Updated: 08/27/23 0434     HS Troponin T 13 ng/L     Narrative:      High Sensitive Troponin T Reference Range:  <10.0 ng/L- Negative Female for AMI  <15.0 ng/L- Negative Male for AMI  >=10 - Abnormal Female indicating possible myocardial injury.  >=15 - Abnormal Male indicating possible myocardial injury.   Clinicians would have to utilize clinical acumen, EKG, Troponin, and serial changes to determine if it is an Acute Myocardial Infarction or myocardial injury due to an underlying chronic condition.         BNP [[id number]]  (Normal) Collected: 08/27/23 0354    Specimen: Blood Updated: 08/27/23 0434     proBNP 72.3 pg/mL     Narrative:      Among patients with dyspnea, NT-proBNP is highly  "sensitive for the detection of acute congestive heart failure. In addition NT-proBNP of <300 pg/ml effectively rules out acute congestive heart failure with 99% negative predictive value.      Blood Culture - Blood, Arm, Left [[id number]] Collected: 08/27/23 0354    Specimen: Blood from Arm, Left Updated: 08/27/23 0400    Blood Culture - Blood, Arm, Right [[id number]] Collected: 08/27/23 0354    Specimen: Blood from Arm, Right Updated: 08/27/23 0400    Lactic Acid, Plasma [[id number]]  (Normal) Collected: 08/27/23 0354    Specimen: Blood Updated: 08/27/23 0426     Lactate 2.0 mmol/L     Procalcitonin [[id number]]  (Normal) Collected: 08/27/23 0354    Specimen: Blood Updated: 08/27/23 0434     Procalcitonin 0.06 ng/mL     Narrative:      As a Marker for Sepsis (Non-Neonates):    1. <0.5 ng/mL represents a low risk of severe sepsis and/or septic shock.  2. >2 ng/mL represents a high risk of severe sepsis and/or septic shock.    As a Marker for Lower Respiratory Tract Infections that require antibiotic therapy:    PCT on Admission    Antibiotic Therapy       6-12 Hrs later    >0.5                Strongly Recommended  >0.25 - <0.5        Recommended   0.1 - 0.25          Discouraged              Remeasure/reassess PCT  <0.1                Strongly Discouraged     Remeasure/reassess PCT    As 28 day mortality risk marker: \"Change in Procalcitonin Result\" (>80% or <=80%) if Day 0 (or Day 1) and Day 4 values are available. Refer to http://www.[facility]-pct-calculator.com    Change in PCT <=80%  A decrease of PCT levels below or equal to 80% defines a positive change in PCT test result representing a higher risk for 28-day all-cause mortality of patients diagnosed with severe sepsis for septic shock.    Change in PCT >80%  A decrease of PCT levels of more than 80% defines a negative change in PCT result representing a lower risk for 28-day all-cause mortality of patients diagnosed with severe sepsis or septic shock.       " Magnesium [[id number]]  (Normal) Collected: 08/27/23 0354    Specimen: Blood Updated: 08/27/23 0428     Magnesium 1.9 mg/dL     CBC Auto Differential [[id number]]  (Abnormal) Collected: 08/27/23 0354    Specimen: Blood Updated: 08/27/23 0407     WBC 15.48 10*3/mm3      RBC 4.20 10*6/mm3      Hemoglobin 13.4 g/dL      Hematocrit 39.5 %      MCV 94.0 fL      MCH 31.9 pg      MCHC 33.9 g/dL      RDW 12.4 %      RDW-SD 43.0 fl      MPV 11.1 fL      Platelets 232 10*3/mm3      Neutrophil % 70.4 %      Lymphocyte % 12.8 %      Monocyte % 10.8 %      Eosinophil % 5.4 %      Basophil % 0.3 %      Immature Grans % 0.3 %      Neutrophils, Absolute 10.91 10*3/mm3      Lymphocytes, Absolute 1.98 10*3/mm3      Monocytes, Absolute 1.67 10*3/mm3      Eosinophils, Absolute 0.83 10*3/mm3      Basophils, Absolute 0.05 10*3/mm3      Immature Grans, Absolute 0.04 10*3/mm3      nRBC 0.0 /100 WBC     Respiratory Panel PCR w/COVID-19(SARS-CoV-2) [facility] In-House, NP Swab in [facility], 3-4 HR TAT - Swab, Nasopharynx [[id number]]  (Normal) Collected: 08/27/23 0355    Specimen: Swab from Nasopharynx Updated: 08/27/23 0518     ADENOVIRUS, PCR Not Detected     Coronavirus 229E Not Detected     Coronavirus HKU1 Not Detected     Coronavirus NL63 Not Detected     Coronavirus OC43 Not Detected     COVID19 Not Detected     Human Metapneumovirus Not Detected     Human Rhinovirus/Enterovirus Not Detected     Influenza A PCR Not Detected     Influenza B PCR Not Detected     Parainfluenza Virus 1 Not Detected     Parainfluenza Virus 2 Not Detected     Parainfluenza Virus 3 Not Detected     Parainfluenza Virus 4 Not Detected     RSV, PCR Not Detected     Bordetella pertussis pcr Not Detected     Bordetella parapertussis PCR Not Detected     Chlamydophila pneumoniae PCR Not Detected     Mycoplasma pneumo by PCR Not Detected    Narrative:      In the setting of a positive respiratory panel with a viral infection PLUS a negative  procalcitonin without other underlying concern for bacterial infection, consider observing off antibiotics or discontinuation of antibiotics and continue supportive care. If the respiratory panel is positive for atypical bacterial infection (Bordetella pertussis, Chlamydophila pneumoniae, or Mycoplasma pneumoniae), consider antibiotic de-escalation to target atypical bacterial infection.    Blood Gas, Arterial - [[id number]]  (Abnormal) Collected: 08/27/23 0557    Specimen: Arterial Blood Updated: 08/27/23 0600     Site Arterial: right radial     Rohit's Test Positive     pH, Arterial 7.328 pH units      pCO2, Arterial 45.1 mm Hg      pO2, Arterial 124.4 mm Hg      HCO3, Arterial 23.6 mmol/L      Base Excess, Arterial -2.5 mmol/L      O2 Saturation Calculated 98.5 %      Barometric Pressure for Blood Gas 747.6 mmHg      Modality HFNC     Flow Rate 11 lpm      Rate 18 Breaths/minute     High Sensitivity Troponin T 2Hr [[id number]]  (Normal) Collected: 08/27/23 0641    Specimen: Blood Updated: 08/27/23 0708     HS Troponin T 11 ng/L      Troponin T Delta -2 ng/L     Narrative:      High Sensitive Troponin T Reference Range:  <10.0 ng/L- Negative Female for AMI  <15.0 ng/L- Negative Male for AMI  >=10 - Abnormal Female indicating possible myocardial injury.  >=15 - Abnormal Male indicating possible myocardial injury.   Clinicians would have to utilize clinical acumen, EKG, Troponin, and serial changes to determine if it is an Acute Myocardial Infarction or myocardial injury due to an underlying chronic condition.                 Imaging Results (Last 24 Hours)       Procedure Component Value Units Date/Time    CT Angiogram Chest [[id number]] Collected: 08/27/23 0605     Updated: 08/27/23 0619    Narrative:      CT ANGIOGRAM CHEST-     INDICATIONS: Short of breath     TECHNIQUE: Radiation dose reduction techniques were utilized, including  automated exposure control and exposure modulation based on body size.  CT  angiography of the chest. Three-dimensional reconstructions.     COMPARISON: Chest CT from 6/26/2020     FINDINGS:     Pulmonary embolic disease is seen in left upper lobe segmental branch  artery, for example axial image 54, sagittal image 173. The RV LV ratio  is 1.4.     No aortic dissection.     The heart size is enlarged without pericardial effusion. A few small  subcentimeter short axis mediastinal lymph nodes are seen that are not  significant by size criteria.     The airways appear clear.     No pleural effusion or pneumothorax.     The lungs show reticulonodular infiltrates throughout the right lung,  suggesting pneumonia, CT follow-up advised to exclude the possibility of  underlying lesion. Largest nodular densities in this region are seen in  the right lower lobe, for example 1.2 cm on axial image 63, 1.3 cm on  image 88     Upper abdominal structures show no acute findings. Minimal hiatal hernia  is present.     Degenerative changes are seen in the spine. No acute fracture is  identified.       Impression:         Critical test result. Small left upper lobe pulmonary embolic disease.     Reticulonodular infiltrates throughout the right lung, suggesting  pneumonia, CT follow-up advised.     Discussed by telephone with Dr. Hu at 0614, 8/27/2023.           This report was finalized on 8/27/2023 6:16 AM by Dr. Naveed Pickett M.D.       XR Chest 1 View [[id number]] Resulted: 08/27/23 0413     Updated: 08/27/23 0413              ECG 12 Lead Dyspnea   Preliminary Result   HEART RATE= 65  bpm   RR Interval= 923  ms   VA Interval= 156  ms   P Horizontal Axis= 12  deg   P Front Axis= 34  deg   QRSD Interval= 120  ms   QT Interval= 689  ms   QTcB= 717  ms   QRS Axis= -57  deg   T Wave Axis= 172  deg   - ABNORMAL ECG -   Sinus rhythm   IVCD, consider atypical RBBB   Nonspecific T abnormalities, lateral leads   Electronically Signed By:    Date and Time of Study: 2023-08-27 04:03:18        Assessment/Plan    Assessment & Plan   Active Hospital Problems    Diagnosis  POA    **Pneumonia involving right lung [J18.9]  Yes    Acute respiratory failure with hypoxia [J96.01]  Yes    Acute pulmonary embolism without acute cor pulmonale [I26.99]  Yes    Obesity (BMI 30-39.9) [E66.9]  Yes    Prediabetes [R73.03]  Yes    Sleep apnea [G47.30]  Yes    Essential hypertension [I10]  Yes    History of rectal cancer [Z85.048]  Yes      Resolved Hospital Problems   No resolved problems to display.       53 y.o. male admitted with Pneumonia involving right lung.    Patient presents with acute hypoxic respiratory failure after being treated for pneumonia over the last week.  It seems he had a rather sudden deterioration last evening possibly brought on by pulmonary embolus seen on CT scan.  He is hemodynamically stable with normal troponin and no evidence of cor pulmonale.  He was given Lovenox in the ED which will be continued.  CT scan shows right-sided infiltrates for which we will continue antibiotics.  His respiratory failure is improving.  We will ask pulmonology to weigh in.  Patient also has suspected sleep apnea and previously no showed a office visit for sleep apnea testing.     His rectal cancer has been felt to be in remission.  He underwent total colectomy with ileostomy in 2015 and declined adjuvant chemotherapy at that time.  Previously seen by Dr. Shelby.  Consider hematology consultation before discharge.      Full code.  Discussed with patient, spouse, nursing staff, and ED provider.      Remigio Bee MD  [facility]  08/27/23  09:00 EDT      Electronically signed by Remigio Bee MD at 08/27/23 0900          Emergency Department Notes        Jerman Hu MD at 08/27/23 0721        Procedure Orders    1. Critical Care [[id number]] ordered by Jerman Hu MD                  EMERGENCY DEPARTMENT ENCOUNTER    Room Number:  09/09  PCP: Ekta Zepeda APRN  Patient Care  Team:  Ekta Zepeda APRN as PCP - General (Family Medicine)  Jacob Shelby MD as Consulting Physician (Hematology and Oncology)  Pal Crane MD as Referring Physician (General Surgery)   Independent Historians: Patient    HPI:  Chief Complaint: Dyspnea    A complete HPI/ROS/PMH/PSH/SH/FH are unobtainable due to: None    Chronic or social conditions impacting patient care (Social Determinants of Health): None  (Financial Resource Strain / Food Insecurity / Transportation Needs / Physical Activity / Stress / Social Connections / Intimate Partner Violence / Housing Stability)    Context: Nikolai Shaw is a 53 y.o. male who presents to the ED c/o acute dyspnea since about 230 this morning.  Patient also reports he felt little bit dizzy.  Patient had just finished a brief course of amoxicillin that he was given by his PMD for presumed pneumonia.  Patient had a history of cough and congestion for the past week or so.  Symptoms are gradually getting worse.  For EMS he was satting in the low 80s on room air.  They placed him on 5 L nasal cannula which improved his O2 sats to 86 to 88%.  Patient denies chest pain or abdominal pain.  No prior history of respiratory issues.    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    Family history of FAP (familial adenomatous polyposis) 07/09/2021    Allergic rhinitis 06/17/2022    Vitamin B12 deficiency 06/17/2022    Prediabetes  06/23/2022    Ingrown toenail of left foot 12/03/2022    Dizziness 08/02/2023    Sleep disturbances 08/02/2023     Resolved Ambulatory Problems     Diagnosis Date Noted    Malignant neoplasm of rectum 06/01/2016    SBO (small bowel obstruction) 01/19/2017    Small bowel obstruction 01/28/2017    Pulmonary nodule 01/10/2020    Parastomal hernia 02/05/2021    Parastomal hernia without obstruction or gangrene 02/15/2021    Syncope 03/19/2021    Herpes zoster without complication 12/03/2021     Past Medical History:   Diagnosis Date    Adenocarcinoma of rectum 06/2015    Adenomatous polyposis     At risk for sleep apnea     Chicken pox     History of anemia     Hypertension     Insomnia     Renal calculi     RLS (restless legs syndrome)     Testicular hypofunction          PAST SURGICAL HISTORY  Past Surgical History:   Procedure Laterality Date    ABDOMINOPERINEAL PROCTOCOLECTOMY  07/06/2015    DR PAL RUDOLPH    COLONOSCOPY  06[birth year]16[birth year]1915    DR SHASTA HOWARD    COLONOSCOPY N/A 2/23/2021    Procedure: ILEOSCOPY, EXCISION OF ILEOSTOMY NODULE;  Surgeon: Pal Rudolph MD;  Location: [facility] OR;  Service: General;  Laterality: N/A;    ENDOSCOPY  06/23/2015    DR SHASTA HOWARD    EXPLORATORY LAPAROTOMY N/A 2/3/2017    Procedure: LAPAROTOMY EXPLORATORY  SMALL BOWEL OBSTRUCTION;  Surgeon: Pal Rudolph MD;  Location: [facility] OR;  Service:     ILEOSTOMY  07/06/2015    MYRINGOTOMY W/ TUBES      2-3 times as child         FAMILY HISTORY  Family History   Problem Relation Age of Onset    Colon cancer Father         familial polyposis    Aneurysm Maternal Grandfather 66        brain aneurysm    Colon cancer Paternal Grandfather         familial polyposis    Hypertension Maternal Uncle     Diabetes Maternal Uncle     Malig Hyperthermia Neg Hx          SOCIAL HISTORY  Social History     Socioeconomic History    Marital status:      Spouse name: Hansa    Years of education: college   Tobacco Use    Smoking  status: Never    Smokeless tobacco: Current     Types: Chew    Tobacco comments:     CHEWS TOBACCO   Vaping Use    Vaping Use: Never used   Substance and Sexual Activity    Alcohol use: Yes     Alcohol/week: 24.0 standard drinks     Types: 24 Cans of beer per week     Comment: Some days 24 beers a day. Notmally  at least 18; stopped drinking 6/10/23    Drug use: No    Sexual activity: Not Currently     Partners: Female         ALLERGIES  Patient has no known allergies.        REVIEW OF SYSTEMS  Review of Systems  Included in HPI  All systems reviewed and negative except for those discussed in HPI.      PHYSICAL EXAM    I have reviewed the triage vital signs and nursing notes.    ED Triage Vitals   Temp Heart Rate Resp BP SpO2   08/27/23 0407 08/27/23 0348 08/27/23 0348 08/27/23 0348 08/27/23 0348   99.1 °F (37.3 °C) 68 24 99/62 (!) 88 %      Temp src Heart Rate Source Patient Position BP Location FiO2 (%)   08/27/23 0407 08/27/23 0348 08/27/23 0348 08/27/23 0348 --   Tympanic Monitor Sitting Right arm        Physical Exam  GENERAL: alert, direct acute distress  SKIN: Warm, dry  HENT: Normocephalic, atraumatic  EYES: no scleral icterus  CV: regular rhythm, regular rate  RESPIRATORY: Patient mild respiratory distress increased work of breathing diminished lung sounds bilaterally  ABDOMEN: soft, nontender, nondistended  MUSCULOSKELETAL: no deformity  NEURO: alert, moves all extremities, follows commands                                                               LAB RESULTS  Recent Results (from the past 24 hour(s))   Comprehensive Metabolic Panel    Collection Time: 08/27/23  3:54 AM    Specimen: Blood   Result Value Ref Range    Glucose 122 (H) 65 - 99 mg/dL    BUN 14 6 - 20 mg/dL    Creatinine 0.99 0.76 - 1.27 mg/dL    Sodium 141 136 - 145 mmol/L    Potassium 3.9 3.5 - 5.2 mmol/L    Chloride 104 98 - 107 mmol/L    CO2 26.0 22.0 - 29.0 mmol/L    Calcium 9.3 8.6 - 10.5 mg/dL    Total Protein 7.3 6.0 - 8.5 g/dL     Albumin 4.1 3.5 - 5.2 g/dL    ALT (SGPT) 19 1 - 41 U/L    AST (SGOT) 28 1 - 40 U/L    Alkaline Phosphatase 105 39 - 117 U/L    Total Bilirubin 0.5 0.0 - 1.2 mg/dL    Globulin 3.2 gm/dL    A/G Ratio 1.3 g/dL    BUN/Creatinine Ratio 14.1 7.0 - 25.0    Anion Gap 11.0 5.0 - 15.0 mmol/L    eGFR 91.1 >60.0 mL/min/1.73   Protime-INR    Collection Time: 08/27/23  3:54 AM    Specimen: Blood   Result Value Ref Range    Protime 13.2 11.7 - 14.2 Seconds    INR 0.99 0.90 - 1.10   aPTT    Collection Time: 08/27/23  3:54 AM    Specimen: Blood   Result Value Ref Range    PTT 25.7 22.7 - 35.4 seconds   High Sensitivity Troponin T    Collection Time: 08/27/23  3:54 AM    Specimen: Blood   Result Value Ref Range    HS Troponin T 13 <15 ng/L   BNP    Collection Time: 08/27/23  3:54 AM    Specimen: Blood   Result Value Ref Range    proBNP 72.3 0.0 - 900.0 pg/mL   Lactic Acid, Plasma    Collection Time: 08/27/23  3:54 AM    Specimen: Blood   Result Value Ref Range    Lactate 2.0 0.5 - 2.0 mmol/L   Procalcitonin    Collection Time: 08/27/23  3:54 AM    Specimen: Blood   Result Value Ref Range    Procalcitonin 0.06 0.00 - 0.25 ng/mL   Magnesium    Collection Time: 08/27/23  3:54 AM    Specimen: Blood   Result Value Ref Range    Magnesium 1.9 1.6 - 2.6 mg/dL   CBC Auto Differential    Collection Time: 08/27/23  3:54 AM    Specimen: Blood   Result Value Ref Range    WBC 15.48 (H) 3.40 - 10.80 10*3/mm3    RBC 4.20 4.14 - 5.80 10*6/mm3    Hemoglobin 13.4 13.0 - 17.7 g/dL    Hematocrit 39.5 37.5 - 51.0 %    MCV 94.0 79.0 - 97.0 fL    MCH 31.9 26.6 - 33.0 pg    MCHC 33.9 31.5 - 35.7 g/dL    RDW 12.4 12.3 - 15.4 %    RDW-SD 43.0 37.0 - 54.0 fl    MPV 11.1 6.0 - 12.0 fL    Platelets 232 140 - 450 10*3/mm3    Neutrophil % 70.4 42.7 - 76.0 %    Lymphocyte % 12.8 (L) 19.6 - 45.3 %    Monocyte % 10.8 5.0 - 12.0 %    Eosinophil % 5.4 0.3 - 6.2 %    Basophil % 0.3 0.0 - 1.5 %    Immature Grans % 0.3 0.0 - 0.5 %    Neutrophils, Absolute 10.91 (H) 1.70 -  7.00 10*3/mm3    Lymphocytes, Absolute 1.98 0.70 - 3.10 10*3/mm3    Monocytes, Absolute 1.67 (H) 0.10 - 0.90 10*3/mm3    Eosinophils, Absolute 0.83 (H) 0.00 - 0.40 10*3/mm3    Basophils, Absolute 0.05 0.00 - 0.20 10*3/mm3    Immature Grans, Absolute 0.04 0.00 - 0.05 10*3/mm3    nRBC 0.0 0.0 - 0.2 /100 WBC   Respiratory Panel PCR w/COVID-19(SARS-CoV-2) A[facility]AN/GINNY/FIDE/PAD/COR/MAD/SAUD In-House, NP Swab in UTM/VTM, 3-4 HR TAT - Swab, Nasopharynx    Collection Time: 08/27/23  3:55 AM    Specimen: Nasopharynx; Swab   Result Value Ref Range    ADENOVIRUS, PCR Not Detected Not Detected    Coronavirus 229E Not Detected Not Detected    Coronavirus HKU1 Not Detected Not Detected    Coronavirus NL63 Not Detected Not Detected    Coronavirus OC43 Not Detected Not Detected    COVID19 Not Detected Not Detected - Ref. Range    Human Metapneumovirus Not Detected Not Detected    Human Rhinovirus/Enterovirus Not Detected Not Detected    Influenza A PCR Not Detected Not Detected    Influenza B PCR Not Detected Not Detected    Parainfluenza Virus 1 Not Detected Not Detected    Parainfluenza Virus 2 Not Detected Not Detected    Parainfluenza Virus 3 Not Detected Not Detected    Parainfluenza Virus 4 Not Detected Not Detected    RSV, PCR Not Detected Not Detected    Bordetella pertussis pcr Not Detected Not Detected    Bordetella parapertussis PCR Not Detected Not Detected    Chlamydophila pneumoniae PCR Not Detected Not Detected    Mycoplasma pneumo by PCR Not Detected Not Detected   ECG 12 Lead Dyspnea    Collection Time: 08/27/23  4:03 AM   Result Value Ref Range    QT Interval 689 ms    QTC Interval 717 ms   Blood Gas, Arterial -    Collection Time: 08/27/23  5:57 AM    Specimen: Arterial Blood   Result Value Ref Range    Site Arterial: right radial     Rohit's Test Positive     pH, Arterial 7.328 (L) 7.350 - 7.450 pH units    pCO2, Arterial 45.1 (H) 35.0 - 45.0 mm Hg    pO2, Arterial 124.4 (H) 80.0 - 100.0 mm Hg    HCO3, Arterial 23.6  22.0 - 28.0 mmol/L    Base Excess, Arterial -2.5 (L) 0.0 - 2.0 mmol/L    O2 Saturation Calculated 98.5 92.0 - 99.0 %    Barometric Pressure for Blood Gas 747.6 mmHg    Modality HFNC     Flow Rate 11 lpm    Rate 18 Breaths/minute   High Sensitivity Troponin T 2Hr    Collection Time: 08/27/23  6:41 AM    Specimen: Blood   Result Value Ref Range    HS Troponin T 11 <15 ng/L    Troponin T Delta -2 >=-4 - <+4 ng/L       Ordered the above labs and independently reviewed the results.        RADIOLOGY  CT Angiogram Chest    Result Date: 8/27/2023  CT ANGIOGRAM CHEST-  INDICATIONS: Short of breath  TECHNIQUE: Radiation dose reduction techniques were utilized, including automated exposure control and exposure modulation based on body size. CT angiography of the chest. Three-dimensional reconstructions.  COMPARISON: Chest CT from 6/26/2020  FINDINGS:  Pulmonary embolic disease is seen in left upper lobe segmental branch artery, for example axial image 54, sagittal image 173. The RV LV ratio is 1.4.  No aortic dissection.  The heart size is enlarged without pericardial effusion. A few small subcentimeter short axis mediastinal lymph nodes are seen that are not significant by size criteria.  The airways appear clear.  No pleural effusion or pneumothorax.  The lungs show reticulonodular infiltrates throughout the right lung, suggesting pneumonia, CT follow-up advised to exclude the possibility of underlying lesion. Largest nodular densities in this region are seen in the right lower lobe, for example 1.2 cm on axial image 63, 1.3 cm on image 88  Upper abdominal structures show no acute findings. Minimal hiatal hernia is present.  Degenerative changes are seen in the spine. No acute fracture is identified.       Critical test result. Small left upper lobe pulmonary embolic disease.  Reticulonodular infiltrates throughout the right lung, suggesting pneumonia, CT follow-up advised.  Discussed by telephone with Dr. Hu at 0614,  8/27/2023.    This report was finalized on 8/27/2023 6:16 AM by Dr. Naveed Pickett M.D.       I ordered the above noted radiological studies. Reviewed by me and discussed with radiologist.  See dictation for official radiology interpretation.      PROCEDURES    Critical Care  Performed by: Jerman Hu MD  Authorized by: Remigio Bee MD     Critical care provider statement:     Critical care time (minutes):  61    Critical care was necessary to treat or prevent imminent or life-threatening deterioration of the following conditions:  Respiratory failure    Critical care was time spent personally by me on the following activities:  Ordering and performing treatments and interventions, ordering and review of laboratory studies, ordering and review of radiographic studies, pulse oximetry, review of old charts, re-evaluation of patient's condition, development of treatment plan with patient or surrogate, evaluation of patient's response to treatment and obtaining history from patient or surrogate      MEDICATIONS GIVEN IN ER    Medications   sodium chloride 0.9 % flush 10 mL (has no administration in time range)   iopamidol (ISOVUE-370) 76 % injection 100 mL (95 mL Intravenous Given 8/27/23 0546)   cefepime 2 gm IVPB in 100 ml NS (VTB) (0 mg Intravenous Stopped 8/27/23 0727)   Enoxaparin Sodium (LOVENOX) syringe 110 mg (110 mg Subcutaneous Given 8/27/23 0646)         ORDERS PLACED DURING THIS VISIT:  Orders Placed This Encounter   Procedures    Critical Care    Respiratory Panel PCR w/COVID-19(SARS-CoV-2) [facility]BRAN/GINNY/FIDE/PAD/COR/MAD/SAUD In-House, NP Swab in UTM/VTM, 3-4 HR TAT - Swab, Nasopharynx    Blood Culture - Blood,    Blood Culture - Blood,    XR Chest 1 View    CT Angiogram Chest    Comprehensive Metabolic Panel    Protime-INR    aPTT    High Sensitivity Troponin T    BNP    Lactic Acid, Plasma    Procalcitonin    Magnesium    CBC Auto Differential    High Sensitivity Troponin T 2Hr    Blood Gas,  Arterial -    Blood Gas, Arterial -    Monitor Blood Pressure    Pulse Oximetry, Continuous    LHA (on-call MD unless specified) Details    ECG 12 Lead Dyspnea    Insert Peripheral IV    Inpatient Admission    CBC & Differential         PROGRESS, DATA ANALYSIS, CONSULTS, AND MEDICAL DECISION MAKING    All labs have been independently interpreted by me.  All radiology studies have been reviewed by me and discussed with radiologist dictating the report.   EKG's independently viewed and interpreted by me.  Discussion below represents my analysis of pertinent findings related to patient's condition, differential diagnosis, treatment plan and final disposition.    My differential diagnosis for dyspnea includes but is not limited to:  Asthma, COPD, pneumonia, pulmonary embolus, acute respiratory distress syndrome, pneumothorax, pleural effusion, pulmonary fibrosis, congestive heart failure, myocardial infarction, DKA, uremia, acidosis, sepsis, anemia, drug related, hyperventilation, CNS disease      ED Course as of 08/27/23 0736   Sun Aug 27, 2023   0407 EKG          EKG time: 0403  Rhythm/Rate: Sinus rhythm rate 65  P waves and MO: Normal  QRS, axis: IVCD  ST and T waves: Nonspecific    Interpreted Contemporaneously by me, independently viewed  No significant changed compared to prior EKG from 11/29/2022   [TJ]   0415 WBC(!): 15.48 [TJ]   0433 Lactate: 2.0 [TJ]   0433 Magnesium: 1.9 [TJ]   0433 WBC(!): 15.48 [TJ]   0433 Hemoglobin: 13.4 [TJ]   0434 Platelets: 232 [TJ]   0434 Creatinine: 0.99 [TJ]   0434 Potassium: 3.9 [TJ]   0530 Reevaluation: Patient's x-ray distress is resolved.  Patient is tolerating high flow O2 well.  We will attempt to titrate him down to check blood gas. [TJ]   0637 Discussed with Dr. Dan.  Will admit the patient.  Requesting Lovenox rather than heparin drip. [TJ]   0767 I have independently reviewed patient's CTA chest; my interpretation is right upper lobe PE, pneumonia [TJ]      ED Course  User Index  [TJ] Jerman Hu MD       PPE: The patient wore a mask and I wore an N95 mask throughout the entire patient encounter.       AS OF 07:36 EDT VITALS:    BP - 119/75  HR - 70  TEMP - 99.1 °F (37.3 °C) (Tympanic)  O2 SATS - 97%        DIAGNOSIS  Final diagnoses:   Hypoxia   Pneumonia due to infectious organism, unspecified laterality, unspecified part of lung   Other acute pulmonary embolism without acute cor pulmonale         DISPOSITION  ED Disposition       ED Disposition   Decision to Admit    Condition   --    Comment   Level of Care: Telemetry [5]   Diagnosis: Hypoxia [503479]   Admitting Physician: NATTY DAN [[id number]]   Certification: I Certify That Inpatient Hospital Services Are Medically Necessary For Greater Than 2 Midnights                    Note Disclaimer: At [facility], we believe that sharing information builds trust and better relationships. You are receiving this note because you recently visited [facility]. It is possible you will see health information before a provider has talked with you about it. This kind of information can be easy to misunderstand. To help you fully understand what it means for your health, we urge you to discuss this note with your provider.         Jerman Hu MD  08/27/23 0736      Electronically signed by Jerman Hu MD at 08/27/23 0736       Perlita Huggins RN at 08/27/23 0639          .Nursing report ED to floor  Nikolai Shaw  53 y.o.  male    HPI :   Chief Complaint   Patient presents with    Shortness of Breath       Admitting doctor:   Natty Dan MD    Admitting diagnosis:   The primary encounter diagnosis was Hypoxia. Diagnoses of Pneumonia due to infectious organism, unspecified laterality, unspecified part of lung and Other acute pulmonary embolism without acute cor pulmonale were also pertinent to this visit.    Code status:   Current Code Status       Date Active Code Status Order ID Comments User  Context       Prior            Allergies:   Patient has no known allergies.    Isolation:   No active isolations    Intake and Output    Intake/Output Summary (Last 24 hours) at 8/27/2023 0640  Last data filed at 8/27/2023 0343  Gross per 24 hour   Intake 400 ml   Output --   Net 400 ml       Weight:       08/27/23 0348   Weight: 107 kg (236 lb)       Most recent vitals:   Vitals:    08/27/23 0517 08/27/23 0531 08/27/23 0532 08/27/23 0631   BP:  119/75     BP Location:       Patient Position:       Pulse: 66  67 70   Resp:       Temp:       TempSrc:       SpO2: 95%  94% 97%   Weight:       Height:           Active LDAs/IV Access:   Lines, Drains & Airways       Active LDAs       Name Placement date Placement time Site Days    Peripheral IV 08/27/23 0344 Left Antecubital 08/27/23 0344  Antecubital  less than 1    Ileostomy RUQ --  --  RUQ  --                    Labs (abnormal labs have a star):   Labs Reviewed   COMPREHENSIVE METABOLIC PANEL - Abnormal; Notable for the following components:       Result Value    Glucose 122 (*)     All other components within normal limits    Narrative:     GFR Normal >60  Chronic Kidney Disease <60  Kidney Failure <15     CBC WITH AUTO DIFFERENTIAL - Abnormal; Notable for the following components:    WBC 15.48 (*)     Lymphocyte % 12.8 (*)     Neutrophils, Absolute 10.91 (*)     Monocytes, Absolute 1.67 (*)     Eosinophils, Absolute 0.83 (*)     All other components within normal limits   BLOOD GAS, ARTERIAL - Abnormal; Notable for the following components:    pH, Arterial 7.328 (*)     pCO2, Arterial 45.1 (*)     pO2, Arterial 124.4 (*)     Base Excess, Arterial -2.5 (*)     All other components within normal limits   RESPIRATORY PANEL PCR W/ COVID-19 (SARS-COV-2) ABRAN/GINNY/FIDE/PAD/COR/MAD/SAUD IN-HOUSE, NP SWAB IN [facility], 3-4 HR TAT - Normal    Narrative:     In the setting of a positive respiratory panel with a viral infection PLUS a negative procalcitonin without other  "underlying concern for bacterial infection, consider observing off antibiotics or discontinuation of antibiotics and continue supportive care. If the respiratory panel is positive for atypical bacterial infection (Bordetella pertussis, Chlamydophila pneumoniae, or Mycoplasma pneumoniae), consider antibiotic de-escalation to target atypical bacterial infection.   PROTIME-INR - Normal   APTT - Normal   TROPONIN - Normal    Narrative:     High Sensitive Troponin T Reference Range:  <10.0 ng/L- Negative Female for AMI  <15.0 ng/L- Negative Male for AMI  >=10 - Abnormal Female indicating possible myocardial injury.  >=15 - Abnormal Male indicating possible myocardial injury.   Clinicians would have to utilize clinical acumen, EKG, Troponin, and serial changes to determine if it is an Acute Myocardial Infarction or myocardial injury due to an underlying chronic condition.        BNP (IN-HOUSE) - Normal    Narrative:     Among patients with dyspnea, NT-proBNP is highly sensitive for the detection of acute congestive heart failure. In addition NT-proBNP of <300 pg/ml effectively rules out acute congestive heart failure with 99% negative predictive value.     LACTIC ACID, PLASMA - Normal   PROCALCITONIN - Normal    Narrative:     As a Marker for Sepsis (Non-Neonates):    1. <0.5 ng/mL represents a low risk of severe sepsis and/or septic shock.  2. >2 ng/mL represents a high risk of severe sepsis and/or septic shock.    As a Marker for Lower Respiratory Tract Infections that require antibiotic therapy:    PCT on Admission    Antibiotic Therapy       6-12 Hrs later    >0.5                Strongly Recommended  >0.25 - <0.5        Recommended   0.1 - 0.25          Discouraged              Remeasure/reassess PCT  <0.1                Strongly Discouraged     Remeasure/reassess PCT    As 28 day mortality risk marker: \"Change in Procalcitonin Result\" (>80% or <=80%) if Day 0 (or Day 1) and Day 4 values are available. Refer to " http://www.[facility]-pct-calculator.com    Change in PCT <=80%  A decrease of PCT levels below or equal to 80% defines a positive change in PCT test result representing a higher risk for 28-day all-cause mortality of patients diagnosed with severe sepsis for septic shock.    Change in PCT >80%  A decrease of PCT levels of more than 80% defines a negative change in PCT result representing a lower risk for 28-day all-cause mortality of patients diagnosed with severe sepsis or septic shock.      MAGNESIUM - Normal   BLOOD CULTURE   BLOOD CULTURE   BLOOD GAS, ARTERIAL   HIGH SENSITIVITIY TROPONIN T 2HR   CBC AND DIFFERENTIAL    Narrative:     The following orders were created for panel order CBC & Differential.  Procedure                               Abnormality         Status                     ---------                               -----------         ------                     CBC Auto Differential[[id number]]        Abnormal            Final result                 Please view results for these tests on the individual orders.       EKG:   ECG 12 Lead Dyspnea   Preliminary Result   HEART RATE= 65  bpm   RR Interval= 923  ms   LA Interval= 156  ms   P Horizontal Axis= 12  deg   P Front Axis= 34  deg   QRSD Interval= 120  ms   QT Interval= 689  ms   QTcB= 717  ms   QRS Axis= -57  deg   T Wave Axis= 172  deg   - ABNORMAL ECG -   Sinus rhythm   IVCD, consider atypical RBBB   Nonspecific T abnormalities, lateral leads   Electronically Signed By:    Date and Time of Study: 2023-08-27 04:03:18          Meds given in ED:   Medications   sodium chloride 0.9 % flush 10 mL (has no administration in time range)   cefepime 2 gm IVPB in 100 ml NS (VTB) (2,000 mg Intravenous New Bag 8/27/23 0637)   Enoxaparin Sodium (LOVENOX) syringe 110 mg (has no administration in time range)   iopamidol (ISOVUE-370) 76 % injection 100 mL (95 mL Intravenous Given 8/27/23 9747)       Imaging results:  CT Angiogram Chest    Result Date: 8/27/2023    Critical test result. Small left upper lobe pulmonary embolic disease.  Reticulonodular infiltrates throughout the right lung, suggesting pneumonia, CT follow-up advised.  Discussed by telephone with Dr. Hu at 0614, 8/27/2023.    This report was finalized on 8/27/2023 6:16 AM by Dr. Naveed Pickett M.D.       Ambulatory status:   - up with assistance    Social issues:   Social History     Socioeconomic History    Marital status:      Spouse name: Hansa    Years of education: college   Tobacco Use    Smoking status: Never    Smokeless tobacco: Current     Types: Chew    Tobacco comments:     CHEWS TOBACCO   Vaping Use    Vaping Use: Never used   Substance and Sexual Activity    Alcohol use: Yes     Alcohol/week: 24.0 standard drinks     Types: 24 Cans of beer per week     Comment: Some days 24 beers a day. Notmally  at least 18; stopped drinking 6/10/23    Drug use: No    Sexual activity: Not Currently     Partners: Female       NIH Stroke Scale:       Perlita Huggins RN  08/27/23 06:40 EDT          Electronically signed by Perlita Huggins RN at 08/27/23 0640       Javier Centeno RN at 08/27/23 0346          Pt presents to ED via [facility] from home with complaints of increased SOA, dizziness, nausea an vomiting since 0230 this morning. Pt states he recently finished amoxicillin from his PCP with no relief.    Denies respiratory history.     Electronically signed by Javier Centeno RN at 08/27/23 0348       Vital Signs (last day)       Date/Time Temp Temp src Pulse Resp BP Patient Position SpO2    08/28/23 1327 97.7 (36.5) Oral 54 20 113/67 Lying 93    08/28/23 1209 -- -- 52 16 122/76 Lying 93    08/28/23 1130 -- -- 53 -- -- -- 94    08/28/23 1127 97.9 (36.6) Oral 50 20 131/79 Lying --    08/28/23 1036 -- -- 52 16 114/71 Sitting 97    08/28/23 0924 98.4 (36.9) Oral 58 15 139/91 Lying 95    08/28/23 0851 98.1 (36.7) Oral 80 -- -- -- --    08/28/23 0737 -- Oral 62 -- -- -- --    08/28/23  0736 -- Oral 62 18 135/88 Lying 99    08/28/23 0700 -- -- 54 -- -- -- 98    08/27/23 2305 98.8 (37.1) Oral 72 18 133/75 Lying 96    08/27/23 1915 99.1 (37.3) Oral 67 18 132/72 Lying 98    08/27/23 1308 99.7 (37.6) Oral -- 20 122/70 Sitting --    08/27/23 0852 99.7 (37.6) Oral -- 20 129/76 Sitting --    08/27/23 0631 -- -- 70 -- -- -- 97    08/27/23 0532 -- -- 67 -- -- -- 94    08/27/23 0531 -- -- -- -- 119/75 -- --    08/27/23 0517 -- -- 66 -- -- -- 95    08/27/23 0501 -- -- 69 -- 130/70 -- 94    08/27/23 0419 -- -- -- 20 -- -- --    08/27/23 0418 -- -- 65 -- -- -- 95    08/27/23 0407 99.1 (37.3) Tympanic -- -- -- -- --    08/27/23 0348 -- -- 68 24 99/62 Sitting 88          Oxygen Therapy (last day)       Date/Time SpO2 Device (Oxygen Therapy) Flow (L/min) Oxygen Concentration (%) ETCO2 (mmHg)    08/28/23 1335 -- room air -- -- --    08/28/23 1327 93 room air -- -- --    08/28/23 1209 93 room air -- -- --    08/28/23 1130 94 -- -- -- --    08/28/23 1127 -- room air -- -- --    08/28/23 1036 97 nasal cannula 2 -- --    08/28/23 0924 95 room air -- -- --    08/28/23 0849 -- room air -- -- --    08/28/23 0736 99 room air -- -- --    08/28/23 0700 98 -- -- -- --    08/27/23 2305 96 nasal cannula 2 -- --    08/27/23 1915 98 nasal cannula 2 -- --    08/27/23 1308 -- nasal cannula 2 -- --    08/27/23 0852 -- nasal cannula 2 -- --    08/27/23 0631 97 -- -- -- --    08/27/23 0532 94 -- -- -- --    08/27/23 0517 95 -- -- -- --    08/27/23 0501 94 -- -- -- --    08/27/23 0419 -- high-flow nasal cannula 10 -- --    08/27/23 0418 95 -- -- -- --    08/27/23 0348 88 nasal cannula 6 -- --          Facility-Administered Medications as of 8/28/2023   Medication Dose Route Frequency Provider Last Rate Last Admin    acetaminophen (TYLENOL) tablet 650 mg  650 mg Oral Q4H PRN Rubin Sood MD        [COMPLETED] cefepime 2 gm IVPB in 100 ml NS (VTB)  2,000 mg Intravenous Once Jerman Hu MD   Stopped at 08/27/23 6632     dextromethorphan polistirex ER (DELSYM) 30 MG/5ML oral suspension 60 mg  60 mg Oral Q12H Rubin Sood MD   60 mg at 08/28/23 1210    Enoxaparin Sodium (LOVENOX) syringe 105 mg  1 mg/kg Subcutaneous Q12H Rubin Sood MD   105 mg at 08/28/23 1210    [COMPLETED] Enoxaparin Sodium (LOVENOX) syringe 110 mg  1 mg/kg Subcutaneous Once Jerman Hu MD   110 mg at 08/27/23 0646    guaiFENesin (MUCINEX) 12 hr tablet 1,200 mg  1,200 mg Oral Q12H Rubin Sood MD   1,200 mg at 08/28/23 1211    [COMPLETED] iopamidol (ISOVUE-370) 76 % injection 100 mL  100 mL Intravenous Once in imaging Jerman Hu MD   95 mL at 08/27/23 0546    ipratropium-albuterol (DUO-NEB) nebulizer solution 3 mL  3 mL Nebulization Q4H PRN Rubin Sood MD        ipratropium-albuterol (DUO-NEB) nebulizer solution 3 mL  3 mL Nebulization Once Rubin Sood MD        melatonin tablet 3 mg  3 mg Oral Nightly PRN Rubin Sood MD   3 mg at 08/28/23 0008    nitroglycerin (NITROSTAT) SL tablet 0.4 mg  0.4 mg Sublingual Q5 Min PRN Rubin Sodo MD        ondansetron (ZOFRAN) tablet 4 mg  4 mg Oral Q6H PRN Rubin Sood MD        Or    ondansetron (ZOFRAN) injection 4 mg  4 mg Intravenous Q6H PRN Rubin Sood MD        [COMPLETED] ondansetron (ZOFRAN) injection 4 mg  4 mg Intravenous Once PRN Anand Wolf MD   4 mg at 08/28/23 1020    pantoprazole (PROTONIX) EC tablet 40 mg  40 mg Oral Daily Rubin Sood MD   40 mg at 08/28/23 1211    sertraline (ZOLOFT) tablet 150 mg  150 mg Oral Daily Rubin Sood MD   150 mg at 08/28/23 1211    sodium chloride 0.9 % infusion  30 mL/hr Intravenous Continuous PRN Rubin Sood MD 30 mL/hr at 08/28/23 0956 Restarted at 08/28/23 1018     Orders (last 24 hrs)        Start     Ordered    08/29/23 0600  Procalcitonin  Morning Draw         08/28/23 1028    08/29/23 0600  Basic Metabolic Panel  Morning Draw         08/28/23 1325    08/29/23 0600  CBC (No Diff)  Morning Draw          08/28/23 1325    08/28/23 1230  Oscillating Positive Expiratory Pressure (OPEP)  4 Times Daily - RT       08/28/23 1028    08/28/23 1137  Diet: Cardiac Diets; Healthy Heart (2-3 Na+); Texture: Regular Texture (IDDSI 7); Fluid Consistency: Thin (IDDSI 0)  Diet Effective Now         08/28/23 1136    08/28/23 1030  ipratropium-albuterol (DUO-NEB) nebulizer solution 3 mL  Once         08/28/23 1028    08/28/23 1027  oxyCODONE-acetaminophen (PERCOCET) 7.5-325 MG per tablet 1 tablet  Every 8 Hours PRN,   Status:  Discontinued         08/28/23 1027    08/28/23 1024  Respiratory Panel PCR w/COVID-19(SARS-CoV-2) ABRAN/GINNY/FIDE/PAD/COR/MAD/SAUD In-House, NP Swab in UTM/VTM, 3-4 HR TAT - Lavage, Lung, Right Lower Lobe  RELEASE UPON ORDERING        Comments: Specimen A: PNEUMOCYSTIS      08/28/23 1024    08/28/23 1024  Non-gynecologic Cytology  RELEASE UPON ORDERING        Comments: Specimen A: PNEUMOCYSTIS      08/28/23 1024    08/28/23 1024  Fungus Culture - Lavage, Lung, Right Lower Lobe  RELEASE UPON ORDERING        Comments: Specimen A: PNEUMOCYSTIS      08/28/23 1024    08/28/23 1024  AFB Culture - Lavage, Lung, Right Lower Lobe  RELEASE UPON ORDERING        Comments: Specimen A: PNEUMOCYSTIS      08/28/23 1024    08/28/23 1024  Fungus Smear - Lavage, Lung, Right Lower Lobe  RELEASE UPON ORDERING        Comments: Specimen A: PNEUMOCYSTIS      08/28/23 1024    08/28/23 1024  BAL Culture, Quantitative - Lavage, Lung, Right Lower Lobe  RELEASE UPON ORDERING        Comments: Specimen A: PNEUMOCYSTIS      08/28/23 1024    08/28/23 1020  Pulse Oximetry, Continuous  Continuous         08/28/23 1019    08/28/23 1020  POC Glucose Once  Once,   Status:  Canceled        Comments: Post op glucose check on all diabetic patients...Notify Anesthesia if blood sugar is less than 80 mg/dL or greater than 220 mg/dL.      08/28/23 1019    08/28/23 1020  Vital signs every 5 minutes for 15 minutes, every 15 minutes thereafter.  Once,    Status:  Canceled         08/28/23 1019    08/28/23 1020  Call Anesthesiologist for additional IV Fluid bolus for Hypotension/Tachycardia  Until Discontinued,   Status:  Canceled         08/28/23 1019    08/28/23 1020  Notify Anesthesia of Any Acute Changes in Patient Condition  Until Discontinued,   Status:  Canceled         08/28/23 1019    08/28/23 1020  Notify Anesthesia for Unrelieved Pain  Until Discontinued,   Status:  Canceled         08/28/23 1019    08/28/23 1020  Once DC criteria to floor met, follow surgeon's orders.  Until Discontinued,   Status:  Canceled         08/28/23 1019    08/28/23 1020  Discharge patient from PACU when discharge criteria is met.  Until Discontinued,   Status:  Canceled         08/28/23 1019    08/28/23 1019  fentaNYL citrate (PF) (SUBLIMAZE) injection 50 mcg  Every 5 Minutes PRN,   Status:  Discontinued         08/28/23 1019    08/28/23 1019  naloxone (NARCAN) injection 0.2 mg  As Needed,   Status:  Discontinued         08/28/23 1019    08/28/23 1019  flumazenil (ROMAZICON) injection 0.2 mg  As Needed,   Status:  Discontinued         08/28/23 1019    08/28/23 1019  ondansetron (ZOFRAN) injection 4 mg  Once As Needed         08/28/23 1019    08/28/23 1019  droperidol (INAPSINE) injection 0.625 mg  Every 20 Minutes PRN,   Status:  Discontinued        See Hyperspace for full Linked Orders Report.    08/28/23 1019    08/28/23 1019  droperidol (INAPSINE) injection 0.625 mg  Every 20 Minutes PRN,   Status:  Discontinued        See Hyperspace for full Linked Orders Report.    08/28/23 1019    08/28/23 1019  promethazine (PHENERGAN) suppository 25 mg  Once As Needed,   Status:  Discontinued        See Hyperspace for full Linked Orders Report.    08/28/23 1019    08/28/23 1019  promethazine (PHENERGAN) tablet 25 mg  Once As Needed,   Status:  Discontinued        See Hyperspace for full Linked Orders Report.    08/28/23 1019    08/28/23 1019  labetalol (NORMODYNE,TRANDATE) injection 5 mg   Every 5 Minutes PRN,   Status:  Discontinued         08/28/23 1019    08/28/23 1019  hydrALAZINE (APRESOLINE) injection 5 mg  Every 10 Minutes PRN,   Status:  Discontinued         08/28/23 1019    08/28/23 1019  ePHEDrine injection 5 mg  Once As Needed,   Status:  Discontinued         08/28/23 1019    08/28/23 1019  diphenhydrAMINE (BENADRYL) injection 12.5 mg  Every 15 Minutes PRN,   Status:  Discontinued         08/28/23 1019    08/28/23 1019  ipratropium-albuterol (DUO-NEB) nebulizer solution 3 mL  Once As Needed,   Status:  Discontinued         08/28/23 1019    08/28/23 1019  HYDROcodone-acetaminophen (NORCO) 7.5-325 MG per tablet 1 tablet  Once As Needed,   Status:  Discontinued         08/28/23 1019    08/28/23 1019  HYDROmorphone (DILAUDID) injection 0.5 mg  Every 5 Minutes PRN,   Status:  Discontinued         08/28/23 1019    08/28/23 1019  oxyCODONE-acetaminophen (PERCOCET) 7.5-325 MG per tablet 1 tablet  Every 4 Hours PRN,   Status:  Discontinued         08/28/23 1019    08/28/23 1015  lidocaine PF 1% (XYLOCAINE) injection  As Needed,   Status:  Discontinued         08/28/23 1016    08/28/23 0921  sodium chloride 0.9 % infusion  Continuous PRN         08/28/23 0921    08/28/23 0600  Basic Metabolic Panel  Morning Draw         08/27/23 0830    08/28/23 0600  CBC (No Diff)  Morning Draw         08/27/23 0830    08/28/23 0600  Procalcitonin  Morning Draw         08/27/23 1447    08/28/23 0430  aPTT  Daily,   Status:  Canceled      Comments: Cancel If Patient Has Infusion Changes      08/27/23 0621    08/28/23 0430  CBC & Differential  Daily,   Status:  Canceled      Comments: Discontinue After Heparin Stopped      08/27/23 0621    08/28/23 0430  Magnesium  Morning Draw         08/27/23 1447    08/28/23 0430  Phosphorus  Morning Draw         08/27/23 1447    08/28/23 0001  NPO Diet NPO Type: Strict NPO  Diet Effective Midnight,   Status:  Canceled         08/27/23 1438    08/27/23 1900  Enoxaparin Sodium  (LOVENOX) syringe 105 mg  Every 12 Hours         08/27/23 0844    08/27/23 1800  Oral Care  2 Times Daily       08/27/23 0830    08/27/23 1630  Oscillating Positive Expiratory Pressure (OPEP)  4 Times Daily - RT,   Status:  Canceled       08/27/23 1447    08/27/23 1448  Respiratory Culture - Sputum, Cough  Once         08/27/23 1447    08/27/23 1439  Obtain Informed Consent  Once         08/27/23 1438    08/27/23 1438  Case request  Once         08/27/23 1438    08/27/23 1200  Vital Signs  Every 4 Hours      Comments: Per per hospital policy    08/27/23 0830    08/27/23 1000  Incentive Spirometry  Every 4 Hours While Awake       08/27/23 0830    08/27/23 1000  dextromethorphan polistirex ER (DELSYM) 30 MG/5ML oral suspension 60 mg  Every 12 Hours Scheduled         08/27/23 0902    08/27/23 1000  guaiFENesin (MUCINEX) 12 hr tablet 1,200 mg  Every 12 Hours Scheduled         08/27/23 0902    08/27/23 1000  sertraline (ZOLOFT) tablet 150 mg  Daily         08/27/23 0902    08/27/23 1000  pantoprazole (PROTONIX) EC tablet 40 mg  Daily         08/27/23 0903    08/27/23 0930  cefTRIAXone (ROCEPHIN) 1,000 mg in sodium chloride 0.9 % 100 mL IVPB-VTB  Every 24 Hours,   Status:  Discontinued        See Hyperspace for full Linked Orders Report.    08/27/23 0830    08/27/23 0930  azithromycin (ZITHROMAX) 500 mg in sodium chloride 0.9 % 250 mL IVPB-VTB  Every 24 Hours,   Status:  Discontinued        See Hyperspace for full Linked Orders Report.    08/27/23 0830    08/27/23 0930  Oscillating Positive Expiratory Pressure (OPEP)  2 Times Daily - RT       08/27/23 0902    08/27/23 0816  Intake & Output  Every Shift       08/27/23 0830    08/27/23 0815  nitroglycerin (NITROSTAT) SL tablet 0.4 mg  Every 5 Minutes PRN         08/27/23 0830    08/27/23 0814  acetaminophen (TYLENOL) tablet 650 mg  Every 4 Hours PRN         08/27/23 0830    08/27/23 0814  ondansetron (ZOFRAN) tablet 4 mg  Every 6 Hours PRN        See Hyperspace for full  "Linked Orders Report.    08/27/23 0830    08/27/23 0814  ondansetron (ZOFRAN) injection 4 mg  Every 6 Hours PRN        See Hyperspace for full Linked Orders Report.    08/27/23 0830    08/27/23 0814  melatonin tablet 3 mg  Nightly PRN         08/27/23 0830    08/27/23 0814  ipratropium-albuterol (DUO-NEB) nebulizer solution 3 mL  Every 4 Hours PRN         08/27/23 0830    Unscheduled  Up with assistance  As Needed       08/27/23 0830    Unscheduled  Blood Gas, Arterial -  As Needed        Comments: Respiratory Distress      08/27/23 0830    Unscheduled  Oxygen Therapy- Nasal Cannula; Titrate 1-6 LPM Per SpO2; 90 - 95%  Continuous PRN       08/28/23 1019    --  SCANNED - TELEMETRY           08/27/23 0000    --  SCANNED - TELEMETRY           08/27/23 0000    --  SCANNED - TELEMETRY           08/27/23 0000    --  SCANNED - TELEMETRY           08/27/23 0000    --  SCANNED - TELEMETRY           08/27/23 0000    --  BRONCHOSCOPY         08/28/23 0844                     Physician Progress Notes (last 24 hours)        Rubin Sood MD at 08/28/23 0912          Dr. VIKRAM Sood    [facility] ENDO SUITES        Patient ID:  Name:  Nikolai Shaw  MRN:  [id number]  1970  53 y.o.  male            CC/Reason for visit: Testing testing    Interval hx: Patient is still coughing, still having secretions.  On a few liters of oxygen this morning, 2 L    ROS: No hemoptysis, no abdominal pain, no nausea    Vitals:  Vitals:    08/28/23 0736 08/28/23 0737 08/28/23 0851 08/28/23 0924   BP: 135/88   139/91   BP Location: Left arm   Right arm   Patient Position: Lying   Lying   Pulse: 62 62 80 58   Resp: 18   15   Temp:   98.1 °F (36.7 °C) 98.4 °F (36.9 °C)   TempSrc: Oral Oral Oral Oral   SpO2: 99%   95%   Weight:    107 kg (236 lb)   Height:    172.7 cm (68\")           Body mass index is 35.88 kg/m².    Intake/Output Summary (Last 24 hours) at 8/28/2023 1024  Last data filed at 8/28/2023 1018  Gross per 24 hour   Intake " 640 ml   Output --   Net 640 ml       Exam:  GEN:  No distress  Alert, oriented x 3.   LUNGS: Scattered rhonchi bilat, no use of accessory muscles  CV:  Normal S1S2, without murmur, no edema  ABD:  Non tender, right ileostomy pouch appears clean dry and intact      Scheduled meds:  dextromethorphan polistirex ER, 60 mg, Oral, Q12H  enoxaparin, 1 mg/kg, Subcutaneous, Q12H  guaiFENesin, 1,200 mg, Oral, Q12H  pantoprazole, 40 mg, Oral, Daily  sertraline, 150 mg, Oral, Daily      IV meds:                      sodium chloride, 30 mL/hr, Last Rate: 30 mL/hr (08/28/23 0956)        Data Review:   I reviewed the patient's medications and new clinical results.            Results from last 7 days   Lab Units 08/28/23  0429 08/27/23  0354   SODIUM mmol/L 141 141   POTASSIUM mmol/L 4.3 3.9   CHLORIDE mmol/L 108* 104   CO2 mmol/L 24.5 26.0   BUN mg/dL 14 14   CREATININE mg/dL 0.86 0.99   CALCIUM mg/dL 8.6 9.3   BILIRUBIN mg/dL  --  0.5   ALK PHOS U/L  --  105   ALT (SGPT) U/L  --  19   AST (SGOT) U/L  --  28   GLUCOSE mg/dL 115* 122*   WBC 10*3/mm3 14.50* 15.48*   HEMOGLOBIN g/dL 11.9* 13.4   PLATELETS 10*3/mm3 207 232   INR   --  0.99   PROBNP pg/mL  --  72.3   PROCALCITONIN ng/mL 0.10 0.06     Results from last 7 days   Lab Units 08/27/23  1719 08/27/23  0354   BLOODCX   --  No growth at 24 hours  No growth at 24 hours   RESPCX  Rejected  --            Results from last 7 days   Lab Units 08/27/23  0557   PH, ARTERIAL pH units 7.328*   PCO2, ARTERIAL mm Hg 45.1*   PO2 ART mm Hg 124.4*   FLOW RATE lpm 11   MODALITY  HFNC   O2 SATURATION CALC % 98.5       Estimated Creatinine Clearance: 117.7 mL/min (by C-G formula based on SCr of 0.86 mg/dL).      ASSESSMENT:     Pneumonia involving both lungs, organism unknown    History of rectal cancer    Essential hypertension    Sleep apnea    Prediabetes    Acute respiratory failure with hypoxia    Acute pulmonary embolism without acute cor pulmonale    Obesity (BMI  30-39.9)        PLAN:  Patient gave consent for bronchoscopy.  We will proceed with bronchoscopy based on his lack of response to Augmentin for the past 7 days.  He was diagnosed with community-acquired pneumonia but did not respond to strong antibiotics.  We will obtain bronchoalveolar lavage samples today for microbiology studies, including bacterial and viral cultures.  Continue to observe off of all antibiotics for now.  He has not had any fever.  His procalcitonin is normal x2.          Rubin Sood MD  2023    Electronically signed by Rubin Sood MD at 23 1027       Remigio Bee MD at 23 0742            Name: Nikolai Shaw ADMIT: 2023   : 1970  PCP: Ekta Zepeda APRN    MRN: [id number] LOS: 1 days   AGE/SEX: 53 y.o. male  ROOM: [facility]     Subjective   Subjective   Still having shortness of breath.    Review of Systems    Objective   Objective   Temp:  [98.8 °F (37.1 °C)-99.7 °F (37.6 °C)] 98.8 °F (37.1 °C)  Heart Rate:  [54-72] 54  Resp:  [18-20] 18  BP: (122-133)/(70-76) 133/75  SpO2:  [96 %-98 %] 98 %  on  Flow (L/min):  [2] 2;   Device (Oxygen Therapy): nasal cannula  Body mass index is 36.13 kg/m².    Physical Exam  Vitals and nursing note reviewed.   Constitutional:       General: He is not in acute distress.  Cardiovascular:      Rate and Rhythm: Normal rate and regular rhythm.   Pulmonary:      Effort: Pulmonary effort is normal.      Breath sounds: Normal breath sounds.   Abdominal:      General: Bowel sounds are normal.      Palpations: Abdomen is soft.      Tenderness: There is no abdominal tenderness.      Comments: Ileostomy present   Musculoskeletal:         General: No swelling.   Skin:     General: Skin is warm and dry.   Neurological:      Mental Status: He is alert. Mental status is at baseline.       Results Review      I reviewed the patient's new clinical results.  Results from last 7 days   Lab Units 23  0429 23  0354   WBC 10*3/mm3  14.50* 15.48*   HEMOGLOBIN g/dL 11.9* 13.4   PLATELETS 10*3/mm3 207 232     Results from last 7 days   Lab Units 08/28/23  0429 08/27/23  0354   SODIUM mmol/L 141 141   POTASSIUM mmol/L 4.3 3.9   CHLORIDE mmol/L 108* 104   CO2 mmol/L 24.5 26.0   BUN mg/dL 14 14   CREATININE mg/dL 0.86 0.99   GLUCOSE mg/dL 115* 122*   EGFR mL/min/1.73 103.5 91.1     Results from last 7 days   Lab Units 08/28/23  0429 08/27/23  0354   CALCIUM mg/dL 8.6 9.3   ALBUMIN g/dL  --  4.1   MAGNESIUM mg/dL 1.9 1.9   PHOSPHORUS mg/dL 2.9  --      Results from last 7 days   Lab Units 08/28/23  0429 08/27/23  0354   PROCALCITONIN ng/mL 0.10 0.06   LACTATE mmol/L  --  2.0   No results found for: STREPPNEUAG, LEGANTIGENUR  Results from last 7 days   Lab Units 08/27/23  1719 08/27/23  0354   BLOODCX   --  No growth at 24 hours  No growth at 24 hours   RESPCX  Rejected  --      Results from last 7 days   Lab Units 08/27/23  0355   COVID19  Not Detected   ADENOVIRUS DETECTION BY PCR  Not Detected   CORONAVIRUS 229E  Not Detected   CORONAVIRUS HKU1  Not Detected   CORONAVIRUS NL63  Not Detected   CORONAVIRUS OC43  Not Detected   HUMAN METAPNEUMOVIRUS  Not Detected   HUMAN RHINOVIRUS/ENTEROVIRUS  Not Detected   INFLUENZA B PCR  Not Detected   PARAINFLUENZA 1  Not Detected   PARAINFLUENZA VIRUS 2  Not Detected   PARAINFLUENZA VIRUS 3  Not Detected   PARAINFLUENZA VIRUS 4  Not Detected   BORDETELLA PERTUSSIS PCR  Not Detected   CHLAMYDOPHILA PNEUMONIAE PCR  Not Detected   MYCOPLAMA PNEUMO PCR  Not Detected   INFLUENZA A PCR  Not Detected   RSV, PCR  Not Detected     Results from last 7 days   Lab Units 08/27/23  0557   PH, ARTERIAL pH units 7.328*   PCO2, ARTERIAL mm Hg 45.1*   PO2 ART mm Hg 124.4*   FLOW RATE lpm 11   MODALITY  HFNC   O2 SATURATION CALC % 98.5       I have personally reviewed all medications:  Scheduled Medications  dextromethorphan polistirex ER, 60 mg, Oral, Q12H  enoxaparin, 1 mg/kg, Subcutaneous, Q12H  guaiFENesin, 1,200 mg, Oral,  Q12H  pantoprazole, 40 mg, Oral, Daily  sertraline, 150 mg, Oral, Daily    Infusions   Diet  NPO Diet NPO Type: Strict NPO    I have personally reviewed:  [x]  Laboratory   [x]  Microbiology   [x]  Radiology   [x]  EKG/Telemetry  [x]  Cardiology/Vascular   []  Pathology    []  Records        Assessment/Plan     Active Hospital Problems    Diagnosis  POA    **Pneumonia involving right lung [J18.9]  Yes    Acute respiratory failure with hypoxia [J96.01]  Yes    Acute pulmonary embolism without acute cor pulmonale [I26.99]  Yes    Obesity (BMI 30-39.9) [E66.9]  Yes    Prediabetes [R73.03]  Yes    Sleep apnea [G47.30]  Yes    Essential hypertension [I10]  Yes    History of rectal cancer [Z85.048]  Yes      Resolved Hospital Problems   No resolved problems to display.       53 y.o. male with Pneumonia involving right lung.    Patient underwent bronchoscopy earlier today.  Antibiotics are on hold.  Follow-up results BAL.  Appreciate pulmonology assistance.  Continue Lovenox for treatment of pulmonary embolism.  Note lower extremity venous Doppler studies negative.    His rectal cancer has been felt to be in remission.  He underwent total colectomy with ileostomy in 2015 and declined adjuvant chemotherapy at that time.  Previously seen by Dr. Shelby.        Blood cultures x2 done in ED negative thus far.    VRP negative.  Supplemental oxygen to keep SaO2 > 92%  Physical therapy consulted.  Full code.  Discussed with patient and spouse.  Anticipate discharge home with family later this week.      Remigio Bee MD  [facility]  08/28/23  07:42 EDT      Electronically signed by Remigio Bee MD at 08/28/23 1325          Consult Notes (last 24 hours)        Rubin Sood MD at 08/27/23 1440        Consult Orders    1. Inpatient Pulmonology Consult [[id number]] ordered by Remigio Bee MD at 08/27/23 0805                 Group: [facility]         CONSULT NOTE    Patient  Identification:  Nikolai Shaw  53 y.o.  male  1970  [id number]            Requesting physician: Dr. Remigio Bee    Reason for Consultation: Pneumonia, pulmonary embolism    CC: Cough and vomiting    History of Present Illness:  53-year-old male patient who yesterday in the middle of the night woke up to 10 to his ileostomy and became lightheaded, short of breath and vomited.  He presented with significant coughing and bronchospasm.  EMS gave him nebulized albuterol and he felt much better.  He was hypoxic on room air and he needed supplemental oxygen upon EMS arrival.  He says for the past 11 days he has been coughing.  Producing yellow sputum and went to urgent care.  They prescribed Augmentin but he has not improved despite taking almost 1 week of Augmentin.  He denies any pulmonary history.  Has never smoked.  He denies any fever or chills but has noticed yellow sputum with ongoing cough despite Augmentin.  CT chest with angiogram here shows a very small left upper lobe pulmonary embolism.  He has never had any history of DVT or PE.  No family history of thrombophilia.      Review of Systems   Constitutional:  Positive for fatigue. Negative for diaphoresis and fever.   HENT:  Negative for ear pain and sore throat.    Eyes:  Negative for pain and visual disturbance.   Respiratory:  Positive for cough and shortness of breath.    Gastrointestinal:  Positive for vomiting. Negative for abdominal pain and diarrhea.   Endocrine: Negative for cold intolerance and polyuria.   Genitourinary:  Negative for dysuria and hematuria.   Musculoskeletal:  Negative for joint swelling and myalgias.   Skin:  Negative for rash and wound.   Neurological:  Negative for speech difficulty and numbness.   Hematological:  Negative for adenopathy. Does not bruise/bleed easily.   Psychiatric/Behavioral:  Negative for agitation and confusion.      Past Medical History:  Past Medical History:   Diagnosis Date    Adenocarcinoma of rectum  06/2015    invasive adenocarcinoma of rectum w 59 benign lymph nodes    Adenomatous polyposis     FAMILIAL     Anxiety     At risk for sleep apnea     Chicken pox     patient reports having as a child    Depression     Herpes zoster without complication 12/3/2021    History of anemia     Hypertension     Hyponatremia     Insomnia     Malignant neoplasm of rectum 6/1/2016    Parastomal hernia without obstruction or gangrene 2/15/2021    Added automatically from request for surgery [id number]    Pulmonary nodule 1/10/2020    6/26/20 CT chest: 6 mm nodule resolved.    Renal calculi     RLS (restless legs syndrome)     Syncope 03/19/2021    prolonged QT interval    Testicular hypofunction     Vitamin D deficiency        Past Surgical History:  Past Surgical History:   Procedure Laterality Date    ABDOMINOPERINEAL PROCTOCOLECTOMY  07/06/2015    DR PAL RUDOLPH    COLONOSCOPY  06/16/1915    DR SHASTA HOWARD    COLONOSCOPY N/A 2/23/2021    Procedure: ILEOSCOPY, EXCISION OF ILEOSTOMY NODULE;  Surgeon: Pal Rudolph MD;  Location: [facility] OR;  Service: General;  Laterality: N/A;    ENDOSCOPY  06/23/2015    DR SHASTA HOWARD    EXPLORATORY LAPAROTOMY N/A 2/3/2017    Procedure: LAPAROTOMY EXPLORATORY  SMALL BOWEL OBSTRUCTION;  Surgeon: Pal Rudolph MD;  Location: [facility] OR;  Service:     ILEOSTOMY  07/06/2015    MYRINGOTOMY W/ TUBES      2-3 times as child        Home Meds:  Medications Prior to Admission   Medication Sig Dispense Refill Last Dose    B Complex Vitamins (VITAMIN-B COMPLEX PO) Take 1 tablet by mouth Daily.   8/26/2023    Cholecalciferol (Vitamin D-3) 125 MCG (5000 UT) tablet Take 1 tablet by mouth Daily. 90 tablet 1 8/26/2023    pantoprazole (PROTONIX) 20 MG EC tablet Take 1 tablet by mouth Daily. 90 tablet 1 Past Week    sertraline (ZOLOFT) 100 MG tablet Take 1.5 tablets by mouth Daily. 135 tablet 1 8/26/2023       Allergies:  No Known Allergies    Social History:   Social History  "    Socioeconomic History    Marital status:      Spouse name: Hansa    Years of education: college   Tobacco Use    Smoking status: Never    Smokeless tobacco: Current     Types: Chew    Tobacco comments:     CHEWS TOBACCO   Vaping Use    Vaping Use: Never used   Substance and Sexual Activity    Alcohol use: Yes     Alcohol/week: 24.0 standard drinks     Types: 24 Cans of beer per week     Comment: Some days 24 beers a day. Notmally  at least 18; stopped drinking 6/10/23    Drug use: No    Sexual activity: Not Currently     Partners: Female       Family History:  Family History   Problem Relation Age of Onset    Colon cancer Father         familial polyposis    Aneurysm Maternal Grandfather 66        brain aneurysm    Colon cancer Paternal Grandfather         familial polyposis    Hypertension Maternal Uncle     Diabetes Maternal Uncle     Malig Hyperthermia Neg Hx        Physical Exam:  /70 (BP Location: Right arm, Patient Position: Sitting)   Pulse 70   Temp 99.7 °F (37.6 °C) (Oral)   Resp 20   Ht 172.7 cm (68\")   Wt 108 kg (237 lb 9.6 oz)   SpO2 97%   BMI 36.13 kg/m²  Body mass index is 36.13 kg/m². 97% 108 kg (237 lb 9.6 oz)  Physical Exam  Constitutional:       General: He is not in acute distress.     Appearance: He is well-developed.   HENT:      Right Ear: External ear normal.      Left Ear: External ear normal.      Nose: Nose normal.   Eyes:      General: No scleral icterus.     Conjunctiva/sclera: Conjunctivae normal.      Pupils: Pupils are equal, round, and reactive to light.   Neck:      Thyroid: No thyromegaly.      Vascular: No JVD.   Cardiovascular:      Rate and Rhythm: Normal rate and regular rhythm.      Heart sounds: No murmur heard.     Comments: No edema  Pulmonary:      Effort: Pulmonary effort is normal.      Breath sounds: Rhonchi present. No wheezing or rales.   Abdominal:      General: There is no distension.      Palpations: Abdomen is soft.      Comments: Ileostomy " bag in right lower quadrant, I cannot palpate any enlarged organs   Musculoskeletal:         General: No deformity. Normal range of motion.      Cervical back: Neck supple. No rigidity.      Comments: No deformity in all 4 extrem   Skin:     Findings: No erythema or rash.      Comments: No palpable nodules   Neurological:      General: No focal deficit present.      Mental Status: He is alert and oriented to person, place, and time.      Cranial Nerves: No cranial nerve deficit.      Motor: No weakness.   Psychiatric:         Mood and Affect: Mood normal.         Thought Content: Thought content normal.       LABS:        Results from last 7 days   Lab Units 08/27/23  0354   MAGNESIUM mg/dL 1.9   SODIUM mmol/L 141   POTASSIUM mmol/L 3.9   CHLORIDE mmol/L 104   CO2 mmol/L 26.0   BUN mg/dL 14   CREATININE mg/dL 0.99   GLUCOSE mg/dL 122*   CALCIUM mg/dL 9.3   WBC 10*3/mm3 15.48*   HEMOGLOBIN g/dL 13.4   PLATELETS 10*3/mm3 232   ALT (SGPT) U/L 19   AST (SGOT) U/L 28   PROBNP pg/mL 72.3   PROCALCITONIN ng/mL 0.06     Lab Results   Component Value Date    TROPONINT 11 08/27/2023     Results from last 7 days   Lab Units 08/27/23  0641 08/27/23  0354   HSTROP T ng/L 11 13         Results from last 7 days   Lab Units 08/27/23  0354   PROCALCITONIN ng/mL 0.06   LACTATE mmol/L 2.0     Results from last 7 days   Lab Units 08/27/23  0557   PH, ARTERIAL pH units 7.328*   PCO2, ARTERIAL mm Hg 45.1*   PO2 ART mm Hg 124.4*   FLOW RATE lpm 11   MODALITY  HFNC   O2 SATURATION CALC % 98.5     Results from last 7 days   Lab Units 08/27/23  0355   ADENOVIRUS DETECTION BY PCR  Not Detected   CORONAVIRUS 229E  Not Detected   CORONAVIRUS HKU1  Not Detected   CORONAVIRUS NL63  Not Detected   CORONAVIRUS OC43  Not Detected   HUMAN METAPNEUMOVIRUS  Not Detected   HUMAN RHINOVIRUS/ENTEROVIRUS  Not Detected   INFLUENZA B PCR  Not Detected   PARAINFLUENZA 1  Not Detected   PARAINFLUENZA VIRUS 2  Not Detected   PARAINFLUENZA VIRUS 3  Not Detected    PARAINFLUENZA VIRUS 4  Not Detected   BORDETELLA PERTUSSIS PCR  Not Detected   BORDETELLA PARAPERTUSSIS PCR  Not Detected   CHLAMYDOPHILA PNEUMONIAE PCR  Not Detected   MYCOPLAMA PNEUMO PCR  Not Detected   RSV, PCR  Not Detected     Results from last 7 days   Lab Units 08/27/23  0354   INR  0.99         Lab Results   Component Value Date    TSH 0.94 07/11/2015     Estimated Creatinine Clearance: 102.8 mL/min (by C-G formula based on SCr of 0.99 mg/dL).         Imaging: I personally visualized the images of CT angiogram of the chest showing bilateral, but predominant right lower lobe reticulonodular and groundglass infiltrates suggesting pneumonia.  There is also small subsegmental left upper lobe pulmonary artery filling defects suggestive of small embolism.      Assessment:  Pneumonia involving right lung  Pulmonary embolism  Acute hypoxemia  Ileostomy      Recommendations:  Clinically the patient is requiring some oxygen and CT shows bilateral reticular nodular infiltrates with some groundglass opacity suggesting pneumonia.  He took 1 week of Augmentin as outpatient but his symptoms are exactly the same.  Initially I suspected aspiration pneumonia, but his cough precedes his episode of vomiting by at least 10 days.  He will need bronchoscopy for further microbiologic studies of this pulmonary infection.  Continue weaning oxygen as tolerated.  His left upper lobe pulmonary embolism is small, likely incidental and of minor clinical significance, however we cannot assume he will not develop larger emboli without anticoagulation.  Therefore I recommend 6 weeks of oral DOAC therapy.  The patient expressed understanding.  He has never smoked.  Does not usually have any pulmonary disease.  He will have to follow-up with his primary care physician to manage 6 months of anticoagulation.  We discussed risks, benefits and alternatives to bronchoscopy with lavage for tomorrow.  I have placed orders for n.p.o. after midnight  and obtained his consent.  He is willing to proceed.  I spoke to  and reserved bronchoscopy slot in endoscopy suite tomorrow at 10 AM.  Hold off on all antibiotics at this time.  This will improve the yield of the bronchoscopy.        Rubin Sood MD  8/27/2023  14:40 EDT      Much of this encounter note is an electronic transcription/translation of spoken language to printed text using Dragon Software.    Electronically signed by Rubin Sood MD at 08/27/23 2119

## 2023-08-28 NOTE — PLAN OF CARE
Goal Outcome Evaluation:                   VSS; HR in 40s when sleeping; 2 liters on with sleeping. Bronch completed. Adlib and independent

## 2023-08-28 NOTE — ANESTHESIA PREPROCEDURE EVALUATION
Anesthesia Evaluation     Patient summary reviewed and Nursing notes reviewed                Airway   Mallampati: II  TM distance: >3 FB  Neck ROM: limited  Large neck circumference and Possible difficult intubation  Dental - normal exam     Pulmonary    (+) pulmonary embolism,sleep apnea  Cardiovascular     ECG reviewed  Rhythm: regular  Rate: normal    (+) hypertension      Neuro/Psych  (+) dizziness/light headedness, syncope, psychiatric history Anxiety and Depression  GI/Hepatic/Renal/Endo    (+) morbid obesity, renal disease stones    Musculoskeletal (-) negative ROS    Abdominal    Substance History - negative use     OB/GYN negative ob/gyn ROS         Other      history of cancer                  Anesthesia Plan    ASA 3     general     intravenous induction     Anesthetic plan, risks, benefits, and alternatives have been provided, discussed and informed consent has been obtained with: patient.    Plan discussed with CRNA.    CODE STATUS:    Level Of Support Discussed With: Patient  Code Status (Patient has no pulse and is not breathing): CPR (Attempt to Resuscitate)  Medical Interventions (Patient has pulse or is breathing): Full

## 2023-08-29 ENCOUNTER — READMISSION MANAGEMENT (OUTPATIENT)
Dept: CALL CENTER | Facility: HOSPITAL | Age: 53
End: 2023-08-29
Payer: COMMERCIAL

## 2023-08-29 VITALS
HEIGHT: 68 IN | TEMPERATURE: 98.1 F | BODY MASS INDEX: 35.77 KG/M2 | RESPIRATION RATE: 18 BRPM | SYSTOLIC BLOOD PRESSURE: 124 MMHG | OXYGEN SATURATION: 96 % | WEIGHT: 236 LBS | DIASTOLIC BLOOD PRESSURE: 74 MMHG | HEART RATE: 60 BPM

## 2023-08-29 LAB
ANION GAP SERPL CALCULATED.3IONS-SCNC: 10 MMOL/L (ref 5–15)
BUN SERPL-MCNC: 17 MG/DL (ref 6–20)
BUN/CREAT SERPL: 16.8 (ref 7–25)
CALCIUM SPEC-SCNC: 8.9 MG/DL (ref 8.6–10.5)
CHLORIDE SERPL-SCNC: 108 MMOL/L (ref 98–107)
CO2 SERPL-SCNC: 25 MMOL/L (ref 22–29)
CREAT SERPL-MCNC: 1.01 MG/DL (ref 0.76–1.27)
DEPRECATED RDW RBC AUTO: 43.3 FL (ref 37–54)
EGFRCR SERPLBLD CKD-EPI 2021: 88.9 ML/MIN/1.73
ERYTHROCYTE [DISTWIDTH] IN BLOOD BY AUTOMATED COUNT: 12.6 % (ref 12.3–15.4)
GLUCOSE SERPL-MCNC: 90 MG/DL (ref 65–99)
HCT VFR BLD AUTO: 33.8 % (ref 37.5–51)
HGB BLD-MCNC: 11.7 G/DL (ref 13–17.7)
MCH RBC QN AUTO: 32.4 PG (ref 26.6–33)
MCHC RBC AUTO-ENTMCNC: 34.6 G/DL (ref 31.5–35.7)
MCV RBC AUTO: 93.6 FL (ref 79–97)
PLATELET # BLD AUTO: 188 10*3/MM3 (ref 140–450)
PMV BLD AUTO: 11.2 FL (ref 6–12)
POTASSIUM SERPL-SCNC: 4 MMOL/L (ref 3.5–5.2)
PROCALCITONIN SERPL-MCNC: 0.07 NG/ML (ref 0–0.25)
RBC # BLD AUTO: 3.61 10*6/MM3 (ref 4.14–5.8)
SODIUM SERPL-SCNC: 143 MMOL/L (ref 136–145)
WBC NRBC COR # BLD: 9.72 10*3/MM3 (ref 3.4–10.8)

## 2023-08-29 PROCEDURE — 25010000002 ENOXAPARIN PER 10 MG: Performed by: INTERNAL MEDICINE

## 2023-08-29 PROCEDURE — 85027 COMPLETE CBC AUTOMATED: CPT | Performed by: HOSPITALIST

## 2023-08-29 PROCEDURE — 84145 PROCALCITONIN (PCT): CPT | Performed by: INTERNAL MEDICINE

## 2023-08-29 PROCEDURE — 80048 BASIC METABOLIC PNL TOTAL CA: CPT | Performed by: HOSPITALIST

## 2023-08-29 RX ORDER — ALBUTEROL SULFATE 90 UG/1
3 AEROSOL, METERED RESPIRATORY (INHALATION) EVERY 6 HOURS PRN
Qty: 6.7 G | Refills: 5 | Status: SHIPPED | OUTPATIENT
Start: 2023-08-29

## 2023-08-29 RX ORDER — ALBUTEROL SULFATE 90 UG/1
3 AEROSOL, METERED RESPIRATORY (INHALATION) EVERY 6 HOURS PRN
Qty: 6.7 G | Refills: 5 | Status: SHIPPED | OUTPATIENT
Start: 2023-08-29 | End: 2023-08-29 | Stop reason: SDUPTHER

## 2023-08-29 RX ADMIN — GUAIFENESIN 1200 MG: 600 TABLET, EXTENDED RELEASE ORAL at 09:29

## 2023-08-29 RX ADMIN — Medication 3 MG: at 01:26

## 2023-08-29 RX ADMIN — ENOXAPARIN SODIUM 105 MG: 150 INJECTION SUBCUTANEOUS at 11:39

## 2023-08-29 RX ADMIN — PANTOPRAZOLE SODIUM 40 MG: 40 TABLET, DELAYED RELEASE ORAL at 09:29

## 2023-08-29 RX ADMIN — SERTRALINE 150 MG: 100 TABLET, FILM COATED ORAL at 09:29

## 2023-08-29 RX ADMIN — DEXTROMETHORPHAN 60 MG: 30 SUSPENSION, EXTENDED RELEASE ORAL at 09:30

## 2023-08-29 NOTE — DISCHARGE SUMMARY
Patient Name: Nikolai Shaw  : 1970  MRN: [id number]    Date of Admission: 2023  Date of Discharge:  2023  Primary Care Physician: Ekta Zepeda APRN      Chief Complaint:   Shortness of Breath      Discharge Diagnoses     Active Hospital Problems    Diagnosis  POA    **Pneumonia involving right lung [J18.9]  Yes    Acute respiratory failure with hypoxia [J96.01]  Yes    Acute pulmonary embolism without acute cor pulmonale [I26.99]  Yes    Obesity (BMI 30-39.9) [E66.9]  Yes    Prediabetes [R73.03]  Yes    Sleep apnea [G47.30]  Yes    Essential hypertension [I10]  Yes    History of rectal cancer [Z85.048]  Yes      Resolved Hospital Problems   No resolved problems to display.        Hospital Course     Mr. Shaw is a 53 y.o. non-smoker with a history of moderately differentiated adenocarcinoma of the rectum who presented to [facility] initially complaining of Shortness of Breath.  Please see the admitting history and physical for further details.  He was found to have small pulmonary embolus, pneumonia and respiratory failure in the ED and was admitted to the hospital for further evaluation and treatment.  He was seen by pulmonology who recommended and performed bronchoscopy.  He tolerated procedure well.  Antibiotics were initially held.  Cultures of all come back negative.  He is stable off oxygen with normal procalcitonin x2.  Pulmonology does not feel he requires additional antibiotic at this time.  They will follow-up with him in their office in about 3 weeks with repeat chest x-ray.    He was treated with Lovenox during stay for pulmonary embolus.  Lower extremity venous Dopplers negative.  Plan discharge home on Eliquis.      Day of Discharge     Subjective:  See today's progress note    Physical Exam:  Temp:  [97.5 °F (36.4 °C)-99 °F (37.2 °C)] 98.1 °F (36.7 °C)  Heart Rate:  [47-82] 60  Resp:  [18-20] 18  BP: (110-132)/(62-96) 124/74  Body mass index is 35.88  kg/m².  Physical Exam    Consultants     Consult Orders (all) (From admission, onward)       Start     Ordered    08/27/23 0856  Inpatient Pulmonology Consult  Once        Specialty:  Pulmonary Disease  Provider:  Walter Ward MD    08/27/23 0856          Procedures     BRONCHOSCOPY WITH BRONCHIAL ALVELOR LAVAGE    Imaging Results (All)       Procedure Component Value Units Date/Time    XR Chest 1 View [[id number]] Collected: 08/27/23 1105     Updated: 08/27/23 1110    Narrative:      XR CHEST 1 VW-     HISTORY: Male who is 53 years-old, short of breath     TECHNIQUE: Frontal view of the chest, provided for review at 11:00 a.m.     COMPARISON: 4/8/2021     FINDINGS: The heart size is borderline. Pulmonary vasculature is  unremarkable. No focal pulmonary consolidation, pleural effusion, or  pneumothorax. Old granulomatous disease is seen. No acute osseous  process.       Impression:      No evidence for acute pulmonary process. Borderline heart  size. Follow-up as indications persist.     This report was finalized on 8/27/2023 11:07 AM by Dr. Naveed Pickett M.D.       CT Angiogram Chest [[id number]] Collected: 08/27/23 0605     Updated: 08/27/23 0619    Narrative:      CT ANGIOGRAM CHEST-     INDICATIONS: Short of breath     TECHNIQUE: Radiation dose reduction techniques were utilized, including  automated exposure control and exposure modulation based on body size.  CT angiography of the chest. Three-dimensional reconstructions.     COMPARISON: Chest CT from 6/26/2020     FINDINGS:     Pulmonary embolic disease is seen in left upper lobe segmental branch  artery, for example axial image 54, sagittal image 173. The RV LV ratio  is 1.4.     No aortic dissection.     The heart size is enlarged without pericardial effusion. A few small  subcentimeter short axis mediastinal lymph nodes are seen that are not  significant by size criteria.     The airways appear clear.     No pleural effusion or pneumothorax.     The  lungs show reticulonodular infiltrates throughout the right lung,  suggesting pneumonia, CT follow-up advised to exclude the possibility of  underlying lesion. Largest nodular densities in this region are seen in  the right lower lobe, for example 1.2 cm on axial image 63, 1.3 cm on  image 88     Upper abdominal structures show no acute findings. Minimal hiatal hernia  is present.     Degenerative changes are seen in the spine. No acute fracture is  identified.       Impression:         Critical test result. Small left upper lobe pulmonary embolic disease.     Reticulonodular infiltrates throughout the right lung, suggesting  pneumonia, CT follow-up advised.     Discussed by telephone with Dr. Hu at 0614, 8/27/2023.           This report was finalized on 8/27/2023 6:16 AM by Dr. Naveed Pickett M.D.             Results for orders placed during the hospital encounter of 08/27/23    Duplex Venous Lower Extremity - Bilateral CAR    Interpretation Summary    Normal bilateral lower extremity venous duplex scan.      Pertinent Labs     Results from last 7 days   Lab Units 08/29/23 0424 08/28/23 0429 08/27/23  0354   WBC 10*3/mm3 9.72 14.50* 15.48*   HEMOGLOBIN g/dL 11.7* 11.9* 13.4   PLATELETS 10*3/mm3 188 207 232     Results from last 7 days   Lab Units 08/29/23 0424 08/28/23 0429 08/27/23  0354   SODIUM mmol/L 143 141 141   POTASSIUM mmol/L 4.0 4.3 3.9   CHLORIDE mmol/L 108* 108* 104   CO2 mmol/L 25.0 24.5 26.0   BUN mg/dL 17 14 14   CREATININE mg/dL 1.01 0.86 0.99   GLUCOSE mg/dL 90 115* 122*   EGFR mL/min/1.73 88.9 103.5 91.1     Results from last 7 days   Lab Units 08/27/23  0354   ALBUMIN g/dL 4.1   BILIRUBIN mg/dL 0.5   ALK PHOS U/L 105   AST (SGOT) U/L 28   ALT (SGPT) U/L 19     Results from last 7 days   Lab Units 08/29/23 0424 08/28/23 0429 08/27/23  0354   CALCIUM mg/dL 8.9 8.6 9.3   ALBUMIN g/dL  --   --  4.1   MAGNESIUM mg/dL  --  1.9 1.9   PHOSPHORUS mg/dL  --  2.9  --        Results from  last 7 days   Lab Units 08/27/23  0641 08/27/23  0354   HSTROP T ng/L 11 13   PROBNP pg/mL  --  72.3           Invalid input(s): LDLCALC  Results from last 7 days   Lab Units 08/27/23  1719 08/27/23  0354   BLOODCX   --  No growth at 2 days  No growth at 2 days   RESPCX  Rejected  --      Results from last 7 days   Lab Units 08/28/23  1020   COVID19  Not Detected       Test Results Pending at Discharge     Pending Labs       Order Current Status    Fungus Culture - Lavage, Lung, Right Lower Lobe In process    Non-gynecologic Cytology In process    AFB Culture - Lavage, Lung, Right Lower Lobe Preliminary result    BAL Culture, Quantitative - Lavage, Lung, Right Lower Lobe Preliminary result    Blood Culture - Blood, Arm, Left Preliminary result    Blood Culture - Blood, Arm, Right Preliminary result            Discharge Details        Discharge Medications        New Medications        Instructions Start Date   albuterol sulfate  (90 Base) MCG/ACT inhaler  Commonly known as: PROVENTIL HFA;VENTOLIN HFA;PROAIR HFA   3 puffs, Inhalation, Every 6 Hours PRN      apixaban 5 MG tablet tablet  Commonly known as: ELIQUIS   Take 10 mg twice daily for 7 days then 5 mg twice daily thereafter.             Continue These Medications        Instructions Start Date   pantoprazole 20 MG EC tablet  Commonly known as: PROTONIX   20 mg, Oral, Daily      sertraline 100 MG tablet  Commonly known as: ZOLOFT   150 mg, Oral, Daily      Vitamin D-3 125 MCG (5000 UT) tablet   5,000 Units, Oral, Daily      VITAMIN-B COMPLEX PO   1 tablet, Oral, Daily               No Known Allergies    Discharge Disposition:  Home or Self Care      Discharge Diet:  Diet Order   Procedures    Diet: Cardiac Diets; Healthy Heart (2-3 Na+); Texture: Regular Texture (IDDSI 7); Fluid Consistency: Thin (IDDSI 0)       Discharge Activity:   Activity Instructions       Activity as Tolerated              CODE STATUS:    Code Status and Medical Interventions:    Ordered at: 08/27/23 0831     Level Of Support Discussed With:    Patient     Code Status (Patient has no pulse and is not breathing):    CPR (Attempt to Resuscitate)     Medical Interventions (Patient has pulse or is breathing):    Full       Future Appointments   Date Time Provider Department Center   10/6/2023  8:15 AM Ekta Zepeda APRN [facility]  [facility]     Additional Instructions for the Follow-ups that You Need to Schedule       Discharge Follow-up with PCP   As directed       Currently Documented PCP:    Ekta Zepeda APRN    PCP Phone Number:    [phone]     Follow Up Details: 1 to 2 weeks (or sooner if problems)               Follow-up Information       Rubin Sood MD Follow up in 4 week(s).    Specialty: Pulmonary Disease  Why: in 3-4 weeks with CXR PA/LAT in office  Contact information:  [address] [id number] [phone]               Ekta Zepeda APRN .    Specialty: Family Medicine  Why: 1 to 2 weeks (or sooner if problems)  Contact information:  [address]  [facility] [id number] [phone]                             Additional Instructions for the Follow-ups that You Need to Schedule       Discharge Follow-up with PCP   As directed       Currently Documented PCP:    Ekta Zepeda APRN    PCP Phone Number:    [phone]     Follow Up Details: 1 to 2 weeks (or sooner if problems)            Time Spent on Discharge:  Greater than 30 minutes      Remigio Bee MD  [facility]  08/29/23  14:40 EDT

## 2023-08-29 NOTE — PROGRESS NOTES
Dr. VIKRAM Sood    [address]        Patient ID:  Name:  Nkiolai Shaw  MRN:  [id number]  1970  53 y.o.  male            CC/Reason for visit: Pneumonia    Interval hx: Patient has been weaned off of oxygen.  Currently on room air.  Still having some cough with secretions.  Feels overall better.  No fever    ROS: No nausea vomiting    Vitals:  Vitals:    08/28/23 1935 08/28/23 2310 08/29/23 0400 08/29/23 0730   BP: 132/80 129/96  110/72   BP Location: Right arm Right arm  Right arm   Patient Position: Lying Lying  Lying   Pulse: 52 59 54 56   Resp: 18 18  18   Temp: 98.4 °F (36.9 °C) 99 °F (37.2 °C)  98.1 °F (36.7 °C)   TempSrc: Oral Oral  Oral   SpO2: 95% 95% 95% 95%   Weight:       Height:               Body mass index is 35.88 kg/m².    Intake/Output Summary (Last 24 hours) at 8/29/2023 1310  Last data filed at 8/29/2023 0920  Gross per 24 hour   Intake 640 ml   Output --   Net 640 ml       Exam:  GEN:  No distress  Alert, oriented x 3.   LUNGS: Clear breath sounds bilat, no use of accessory muscles  CV:  Normal S1S2, without murmur, no edema  ABD:  Non tender, no enlarged liver or masses      Scheduled meds:  dextromethorphan polistirex ER, 60 mg, Oral, Q12H  enoxaparin, 1 mg/kg, Subcutaneous, Q12H  guaiFENesin, 1,200 mg, Oral, Q12H  ipratropium-albuterol, 3 mL, Nebulization, Once  pantoprazole, 40 mg, Oral, Daily  sertraline, 150 mg, Oral, Daily      IV meds:                      sodium chloride, 30 mL/hr, Last Rate: 30 mL/hr (08/28/23 0956)        Data Review:   I reviewed the patient's medications and new clinical results.    COVID19   Date Value Ref Range Status   08/28/2023 Not Detected Not Detected - Ref. Range Final         Lab Results   Component Value Date    CALCIUM 8.9 08/29/2023    PHOS 2.9 08/28/2023    MG 1.9 08/28/2023    MG 1.9 08/27/2023    MG 1.8 11/29/2022     Results from last 7 days   Lab Units 08/29/23  0424 08/28/23  0429 08/27/23  0354   SODIUM mmol/L 143 141 141    POTASSIUM mmol/L 4.0 4.3 3.9   CHLORIDE mmol/L 108* 108* 104   CO2 mmol/L 25.0 24.5 26.0   BUN mg/dL 17 14 14   CREATININE mg/dL 1.01 0.86 0.99   CALCIUM mg/dL 8.9 8.6 9.3   BILIRUBIN mg/dL  --   --  0.5   ALK PHOS U/L  --   --  105   ALT (SGPT) U/L  --   --  19   AST (SGOT) U/L  --   --  28   GLUCOSE mg/dL 90 115* 122*   WBC 10*3/mm3 9.72 14.50* 15.48*   HEMOGLOBIN g/dL 11.7* 11.9* 13.4   PLATELETS 10*3/mm3 188 207 232   INR   --   --  0.99   PROBNP pg/mL  --   --  72.3   PROCALCITONIN ng/mL 0.07 0.10 0.06     Results from last 7 days   Lab Units 08/27/23  1719 08/27/23  0354   BLOODCX   --  No growth at 2 days  No growth at 2 days   RESPCX  Rejected  --          Results from last 7 days   Lab Units 08/27/23  0641 08/27/23  0354   HSTROP T ng/L 11 13     Results from last 7 days   Lab Units 08/27/23  0557   PH, ARTERIAL pH units 7.328*   PCO2, ARTERIAL mm Hg 45.1*   PO2 ART mm Hg 124.4*   FLOW RATE lpm 11   MODALITY  HFNC   O2 SATURATION CALC % 98.5       Estimated Creatinine Clearance: 100.3 mL/min (by C-G formula based on SCr of 1.01 mg/dL).      ASSESSMENT:     Pneumonia involving right lung    History of rectal cancer    Essential hypertension    Sleep apnea    Prediabetes    Acute respiratory failure with hypoxia    Acute pulmonary embolism without acute cor pulmonale    Obesity (BMI 30-39.9)  Hypoxemia      PLAN:  All his bronchoscopy results are negative so far for bacteria, viruses or fungi.  He has been off of oxygen for 48 hours.  He may be discharged home today without any antibiotics.  His procalcitonin has been negative twice and has not had any fever or leukocytosis.  He took 6 days of Augmentin as an outpatient.  This evidently was enough to sterilize all of his bronchoscopy cultures.  I will give him albuterol inhaler to use another 2 weeks at home while he recovers from this pneumonia and has some residual secretions and cough.  I set his expectations by informing him that he will cough for the  next week or so.  Pneumonia infiltrates to take approximately 3 to 4 weeks to resolve on chest x-ray.  For this reason I will see him in 3 to 4 weeks in the office with a follow-up chest x-ray.  He should complete 6 months of oral DOAC anticoagulation therapy.  Hypoxemia has resolved.  Home today      Rubin Sood MD  8/29/2023

## 2023-08-29 NOTE — PROGRESS NOTES
Name: Nikolai Shaw ADMIT: 2023   : 1970  PCP: Ekta Zepeda, APRN    MRN: [id number] LOS: 2 days   AGE/SEX: 53 y.o. male  ROOM: [facility]     Subjective   Subjective   Still with slight cough and shortness of breath.  Otherwise feels okay.    Review of Systems     Objective   Objective   Temp:  [97.5 °F (36.4 °C)-99 °F (37.2 °C)] 98.1 °F (36.7 °C)  Heart Rate:  [47-82] 56  Resp:  [15-20] 18  BP: (110-139)/(62-96) 110/72  SpO2:  [93 %-100 %] 95 %  on  Flow (L/min):  [2] 2;   Device (Oxygen Therapy): room air  Body mass index is 35.88 kg/m².    Physical Exam  Vitals and nursing note reviewed.   Constitutional:       General: He is not in acute distress.  Cardiovascular:      Rate and Rhythm: Normal rate and regular rhythm.   Pulmonary:      Effort: Pulmonary effort is normal.      Breath sounds: Normal breath sounds.   Abdominal:      General: Bowel sounds are normal.      Palpations: Abdomen is soft.      Tenderness: There is no abdominal tenderness.      Comments: Ileostomy present   Musculoskeletal:         General: No swelling.   Skin:     General: Skin is warm and dry.   Neurological:      Mental Status: He is alert. Mental status is at baseline.       Results Review      I reviewed the patient's new clinical results.  Results from last 7 days   Lab Units 23  0424 23  0429 23  0354   WBC 10*3/mm3 9.72 14.50* 15.48*   HEMOGLOBIN g/dL 11.7* 11.9* 13.4   PLATELETS 10*3/mm3 188 207 232       Results from last 7 days   Lab Units 23  0424 23  0429 23  0354   SODIUM mmol/L 143 141 141   POTASSIUM mmol/L 4.0 4.3 3.9   CHLORIDE mmol/L 108* 108* 104   CO2 mmol/L 25.0 24.5 26.0   BUN mg/dL 17 14 14   CREATININE mg/dL 1.01 0.86 0.99   GLUCOSE mg/dL 90 115* 122*   EGFR mL/min/1.73 88.9 103.5 91.1       Results from last 7 days   Lab Units 23  0424 23  0429 23  0354   CALCIUM mg/dL 8.9 8.6 9.3   ALBUMIN g/dL  --   --  4.1   MAGNESIUM mg/dL  --  1.9 1.9    PHOSPHORUS mg/dL  --  2.9  --        Results from last 7 days   Lab Units 08/29/23  0424 08/28/23  0429 08/27/23  0354   PROCALCITONIN ng/mL 0.07 0.10 0.06   LACTATE mmol/L  --   --  2.0     No results found for: STREPPNEUAG, LEGANTIGENUR  Results from last 7 days   Lab Units 08/27/23  1719 08/27/23  0354   BLOODCX   --  No growth at 2 days  No growth at 2 days   RESPCX  Rejected  --        Results from last 7 days   Lab Units 08/27/23  0557   PH, ARTERIAL pH units 7.328*   PCO2, ARTERIAL mm Hg 45.1*   PO2 ART mm Hg 124.4*   FLOW RATE lpm 11   MODALITY  HFNC   O2 SATURATION CALC % 98.5         I have personally reviewed all medications:  Scheduled Medications  dextromethorphan polistirex ER, 60 mg, Oral, Q12H  enoxaparin, 1 mg/kg, Subcutaneous, Q12H  guaiFENesin, 1,200 mg, Oral, Q12H  ipratropium-albuterol, 3 mL, Nebulization, Once  pantoprazole, 40 mg, Oral, Daily  sertraline, 150 mg, Oral, Daily    Infusions  sodium chloride, 30 mL/hr, Last Rate: 30 mL/hr (08/28/23 0956)    Diet  Diet: Cardiac Diets; Healthy Heart (2-3 Na+); Texture: Regular Texture (IDDSI 7); Fluid Consistency: Thin (IDDSI 0)    I have personally reviewed:  [x]  Laboratory   [x]  Microbiology   [x]  Radiology   [x]  EKG/Telemetry  [x]  Cardiology/Vascular   []  Pathology    []  Records         Assessment/Plan     Active Hospital Problems    Diagnosis  POA    **Pneumonia involving right lung [J18.9]  Yes    Acute respiratory failure with hypoxia [J96.01]  Yes    Acute pulmonary embolism without acute cor pulmonale [I26.99]  Yes    Obesity (BMI 30-39.9) [E66.9]  Yes    Prediabetes [R73.03]  Yes    Sleep apnea [G47.30]  Yes    Essential hypertension [I10]  Yes    History of rectal cancer [Z85.048]  Yes      Resolved Hospital Problems   No resolved problems to display.       53 y.o. male with Pneumonia involving right lung.    Remains stable off antibiotics.  Pulmonology following.  Cultures from bronchoscopy pending.  Continue Lovenox for  treatment of pulmonary embolus.  No evidence of DVT.    His rectal cancer has been felt to be in remission.  He underwent total colectomy with ileostomy in 2015 and declined adjuvant chemotherapy at that time.  Previously seen by Dr. Shelby.        Blood cultures x2 done in ED negative thus far.    VRP negative.  Supplemental oxygen to keep SaO2 > 92%  Physical therapy consulted.  Full code.  Discussed with patient.  Anticipate discharge home with family when cleared by consultants.      Remigio Bee MD  [facility]  08/29/23  09:05 EDT

## 2023-08-30 ENCOUNTER — TRANSITIONAL CARE MANAGEMENT TELEPHONE ENCOUNTER (OUTPATIENT)
Dept: CALL CENTER | Facility: HOSPITAL | Age: 53
End: 2023-08-30
Payer: COMMERCIAL

## 2023-08-30 LAB
BACTERIA SPEC AEROBE CULT: NORMAL
CYTO UR: NORMAL
GRAM STN SPEC: NORMAL
GRAM STN SPEC: NORMAL
LAB AP CASE REPORT: NORMAL
LAB AP CLINICAL INFORMATION: NORMAL
LAB AP SPECIAL STAINS: NORMAL
PATH REPORT.FINAL DX SPEC: NORMAL
PATH REPORT.GROSS SPEC: NORMAL

## 2023-08-30 NOTE — OUTREACH NOTE
Call Center TCM Note      Flowsheet Row Responses   [facility] patient discharged from? [place]   Does the patient have one of the following disease processes/diagnoses(primary or secondary)? Pneumonia   TCM attempt successful? Yes   Call start time 1343   Call end time 1346   Discharge diagnosis *Pneumonia involving right lung   Meds reviewed with patient/caregiver? Yes   Is the patient having any side effects they believe may be caused by any medication additions or changes? No   Does the patient have all medications ordered at discharge? Yes   Is the patient taking all medications as directed (includes completed medication regime)? Yes   Comments TCM APPT WITH PCP YOUNG LONG IS 09/06/2023   Does the patient have an appointment with their PCP within 7-14 days of discharge? Yes   Has home health visited the patient within 72 hours of discharge? N/A   Psychosocial issues? No   Did the patient receive a copy of their discharge instructions? Yes   Nursing interventions Reviewed instructions with patient   What is the patient's perception of their health status since discharge? Same   Nursing Interventions Nurse provided patient education   Are the patient's immunizations up to date?  Yes   Is the patient/caregiver able to teach back the hierarchy of who to call/visit for symptoms/problems? PCP, Specialist, Home health nurse, Urgent Care, ED, 911 Yes   Is the patient/caregiver able to teach back signs and symptoms of worsening condition: Fever/chills, Shortness of breath, Chest pain   Is the patient/caregiver able to teach back importance of completing antibiotic course of treatment? Yes   TCM call completed? Yes   Wrap up additional comments D/C DX: PNA Rt lung,  bronchoscopy,  PE - Pt better than before admit, productive cough. New rx's albuterol sulfate HFA, Apixaban in place. No questions at this time. Pt will be calling to [facility] Follow up with PULM MD Dr Sood. TCM APPT with PCP YOUNG Dash  Katelyn is 09/06/2023.   Call end time 1347            Divine Emmanuel MA    8/30/2023, 13:49 EDT

## 2023-08-30 NOTE — OUTREACH NOTE
Prep Survey      Flowsheet Row Responses   [facility] patient discharged from? [place]   Is LACE score < 7 ? No   Eligibility [facility]   Date of Admission 08/27/23   Date of Discharge 08/29/23   Discharge Disposition Home or Self Care   Discharge diagnosis *Pneumonia involving right lung (J   Does the patient have one of the following disease processes/diagnoses(primary or secondary)? Pneumonia   Does the patient have Home health ordered? No   Is there a DME ordered? No   Prep survey completed? Yes            DANAY BRYAN - Registered Nurse

## 2023-08-30 NOTE — CASE MANAGEMENT/SOCIAL WORK
Case Management Discharge Note      Final Note: DC home, family to transport    Provided Post Acute Provider List?: N/A  Provided Post Acute Provider Quality & Resource List?: N/A    Selected Continued Care - Discharged on 8/29/2023 Admission date: 8/27/2023 - Discharge disposition: Home or Self Care      Destination    No services have been selected for the patient.                Durable Medical Equipment    No services have been selected for the patient.                Dialysis/Infusion    No services have been selected for the patient.                Home Medical Care    No services have been selected for the patient.                Therapy    No services have been selected for the patient.                Community Resources    No services have been selected for the patient.                Community & DME    No services have been selected for the patient.                    Transportation Services  Private: Car    Final Discharge Disposition Code: 01 - home or self-care

## 2023-08-30 NOTE — PAYOR COMM NOTE
"Bob Peres (53 y.o. Male)                             ATTENTION; DISCHARGE CASE REF #[id number]                            REPLY TO [facility] DEPT  [phone] OR CALL   DEEPTI CONTRERAS YOLIE             Date of Birth   1970    Social Security Number       Address   [address] [place] [address]  [facility] [address]    Home Phone   [phone]    MRN   [id number]       [employer]   [employer]    Marital Status                               Admission Date   23    Admission Type   Emergency    Admitting Provider   Vasquez Dan MD    Attending Provider       Department, Room/Bed   [address], [address]/1       Discharge Date   2023    Discharge Disposition   Home or Self Care    Discharge Destination                                 Attending Provider: (none)   Allergies: No Known Allergies    Isolation: None   Infection: None   Code Status: Prior    Ht: 172.7 cm (68\")   Wt: 107 kg (236 lb)    Admission Cmt: None   Principal Problem: Pneumonia involving right lung [J18.9]                   Active Insurance as of 2023       Primary Coverage       Payor Plan Insurance Group Employer/Plan Group    [employer]UE CR[employer] BLUE SHIELD PPO [id number]       Payor Plan Address Payor Plan Phone Number Payor Plan Fax Number Effective Dates    [address] [phone]  2014 - None Entered    [place] [address]         Subscriber Name Subscriber Birth Date Member ID       BOB PERSE 1970 [id number]                     Emergency Contacts        (Rel.) Home Phone Work Phone Mobile Phone    Hansa Whitman (Spouse) [phone] -- [phone]                 Discharge Summary        Remigio Bee MD at 23 1440              Patient Name: Bbo Peres  : 1970  MRN: [id number]    Date of Admission: 2023  Date of Discharge:  2023  Primary Care Physician: Ekta Zepeda, APRN      Chief Complaint:   Shortness of " Breath      Discharge Diagnoses     Active Hospital Problems    Diagnosis  POA    **Pneumonia involving right lung [J18.9]  Yes    Acute respiratory failure with hypoxia [J96.01]  Yes    Acute pulmonary embolism without acute cor pulmonale [I26.99]  Yes    Obesity (BMI 30-39.9) [E66.9]  Yes    Prediabetes [R73.03]  Yes    Sleep apnea [G47.30]  Yes    Essential hypertension [I10]  Yes    History of rectal cancer [Z85.048]  Yes      Resolved Hospital Problems   No resolved problems to display.        Hospital Course     Mr. Shaw is a 53 y.o. non-smoker with a history of moderately differentiated adenocarcinoma of the rectum who presented to [facility] initially complaining of Shortness of Breath.  Please see the admitting history and physical for further details.  He was found to have small pulmonary embolus, pneumonia and respiratory failure in the ED and was admitted to the hospital for further evaluation and treatment.  He was seen by pulmonology who recommended and performed bronchoscopy.  He tolerated procedure well.  Antibiotics were initially held.  Cultures of all come back negative.  He is stable off oxygen with normal procalcitonin x2.  Pulmonology does not feel he requires additional antibiotic at this time.  They will follow-up with him in their office in about 3 weeks with repeat chest x-ray.    He was treated with Lovenox during stay for pulmonary embolus.  Lower extremity venous Dopplers negative.  Plan discharge home on Eliquis.      Day of Discharge     Subjective:  See today's progress note    Physical Exam:  Temp:  [97.5 °F (36.4 °C)-99 °F (37.2 °C)] 98.1 °F (36.7 °C)  Heart Rate:  [47-82] 60  Resp:  [18-20] 18  BP: (110-132)/(62-96) 124/74  Body mass index is 35.88 kg/m².  Physical Exam    Consultants     Consult Orders (all) (From admission, onward)       Start     Ordered    08/27/23 0856  Inpatient Pulmonology Consult  Once        Specialty:  Pulmonary Disease  Provider:  Ed  MD Walter    08/27/23 0856          Procedures     BRONCHOSCOPY WITH BRONCHIAL ALVELOR LAVAGE    Imaging Results (All)       Procedure Component Value Units Date/Time    XR Chest 1 View [[id number]] Collected: 08/27/23 1105     Updated: 08/27/23 1110    Narrative:      XR CHEST 1 VW-     HISTORY: Male who is 53 years-old, short of breath     TECHNIQUE: Frontal view of the chest, provided for review at 11:00 a.m.     COMPARISON: 4/8/2021     FINDINGS: The heart size is borderline. Pulmonary vasculature is  unremarkable. No focal pulmonary consolidation, pleural effusion, or  pneumothorax. Old granulomatous disease is seen. No acute osseous  process.       Impression:      No evidence for acute pulmonary process. Borderline heart  size. Follow-up as indications persist.     This report was finalized on 8/27/2023 11:07 AM by Dr. Naveed Pickett M.D.       CT Angiogram Chest [[id number]] Collected: 08/27/23 0605     Updated: 08/27/23 0619    Narrative:      CT ANGIOGRAM CHEST-     INDICATIONS: Short of breath     TECHNIQUE: Radiation dose reduction techniques were utilized, including  automated exposure control and exposure modulation based on body size.  CT angiography of the chest. Three-dimensional reconstructions.     COMPARISON: Chest CT from 6/26/2020     FINDINGS:     Pulmonary embolic disease is seen in left upper lobe segmental branch  artery, for example axial image 54, sagittal image 173. The RV LV ratio  is 1.4.     No aortic dissection.     The heart size is enlarged without pericardial effusion. A few small  subcentimeter short axis mediastinal lymph nodes are seen that are not  significant by size criteria.     The airways appear clear.     No pleural effusion or pneumothorax.     The lungs show reticulonodular infiltrates throughout the right lung,  suggesting pneumonia, CT follow-up advised to exclude the possibility of  underlying lesion. Largest nodular densities in this region are seen in  the  right lower lobe, for example 1.2 cm on axial image 63, 1.3 cm on  image 88     Upper abdominal structures show no acute findings. Minimal hiatal hernia  is present.     Degenerative changes are seen in the spine. No acute fracture is  identified.       Impression:         Critical test result. Small left upper lobe pulmonary embolic disease.     Reticulonodular infiltrates throughout the right lung, suggesting  pneumonia, CT follow-up advised.     Discussed by telephone with Dr. Hu at 0614, 8/27/2023.           This report was finalized on 8/27/2023 6:16 AM by Dr. Naveed Pickett M.D.             Results for orders placed during the hospital encounter of 08/27/23    Duplex Venous Lower Extremity - Bilateral CAR    Interpretation Summary    Normal bilateral lower extremity venous duplex scan.      Pertinent Labs     Results from last 7 days   Lab Units 08/29/23 0424 08/28/23 0429 08/27/23  0354   WBC 10*3/mm3 9.72 14.50* 15.48*   HEMOGLOBIN g/dL 11.7* 11.9* 13.4   PLATELETS 10*3/mm3 188 207 232     Results from last 7 days   Lab Units 08/29/23 0424 08/28/23 0429 08/27/23  0354   SODIUM mmol/L 143 141 141   POTASSIUM mmol/L 4.0 4.3 3.9   CHLORIDE mmol/L 108* 108* 104   CO2 mmol/L 25.0 24.5 26.0   BUN mg/dL 17 14 14   CREATININE mg/dL 1.01 0.86 0.99   GLUCOSE mg/dL 90 115* 122*   EGFR mL/min/1.73 88.9 103.5 91.1     Results from last 7 days   Lab Units 08/27/23  0354   ALBUMIN g/dL 4.1   BILIRUBIN mg/dL 0.5   ALK PHOS U/L 105   AST (SGOT) U/L 28   ALT (SGPT) U/L 19     Results from last 7 days   Lab Units 08/29/23  0424 08/28/23 0429 08/27/23  0354   CALCIUM mg/dL 8.9 8.6 9.3   ALBUMIN g/dL  --   --  4.1   MAGNESIUM mg/dL  --  1.9 1.9   PHOSPHORUS mg/dL  --  2.9  --        Results from last 7 days   Lab Units 08/27/23  0641 08/27/23  0354   HSTROP T ng/L 11 13   PROBNP pg/mL  --  72.3           Invalid input(s): LDLCALC  Results from last 7 days   Lab Units 08/27/23  1719 08/27/23  0354   BLOODCX   --   No growth at 2 days  No growth at 2 days   RESPCX  Rejected  --      Results from last 7 days   Lab Units 08/28/23  1020   COVID19  Not Detected       Test Results Pending at Discharge     Pending Labs       Order Current Status    Fungus Culture - Lavage, Lung, Right Lower Lobe In process    Non-gynecologic Cytology In process    AFB Culture - Lavage, Lung, Right Lower Lobe Preliminary result    BAL Culture, Quantitative - Lavage, Lung, Right Lower Lobe Preliminary result    Blood Culture - Blood, Arm, Left Preliminary result    Blood Culture - Blood, Arm, Right Preliminary result            Discharge Details        Discharge Medications        New Medications        Instructions Start Date   albuterol sulfate  (90 Base) MCG/ACT inhaler  Commonly known as: PROVENTIL HFA;VENTOLIN HFA;PROAIR HFA   3 puffs, Inhalation, Every 6 Hours PRN      apixaban 5 MG tablet tablet  Commonly known as: ELIQUIS   Take 10 mg twice daily for 7 days then 5 mg twice daily thereafter.             Continue These Medications        Instructions Start Date   pantoprazole 20 MG EC tablet  Commonly known as: PROTONIX   20 mg, Oral, Daily      sertraline 100 MG tablet  Commonly known as: ZOLOFT   150 mg, Oral, Daily      Vitamin D-3 125 MCG (5000 UT) tablet   5,000 Units, Oral, Daily      VITAMIN-B COMPLEX PO   1 tablet, Oral, Daily               No Known Allergies    Discharge Disposition:  Home or Self Care      Discharge Diet:  Diet Order   Procedures    Diet: Cardiac Diets; Healthy Heart (2-3 Na+); Texture: Regular Texture (IDDSI 7); Fluid Consistency: Thin (IDDSI 0)       Discharge Activity:   Activity Instructions       Activity as Tolerated              CODE STATUS:    Code Status and Medical Interventions:   Ordered at: 08/27/23 0831     Level Of Support Discussed With:    Patient     Code Status (Patient has no pulse and is not breathing):    CPR (Attempt to Resuscitate)     Medical Interventions (Patient has pulse or is  breathing):    Full       Future Appointments   Date Time Provider Department Center   10/6/2023  8:15 AM Ekta Zepeda APRN [facility]     Additional Instructions for the Follow-ups that You Need to Schedule       Discharge Follow-up with PCP   As directed       Currently Documented PCP:    Ekta Zepeda APRN    PCP Phone Number:    [phone]     Follow Up Details: 1 to 2 weeks (or sooner if problems)               Follow-up Information       Rubin Sood MD Follow up in 4 week(s).    Specialty: Pulmonary Disease  Why: in 3-4 weeks with CXR PA/LAT in office  Contact information:  [address] [facility] [address]  [facility] [id number] [phone]               Ekta Zepeda APRN .    Specialty: Family Medicine  Why: 1 to 2 weeks (or sooner if problems)  Contact information:  [address]  [facility] [id number] [phone]                             Additional Instructions for the Follow-ups that You Need to Schedule       Discharge Follow-up with PCP   As directed       Currently Documented PCP:    Ekta Zepeda APRN    PCP Phone Number:    [phone]     Follow Up Details: 1 to 2 weeks (or sooner if problems)            Time Spent on Discharge:  Greater than 30 minutes      Remigio Bee MD  [facility]  08/29/23  14:40 EDT              Electronically signed by Remigio Bee MD at 08/29/23 2413

## 2023-09-01 LAB
BACTERIA SPEC AEROBE CULT: NORMAL
BACTERIA SPEC AEROBE CULT: NORMAL

## 2023-09-06 ENCOUNTER — OFFICE VISIT (OUTPATIENT)
Dept: FAMILY MEDICINE CLINIC | Facility: CLINIC | Age: 53
End: 2023-09-06
Payer: COMMERCIAL

## 2023-09-06 VITALS
DIASTOLIC BLOOD PRESSURE: 82 MMHG | SYSTOLIC BLOOD PRESSURE: 120 MMHG | WEIGHT: 233 LBS | OXYGEN SATURATION: 98 % | HEIGHT: 68 IN | HEART RATE: 78 BPM | BODY MASS INDEX: 35.31 KG/M2

## 2023-09-06 DIAGNOSIS — R12 HEARTBURN: ICD-10-CM

## 2023-09-06 DIAGNOSIS — J18.9 PNEUMONIA OF RIGHT LUNG DUE TO INFECTIOUS ORGANISM, UNSPECIFIED PART OF LUNG: ICD-10-CM

## 2023-09-06 DIAGNOSIS — J01.90 ACUTE NON-RECURRENT SINUSITIS, UNSPECIFIED LOCATION: ICD-10-CM

## 2023-09-06 DIAGNOSIS — I26.99 ACUTE PULMONARY EMBOLISM WITHOUT ACUTE COR PULMONALE, UNSPECIFIED PULMONARY EMBOLISM TYPE: ICD-10-CM

## 2023-09-06 DIAGNOSIS — J96.01 ACUTE RESPIRATORY FAILURE WITH HYPOXIA: ICD-10-CM

## 2023-09-06 DIAGNOSIS — I10 ESSENTIAL HYPERTENSION: ICD-10-CM

## 2023-09-06 DIAGNOSIS — E53.8 VITAMIN B12 DEFICIENCY: ICD-10-CM

## 2023-09-06 DIAGNOSIS — D64.9 ANEMIA, UNSPECIFIED TYPE: ICD-10-CM

## 2023-09-06 DIAGNOSIS — Z12.5 PROSTATE CANCER SCREENING: ICD-10-CM

## 2023-09-06 DIAGNOSIS — Z09 HOSPITAL DISCHARGE FOLLOW-UP: Primary | ICD-10-CM

## 2023-09-06 DIAGNOSIS — R53.83 FATIGUE, UNSPECIFIED TYPE: ICD-10-CM

## 2023-09-06 RX ORDER — FLUTICASONE PROPIONATE 50 MCG
2 SPRAY, SUSPENSION (ML) NASAL DAILY
Qty: 18.2 ML | Refills: 2 | Status: SHIPPED | OUTPATIENT
Start: 2023-09-06

## 2023-09-06 RX ORDER — AMOXICILLIN AND CLAVULANATE POTASSIUM 875; 125 MG/1; MG/1
1 TABLET, FILM COATED ORAL EVERY 12 HOURS SCHEDULED
COMMUNITY
Start: 2023-08-21

## 2023-09-06 RX ORDER — APIXABAN 5 MG/1
5 TABLET, FILM COATED ORAL EVERY 12 HOURS SCHEDULED
COMMUNITY
Start: 2023-08-29

## 2023-09-06 NOTE — PROGRESS NOTES
Subjective   Nikolai Shaw is a 53 y.o. male.     Chief Complaint   Patient presents with    Hospital discharge follow up       History of Present Illness  Patient presents for hospital discharge follow up; patient went to [facility] ER on 8/27/23 with c/o SOA; had been seen at [facility] 8/21/23 with congestion and given Rx for Augmentin, but was not feeling better; had episode of syncope at home and called EMS; was found to have PE, pneumonia and respiratory failure; started on oxygen up to 5 liters, was on high flow O2; was admitted for further evaluation and treatment; was started on IV antibiotics initially and then held; was seen by pulmonary and had bronch; cultures were negative; was on oxygen for period of time, but weaned off; venous doppler negative for DVT; PE treated with Lovenox while inpatient and started on Eliquis at discharge; discharged home on 8/29/23; to follow up with pulmonary, Dr. Sood in 3 weeks for repeat chest x-ray; needs to schedule appointment.    Started feeling bad again next day after home and pt resumed Augmentin; malaise has improved with taking Augmentin; has been off work due to feeling bad; needs paper work completed for work.    Has had low energy; has productive cough at times--cream color; no SOA; has lost another pound since out of the hospital; has felt feverish at times; no sore throat; some sensation of fluid in ears at times; no nausea, vomiting, or diarrhea; has had decreased appetite since has been home from hospital; had previously lost 24 pounds since stopped drinking in June.    Has been using Albuterol inhaler 1-2 times daily and helps have productive cough; has also been using incentive spirometer.    F/U HTN: no current medication for BP; did take 1 dose of Lisionpril on 8/10/23 and 8/13/23 with BP 130s/90s, but no other days; BP was running 110-130s/80s other days; has not been monitoring BP since has been home from hospital; no headaches; mild pressure  in head with increased cough; has had orthostasis; no swelling.    F/U PE: has been taking Eliquis twice daily; no blood in BM.    F/U heartburn: takes Pantroprazole daily as needed if will be eating something spicy; does not take regularly.    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         Within 48 business hours after discharge our office contacted him via telephone to coordinate his care and needs.        8/29/2023     8:51 PM   Date of TCM Phone Call   [facility]   Date of Admission 8/27/2023   Date of Discharge 8/29/2023   Discharge Disposition Home or Self Care     Risk for Readmission (LACE) Score: 8 (8/29/2023  6:00 AM)      Patient was admitted to [facility]   ALL records were obtained and reviewed.  Date of admission 8/27/23  Date of discharge 8/29/23  Diagnosis: Pneumonia involving the right lung; Acute respiratory failure with hypoxia; Acute pulmonary embolism without acute cor pulmonale; Obesity; Prediabetes; Sleep apnea; Essential hypertension; History of rectal cancer  Medications upon discharge: Reviewed and reconciled  Condition stable    Current outpatient and discharge medications have been reconciled for the patient.    I reviewed and requested records labs and diagnostics from the hospital with the patient and family.  The patient is to follow-up with specialist as discussed and directed.    If any problems arise or further questions develop patient is to call or to contact us for any needs.     The following portions of the patient's history were reviewed and updated as appropriate: allergies, current medications, past family history, past medical history, past social history, past surgical history and problem list.    Current Outpatient Medications   Medication Sig Dispense Refill    albuterol sulfate  (90 Base) MCG/ACT inhaler Inhale 3 puffs Every 6 (Six) Hours As  Needed for Wheezing or Shortness of Air (or cough). 6.7 g 5    amoxicillin-clavulanate (AUGMENTIN) 875-125 MG per tablet Take 1 tablet by mouth Every 12 (Twelve) Hours.      B Complex Vitamins (VITAMIN-B COMPLEX PO) Take 1 tablet by mouth Daily.      Cholecalciferol (Vitamin D-3) 125 MCG (5000 UT) tablet Take 1 tablet by mouth Daily. 90 tablet 1    Eliquis 5 MG tablet tablet Take 1 tablet by mouth Every 12 (Twelve) Hours.      pantoprazole (PROTONIX) 20 MG EC tablet Take 1 tablet by mouth Daily. 90 tablet 1    sertraline (ZOLOFT) 100 MG tablet Take 1.5 tablets by mouth Daily. 135 tablet 1    fluticasone (FLONASE) 50 MCG/ACT nasal spray 2 sprays into the nostril(s) as directed by provider Daily. 18.2 mL 2     No current facility-administered medications for this visit.       Current Outpatient Medications on File Prior to Visit   Medication Sig    albuterol sulfate  (90 Base) MCG/ACT inhaler Inhale 3 puffs Every 6 (Six) Hours As Needed for Wheezing or Shortness of Air (or cough).    amoxicillin-clavulanate (AUGMENTIN) 875-125 MG per tablet Take 1 tablet by mouth Every 12 (Twelve) Hours.    B Complex Vitamins (VITAMIN-B COMPLEX PO) Take 1 tablet by mouth Daily.    Cholecalciferol (Vitamin D-3) 125 MCG (5000 UT) tablet Take 1 tablet by mouth Daily.    Eliquis 5 MG tablet tablet Take 1 tablet by mouth Every 12 (Twelve) Hours.    pantoprazole (PROTONIX) 20 MG EC tablet Take 1 tablet by mouth Daily.    sertraline (ZOLOFT) 100 MG tablet Take 1.5 tablets by mouth Daily.     No current facility-administered medications on file prior to visit.       Past Medical History:   Diagnosis Date    Adenocarcinoma of rectum 06/2015    invasive adenocarcinoma of rectum w 59 benign lymph nodes    Adenomatous polyposis     FAMILIAL     Anxiety     At risk for sleep apnea     Chicken pox     patient reports having as a child    Depression     Herpes zoster without complication 12/3/2021    History of anemia     Hypertension      Hyponatremia     Insomnia     Malignant neoplasm of rectum 6/1/2016    Parastomal hernia without obstruction or gangrene 2/15/2021    Added automatically from request for surgery [id number]    Pulmonary nodule 1/10/2020    6/26/20 CT chest: 6 mm nodule resolved.    Renal calculi     RLS (restless legs syndrome)     Syncope 03/19/2021    prolonged QT interval    Testicular hypofunction     Vitamin D deficiency        Past Surgical History:   Procedure Laterality Date    ABDOMINOPERINEAL PROCTOCOLECTOMY  07/06/2015    DR MEET RUDOLPH    BRONCHOSCOPY N/A 8/28/2023    Procedure: BRONCHOSCOPY WITH BRONCHIAL ALVELOR LAVAGE;  Surgeon: Rubin Sood MD;  Location: [facility] ENDOSCOPY;  Service: Pulmonary;  Laterality: N/A;  PRE: RECURRENT PNEUMONIA /   POST: SAME    COLONOSCOPY  06/16/1915    DR SHASTA HOWARD    COLONOSCOPY N/A 2/23/2021    Procedure: ILEOSCOPY, EXCISION OF ILEOSTOMY NODULE;  Surgeon: Meet Rudolph MD;  Location: [facility] MAIN OR;  Service: General;  Laterality: N/A;    ENDOSCOPY  06/23/2015    DR SHASTA HOWARD    EXPLORATORY LAPAROTOMY N/A 2/3/2017    Procedure: LAPAROTOMY EXPLORATORY  SMALL BOWEL OBSTRUCTION;  Surgeon: Meet Rudolph MD;  Location: [facility] MAIN OR;  Service:     ILEOSTOMY  07/06/2015    MYRINGOTOMY W/ TUBES      2-3 times as child       Family History   Problem Relation Age of Onset    Colon cancer Father         familial polyposis    Aneurysm Maternal Grandfather 66        brain aneurysm    Colon cancer Paternal Grandfather         familial polyposis    Hypertension Maternal Uncle     Diabetes Maternal Uncle     Malig Hyperthermia Neg Hx        Social History     Socioeconomic History    Marital status:      Spouse name: Hansa    Years of education: college   Tobacco Use    Smoking status: Never    Smokeless tobacco: Current     Types: Chew    Tobacco comments:     CHEWS TOBACCO   Vaping Use    Vaping Use: Never used   Substance and Sexual Activity    Alcohol use: Yes      "Alcohol/week: 24.0 standard drinks     Types: 24 Cans of beer per week     Comment: Some days 24 beers a day. Notmally  at least 18; stopped drinking 6/10/23    Drug use: No    Sexual activity: Not Currently     Partners: Female       Review of Systems   Constitutional:  Positive for fatigue. Appetite change: see HPI. Fever: see HPI. Weight loss: see HPI.  HENT:  Negative for postnasal drip, sinus pressure and trouble swallowing. Rhinorrhea: occasional.   Eyes:  Negative for blurred vision and discharge.   Respiratory:  Positive for cough. Negative for chest tightness and shortness of breath.    Cardiovascular:  Negative for chest pain and palpitations.   Gastrointestinal:  Negative for abdominal pain, constipation, GERD and indigestion. Diarrhea: no change with ileostomy.  Endocrine: Negative for cold intolerance and heat intolerance. Polydipsia: has been drinking a lot of water to thin secretions.  Genitourinary:  Negative for dysuria and frequency.   Musculoskeletal:  Negative for arthralgias and back pain.   Skin:  Negative for rash.   Neurological:  Negative for syncope and weakness. Light-headedness: see HPI.    Objective   Vitals:    09/06/23 1255   BP: 120/82   BP Location: Left arm   Pulse: 78   SpO2: 98%   Weight: 106 kg (233 lb)   Height: 172.7 cm (68\")     Body mass index is 35.43 kg/m².    Physical Exam  Vitals and nursing note reviewed.   Constitutional:       General: He is not in acute distress.     Appearance: He is well-developed and well-groomed. He is not diaphoretic.   HENT:      Head: Normocephalic.      Right Ear: External ear normal. No decreased hearing noted. Right ear middle ear effusion: mild. Tympanic membrane is not erythematous.      Left Ear: External ear normal. Left ear decreased hearing: some compared to right. Left ear middle ear effusion: mild. Tympanic membrane is not erythematous. Left ear retracted TM: mild.     Nose: Nose normal.      Right Sinus: No maxillary sinus " tenderness or frontal sinus tenderness.      Left Sinus: No maxillary sinus tenderness or frontal sinus tenderness.      Mouth/Throat:      Mouth: Mucous membranes are moist.      Pharynx: No oropharyngeal exudate or posterior oropharyngeal erythema.   Eyes:      Conjunctiva/sclera: Conjunctivae normal.   Neck:      Vascular: No carotid bruit.   Cardiovascular:      Rate and Rhythm: Normal rate and regular rhythm.      Pulses: Normal pulses.      Heart sounds: Normal heart sounds. No murmur heard.  Pulmonary:      Effort: Pulmonary effort is normal. No respiratory distress.      Breath sounds: Normal breath sounds.   Abdominal:      General: Bowel sounds are normal.      Palpations: Abdomen is soft. There is no hepatomegaly or splenomegaly.      Tenderness: There is no abdominal tenderness. There is no guarding.   Musculoskeletal:      Cervical back: Normal range of motion and neck supple.      Right lower leg: No edema.      Left lower leg: No edema.   Lymphadenopathy:      Cervical: No cervical adenopathy.   Skin:     General: Skin is warm and dry.      Findings: No rash.   Neurological:      Mental Status: He is alert and oriented to person, place, and time.      Gait: Gait normal.   Psychiatric:         Mood and Affect: Mood normal.         Behavior: Behavior normal.         Thought Content: Thought content normal.         Cognition and Memory: Cognition normal.         Judgment: Judgment normal.       Lab Results   Component Value Date    WBC 9.72 08/29/2023    RBC 3.61 (L) 08/29/2023    HGB 11.7 (L) 08/29/2023    HCT 33.8 (L) 08/29/2023    MCV 93.6 08/29/2023    MCH 32.4 08/29/2023    MCHC 34.6 08/29/2023    RDW 12.6 08/29/2023    RDWSD 43.3 08/29/2023    MPV 11.2 08/29/2023     08/29/2023    NEUTRORELPCT 70.4 08/27/2023    LYMPHORELPCT 12.8 (L) 08/27/2023    MONORELPCT 10.8 08/27/2023    EOSRELPCT 5.4 08/27/2023    BASORELPCT 0.3 08/27/2023    AUTOIGPER 0.3 08/27/2023    NEUTROABS 10.91 (H) 08/27/2023     LYMPHSABS 1.98 08/27/2023    MONOSABS 1.67 (H) 08/27/2023    EOSABS 0.83 (H) 08/27/2023    BASOSABS 0.05 08/27/2023    AUTOIGNUM 0.04 08/27/2023    NRBC 0.0 08/27/2023     Lab Results   Component Value Date    GLUCOSE 90 08/29/2023    BUN 17 08/29/2023    CREATININE 1.01 08/29/2023    EGFRIFNONA 112 10/15/2021    EGFRIFAFRI 135 10/15/2021    BCR 16.8 08/29/2023    K 4.0 08/29/2023    CO2 25.0 08/29/2023    CALCIUM 8.9 08/29/2023    PROTENTOTREF 7.0 08/01/2023    ALBUMIN 4.1 08/27/2023    LABIL2 1.6 08/01/2023    AST 28 08/27/2023    ALT 19 08/27/2023      Lab Results   Component Value Date    CHLPL 195 05/26/2023    TRIG 72 05/26/2023    HDL 91 05/26/2023    VLDL 13 05/26/2023    LDL 91 05/26/2023     Lab Results   Component Value Date    TSH 0.94 07/11/2015     Lab Results   Component Value Date    HGBA1C 5.6 05/26/2023     Lab Results   Component Value Date    PSA 0.4 06/17/2022     Lab Results   Component Value Date    WBCU 0-2 07/12/2015    RBCUA 0-2 07/12/2015    BACTERIA None Seen 07/12/2015    LABPH 6.0 07/12/2015    COLORU Yellow 02/05/2021    CLARITYU Clear 02/05/2021    LEUKOCYTESUR Negative 02/05/2021    GLUCOSEU Negative 02/05/2021    BLOODU Negative 02/05/2021    BILIRUBINUR Negative 02/05/2021    NITRITEU Negative 02/05/2021     Records reviewed from [facility] from 8/27/23--8/29/23.    8/27/23 CTA chest: Pulmonary embolic disease is seen in left upper lobe segmental branch artery, for example axial image 54, sagittal image 173. The RV LV ratio is 1.4.  No aortic dissection.  The heart size is enlarged without pericardial effusion. A few small subcentimeter short axis mediastinal lymph nodes are seen that are not significant by size criteria. The airways appear clear.  No pleural effusion or pneumothorax.  The lungs show reticulonodular infiltrates throughout the right lung, suggesting pneumonia, CT follow-up advised to exclude the possibility of underlying lesion. Largest nodular  densities in this region are seen in the right lower lobe, for example 1.2 cm on axial image 63, 1.3 cm on image 88  Upper abdominal structures show no acute findings. Minimal hiatal hernia is present.  Degenerative changes are seen in the spine. No acute fracture is identified.    8/27/23 venous doppler: Normal bilateral lower extremity venous duplex scan.     Assessment & Plan .  Problem List Items Addressed This Visit       Anemia    Relevant Orders    CBC & Differential    Iron    Ferritin    Essential hypertension    Current Assessment & Plan     Hypertension is  stable off medication .  Ambulatory blood pressure monitoring.  Blood pressure will be reassessed in 4 weeks.         Relevant Orders    CBC & Differential    Comprehensive Metabolic Panel    Heartburn    Current Assessment & Plan     Stable.  Continue Pantoprazole daily as needed.         Vitamin B12 deficiency    Relevant Orders    Vitamin B12 & Folate    Acute pulmonary embolism without acute cor pulmonale    Current Assessment & Plan     Stable.  Continue Eliquis twice daily.  Schedule appointment with pulmonary.         Pneumonia involving right lung    Current Assessment & Plan     Continue to use incentive spirometer.  Continue Albuterol inhaler as needed.  Schedule an appointment with pulmonary.         Relevant Medications    fluticasone (FLONASE) 50 MCG/ACT nasal spray    Acute non-recurrent sinusitis    Current Assessment & Plan     Continue increased intake of clear liquids and rest.  Try over the counter nasal steroid, such Flonase or Nasacort.  Finish Augmentin as prescribed.         Relevant Medications    fluticasone (FLONASE) 50 MCG/ACT nasal spray    Fatigue    Relevant Orders    CBC & Differential    Comprehensive Metabolic Panel    TSH Rfx On Abnormal To Free T4    Vitamin B12 & Folate    RESOLVED: Acute respiratory failure with hypoxia    Relevant Orders    CBC & Differential     Other Visit Diagnoses       Hospital discharge  follow-up    -  Primary    Relevant Orders    CBC & Differential    Comprehensive Metabolic Panel    Prostate cancer screening        Relevant Orders    PSA Screen            Return in about 1 month (around 10/6/2023) for Annual physical, Recheck.or sooner if symptoms persist or worsen.  Patient is still having a lot of fatigue with cough; will recheck labs today; pt has physical job in [employer] with no air conditioning; will continue off work for another 10 days to allow rest and recovery; paper work completed with expected return to work date on 9/17/23.

## 2023-09-06 NOTE — PATIENT INSTRUCTIONS
Call pulmonary at [phone] to schedule an appointment.  Continue increased intake of clear liquids and rest.  Try over the counter nasal steroid, such Flonase or Nasacort.  Continue to monitor your blood pressure periodically and record results.  Follow up pending lab results.  Follow up in 1 month for physical, or sooner if symptoms persist or worsen.

## 2023-09-07 ENCOUNTER — READMISSION MANAGEMENT (OUTPATIENT)
Dept: CALL CENTER | Facility: HOSPITAL | Age: 53
End: 2023-09-07
Payer: COMMERCIAL

## 2023-09-07 PROBLEM — J01.90 ACUTE NON-RECURRENT SINUSITIS: Status: ACTIVE | Noted: 2023-09-07

## 2023-09-07 PROBLEM — R53.83 FATIGUE: Status: ACTIVE | Noted: 2023-09-07

## 2023-09-07 PROBLEM — J96.01 ACUTE RESPIRATORY FAILURE WITH HYPOXIA: Status: RESOLVED | Noted: 2023-08-27 | Resolved: 2023-09-07

## 2023-09-07 LAB
ALBUMIN SERPL-MCNC: 4.2 G/DL (ref 3.8–4.9)
ALBUMIN/GLOB SERPL: 1.5 {RATIO} (ref 1.2–2.2)
ALP SERPL-CCNC: 121 IU/L (ref 44–121)
ALT SERPL-CCNC: 18 IU/L (ref 0–44)
AST SERPL-CCNC: 23 IU/L (ref 0–40)
BASOPHILS # BLD AUTO: 0.1 X10E3/UL (ref 0–0.2)
BASOPHILS NFR BLD AUTO: 1 %
BILIRUB SERPL-MCNC: 0.6 MG/DL (ref 0–1.2)
BUN SERPL-MCNC: 8 MG/DL (ref 6–24)
BUN/CREAT SERPL: 9 (ref 9–20)
CALCIUM SERPL-MCNC: 9.7 MG/DL (ref 8.7–10.2)
CHLORIDE SERPL-SCNC: 102 MMOL/L (ref 96–106)
CO2 SERPL-SCNC: 25 MMOL/L (ref 20–29)
CREAT SERPL-MCNC: 0.85 MG/DL (ref 0.76–1.27)
EGFRCR SERPLBLD CKD-EPI 2021: 104 ML/MIN/1.73
EOSINOPHIL # BLD AUTO: 0.3 X10E3/UL (ref 0–0.4)
EOSINOPHIL NFR BLD AUTO: 3 %
ERYTHROCYTE [DISTWIDTH] IN BLOOD BY AUTOMATED COUNT: 12.5 % (ref 11.6–15.4)
FERRITIN SERPL-MCNC: 202 NG/ML (ref 30–400)
FOLATE SERPL-MCNC: 14.7 NG/ML
GLOBULIN SER CALC-MCNC: 2.8 G/DL (ref 1.5–4.5)
GLUCOSE SERPL-MCNC: 86 MG/DL (ref 70–99)
HCT VFR BLD AUTO: 38.4 % (ref 37.5–51)
HGB BLD-MCNC: 12.9 G/DL (ref 13–17.7)
IMM GRANULOCYTES # BLD AUTO: 0 X10E3/UL (ref 0–0.1)
IMM GRANULOCYTES NFR BLD AUTO: 0 %
IRON SERPL-MCNC: 66 UG/DL (ref 38–169)
LYMPHOCYTES # BLD AUTO: 1.4 X10E3/UL (ref 0.7–3.1)
LYMPHOCYTES NFR BLD AUTO: 14 %
MCH RBC QN AUTO: 31 PG (ref 26.6–33)
MCHC RBC AUTO-ENTMCNC: 33.6 G/DL (ref 31.5–35.7)
MCV RBC AUTO: 92 FL (ref 79–97)
MONOCYTES # BLD AUTO: 0.6 X10E3/UL (ref 0.1–0.9)
MONOCYTES NFR BLD AUTO: 6 %
NEUTROPHILS # BLD AUTO: 7.8 X10E3/UL (ref 1.4–7)
NEUTROPHILS NFR BLD AUTO: 76 %
PLATELET # BLD AUTO: 191 X10E3/UL (ref 150–450)
POTASSIUM SERPL-SCNC: 4.6 MMOL/L (ref 3.5–5.2)
PROT SERPL-MCNC: 7 G/DL (ref 6–8.5)
PSA SERPL-MCNC: 0.5 NG/ML (ref 0–4)
RBC # BLD AUTO: 4.16 X10E6/UL (ref 4.14–5.8)
SODIUM SERPL-SCNC: 142 MMOL/L (ref 134–144)
TSH SERPL DL<=0.005 MIU/L-ACNC: 1.04 UIU/ML (ref 0.45–4.5)
VIT B12 SERPL-MCNC: 570 PG/ML (ref 232–1245)
WBC # BLD AUTO: 10.2 X10E3/UL (ref 3.4–10.8)

## 2023-09-07 NOTE — OUTREACH NOTE
COPD/PN Week 2 Survey      Flowsheet Row Responses   [facility] patient discharged from? [place]   Does the patient have one of the following disease processes/diagnoses(primary or secondary)? Pneumonia   Week 2 attempt successful? Yes   Call start time 1749   Call end time 1754   Discharge diagnosis *Pneumonia involving right lung   Person spoke with today (if not patient) and relationship patient   Meds reviewed with patient/caregiver? Yes   Is the patient having any side effects they believe may be caused by any medication additions or changes? No   Does the patient have all medications ordered at discharge? Yes   Is the patient taking all medications as directed (includes completed medication regime)? Yes   Medication comments seen by PCP yesterday, resumed taking abx. still using Albuterol inhaler a few times a day.   Does the patient have a primary care provider?  Yes   Comments regarding PCP saw PCP yesterday, 9/6/23   Has the patient kept scheduled appointments due by today? Yes   Has home health visited the patient within 72 hours of discharge? N/A   Pulse Ox monitoring Intermittent   Pulse Ox device source Other  [doctors office]   O2 Sat comments 98% on room air   O2 Sat: education provided Sat levels, Monitoring frequency, When to seek care   Psychosocial issues? No   Did the patient receive a copy of their discharge instructions? Yes   Nursing interventions Reviewed instructions with patient   What is the patient's perception of their health status since discharge? Improving   Nursing Interventions Nurse provided patient education   Is the patient/caregiver able to teach back the hierarchy of who to call/visit for symptoms/problems? PCP, Specialist, Home health nurse, Urgent Care, ED, 911 Yes   Is the patient/caregiver able to teach back signs and symptoms of worsening condition: Fever/chills, Shortness of breath, Chest pain   Is the patient/caregiver able to teach back importance of completing  antibiotic course of treatment? Yes   Week 2 call completed? Yes   Graduated Yes   Is the patient interested in additional calls from an ambulatory ? No   Would this patient benefit from a Referral to [facility]? No   Wrap up additional comments Pt states he is feeling better, still with cough. doing spirometer at home and following discharge orders. denies any needs at present time.   Call end time [id number]            Nora ESPINOZA - Registered Nurse

## 2023-09-07 NOTE — ASSESSMENT & PLAN NOTE
Hypertension is  stable off medication .  Ambulatory blood pressure monitoring.  Blood pressure will be reassessed in 4 weeks.

## 2023-09-07 NOTE — ASSESSMENT & PLAN NOTE
Continue to use incentive spirometer.  Continue Albuterol inhaler as needed.  Schedule an appointment with pulmonary.

## 2023-09-07 NOTE — ASSESSMENT & PLAN NOTE
Continue increased intake of clear liquids and rest.  Try over the counter nasal steroid, such Flonase or Nasacort.  Finish Augmentin as prescribed.

## 2023-09-13 ENCOUNTER — TELEPHONE (OUTPATIENT)
Dept: FAMILY MEDICINE CLINIC | Facility: CLINIC | Age: 53
End: 2023-09-13
Payer: COMMERCIAL

## 2023-09-13 NOTE — TELEPHONE ENCOUNTER
Patient needs note to return to work on Sunday, September 17, 2023; patient has been feeling much better since Monday, September 11, 2023; patient is still having a mild cough; no shortness of air; patient has been active around the house without difficulty; patient states he will be able to return to work without restrictions; will send work note to [facility] as requested.

## 2023-09-24 LAB — FUNGUS WND CULT: NORMAL

## 2023-09-27 DIAGNOSIS — G25.81 RESTLESS LEG SYNDROME: Primary | ICD-10-CM

## 2023-09-27 RX ORDER — ROPINIROLE 0.25 MG/1
0.25 TABLET, FILM COATED ORAL NIGHTLY
Qty: 30 TABLET | Refills: 1 | Status: SHIPPED | OUTPATIENT
Start: 2023-09-27

## 2023-10-06 ENCOUNTER — TELEPHONE (OUTPATIENT)
Dept: FAMILY MEDICINE CLINIC | Facility: CLINIC | Age: 53
End: 2023-10-06

## 2023-10-06 NOTE — TELEPHONE ENCOUNTER
Tried to reach patient by phone; left message that had cancellation at 11:00 if patient is available; also stated could see him at 12:45 if that would work better.

## 2023-10-06 NOTE — TELEPHONE ENCOUNTER
Caller: Nikolai Shaw    Relationship to patient: Self    Best call back number:    [phone]         Chief complaint:    Type of visit: PHYSICAL        If rescheduling, when is the original appointment: 10/06/23 @8:15     Additional notes:CALLED PATIENT TO RESCHEDULE THE APPOINTMENT THAT HE NO SHOWED FOR TODAY.  HE DECLINED THE APPOINTMENT.  HE WAS TOLD THAT [employer] COULD WORK HIM IN THIS AFTERNOON, BUT HE WAS NOT INTERESTED.  HE STATES HE WILL JUST TALK TO THE .    PLEASE ADVISE.         DELETE AFTER READING TO PATIENT: “Thank you for sharing this information with me. I will send a message to the scheduling team. Please allow 48 hours for the  staff to follow up on this request.”

## 2023-10-09 LAB
MYCOBACTERIUM SPEC CULT: NORMAL
NIGHT BLUE STAIN TISS: NORMAL

## 2023-10-20 ENCOUNTER — HOSPITAL ENCOUNTER (EMERGENCY)
Facility: HOSPITAL | Age: 53
Discharge: HOME | End: 2023-10-20
Attending: EMERGENCY MEDICINE
Payer: COMMERCIAL

## 2023-10-20 ENCOUNTER — TELEPHONE (OUTPATIENT)
Dept: SURGERY | Facility: CLINIC | Age: 53
End: 2023-10-20
Payer: COMMERCIAL

## 2023-10-20 VITALS
HEIGHT: 68 IN | RESPIRATION RATE: 18 BRPM | TEMPERATURE: 98.3 F | BODY MASS INDEX: 36.37 KG/M2 | OXYGEN SATURATION: 92 % | WEIGHT: 240 LBS | SYSTOLIC BLOOD PRESSURE: 139 MMHG | DIASTOLIC BLOOD PRESSURE: 97 MMHG | HEART RATE: 62 BPM

## 2023-10-20 DIAGNOSIS — E86.0 DEHYDRATION: ICD-10-CM

## 2023-10-20 DIAGNOSIS — R19.7 DIARRHEA, UNSPECIFIED TYPE: ICD-10-CM

## 2023-10-20 DIAGNOSIS — N17.9 ACUTE KIDNEY INJURY: Primary | ICD-10-CM

## 2023-10-20 LAB
ALBUMIN SERPL-MCNC: 5.1 G/DL (ref 3.5–5.2)
ALBUMIN/GLOB SERPL: 1.3 G/DL
ALP SERPL-CCNC: 127 U/L (ref 39–117)
ALT SERPL W P-5'-P-CCNC: 25 U/L (ref 1–41)
ANION GAP SERPL CALCULATED.3IONS-SCNC: 14.8 MMOL/L (ref 5–15)
AST SERPL-CCNC: 33 U/L (ref 1–40)
BASOPHILS # BLD AUTO: 0.04 10*3/MM3 (ref 0–0.2)
BASOPHILS NFR BLD AUTO: 0.4 % (ref 0–1.5)
BILIRUB SERPL-MCNC: 0.8 MG/DL (ref 0–1.2)
BUN SERPL-MCNC: 23 MG/DL (ref 6–20)
BUN/CREAT SERPL: 18 (ref 7–25)
CALCIUM SPEC-SCNC: 10.6 MG/DL (ref 8.6–10.5)
CHLORIDE SERPL-SCNC: 99 MMOL/L (ref 98–107)
CO2 SERPL-SCNC: 23.2 MMOL/L (ref 22–29)
CREAT SERPL-MCNC: 1.28 MG/DL (ref 0.76–1.27)
DEPRECATED RDW RBC AUTO: 44.1 FL (ref 37–54)
EGFRCR SERPLBLD CKD-EPI 2021: 66.9 ML/MIN/1.73
EOSINOPHIL # BLD AUTO: 0.16 10*3/MM3 (ref 0–0.4)
EOSINOPHIL NFR BLD AUTO: 1.6 % (ref 0.3–6.2)
ERYTHROCYTE [DISTWIDTH] IN BLOOD BY AUTOMATED COUNT: 13 % (ref 12.3–15.4)
GLOBULIN UR ELPH-MCNC: 3.9 GM/DL
GLUCOSE SERPL-MCNC: 107 MG/DL (ref 65–99)
HCT VFR BLD AUTO: 48.7 % (ref 37.5–51)
HGB BLD-MCNC: 16.5 G/DL (ref 13–17.7)
IMM GRANULOCYTES # BLD AUTO: 0.03 10*3/MM3 (ref 0–0.05)
IMM GRANULOCYTES NFR BLD AUTO: 0.3 % (ref 0–0.5)
LYMPHOCYTES # BLD AUTO: 1.46 10*3/MM3 (ref 0.7–3.1)
LYMPHOCYTES NFR BLD AUTO: 14.7 % (ref 19.6–45.3)
MAGNESIUM SERPL-MCNC: 2.2 MG/DL (ref 1.6–2.6)
MCH RBC QN AUTO: 31.1 PG (ref 26.6–33)
MCHC RBC AUTO-ENTMCNC: 33.9 G/DL (ref 31.5–35.7)
MCV RBC AUTO: 91.7 FL (ref 79–97)
MONOCYTES # BLD AUTO: 1.12 10*3/MM3 (ref 0.1–0.9)
MONOCYTES NFR BLD AUTO: 11.2 % (ref 5–12)
NEUTROPHILS NFR BLD AUTO: 7.15 10*3/MM3 (ref 1.7–7)
NEUTROPHILS NFR BLD AUTO: 71.8 % (ref 42.7–76)
NRBC BLD AUTO-RTO: 0 /100 WBC (ref 0–0.2)
PLATELET # BLD AUTO: 254 10*3/MM3 (ref 140–450)
PMV BLD AUTO: 11.8 FL (ref 6–12)
POTASSIUM SERPL-SCNC: 3.7 MMOL/L (ref 3.5–5.2)
PROT SERPL-MCNC: 9 G/DL (ref 6–8.5)
RBC # BLD AUTO: 5.31 10*6/MM3 (ref 4.14–5.8)
SODIUM SERPL-SCNC: 137 MMOL/L (ref 136–145)
WBC NRBC COR # BLD: 9.96 10*3/MM3 (ref 3.4–10.8)

## 2023-10-20 PROCEDURE — 99283 EMERGENCY DEPT VISIT LOW MDM: CPT

## 2023-10-20 PROCEDURE — 85025 COMPLETE CBC W/AUTO DIFF WBC: CPT | Performed by: EMERGENCY MEDICINE

## 2023-10-20 PROCEDURE — 83735 ASSAY OF MAGNESIUM: CPT | Performed by: EMERGENCY MEDICINE

## 2023-10-20 PROCEDURE — 25810000003 LACTATED RINGERS SOLUTION: Performed by: EMERGENCY MEDICINE

## 2023-10-20 PROCEDURE — 80053 COMPREHEN METABOLIC PANEL: CPT | Performed by: EMERGENCY MEDICINE

## 2023-10-20 RX ORDER — SODIUM CHLORIDE 0.9 % (FLUSH) 0.9 %
10 SYRINGE (ML) INJECTION AS NEEDED
Status: DISCONTINUED | OUTPATIENT
Start: 2023-10-20 | End: 2023-10-20 | Stop reason: HOSPADM

## 2023-10-20 RX ORDER — DIPHENOXYLATE HYDROCHLORIDE AND ATROPINE SULFATE 2.5; .025 MG/1; MG/1
1 TABLET ORAL 4 TIMES DAILY PRN
Qty: 12 TABLET | Refills: 0 | Status: SHIPPED | OUTPATIENT
Start: 2023-10-20

## 2023-10-20 RX ADMIN — SODIUM CHLORIDE, POTASSIUM CHLORIDE, SODIUM LACTATE AND CALCIUM CHLORIDE 1000 ML: 600; 310; 30; 20 INJECTION, SOLUTION INTRAVENOUS at 14:09

## 2023-10-20 RX ADMIN — SODIUM CHLORIDE, POTASSIUM CHLORIDE, SODIUM LACTATE AND CALCIUM CHLORIDE 1000 ML: 600; 310; 30; 20 INJECTION, SOLUTION INTRAVENOUS at 12:13

## 2023-10-20 NOTE — ED NOTES
Pt has an ileostomy and sometimes gets dehydrated.  He has been drinking gatorade but has had increased output.  His bp this am was 90s/60s

## 2023-10-20 NOTE — TELEPHONE ENCOUNTER
"Wife called with concerns of Mr. Frias [facility]. She stated that he has been having episodes of severe dehydration, fluid like stools, dizziness and seizure like episodes and lots of confusion and color loss. She reported that last night Mr. Shaw began getting confused and in a \"daze\" and began to projectile vomit. She stated that he had an episode similar to this about a month ago that put him in the hospital. Wife stated that Mr. Shaw has had a fall that resulted with a hole in the wall. She is concerned and would like to know what keeps causing these episodes. I advised them that with these concerns they needed to present to the ER. Wife stated they were already on the way to the ER. Wife requested an appointment with Dr. Crane or a PA. I was able to put him on the schedule with Beth ALCANTAR on 11/08. I also advised that they needed to contact Mr. Frias PCP with these concerns.   "

## 2023-10-20 NOTE — DISCHARGE INSTRUCTIONS
Drink plenty of fluids.  Take Lomotil as prescribed.  Follow-up with your primary care provider if symptoms are not improving in the next 2 to 3 days.  Return to the emergency department for persistent symptoms, fever, abdominal pain, vomiting, or other concern.

## 2023-10-20 NOTE — ED NOTES
Pt reports increase in stool in ileostomy that started last night. Pt feels like he might be dehydrated. Reports weakness and dizziness this am

## 2023-10-23 DIAGNOSIS — G25.81 RESTLESS LEG SYNDROME: ICD-10-CM

## 2023-10-23 RX ORDER — ROPINIROLE 0.25 MG/1
0.25 TABLET, FILM COATED ORAL NIGHTLY
Qty: 30 TABLET | Refills: 0 | Status: SHIPPED | OUTPATIENT
Start: 2023-10-23

## 2023-11-03 ENCOUNTER — OFFICE VISIT (OUTPATIENT)
Dept: SURGERY | Facility: CLINIC | Age: 53
End: 2023-11-03
Payer: COMMERCIAL

## 2023-11-03 VITALS
DIASTOLIC BLOOD PRESSURE: 70 MMHG | WEIGHT: 246.6 LBS | BODY MASS INDEX: 37.38 KG/M2 | SYSTOLIC BLOOD PRESSURE: 134 MMHG | HEIGHT: 68 IN

## 2023-11-03 DIAGNOSIS — R19.7 DIARRHEA, UNSPECIFIED TYPE: ICD-10-CM

## 2023-11-03 DIAGNOSIS — E55.9 VITAMIN D DEFICIENCY: ICD-10-CM

## 2023-11-03 PROCEDURE — 99213 OFFICE O/P EST LOW 20 MIN: CPT | Performed by: PHYSICIAN ASSISTANT

## 2023-11-03 RX ORDER — DIPHENOXYLATE HYDROCHLORIDE AND ATROPINE SULFATE 2.5; .025 MG/1; MG/1
1 TABLET ORAL 4 TIMES DAILY PRN
Qty: 30 TABLET | Refills: 3 | Status: SHIPPED | OUTPATIENT
Start: 2023-11-03

## 2023-11-03 NOTE — PROGRESS NOTES
ASSESSMENT/PLAN:    53-year-old gentleman returning to the office after recent bout of a GI bug that increased his ostomy output and caused him to be dehydrated and going to the hospital.  He is doing well now with normal ostomy output.  I did refill his Lomotil so that he has this on hand in the event that his ostomy output increases significantly again.  Additionally I did refill his vitamin D3 as he was out of this and requested that I do so.  He will follow-up with his primary care practitioner about this in the future.  All questions were answered and he was willing to proceed with all recommendations.    CC:     Recent hospitalization    HPI:    This is a 53-year-old gentleman returning to the office today after last having seen Dr. Crane in 2021 for his endoscopy procedure.  Recently he had a bout of a GI bug and had high ostomy output causing him to become dehydrated and be hospitalized.  That has since resolved and he is doing well, having no complaints.  He is out of his Lomotil and did want to inquire about a refill of this.  Currently he denies abdominal pain, abdominal distention, changes to his ostomy output, blood within his ostomy or any additional complaints.    ENDOSCOPY:   Ileoscopy 2021    SOCIAL HISTORY:   Denies tobacco use  Denies alcohol use    FAMILY HISTORY:    Colorectal cancer: Father and grandfather    PREVIOUS ABDOMINAL SURGERY    Exploratory laparotomy with small bowel resection 2017  Abdominal perineal proctocolectomy with end ileostomy 2015    OTHER SURGERY  Past Surgical History:   Procedure Laterality Date    ABDOMINOPERINEAL PROCTOCOLECTOMY  07/06/2015    DR MEET CRANE    BRONCHOSCOPY N/A 8/28/2023    Procedure: BRONCHOSCOPY WITH BRONCHIAL ALVELOR LAVAGE;  Surgeon: Rubin Sood MD;  Location: [facility] ENDOSCOPY;  Service: Pulmonary;  Laterality: N/A;  PRE: RECURRENT PNEUMONIA /   POST: SAME    COLONOSCOPY  06/16/1915    DR SHASTA HOWARD    COLONOSCOPY N/A 2/23/2021     Procedure: ILEOSCOPY, EXCISION OF ILEOSTOMY NODULE;  Surgeon: Pal Crane MD;  Location:  [facility] OR;  Service: General;  Laterality: N/A;    ENDOSCOPY  06/23/2015    DR SHASTA HOWARD    EXPLORATORY LAPAROTOMY N/A 2/3/2017    Procedure: LAPAROTOMY EXPLORATORY  SMALL BOWEL OBSTRUCTION;  Surgeon: Pal Crane MD;  Location:  [facility];  Service:     ILEOSTOMY  07/06/2015    MYRINGOTOMY W/ TUBES      2-3 times as child       PAST MEDICAL HISTORY:    Past Medical History:   Diagnosis Date    Adenocarcinoma of rectum 06/2015    invasive adenocarcinoma of rectum w 59 benign lymph nodes    Adenomatous polyposis     FAMILIAL     Anxiety     At risk for sleep apnea     Chicken pox     patient reports having as a child    Depression     Herpes zoster without complication 12/3/2021    History of anemia     Hypertension     Hyponatremia     Insomnia     Malignant neoplasm of rectum 6/1/2016    Parastomal hernia without obstruction or gangrene 2/15/2021    Added automatically from request for surgery [id number]    Pulmonary nodule 1/10/2020    6/26/20 CT chest: 6 mm nodule resolved.    Renal calculi     RLS (restless legs syndrome)     Syncope 03/19/2021    prolonged QT interval    Testicular hypofunction     Vitamin D deficiency        MEDICATIONS:     Current Outpatient Medications:     B Complex Vitamins (VITAMIN-B COMPLEX PO), Take 1 tablet by mouth Daily., Disp: , Rfl:     Cholecalciferol (Vitamin D-3) 125 MCG (5000 UT) tablet, Take 1 tablet by mouth Daily., Disp: 90 tablet, Rfl: 1    diphenoxylate-atropine (LOMOTIL) 2.5-0.025 MG per tablet, Take 1 tablet by mouth 4 (Four) Times a Day As Needed for Diarrhea., Disp: 30 tablet, Rfl: 3    Eliquis 5 MG tablet tablet, Take 1 tablet by mouth Every 12 (Twelve) Hours., Disp: , Rfl:     fluticasone (FLONASE) 50 MCG/ACT nasal spray, 2 sprays into the nostril(s) as directed by provider Daily., Disp: 18.2 mL, Rfl: 2    pantoprazole (PROTONIX) 20 MG EC tablet, Take 1  "tablet by mouth Daily., Disp: 90 tablet, Rfl: 1    sertraline (ZOLOFT) 100 MG tablet, Take 1.5 tablets by mouth Daily., Disp: 135 tablet, Rfl: 1    albuterol sulfate  (90 Base) MCG/ACT inhaler, Inhale 3 puffs Every 6 (Six) Hours As Needed for Wheezing or Shortness of Air (or cough). (Patient not taking: Reported on 11/3/2023), Disp: 6.7 g, Rfl: 5    rOPINIRole (REQUIP) 0.25 MG tablet, Take 1 tablet by mouth Every Night. Take 1 hour before bedtime. (Patient not taking: Reported on 11/3/2023), Disp: 30 tablet, Rfl: 0    ALLERGIES:   No Known Allergies    PHYSICAL EXAM:   Constitutional: Well-developed well-nourished, no acute distress  Vital signs:   Height 68\"  Weight 246  BMI 37.5  Gastrointestinal: Ileostomy in place right lower quadrant with good ostomy output no pat blood, soft, nontender  Psychiatric: Alert and oriented ×3, normal affect       Jacob Barnett PA-C    [facility] - General Surgery   [address], [address]  [place] [address]     [address], [address]  [place] [address]     Office: [phone]  Fax: [phone]    "

## 2023-11-17 ENCOUNTER — TELEPHONE (OUTPATIENT)
Dept: FAMILY MEDICINE CLINIC | Facility: CLINIC | Age: 53
End: 2023-11-17
Payer: COMMERCIAL

## 2023-11-17 NOTE — TELEPHONE ENCOUNTER
Spoke to patient on 11/17/2023 to reschedule he physical and patient stated that since he worked at [employer] and it was currently their busy season he would call back within the next few days.

## 2023-11-23 DIAGNOSIS — I10 ESSENTIAL HYPERTENSION: ICD-10-CM

## 2023-11-23 DIAGNOSIS — F41.9 ANXIETY: ICD-10-CM

## 2023-11-23 DIAGNOSIS — F33.41 RECURRENT MAJOR DEPRESSIVE DISORDER, IN PARTIAL REMISSION: ICD-10-CM

## 2023-11-24 RX ORDER — PANTOPRAZOLE SODIUM 20 MG/1
20 TABLET, DELAYED RELEASE ORAL DAILY
Qty: 90 TABLET | Refills: 0 | Status: SHIPPED | OUTPATIENT
Start: 2023-11-24

## 2023-11-24 RX ORDER — LISINOPRIL AND HYDROCHLOROTHIAZIDE 20; 12.5 MG/1; MG/1
1 TABLET ORAL DAILY
Qty: 90 TABLET | Refills: 1 | OUTPATIENT
Start: 2023-11-24

## 2023-11-24 RX ORDER — SERTRALINE HYDROCHLORIDE 100 MG/1
150 TABLET, FILM COATED ORAL DAILY
Qty: 135 TABLET | Refills: 0 | Status: SHIPPED | OUTPATIENT
Start: 2023-11-24

## 2023-12-11 DIAGNOSIS — G25.81 RESTLESS LEG SYNDROME: ICD-10-CM

## 2023-12-11 RX ORDER — ROPINIROLE 0.25 MG/1
0.25 TABLET, FILM COATED ORAL NIGHTLY
Qty: 90 TABLET | Refills: 0 | Status: SHIPPED | OUTPATIENT
Start: 2023-12-11

## 2023-12-11 NOTE — TELEPHONE ENCOUNTER
Rx Refill Note  Requested Prescriptions     Pending Prescriptions Disp Refills    rOPINIRole (REQUIP) 0.25 MG tablet 90 tablet 0     Sig: Take 1 tablet by mouth Every Night. Take 1 hour before bedtime.      Last office visit with prescribing clinician: 9/6/2023   Last telemedicine visit with prescribing clinician: Visit date not found   Next office visit with prescribing clinician: Visit date not found                         Would you like a call back once the refill request has been completed: [] Yes [] No    If the office needs to give you a call back, can they leave a voicemail: [] Yes [] No    Deya Ribeiro MA  12/11/23, 11:49 EST

## 2024-02-19 DIAGNOSIS — I10 ESSENTIAL HYPERTENSION: ICD-10-CM

## 2024-02-19 DIAGNOSIS — R12 HEARTBURN: Primary | ICD-10-CM

## 2024-02-19 DIAGNOSIS — F33.41 RECURRENT MAJOR DEPRESSIVE DISORDER, IN PARTIAL REMISSION: ICD-10-CM

## 2024-02-19 DIAGNOSIS — F41.9 ANXIETY: ICD-10-CM

## 2024-02-19 RX ORDER — LISINOPRIL 20 MG/1
20 TABLET ORAL DAILY
Qty: 30 TABLET | Refills: 0 | Status: SHIPPED | OUTPATIENT
Start: 2024-02-19

## 2024-02-19 RX ORDER — LISINOPRIL 20 MG/1
1 TABLET ORAL DAILY
COMMUNITY
Start: 2023-11-23 | End: 2024-02-19

## 2024-02-19 RX ORDER — SERTRALINE HYDROCHLORIDE 100 MG/1
TABLET, FILM COATED ORAL
Qty: 45 TABLET | Refills: 0 | Status: SHIPPED | OUTPATIENT
Start: 2024-02-19

## 2024-02-19 RX ORDER — PANTOPRAZOLE SODIUM 20 MG/1
20 TABLET, DELAYED RELEASE ORAL DAILY
Qty: 30 TABLET | Refills: 0 | Status: SHIPPED | OUTPATIENT
Start: 2024-02-19

## 2024-02-27 DIAGNOSIS — I10 ESSENTIAL HYPERTENSION: ICD-10-CM

## 2024-02-27 RX ORDER — LISINOPRIL 20 MG/1
20 TABLET ORAL DAILY
Qty: 30 TABLET | Refills: 0 | OUTPATIENT
Start: 2024-02-27

## 2024-03-04 DIAGNOSIS — F41.9 ANXIETY: ICD-10-CM

## 2024-03-04 DIAGNOSIS — F33.41 RECURRENT MAJOR DEPRESSIVE DISORDER, IN PARTIAL REMISSION: ICD-10-CM

## 2024-03-04 DIAGNOSIS — R12 HEARTBURN: ICD-10-CM

## 2024-03-04 RX ORDER — SERTRALINE HYDROCHLORIDE 100 MG/1
TABLET, FILM COATED ORAL
Qty: 45 TABLET | Refills: 0 | OUTPATIENT
Start: 2024-03-04

## 2024-03-04 RX ORDER — PANTOPRAZOLE SODIUM 20 MG/1
20 TABLET, DELAYED RELEASE ORAL DAILY
Qty: 30 TABLET | Refills: 0 | Status: SHIPPED | OUTPATIENT
Start: 2024-03-04

## 2024-03-04 NOTE — TELEPHONE ENCOUNTER
NOV NONE   LOV 9/6/2023  LR 2/19/2024       PATIENT NEEDS AN APPT AND FOLLOW UP   MEDICATION WAS REFILLED ON 2/19/2024

## 2024-03-08 DIAGNOSIS — G25.81 RESTLESS LEG SYNDROME: ICD-10-CM

## 2024-03-08 RX ORDER — ROPINIROLE 0.25 MG/1
TABLET, FILM COATED ORAL
Qty: 30 TABLET | Refills: 0 | Status: SHIPPED | OUTPATIENT
Start: 2024-03-08

## 2024-03-08 NOTE — TELEPHONE ENCOUNTER
Rx Refill Note  Requested Prescriptions     Pending Prescriptions Disp Refills    rOPINIRole (REQUIP) 0.25 MG tablet [Pharmacy Med Name: ROPINIROLE HCL 0.25 MG TABLET] 30 tablet 0     Sig: TAKE 1 TABLET BY MOUTH DAILY AT NIGHT 1 HOUR BEFORE BEDTIME      Last office visit with prescribing clinician: 9/6/2023   Last telemedicine visit with prescribing clinician: Visit date not found   Next office visit with prescribing clinician: Visit date not found                         Would you like a call back once the refill request has been completed: [] Yes [] No    If the office needs to give you a call back, can they leave a voicemail: [] Yes [] No    Deya Ribeiro MA  03/08/24, 08:58 EST

## 2024-04-01 ENCOUNTER — OFFICE VISIT (OUTPATIENT)
Dept: FAMILY MEDICINE CLINIC | Facility: CLINIC | Age: 54
End: 2024-04-01
Payer: COMMERCIAL

## 2024-04-01 VITALS
WEIGHT: 259.6 LBS | OXYGEN SATURATION: 97 % | TEMPERATURE: 97.3 F | HEIGHT: 67 IN | BODY MASS INDEX: 40.74 KG/M2 | HEART RATE: 51 BPM | SYSTOLIC BLOOD PRESSURE: 130 MMHG | DIASTOLIC BLOOD PRESSURE: 90 MMHG

## 2024-04-01 DIAGNOSIS — E53.8 VITAMIN B12 DEFICIENCY: ICD-10-CM

## 2024-04-01 DIAGNOSIS — R00.1 BRADYCARDIA: ICD-10-CM

## 2024-04-01 DIAGNOSIS — R42 DIZZINESS: ICD-10-CM

## 2024-04-01 DIAGNOSIS — I10 ESSENTIAL HYPERTENSION: ICD-10-CM

## 2024-04-01 DIAGNOSIS — G25.81 RESTLESS LEG SYNDROME: ICD-10-CM

## 2024-04-01 DIAGNOSIS — Z00.00 ANNUAL PHYSICAL EXAM: Primary | ICD-10-CM

## 2024-04-01 DIAGNOSIS — E55.9 VITAMIN D DEFICIENCY: ICD-10-CM

## 2024-04-01 RX ORDER — ROPINIROLE 0.5 MG/1
0.5 TABLET, FILM COATED ORAL 2 TIMES DAILY
Qty: 60 TABLET | Refills: 1 | Status: SHIPPED | OUTPATIENT
Start: 2024-04-01

## 2024-04-01 NOTE — PROGRESS NOTES
"Chief Complaint  Annual Exam and Labs Only (Patient is fasting)    Subjective        Nikolai Shaw presents to [facility]  History of Present Illness  Patient is here for annual exam. He is here for twice yearly blood work. He is concerned for cost of vaccination today. He is concerned for his restless leg symptoms. Seems to be worsening and is occurring during the day as well.     Some dizziness reported for last couple of days. He had quit drinking alcohol and Ekta Zepeda stopped his bp meds so he is not sure if that is related. He admits to not checking bp readings at home regularly.     He is due to see pulmonology for PE follow up. He would like to quit taking eliquis if he can.     Objective   Vital Signs:  /90 (BP Location: Left arm, Patient Position: Sitting, Cuff Size: Adult)   Pulse 51   Temp 97.3 °F (36.3 °C) (Temporal)   Ht 170.2 cm (67\")   Wt 118 kg (259 lb 9.6 oz)   SpO2 97%   BMI 40.66 kg/m²   Estimated body mass index is 40.66 kg/m² as calculated from the following:    Height as of this encounter: 170.2 cm (67\").    Weight as of this encounter: 118 kg (259 lb 9.6 oz).               Physical Exam  Constitutional:       Appearance: Normal appearance.   HENT:      Head: Normocephalic.      Nose: Nose normal.   Eyes:      Extraocular Movements: Extraocular movements intact.      Pupils: Pupils are equal, round, and reactive to light.   Cardiovascular:      Rate and Rhythm: Regular rhythm. Bradycardia present.      Pulses: Normal pulses.      Heart sounds: Normal heart sounds.   Pulmonary:      Effort: Pulmonary effort is normal.      Breath sounds: Normal breath sounds.   Abdominal:      General: Bowel sounds are increased.   Musculoskeletal:         General: Normal range of motion.      Cervical back: Normal range of motion.   Skin:     General: Skin is warm and dry.   Neurological:      General: No focal deficit present.      Mental Status: He is alert and " oriented to person, place, and time.   Psychiatric:         Mood and Affect: Mood normal.        Result Review :                ECG 12 Lead    Date/Time: 4/1/2024 9:03 AM  Performed by: Mary Roca APRN    Authorized by: Mary Roca APRN  Rhythm: sinus bradycardia    Clinical impression: abnormal EKG            Assessment and Plan     Diagnoses and all orders for this visit:    1. Annual physical exam (Primary)    2. Restless leg syndrome  -     rOPINIRole (REQUIP) 0.5 MG tablet; Take 1 tablet by mouth 2 (Two) Times a Day. Take 1 hour before bedtime.  Dispense: 60 tablet; Refill: 1    3. Bradycardia  -     ECG 12 Lead  -     Cancel: Ambulatory Referral to Cardiology  -     Ambulatory Referral to Cardiology; Future    4. Essential hypertension  -     CBC w AUTO Differential  -     Comprehensive metabolic panel  -     Lipid panel    5. Dizziness  -     Cancel: Ambulatory Referral to Cardiology  -     Ambulatory Referral to Cardiology; Future    6. Vitamin D deficiency  -     Vitamin D 25 hydroxy    7. Vitamin B12 deficiency  -     Vitamin B12    New onset bradycardia, please follow up with cardiology.     Patient is to start taking bp readings at home.          Follow Up     Return in about 1 year (around 4/1/2025) for Annual physical.  Patient was given instructions and counseling regarding his condition or for health maintenance advice. Please see specific information pulled into the AVS if appropriate.

## 2024-04-02 LAB
25(OH)D3+25(OH)D2 SERPL-MCNC: 26.2 NG/ML (ref 30–100)
ALBUMIN SERPL-MCNC: 4 G/DL (ref 3.5–5.2)
ALBUMIN/GLOB SERPL: 1.6 G/DL
ALP SERPL-CCNC: 109 U/L (ref 39–117)
ALT SERPL-CCNC: 21 U/L (ref 1–41)
AST SERPL-CCNC: 23 U/L (ref 1–40)
BASOPHILS # BLD AUTO: 0.04 10*3/MM3 (ref 0–0.2)
BASOPHILS NFR BLD AUTO: 0.5 % (ref 0–1.5)
BILIRUB SERPL-MCNC: 0.7 MG/DL (ref 0–1.2)
BUN SERPL-MCNC: 9 MG/DL (ref 6–20)
BUN/CREAT SERPL: 9.8 (ref 7–25)
CALCIUM SERPL-MCNC: 9.3 MG/DL (ref 8.6–10.5)
CHLORIDE SERPL-SCNC: 106 MMOL/L (ref 98–107)
CHOLEST SERPL-MCNC: 176 MG/DL (ref 0–200)
CO2 SERPL-SCNC: 29.2 MMOL/L (ref 22–29)
CREAT SERPL-MCNC: 0.92 MG/DL (ref 0.76–1.27)
EGFRCR SERPLBLD CKD-EPI 2021: 99.5 ML/MIN/1.73
EOSINOPHIL # BLD AUTO: 0.51 10*3/MM3 (ref 0–0.4)
EOSINOPHIL NFR BLD AUTO: 6.7 % (ref 0.3–6.2)
ERYTHROCYTE [DISTWIDTH] IN BLOOD BY AUTOMATED COUNT: 13.8 % (ref 12.3–15.4)
GLOBULIN SER CALC-MCNC: 2.5 GM/DL
GLUCOSE SERPL-MCNC: 96 MG/DL (ref 65–99)
HCT VFR BLD AUTO: 42.6 % (ref 37.5–51)
HDLC SERPL-MCNC: 46 MG/DL (ref 40–60)
HGB BLD-MCNC: 14 G/DL (ref 13–17.7)
IMM GRANULOCYTES # BLD AUTO: 0.01 10*3/MM3 (ref 0–0.05)
IMM GRANULOCYTES NFR BLD AUTO: 0.1 % (ref 0–0.5)
LDLC SERPL CALC-MCNC: 103 MG/DL (ref 0–100)
LYMPHOCYTES # BLD AUTO: 1.32 10*3/MM3 (ref 0.7–3.1)
LYMPHOCYTES NFR BLD AUTO: 17.3 % (ref 19.6–45.3)
MCH RBC QN AUTO: 30 PG (ref 26.6–33)
MCHC RBC AUTO-ENTMCNC: 32.9 G/DL (ref 31.5–35.7)
MCV RBC AUTO: 91.4 FL (ref 79–97)
MONOCYTES # BLD AUTO: 0.63 10*3/MM3 (ref 0.1–0.9)
MONOCYTES NFR BLD AUTO: 8.2 % (ref 5–12)
NEUTROPHILS # BLD AUTO: 5.14 10*3/MM3 (ref 1.7–7)
NEUTROPHILS NFR BLD AUTO: 67.2 % (ref 42.7–76)
NRBC BLD AUTO-RTO: 0 /100 WBC (ref 0–0.2)
PLATELET # BLD AUTO: 175 10*3/MM3 (ref 140–450)
POTASSIUM SERPL-SCNC: 4.4 MMOL/L (ref 3.5–5.2)
PROT SERPL-MCNC: 6.5 G/DL (ref 6–8.5)
RBC # BLD AUTO: 4.66 10*6/MM3 (ref 4.14–5.8)
SODIUM SERPL-SCNC: 145 MMOL/L (ref 136–145)
TRIGL SERPL-MCNC: 152 MG/DL (ref 0–150)
VIT B12 SERPL-MCNC: 347 PG/ML (ref 211–946)
VLDLC SERPL CALC-MCNC: 27 MG/DL (ref 5–40)
WBC # BLD AUTO: 7.65 10*3/MM3 (ref 3.4–10.8)

## 2024-04-09 DIAGNOSIS — G25.81 RESTLESS LEG SYNDROME: ICD-10-CM

## 2024-04-11 RX ORDER — ROPINIROLE 0.5 MG/1
0.5 TABLET, FILM COATED ORAL 2 TIMES DAILY
Qty: 60 TABLET | Refills: 1 | OUTPATIENT
Start: 2024-04-11

## 2024-05-06 ENCOUNTER — OFFICE VISIT (OUTPATIENT)
Dept: CARDIOLOGY | Facility: CLINIC | Age: 54
End: 2024-05-06
Payer: COMMERCIAL

## 2024-05-06 VITALS
HEART RATE: 50 BPM | HEIGHT: 67 IN | WEIGHT: 262.4 LBS | SYSTOLIC BLOOD PRESSURE: 120 MMHG | DIASTOLIC BLOOD PRESSURE: 80 MMHG | BODY MASS INDEX: 41.18 KG/M2

## 2024-05-06 DIAGNOSIS — I10 ESSENTIAL HYPERTENSION: Primary | ICD-10-CM

## 2024-05-06 DIAGNOSIS — I26.99 ACUTE PULMONARY EMBOLISM WITHOUT ACUTE COR PULMONALE, UNSPECIFIED PULMONARY EMBOLISM TYPE: ICD-10-CM

## 2024-05-06 DIAGNOSIS — G47.30 SLEEP APNEA, UNSPECIFIED TYPE: ICD-10-CM

## 2024-05-06 DIAGNOSIS — Z85.048 HISTORY OF RECTAL CANCER: ICD-10-CM

## 2024-05-06 DIAGNOSIS — R42 DIZZINESS: ICD-10-CM

## 2024-05-06 DIAGNOSIS — D64.9 ANEMIA, UNSPECIFIED TYPE: ICD-10-CM

## 2024-05-06 DIAGNOSIS — R00.1 BRADYCARDIA: ICD-10-CM

## 2024-05-06 PROCEDURE — 93000 ELECTROCARDIOGRAM COMPLETE: CPT | Performed by: INTERNAL MEDICINE

## 2024-05-06 PROCEDURE — 99204 OFFICE O/P NEW MOD 45 MIN: CPT | Performed by: INTERNAL MEDICINE

## 2024-05-09 ENCOUNTER — PATIENT ROUNDING (BHMG ONLY) (OUTPATIENT)
Dept: CARDIOLOGY | Facility: CLINIC | Age: 54
End: 2024-05-09
Payer: COMMERCIAL

## 2024-05-13 DIAGNOSIS — G25.81 RESTLESS LEG SYNDROME: ICD-10-CM

## 2024-05-13 RX ORDER — ROPINIROLE 0.5 MG/1
0.5 TABLET, FILM COATED ORAL 2 TIMES DAILY
Qty: 180 TABLET | Refills: 0 | Status: SHIPPED | OUTPATIENT
Start: 2024-05-13

## 2024-05-20 ENCOUNTER — HOSPITAL ENCOUNTER (OUTPATIENT)
Dept: CARDIOLOGY | Facility: HOSPITAL | Age: 54
Discharge: HOME | End: 2024-05-20
Admitting: INTERNAL MEDICINE
Payer: COMMERCIAL

## 2024-05-20 VITALS
SYSTOLIC BLOOD PRESSURE: 128 MMHG | WEIGHT: 262 LBS | BODY MASS INDEX: 41.12 KG/M2 | DIASTOLIC BLOOD PRESSURE: 64 MMHG | HEART RATE: 50 BPM | HEIGHT: 67 IN

## 2024-05-20 DIAGNOSIS — R42 DIZZINESS: ICD-10-CM

## 2024-05-20 DIAGNOSIS — I10 ESSENTIAL HYPERTENSION: ICD-10-CM

## 2024-05-20 DIAGNOSIS — R00.1 BRADYCARDIA: ICD-10-CM

## 2024-05-20 LAB
AORTIC ARCH: 2.9 CM
AORTIC DIMENSIONLESS INDEX: 0.8 (DI)
ASCENDING AORTA: 3.3 CM
BH CV ECHO LEFT VENTRICLE GLOBAL LONGITUDINAL STRAIN: -21.1 %
BH CV ECHO MEAS - ACS: 1.84 CM
BH CV ECHO MEAS - AO MAX PG: 10.1 MMHG
BH CV ECHO MEAS - AO MEAN PG: 5 MMHG
BH CV ECHO MEAS - AO ROOT DIAM: 3.1 CM
BH CV ECHO MEAS - AO V2 MAX: 159 CM/SEC
BH CV ECHO MEAS - AO V2 VTI: 34.6 CM
BH CV ECHO MEAS - AVA(I,D): 3 CM2
BH CV ECHO MEAS - EDV(CUBED): 141.2 ML
BH CV ECHO MEAS - EDV(MOD-SP2): 146 ML
BH CV ECHO MEAS - EDV(MOD-SP4): 153 ML
BH CV ECHO MEAS - EF(MOD-BP): 65.5 %
BH CV ECHO MEAS - EF(MOD-SP2): 62.3 %
BH CV ECHO MEAS - EF(MOD-SP4): 68.6 %
BH CV ECHO MEAS - ESV(CUBED): 33.8 ML
BH CV ECHO MEAS - ESV(MOD-SP2): 55 ML
BH CV ECHO MEAS - ESV(MOD-SP4): 48 ML
BH CV ECHO MEAS - FS: 37.9 %
BH CV ECHO MEAS - IVS/LVPW: 0.9 CM
BH CV ECHO MEAS - IVSD: 0.93 CM
BH CV ECHO MEAS - LAT PEAK E' VEL: 13.1 CM/SEC
BH CV ECHO MEAS - LV DIASTOLIC VOL/BSA (35-75): 67.5 CM2
BH CV ECHO MEAS - LV MASS(C)D: 189.7 GRAMS
BH CV ECHO MEAS - LV MAX PG: 6.8 MMHG
BH CV ECHO MEAS - LV MEAN PG: 3 MMHG
BH CV ECHO MEAS - LV SYSTOLIC VOL/BSA (12-30): 21.2 CM2
BH CV ECHO MEAS - LV V1 MAX: 130 CM/SEC
BH CV ECHO MEAS - LV V1 VTI: 28.2 CM
BH CV ECHO MEAS - LVIDD: 5.2 CM
BH CV ECHO MEAS - LVIDS: 3.2 CM
BH CV ECHO MEAS - LVOT AREA: 3.7 CM2
BH CV ECHO MEAS - LVOT DIAM: 2.17 CM
BH CV ECHO MEAS - LVPWD: 1.03 CM
BH CV ECHO MEAS - MED PEAK E' VEL: 10.1 CM/SEC
BH CV ECHO MEAS - MV A DUR: 0.18 SEC
BH CV ECHO MEAS - MV A MAX VEL: 76.3 CM/SEC
BH CV ECHO MEAS - MV DEC SLOPE: 468.6 CM/SEC2
BH CV ECHO MEAS - MV DEC TIME: 0.22 SEC
BH CV ECHO MEAS - MV E MAX VEL: 93.8 CM/SEC
BH CV ECHO MEAS - MV E/A: 1.23
BH CV ECHO MEAS - MV MAX PG: 3.7 MMHG
BH CV ECHO MEAS - MV MEAN PG: 1.22 MMHG
BH CV ECHO MEAS - MV P1/2T: 64.5 MSEC
BH CV ECHO MEAS - MV V2 VTI: 37.7 CM
BH CV ECHO MEAS - MVA(P1/2T): 3.4 CM2
BH CV ECHO MEAS - MVA(VTI): 2.8 CM2
BH CV ECHO MEAS - PA ACC TIME: 0.12 SEC
BH CV ECHO MEAS - PA V2 MAX: 93.7 CM/SEC
BH CV ECHO MEAS - PULM A REVS DUR: 0.15 SEC
BH CV ECHO MEAS - PULM A REVS VEL: 30.1 CM/SEC
BH CV ECHO MEAS - PULM DIAS VEL: 39.2 CM/SEC
BH CV ECHO MEAS - PULM S/D: 1.13
BH CV ECHO MEAS - PULM SYS VEL: 44.3 CM/SEC
BH CV ECHO MEAS - QP/QS: 0.45
BH CV ECHO MEAS - RAP SYSTOLE: 3 MMHG
BH CV ECHO MEAS - RV MAX PG: 1.34 MMHG
BH CV ECHO MEAS - RV V1 MAX: 57.8 CM/SEC
BH CV ECHO MEAS - RV V1 VTI: 12.7 CM
BH CV ECHO MEAS - RVOT DIAM: 2.17 CM
BH CV ECHO MEAS - RVSP: 17.7 MMHG
BH CV ECHO MEAS - SV(LVOT): 104.1 ML
BH CV ECHO MEAS - SV(MOD-SP2): 91 ML
BH CV ECHO MEAS - SV(MOD-SP4): 105 ML
BH CV ECHO MEAS - SV(RVOT): 47.1 ML
BH CV ECHO MEAS - SVI(LVOT): 45.9 ML/M2
BH CV ECHO MEAS - SVI(MOD-SP2): 40.1 ML/M2
BH CV ECHO MEAS - SVI(MOD-SP4): 46.3 ML/M2
BH CV ECHO MEAS - TAPSE (>1.6): 2.36 CM
BH CV ECHO MEAS - TR MAX PG: 14.7 MMHG
BH CV ECHO MEAS - TR MAX VEL: 191.4 CM/SEC
BH CV ECHO MEASUREMENTS AVERAGE E/E' RATIO: 8.09
BH CV XLRA - RV BASE: 3.8 CM
BH CV XLRA - RV LENGTH: 9.9 CM
BH CV XLRA - RV MID: 3.1 CM
BH CV XLRA - TDI S': 15.3 CM/SEC
LEFT ATRIUM VOLUME INDEX: 32.8 ML/M2
SINUS: 3 CM
STJ: 2.9 CM

## 2024-05-20 PROCEDURE — 93356 MYOCRD STRAIN IMG SPCKL TRCK: CPT

## 2024-05-20 PROCEDURE — 25510000001 PERFLUTREN (DEFINITY) 8.476 MG IN SODIUM CHLORIDE (PF) 0.9 % 10 ML INJECTION: Performed by: INTERNAL MEDICINE

## 2024-05-20 PROCEDURE — 93306 TTE W/DOPPLER COMPLETE: CPT

## 2024-05-20 RX ADMIN — PERFLUTREN 2 ML: 6.52 INJECTION, SUSPENSION INTRAVENOUS at 09:00

## 2024-07-31 ENCOUNTER — OFFICE VISIT (OUTPATIENT)
Dept: FAMILY MEDICINE CLINIC | Facility: CLINIC | Age: 54
End: 2024-07-31
Payer: COMMERCIAL

## 2024-07-31 VITALS
OXYGEN SATURATION: 96 % | SYSTOLIC BLOOD PRESSURE: 120 MMHG | DIASTOLIC BLOOD PRESSURE: 78 MMHG | HEIGHT: 67 IN | HEART RATE: 61 BPM | TEMPERATURE: 97.3 F | WEIGHT: 258 LBS | BODY MASS INDEX: 40.49 KG/M2

## 2024-07-31 DIAGNOSIS — E55.9 VITAMIN D DEFICIENCY: ICD-10-CM

## 2024-07-31 DIAGNOSIS — R12 HEARTBURN: ICD-10-CM

## 2024-07-31 DIAGNOSIS — I10 ESSENTIAL HYPERTENSION: ICD-10-CM

## 2024-07-31 DIAGNOSIS — H10.33 ACUTE CONJUNCTIVITIS OF BOTH EYES, UNSPECIFIED ACUTE CONJUNCTIVITIS TYPE: Primary | ICD-10-CM

## 2024-07-31 DIAGNOSIS — F41.9 ANXIETY: ICD-10-CM

## 2024-07-31 DIAGNOSIS — F33.41 RECURRENT MAJOR DEPRESSIVE DISORDER, IN PARTIAL REMISSION: ICD-10-CM

## 2024-07-31 DIAGNOSIS — R53.83 FATIGUE, UNSPECIFIED TYPE: ICD-10-CM

## 2024-07-31 DIAGNOSIS — G25.81 RESTLESS LEG SYNDROME: ICD-10-CM

## 2024-07-31 PROCEDURE — 99214 OFFICE O/P EST MOD 30 MIN: CPT | Performed by: NURSE PRACTITIONER

## 2024-07-31 RX ORDER — SERTRALINE HYDROCHLORIDE 100 MG/1
150 TABLET, FILM COATED ORAL DAILY
Qty: 135 TABLET | Refills: 1 | Status: SHIPPED | OUTPATIENT
Start: 2024-07-31

## 2024-07-31 RX ORDER — ROPINIROLE 1 MG/1
1 TABLET, FILM COATED ORAL NIGHTLY
Qty: 90 TABLET | Refills: 1 | Status: SHIPPED | OUTPATIENT
Start: 2024-07-31

## 2024-07-31 NOTE — PATIENT INSTRUCTIONS
Try over the counter allergy eye drop, such as Pataday.  Get labs drawn when able to get off work.  Follow up pending lab results.  Follow up in 6 months, or sooner if symptoms persist or worsen.

## 2024-07-31 NOTE — PROGRESS NOTES
Subjective   Nikolai Shaw is a 54 y.o. male.     Chief Complaint   Patient presents with    Conjunctivitis     Bilateral eyes crusty X 6 days        History of Present Illness   Patient presents with c/o crusting of bilateral eyes x6 days; no itching of eyes; symptoms some better; no waking up with eyes matted shut; will just noted yellow crusting in eyes at times throughout the day; no watery drainage from eyes, drainage has been thicker; no change in vision; no runny/stuffy nose; no sinus symptoms; no ear pain; no fever.    F/U HTN: no longer taking medication for blood pressure since lost weight and stopped drinking EtOH.    F/U bradycardia: noted at LOV; saw cardiology for RBBB noted on ECG; ordered Echo and WNL; no concern with bradycardia.    History of PE: diagnosed when had pneumonia; no longer taking Eliquis as per pulmonary.    F/U RLS: takes Ropinirole 1 mg nightly 2 hours before bed and helps;has been taking 2 tablets of Ropinirole nightly due to lower dose did not help.    F/U anxiety: takes Sertraline daily and works well; some trouble falling asleep and staying asleep; has to get up 3 times per night to take air out of colostomy bag; does not feel rested when wakes up; discussed with pulmonary and due to history of borderline sleep study results pt tried CPAP for about 1 month, but no change in symptoms so stopped using; no SI/HI.    F/U heartburn: takes Pantoprazole daily as needed if will be eating foods that will aggravate symptoms.    Also, c/o fatigue; would like to get testosterone checked; has been on testosterone replacement in past.    Has been taking Vitamin D daily; needs refill.        The following portions of the patient's history were reviewed and updated as appropriate: allergies, current medications, past family history, past medical history, past social history, past surgical history and problem list.        Current Outpatient Medications   Medication Sig Dispense Refill    B  Complex Vitamins (VITAMIN-B COMPLEX PO) Take 1 tablet by mouth Daily.      Cholecalciferol (Vitamin D-3) 125 MCG (5000 UT) tablet Take 1 tablet by mouth Daily. 90 tablet 1    pantoprazole (PROTONIX) 20 MG EC tablet Take 1 tablet by mouth Daily. 30 tablet 0    sertraline (ZOLOFT) 100 MG tablet Take 1.5 tablets by mouth Daily. 135 tablet 1    diphenoxylate-atropine (LOMOTIL) 2.5-0.025 MG per tablet Take 1 tablet by mouth 4 (Four) Times a Day As Needed for Diarrhea. (Patient not taking: Reported on 7/31/2024) 30 tablet 3    fluticasone (FLONASE) 50 MCG/ACT nasal spray 2 sprays into the nostril(s) as directed by provider Daily. (Patient not taking: Reported on 7/31/2024) 18.2 mL 2    rOPINIRole (REQUIP) 1 MG tablet Take 1 tablet by mouth Every Night. Take 1 hour before bedtime. 90 tablet 1     No current facility-administered medications for this visit.       Past Medical History:   Diagnosis Date    Adenocarcinoma of rectum 06/2015    invasive adenocarcinoma of rectum w 59 benign lymph nodes    Adenomatous polyposis     FAMILIAL     Anxiety     At risk for sleep apnea     Chicken pox     patient reports having as a child    Depression     Herpes zoster without complication 12/03/2021    History of anemia     Hypertension     Hyponatremia     Insomnia     Malignant neoplasm of rectum 06/01/2016    Parastomal hernia without obstruction or gangrene 02/15/2021    Added automatically from request for surgery [id number]    Pneumonia involving right lung 08/27/2023    Pulmonary nodule 01/10/2020    6/26/20 CT chest: 6 mm nodule resolved.    Renal calculi     RLS (restless legs syndrome)     Syncope 03/19/2021    prolonged QT interval    Testicular hypofunction     Vitamin D deficiency        Past Surgical History:   Procedure Laterality Date    ABDOMINOPERINEAL PROCTOCOLECTOMY  07/06/2015    DR MEET RUDOLPH    BRONCHOSCOPY N/A 8/28/2023    Procedure: BRONCHOSCOPY WITH BRONCHIAL ALVELOR LAVAGE;  Surgeon: Rubin Sood MD;   Location: [facility] ENDOSCOPY;  Service: Pulmonary;  Laterality: N/A;  PRE: RECURRENT PNEUMONIA /   POST: SAME    COLONOSCOPY  06/16/1915    DR SHASTA HOWARD    COLONOSCOPY N/A 2/23/2021    Procedure: ILEOSCOPY, EXCISION OF ILEOSTOMY NODULE;  Surgeon: Pal Crane MD;  Location: [facility] MAIN OR;  Service: General;  Laterality: N/A;    ENDOSCOPY  06/23/2015    DR SHASTA HOWARD    EXPLORATORY LAPAROTOMY N/A 2/3/2017    Procedure: LAPAROTOMY EXPLORATORY  SMALL BOWEL OBSTRUCTION;  Surgeon: Pal Crane MD;  Location: [facility] MAIN OR;  Service:     ILEOSTOMY  07/06/2015    MYRINGOTOMY W/ TUBES      2-3 times as child       Family History   Problem Relation Age of Onset    Colon cancer Father         familial polyposis    Aneurysm Maternal Grandfather 66        brain aneurysm    Colon cancer Paternal Grandfather         familial polyposis    Hypertension Maternal Uncle     Diabetes Maternal Uncle     Malig Hyperthermia Neg Hx        Social History     Socioeconomic History    Marital status:      Spouse name: Hansa    Years of education: college   Tobacco Use    Smoking status: Never    Smokeless tobacco: Current     Types: Chew    Tobacco comments:     CHEWS TOBACCO   Vaping Use    Vaping status: Never Used   Substance and Sexual Activity    Alcohol use: Yes     Alcohol/week: 24.0 standard drinks of alcohol     Types: 24 Cans of beer per week     Comment: Some days 24 beers a day. Notmally  at least 18; stopped drinking 6/10/23    Drug use: No    Sexual activity: Not Currently     Partners: Female       Review of Systems   Constitutional:  Positive for fatigue. Negative for appetite change, chills, fever, unexpected weight gain and unexpected weight loss.   HENT:  Negative for sinus pressure and trouble swallowing.    Eyes:  Eye discharge: see HPI. Eye redness: mild.   Respiratory:  Negative for cough, chest tightness and shortness of breath.    Cardiovascular:  Negative for chest pain, palpitations and leg  "swelling.   Gastrointestinal:  Negative for abdominal pain and blood in stool.   Genitourinary:  Negative for dysuria and frequency.   Skin:  Negative for rash.   Neurological:  Negative for dizziness, syncope, light-headedness and headache.       Objective   Vitals:    07/31/24 1346   BP: 120/78   BP Location: Left arm   Patient Position: Sitting   Cuff Size: Adult   Pulse: 61   Temp: 97.3 °F (36.3 °C)   SpO2: 96%   Weight: 117 kg (258 lb)   Height: 170.2 cm (67\")     Body mass index is 40.41 kg/m².    Physical Exam  Vitals and nursing note reviewed.   Constitutional:       General: He is not in acute distress.     Appearance: He is well-developed and well-groomed. He is not diaphoretic.   HENT:      Head: Normocephalic.      Right Ear: Tympanic membrane and external ear normal. No decreased hearing noted.      Left Ear: External ear normal. No decreased hearing noted. Left ear middle ear effusion: TM dull. Tympanic membrane is not erythematous.      Nose: Nose normal.      Right Sinus: No maxillary sinus tenderness or frontal sinus tenderness.      Left Sinus: No maxillary sinus tenderness or frontal sinus tenderness.      Mouth/Throat:      Mouth: Mucous membranes are moist.      Pharynx: No oropharyngeal exudate or posterior oropharyngeal erythema.   Eyes:      Extraocular Movements:      Right eye: Normal extraocular motion.      Left eye: Normal extraocular motion.      Conjunctiva/sclera:      Right eye: Right eye conjunctiva injected: mild.      Left eye: Left eye conjunctiva injected: mild.      Pupils: Pupils are equal, round, and reactive to light.      Comments: No pain with EOM   Neck:      Vascular: No carotid bruit.   Cardiovascular:      Rate and Rhythm: Normal rate and regular rhythm.      Pulses: Normal pulses.      Heart sounds: Normal heart sounds. No murmur heard.  Pulmonary:      Effort: Pulmonary effort is normal. No respiratory distress.      Breath sounds: Normal breath sounds.   Abdominal: "      General: Bowel sounds are normal.      Palpations: Abdomen is soft. There is no hepatomegaly or splenomegaly.      Tenderness: There is no abdominal tenderness. There is no guarding.   Musculoskeletal:      Cervical back: Normal range of motion and neck supple.      Right lower leg: No edema.      Left lower leg: No edema.   Lymphadenopathy:      Cervical: No cervical adenopathy.   Skin:     General: Skin is warm and dry.      Findings: No rash.   Neurological:      Mental Status: He is alert and oriented to person, place, and time.      Gait: Gait normal.   Psychiatric:         Mood and Affect: Mood normal.         Behavior: Behavior normal.         Thought Content: Thought content normal.         Cognition and Memory: Cognition normal.         Judgment: Judgment normal.         Lab Results   Component Value Date    WBC 7.65 04/01/2024    RBC 4.66 04/01/2024    HGB 14.0 04/01/2024    HCT 42.6 04/01/2024    MCV 91.4 04/01/2024    MCH 30.0 04/01/2024    MCHC 32.9 04/01/2024    RDW 13.8 04/01/2024    RDWSD 44.1 10/20/2023    MPV 11.8 10/20/2023     04/01/2024    NEUTRORELPCT 67.2 04/01/2024    LYMPHORELPCT 17.3 (L) 04/01/2024    MONORELPCT 8.2 04/01/2024    EOSRELPCT 6.7 (H) 04/01/2024    BASORELPCT 0.5 04/01/2024    AUTOIGPER 0.3 10/20/2023    NEUTROABS 5.14 04/01/2024    LYMPHSABS 1.32 04/01/2024    MONOSABS 0.63 04/01/2024    EOSABS 0.51 (H) 04/01/2024    BASOSABS 0.04 04/01/2024    AUTOIGNUM 0.03 10/20/2023    NRBC 0.0 04/01/2024     Lab Results   Component Value Date    GLUCOSE 96 04/01/2024    BUN 9 04/01/2024    CREATININE 0.92 04/01/2024    EGFRIFNONA 112 10/15/2021    EGFRIFAFRI 135 10/15/2021    BCR 9.8 04/01/2024    K 4.4 04/01/2024    CO2 29.2 (H) 04/01/2024    CALCIUM 9.3 04/01/2024    PROTENTOTREF 6.5 04/01/2024    ALBUMIN 4.0 04/01/2024    LABIL2 1.6 04/01/2024    AST 23 04/01/2024    ALT 21 04/01/2024      Lab Results   Component Value Date    CHLPL 176 04/01/2024    TRIG 152 (H) 04/01/2024     HDL 46 04/01/2024    VLDL 27 04/01/2024     (H) 04/01/2024     Lab Results   Component Value Date    TSH 1.040 09/06/2023     Lab Results   Component Value Date    HGBA1C 5.6 05/26/2023     Lab Results   Component Value Date    PSA 0.5 09/06/2023     Lab Results   Component Value Date    WBCU 0-2 07/12/2015    RBCUA 0-2 07/12/2015    BACTERIA None Seen 07/12/2015    LABPH 6.0 07/12/2015    COLORU Yellow 02/05/2021    CLARITYU Clear 02/05/2021    LEUKOCYTESUR Negative 02/05/2021    GLUCOSEU Negative 02/05/2021    BLOODU Negative 02/05/2021    BILIRUBINUR Negative 02/05/2021    NITRITEU Negative 02/05/2021          Assessment    Problem List Items Addressed This Visit       Essential hypertension    Current Assessment & Plan     Hypertension is stable off medication.  Continue current treatment regimen.  Regular aerobic exercise.  Ambulatory blood pressure monitoring.  Blood pressure will be reassessed in 6 months.         Vitamin D deficiency    Relevant Medications    Cholecalciferol (Vitamin D-3) 125 MCG (5000 UT) tablet    Other Relevant Orders    Vitamin D,25-Hydroxy    Depression    Current Assessment & Plan     Patient's depression is a recurrent episode that is mild without psychosis. Depression is active and stable.    Plan:   Continue current medication therapy   Continue Sertraline daily.  Followup in 6 months.          Relevant Medications    sertraline (ZOLOFT) 100 MG tablet    Anxiety    Current Assessment & Plan     Stable.  Continue Sertraline daily.         Relevant Medications    sertraline (ZOLOFT) 100 MG tablet    Heartburn    Current Assessment & Plan     Stable.  Continue Pantoprazole daily as needed.         Fatigue    Relevant Orders    Testosterone (Free & Total), LC / MS    Comprehensive Metabolic Panel    CBC & Differential    Acute conjunctivitis of both eyes - Primary    Current Assessment & Plan     Try over the counter allergy eye drop, such as Pataday.         Restless leg  syndrome    Current Assessment & Plan     Stable.  Continue Ropinirole nightly.         Relevant Medications    rOPINIRole (REQUIP) 1 MG tablet    Other Relevant Orders    Ferritin        Come back for early morning labs.  Return in about 6 months (around 1/31/2025) for Recheck.or sooner if symptoms persist or worsen.  Symptoms of conjunctivitis improved today; discussed allergic vs viral conjunctivitis; despite no itching, will try Pataday eye drops and see if helps.

## 2024-08-01 PROBLEM — G25.81 RESTLESS LEG SYNDROME: Status: ACTIVE | Noted: 2024-08-01

## 2024-08-01 PROBLEM — J18.9 PNEUMONIA INVOLVING RIGHT LUNG: Status: RESOLVED | Noted: 2023-08-27 | Resolved: 2024-08-01

## 2024-08-01 NOTE — ASSESSMENT & PLAN NOTE
Patient's depression is a recurrent episode that is mild without psychosis. Depression is active and stable.    Plan:   Continue current medication therapy   Continue Sertraline daily.  Followup in 6 months.

## 2024-08-01 NOTE — ASSESSMENT & PLAN NOTE
Hypertension is stable off medication.  Continue current treatment regimen.  Regular aerobic exercise.  Ambulatory blood pressure monitoring.  Blood pressure will be reassessed in 6 months.

## 2024-08-13 NOTE — LETTER
August 29, 2023     Patient: Nikolai Shaw   YOB: 1970   Date of Visit: 8/27/2023       To Whom It May Concern:    It is my medical opinion that Nikolai Shaw may return to work on   .           Sincerely,        No name on file    CC:   No Recipients   22

## 2024-09-21 ENCOUNTER — APPOINTMENT (OUTPATIENT)
Dept: CT IMAGING | Facility: HOSPITAL | Age: 54
End: 2024-09-21
Payer: COMMERCIAL

## 2024-09-21 ENCOUNTER — HOSPITAL ENCOUNTER (OUTPATIENT)
Facility: HOSPITAL | Age: 54
Setting detail: OBSERVATION
Discharge: HOME | End: 2024-09-24
Attending: EMERGENCY MEDICINE | Admitting: STUDENT IN AN ORGANIZED HEALTH CARE EDUCATION/TRAINING PROGRAM
Payer: COMMERCIAL

## 2024-09-21 DIAGNOSIS — K56.609 SMALL BOWEL OBSTRUCTION: Primary | ICD-10-CM

## 2024-09-21 PROBLEM — R10.9 ACUTE ABDOMINAL PAIN: Status: ACTIVE | Noted: 2024-09-21

## 2024-09-21 LAB
ALBUMIN SERPL-MCNC: 4.4 G/DL (ref 3.5–5.2)
ALBUMIN/GLOB SERPL: 1.6 G/DL
ALP SERPL-CCNC: 131 U/L (ref 39–117)
ALT SERPL W P-5'-P-CCNC: 24 U/L (ref 1–41)
ANION GAP SERPL CALCULATED.3IONS-SCNC: 10 MMOL/L (ref 5–15)
AST SERPL-CCNC: 22 U/L (ref 1–40)
BASOPHILS # BLD AUTO: 0.04 10*3/MM3 (ref 0–0.2)
BASOPHILS NFR BLD AUTO: 0.4 % (ref 0–1.5)
BILIRUB SERPL-MCNC: 0.5 MG/DL (ref 0–1.2)
BUN SERPL-MCNC: 10 MG/DL (ref 6–20)
BUN/CREAT SERPL: 10.1 (ref 7–25)
CALCIUM SPEC-SCNC: 9.5 MG/DL (ref 8.6–10.5)
CHLORIDE SERPL-SCNC: 106 MMOL/L (ref 98–107)
CO2 SERPL-SCNC: 26 MMOL/L (ref 22–29)
CREAT SERPL-MCNC: 0.99 MG/DL (ref 0.76–1.27)
D-LACTATE SERPL-SCNC: 1.6 MMOL/L (ref 0.5–2)
DEPRECATED RDW RBC AUTO: 46 FL (ref 37–54)
EGFRCR SERPLBLD CKD-EPI 2021: 90.5 ML/MIN/1.73
EOSINOPHIL # BLD AUTO: 0.03 10*3/MM3 (ref 0–0.4)
EOSINOPHIL NFR BLD AUTO: 0.3 % (ref 0.3–6.2)
ERYTHROCYTE [DISTWIDTH] IN BLOOD BY AUTOMATED COUNT: 13.3 % (ref 12.3–15.4)
GLOBULIN UR ELPH-MCNC: 2.8 GM/DL
GLUCOSE SERPL-MCNC: 122 MG/DL (ref 65–99)
HCT VFR BLD AUTO: 44.9 % (ref 37.5–51)
HGB BLD-MCNC: 15 G/DL (ref 13–17.7)
HOLD SPECIMEN: NORMAL
HOLD SPECIMEN: NORMAL
IMM GRANULOCYTES # BLD AUTO: 0.02 10*3/MM3 (ref 0–0.05)
IMM GRANULOCYTES NFR BLD AUTO: 0.2 % (ref 0–0.5)
LIPASE SERPL-CCNC: 28 U/L (ref 13–60)
LYMPHOCYTES # BLD AUTO: 0.54 10*3/MM3 (ref 0.7–3.1)
LYMPHOCYTES NFR BLD AUTO: 5.4 % (ref 19.6–45.3)
MAGNESIUM SERPL-MCNC: 2 MG/DL (ref 1.6–2.6)
MCH RBC QN AUTO: 31.1 PG (ref 26.6–33)
MCHC RBC AUTO-ENTMCNC: 33.4 G/DL (ref 31.5–35.7)
MCV RBC AUTO: 93.2 FL (ref 79–97)
MONOCYTES # BLD AUTO: 0.3 10*3/MM3 (ref 0.1–0.9)
MONOCYTES NFR BLD AUTO: 3 % (ref 5–12)
NEUTROPHILS NFR BLD AUTO: 9.04 10*3/MM3 (ref 1.7–7)
NEUTROPHILS NFR BLD AUTO: 90.7 % (ref 42.7–76)
NRBC BLD AUTO-RTO: 0 /100 WBC (ref 0–0.2)
PLATELET # BLD AUTO: 158 10*3/MM3 (ref 140–450)
PMV BLD AUTO: 10.9 FL (ref 6–12)
POTASSIUM SERPL-SCNC: 4.4 MMOL/L (ref 3.5–5.2)
PROT SERPL-MCNC: 7.2 G/DL (ref 6–8.5)
RBC # BLD AUTO: 4.82 10*6/MM3 (ref 4.14–5.8)
SODIUM SERPL-SCNC: 142 MMOL/L (ref 136–145)
WBC NRBC COR # BLD AUTO: 9.97 10*3/MM3 (ref 3.4–10.8)
WHOLE BLOOD HOLD COAG: NORMAL
WHOLE BLOOD HOLD SPECIMEN: NORMAL

## 2024-09-21 PROCEDURE — 96374 THER/PROPH/DIAG INJ IV PUSH: CPT

## 2024-09-21 PROCEDURE — 25010000002 HYDROMORPHONE 1 MG/ML SOLUTION: Performed by: EMERGENCY MEDICINE

## 2024-09-21 PROCEDURE — 25810000003 SODIUM CHLORIDE 0.9 % SOLUTION: Performed by: EMERGENCY MEDICINE

## 2024-09-21 PROCEDURE — 96361 HYDRATE IV INFUSION ADD-ON: CPT

## 2024-09-21 PROCEDURE — 83690 ASSAY OF LIPASE: CPT | Performed by: EMERGENCY MEDICINE

## 2024-09-21 PROCEDURE — 25010000002 ONDANSETRON PER 1 MG: Performed by: EMERGENCY MEDICINE

## 2024-09-21 PROCEDURE — 83735 ASSAY OF MAGNESIUM: CPT | Performed by: EMERGENCY MEDICINE

## 2024-09-21 PROCEDURE — 25510000001 IOPAMIDOL 61 % SOLUTION: Performed by: EMERGENCY MEDICINE

## 2024-09-21 PROCEDURE — 85025 COMPLETE CBC W/AUTO DIFF WBC: CPT | Performed by: EMERGENCY MEDICINE

## 2024-09-21 PROCEDURE — 80053 COMPREHEN METABOLIC PANEL: CPT | Performed by: EMERGENCY MEDICINE

## 2024-09-21 PROCEDURE — 83605 ASSAY OF LACTIC ACID: CPT | Performed by: EMERGENCY MEDICINE

## 2024-09-21 PROCEDURE — 99285 EMERGENCY DEPT VISIT HI MDM: CPT

## 2024-09-21 PROCEDURE — 96375 TX/PRO/DX INJ NEW DRUG ADDON: CPT

## 2024-09-21 PROCEDURE — 96376 TX/PRO/DX INJ SAME DRUG ADON: CPT

## 2024-09-21 PROCEDURE — G0378 HOSPITAL OBSERVATION PER HR: HCPCS

## 2024-09-21 PROCEDURE — 74177 CT ABD & PELVIS W/CONTRAST: CPT

## 2024-09-21 RX ORDER — DEXTROSE MONOHYDRATE, SODIUM CHLORIDE, AND POTASSIUM CHLORIDE 50; 1.49; 4.5 G/1000ML; G/1000ML; G/1000ML
100 INJECTION, SOLUTION INTRAVENOUS CONTINUOUS
Status: DISCONTINUED | OUTPATIENT
Start: 2024-09-21 | End: 2024-09-24 | Stop reason: HOSPADM

## 2024-09-21 RX ORDER — SODIUM CHLORIDE 0.9 % (FLUSH) 0.9 %
10 SYRINGE (ML) INJECTION AS NEEDED
Status: DISCONTINUED | OUTPATIENT
Start: 2024-09-21 | End: 2024-09-24 | Stop reason: HOSPADM

## 2024-09-21 RX ORDER — SODIUM CHLORIDE 9 MG/ML
125 INJECTION, SOLUTION INTRAVENOUS CONTINUOUS
Status: DISCONTINUED | OUTPATIENT
Start: 2024-09-21 | End: 2024-09-24 | Stop reason: HOSPADM

## 2024-09-21 RX ORDER — ONDANSETRON 2 MG/ML
4 INJECTION INTRAMUSCULAR; INTRAVENOUS EVERY 6 HOURS PRN
Status: DISCONTINUED | OUTPATIENT
Start: 2024-09-21 | End: 2024-09-24 | Stop reason: HOSPADM

## 2024-09-21 RX ORDER — HYDROMORPHONE HYDROCHLORIDE 1 MG/ML
0.5 INJECTION, SOLUTION INTRAMUSCULAR; INTRAVENOUS; SUBCUTANEOUS ONCE
Status: COMPLETED | OUTPATIENT
Start: 2024-09-21 | End: 2024-09-21

## 2024-09-21 RX ORDER — ONDANSETRON 2 MG/ML
4 INJECTION INTRAMUSCULAR; INTRAVENOUS ONCE
Status: COMPLETED | OUTPATIENT
Start: 2024-09-21 | End: 2024-09-21

## 2024-09-21 RX ORDER — HYDROMORPHONE HYDROCHLORIDE 1 MG/ML
0.5 INJECTION, SOLUTION INTRAMUSCULAR; INTRAVENOUS; SUBCUTANEOUS
Status: DISCONTINUED | OUTPATIENT
Start: 2024-09-21 | End: 2024-09-24 | Stop reason: HOSPADM

## 2024-09-21 RX ORDER — IOPAMIDOL 612 MG/ML
100 INJECTION, SOLUTION INTRAVASCULAR
Status: COMPLETED | OUTPATIENT
Start: 2024-09-21 | End: 2024-09-21

## 2024-09-21 RX ORDER — ROPINIROLE 1 MG/1
1 TABLET, FILM COATED ORAL NIGHTLY
Status: DISCONTINUED | OUTPATIENT
Start: 2024-09-21 | End: 2024-09-24 | Stop reason: HOSPADM

## 2024-09-21 RX ADMIN — SODIUM CHLORIDE 500 ML: 9 INJECTION, SOLUTION INTRAVENOUS at 15:22

## 2024-09-21 RX ADMIN — IOPAMIDOL 85 ML: 612 INJECTION, SOLUTION INTRAVENOUS at 17:52

## 2024-09-21 RX ADMIN — HYDROMORPHONE HYDROCHLORIDE 0.5 MG: 1 INJECTION, SOLUTION INTRAMUSCULAR; INTRAVENOUS; SUBCUTANEOUS at 15:25

## 2024-09-21 RX ADMIN — HYDROMORPHONE HYDROCHLORIDE 0.5 MG: 1 INJECTION, SOLUTION INTRAMUSCULAR; INTRAVENOUS; SUBCUTANEOUS at 19:44

## 2024-09-21 RX ADMIN — ONDANSETRON 4 MG: 2 INJECTION INTRAMUSCULAR; INTRAVENOUS at 15:25

## 2024-09-21 RX ADMIN — SODIUM CHLORIDE 125 ML/HR: 9 INJECTION, SOLUTION INTRAVENOUS at 15:22

## 2024-09-22 PROCEDURE — 99222 1ST HOSP IP/OBS MODERATE 55: CPT | Performed by: SURGERY

## 2024-09-22 PROCEDURE — 96376 TX/PRO/DX INJ SAME DRUG ADON: CPT

## 2024-09-22 PROCEDURE — 25010000002 HYDROMORPHONE PER 4 MG: Performed by: SURGERY

## 2024-09-22 PROCEDURE — 96361 HYDRATE IV INFUSION ADD-ON: CPT

## 2024-09-22 PROCEDURE — G0378 HOSPITAL OBSERVATION PER HR: HCPCS

## 2024-09-22 RX ADMIN — HYDROMORPHONE HYDROCHLORIDE 0.5 MG: 1 INJECTION, SOLUTION INTRAMUSCULAR; INTRAVENOUS; SUBCUTANEOUS at 21:00

## 2024-09-22 RX ADMIN — HYDROMORPHONE HYDROCHLORIDE 0.5 MG: 1 INJECTION, SOLUTION INTRAMUSCULAR; INTRAVENOUS; SUBCUTANEOUS at 13:43

## 2024-09-22 RX ADMIN — POTASSIUM CHLORIDE, DEXTROSE MONOHYDRATE AND SODIUM CHLORIDE 100 ML/HR: 150; 5; 450 INJECTION, SOLUTION INTRAVENOUS at 00:01

## 2024-09-22 RX ADMIN — HYDROMORPHONE HYDROCHLORIDE 0.5 MG: 1 INJECTION, SOLUTION INTRAMUSCULAR; INTRAVENOUS; SUBCUTANEOUS at 05:39

## 2024-09-22 RX ADMIN — HYDROMORPHONE HYDROCHLORIDE 0.5 MG: 1 INJECTION, SOLUTION INTRAMUSCULAR; INTRAVENOUS; SUBCUTANEOUS at 18:02

## 2024-09-22 RX ADMIN — HYDROMORPHONE HYDROCHLORIDE 0.5 MG: 1 INJECTION, SOLUTION INTRAMUSCULAR; INTRAVENOUS; SUBCUTANEOUS at 00:01

## 2024-09-22 RX ADMIN — HYDROMORPHONE HYDROCHLORIDE 0.5 MG: 1 INJECTION, SOLUTION INTRAMUSCULAR; INTRAVENOUS; SUBCUTANEOUS at 08:23

## 2024-09-22 RX ADMIN — POTASSIUM CHLORIDE, DEXTROSE MONOHYDRATE AND SODIUM CHLORIDE 100 ML/HR: 150; 5; 450 INJECTION, SOLUTION INTRAVENOUS at 21:00

## 2024-09-22 RX ADMIN — ROPINIROLE 1 MG: 1 TABLET, FILM COATED ORAL at 20:44

## 2024-09-22 RX ADMIN — SERTRALINE 150 MG: 100 TABLET, FILM COATED ORAL at 08:20

## 2024-09-22 RX ADMIN — POTASSIUM CHLORIDE, DEXTROSE MONOHYDRATE AND SODIUM CHLORIDE 100 ML/HR: 150; 5; 450 INJECTION, SOLUTION INTRAVENOUS at 12:08

## 2024-09-23 LAB
BILIRUB UR QL STRIP: NEGATIVE
CLARITY UR: CLEAR
COLOR UR: YELLOW
GLUCOSE UR STRIP-MCNC: NEGATIVE MG/DL
HGB UR QL STRIP.AUTO: NEGATIVE
KETONES UR QL STRIP: NEGATIVE
LEUKOCYTE ESTERASE UR QL STRIP.AUTO: NEGATIVE
NITRITE UR QL STRIP: NEGATIVE
PH UR STRIP.AUTO: <=5 [PH] (ref 5–8)
PROT UR QL STRIP: NEGATIVE
SP GR UR STRIP: 1.01 (ref 1–1.03)
UROBILINOGEN UR QL STRIP: NORMAL

## 2024-09-23 PROCEDURE — G0378 HOSPITAL OBSERVATION PER HR: HCPCS

## 2024-09-23 PROCEDURE — 96361 HYDRATE IV INFUSION ADD-ON: CPT

## 2024-09-23 PROCEDURE — 25010000002 HYDROMORPHONE PER 4 MG: Performed by: SURGERY

## 2024-09-23 PROCEDURE — 96376 TX/PRO/DX INJ SAME DRUG ADON: CPT

## 2024-09-23 PROCEDURE — 99231 SBSQ HOSP IP/OBS SF/LOW 25: CPT

## 2024-09-23 PROCEDURE — 81003 URINALYSIS AUTO W/O SCOPE: CPT | Performed by: EMERGENCY MEDICINE

## 2024-09-23 RX ORDER — ACETAMINOPHEN 325 MG/1
650 TABLET ORAL EVERY 6 HOURS PRN
Status: DISCONTINUED | OUTPATIENT
Start: 2024-09-23 | End: 2024-09-24 | Stop reason: HOSPADM

## 2024-09-23 RX ADMIN — ROPINIROLE 1 MG: 1 TABLET, FILM COATED ORAL at 20:34

## 2024-09-23 RX ADMIN — HYDROMORPHONE HYDROCHLORIDE 0.5 MG: 1 INJECTION, SOLUTION INTRAMUSCULAR; INTRAVENOUS; SUBCUTANEOUS at 18:50

## 2024-09-23 RX ADMIN — POTASSIUM CHLORIDE, DEXTROSE MONOHYDRATE AND SODIUM CHLORIDE 100 ML/HR: 150; 5; 450 INJECTION, SOLUTION INTRAVENOUS at 16:18

## 2024-09-23 RX ADMIN — ACETAMINOPHEN 325MG 650 MG: 325 TABLET ORAL at 16:24

## 2024-09-23 RX ADMIN — HYDROMORPHONE HYDROCHLORIDE 0.5 MG: 1 INJECTION, SOLUTION INTRAMUSCULAR; INTRAVENOUS; SUBCUTANEOUS at 06:44

## 2024-09-23 RX ADMIN — HYDROMORPHONE HYDROCHLORIDE 0.5 MG: 1 INJECTION, SOLUTION INTRAMUSCULAR; INTRAVENOUS; SUBCUTANEOUS at 21:53

## 2024-09-23 RX ADMIN — POTASSIUM CHLORIDE, DEXTROSE MONOHYDRATE AND SODIUM CHLORIDE 100 ML/HR: 150; 5; 450 INJECTION, SOLUTION INTRAVENOUS at 06:39

## 2024-09-23 RX ADMIN — SERTRALINE 150 MG: 100 TABLET, FILM COATED ORAL at 08:41

## 2024-09-23 RX ADMIN — HYDROMORPHONE HYDROCHLORIDE 0.5 MG: 1 INJECTION, SOLUTION INTRAMUSCULAR; INTRAVENOUS; SUBCUTANEOUS at 12:22

## 2024-09-23 RX ADMIN — HYDROMORPHONE HYDROCHLORIDE 0.5 MG: 1 INJECTION, SOLUTION INTRAMUSCULAR; INTRAVENOUS; SUBCUTANEOUS at 00:58

## 2024-09-24 ENCOUNTER — READMISSION MANAGEMENT (OUTPATIENT)
Dept: CALL CENTER | Facility: HOSPITAL | Age: 54
End: 2024-09-24
Payer: COMMERCIAL

## 2024-09-24 VITALS
BODY MASS INDEX: 38.52 KG/M2 | WEIGHT: 254.19 LBS | HEART RATE: 40 BPM | TEMPERATURE: 97.9 F | DIASTOLIC BLOOD PRESSURE: 79 MMHG | RESPIRATION RATE: 20 BRPM | SYSTOLIC BLOOD PRESSURE: 139 MMHG | HEIGHT: 68 IN | OXYGEN SATURATION: 97 %

## 2024-09-24 PROBLEM — R10.9 ACUTE ABDOMINAL PAIN: Status: RESOLVED | Noted: 2024-09-21 | Resolved: 2024-09-24

## 2024-09-24 PROCEDURE — G0378 HOSPITAL OBSERVATION PER HR: HCPCS

## 2024-09-24 PROCEDURE — 99238 HOSP IP/OBS DSCHRG MGMT 30/<: CPT

## 2024-09-24 PROCEDURE — 25010000002 HYDROMORPHONE PER 4 MG: Performed by: SURGERY

## 2024-09-24 PROCEDURE — 96361 HYDRATE IV INFUSION ADD-ON: CPT

## 2024-09-24 PROCEDURE — 96376 TX/PRO/DX INJ SAME DRUG ADON: CPT

## 2024-09-24 RX ADMIN — HYDROMORPHONE HYDROCHLORIDE 0.5 MG: 1 INJECTION, SOLUTION INTRAMUSCULAR; INTRAVENOUS; SUBCUTANEOUS at 01:21

## 2024-09-24 RX ADMIN — POTASSIUM CHLORIDE, DEXTROSE MONOHYDRATE AND SODIUM CHLORIDE 100 ML/HR: 150; 5; 450 INJECTION, SOLUTION INTRAVENOUS at 01:25

## 2024-09-24 RX ADMIN — SERTRALINE 150 MG: 100 TABLET, FILM COATED ORAL at 08:30

## 2024-09-24 RX ADMIN — HYDROMORPHONE HYDROCHLORIDE 0.5 MG: 1 INJECTION, SOLUTION INTRAMUSCULAR; INTRAVENOUS; SUBCUTANEOUS at 03:49

## 2024-09-25 ENCOUNTER — TRANSITIONAL CARE MANAGEMENT TELEPHONE ENCOUNTER (OUTPATIENT)
Dept: CALL CENTER | Facility: HOSPITAL | Age: 54
End: 2024-09-25
Payer: COMMERCIAL

## 2025-01-15 NOTE — ED PROVIDER NOTES
EMERGENCY DEPARTMENT ENCOUNTER    Room Number:  23/23  PCP: Ekta Zepeda APRN  Historian: Patient, spouse      HPI:  Chief Complaint: Increased ileostomy output  A complete HPI/ROS/PMH/PSH/SH/FH are unobtainable due to: Nothing  Context: Nikolai Shaw is a 53 y.o. male who presents to the ED by private vehicle from home c/o increased ileostomy output for the past 2 days.  Patient states he feels dehydrated.  Ostomy output has been more watery than normal.  He has been drinking Pedialyte.  He reports increased fatigue, generalized weakness, and intermittent lightheadedness.  He feels lightheaded when he stands or sits up.  Denies fever, chills, chest pain, shortness of breath, syncope, abdominal pain, nausea, or vomiting.  He has had similar episodes in the past with dehydration.            PAST MEDICAL HISTORY  Active Ambulatory Problems     Diagnosis Date Noted    History of rectal cancer 02/09/2018    Hyponatremia 07/27/2018    Polyp of ileum 02/15/2021    Anemia 03/19/2021    Essential hypertension 03/19/2021    Vitamin D deficiency 03/19/2021    Depression     Anxiety     Sleep apnea 04/16/2021    Heartburn 05/14/2021    Monoallelic mutation of APC gene 08/10/2021    Family history of FAP (familial adenomatous polyposis) 07/09/2021    Allergic rhinitis 06/17/2022    Vitamin B12 deficiency 06/17/2022    Prediabetes 06/23/2022    Ingrown toenail of left foot 12/03/2022    Dizziness 08/02/2023    Sleep disturbances 08/02/2023    Acute pulmonary embolism without acute cor pulmonale 08/27/2023    Pneumonia involving right lung 08/27/2023    Obesity (BMI 30-39.9) 08/27/2023    Acute non-recurrent sinusitis 09/07/2023    Fatigue 09/07/2023     Resolved Ambulatory Problems     Diagnosis Date Noted    Malignant neoplasm of rectum 06/01/2016    SBO (small bowel obstruction) 01/19/2017    Small bowel obstruction 01/28/2017    Pulmonary nodule 01/10/2020    Parastomal hernia 02/05/2021    Parastomal hernia without  [facility] Hospital Medicine Progress Note        Chief Complaint: Inpatient Follow-up for left thigh swelling     HPI:   Mildred Atkins is a 48 y.o. male who PMH Includes HTN, CAD, ESRD with HD, anemia, hyperparathyroidism; presents to the ED at [facility] on 1/12/2025 transferred from [facility] for higher level of care and further evaluation of left leg pain complaints.  Patient reports he has been having left leg pain off and on over the past  2 week progressively getting worse. Pt reports he started to have swelling which started in his left groin/thigh area.  Patient reports he was seen at [facility] ED for this with need to follow-up with his PCP.  Patient reports the pain and swelling worsened thus he presented to [facility] ED again.  X-ray of the left femur impression reviewed from outside hospital demonstrated no evidence of acute fracture dislocation.  Of the left lower extremity done at outside hospital impression reviewed demonstrated extensive soft tissue edema noted, heterogeneous mass versus hematoma noted involving the anterior quadriceps muscle, recommend MRI for further evaluation.  Chest x-ray done at outside hospital impression reviewed demonstrated no evidence of acute cardiopulmonary process radiographically.    Initial vital signs /92 pulse 136 respirations 20 temperature 102.2° F O2 saturation 98% on room air.  Patient did have blood cultures collected at OSH.  Patient was started on IV vancomycin and Zosyn therapy.  At the time of rounds patient's heart rate had trended down to 110's. Pt denies any CP, SOB, N/V/D.     Patient was started on broad spectrum antibiotics. HD started per renal team. Patients h&h dropped and he was transfused 1 unit of PRBC. Since the swelling was firm and non mobile there was a high suspicion for an infected hematoma vs a sarcoma. 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 Seen by ortho team and recommended  obstruction or gangrene 02/15/2021    Syncope 03/19/2021    Herpes zoster without complication 12/03/2021    Acute respiratory failure with hypoxia 08/27/2023     Past Medical History:   Diagnosis Date    Adenocarcinoma of rectum 06/2015    Adenomatous polyposis     At risk for sleep apnea     Chicken pox     History of anemia     Hypertension     Insomnia     Renal calculi     RLS (restless legs syndrome)     Testicular hypofunction          PAST SURGICAL HISTORY  Past Surgical History:   Procedure Laterality Date    ABDOMINOPERINEAL PROCTOCOLECTOMY  07/06/2015    DR MEET RUDOLPH    BRONCHOSCOPY N/A 8/28/2023    Procedure: BRONCHOSCOPY WITH BRONCHIAL ALVELOR LAVAGE;  Surgeon: Rubin Sood MD;  Location: [facility] ENDOSCOPY;  Service: Pulmonary;  Laterality: N/A;  PRE: RECURRENT PNEUMONIA /   POST: SAME    COLONOSCOPY  06/16/1915    DR SHASTA HOWARD    COLONOSCOPY N/A 2/23/2021    Procedure: ILEOSCOPY, EXCISION OF ILEOSTOMY NODULE;  Surgeon: Meet Rudoplh MD;  [facility] MA[facility];  Service: General;  Laterality: N/A;    ENDOSCOPY  06/23/2015    DR SHASTA HOWARD    EXPLORATORY LAPAROTOMY N/A 2/3/2017    Procedure: LAPAROTOMY EXPLORATORY  SMALL BOWEL OBSTRUCTION;  Surgeon: Meet Rudolph MD;  [facility];  Service:     ILEOSTOMY  07/06/2015    MYRINGOTOMY W/ TUBES      2-3 times as child         FAMILY HISTORY  Family History   Problem Relation Age of Onset    Colon cancer Father         familial polyposis    Aneurysm Maternal Grandfather 66        brain aneurysm    Colon cancer Paternal Grandfather         familial polyposis    Hypertension Maternal Uncle     Diabetes Maternal Uncle     Malig Hyperthermia Neg Hx          SOCIAL HISTORY  Social History     Socioeconomic History    Marital status:      Spouse name: Hansa    Years of education: college   Tobacco Use    Smoking status: Never    Smokeless tobacco: Current     Types: Chew    Tobacco comments:     CHEWS TOBACCO   Vaping  transfer to hospital with ortho oncology service. CM notified.     Patient and family notified and they are ok with the plan.    Interval Hx:   Patient today awake and comfortable. He has been afebrile. Informed him about ortho team recommendations. He is ok with the transfer.     Case was discussed with patient's nurse and  on the floor.    Objective/physical exam:  General: In no acute distress  Chest: Clear to auscultation bilaterally  Heart: RRR, +S1, S2, no appreciable murmur  Abdomen: Soft, nontender, BS +  MSK: + left thigh firm swelling and non mobile, mild tenderness   Neurologic: Alert and oriented x4, Cranial nerve II-XII intact, Strength 5/5 in all 4 extremities    VITAL SIGNS: 24 HRS MIN & MAX LAST   Temp  Min: 98.1 °F (36.7 °C)  Max: 99.4 °F (37.4 °C) 98.9 °F (37.2 °C)   BP  Min: 98/63  Max: 172/98 (!) 108/58   Pulse  Min: 117  Max: 145  (!) 118   Resp  Min: 18  Max: 22 18   SpO2  Min: 95 %  Max: 100 % 96 %     I have reviewed the following labs:  Recent Labs   Lab 01/13/25  0023 01/14/25  0658 01/15/25  0031 01/15/25  0339   WBC 46.88  46.88* 45.86*  --  32.38*   RBC 2.48* 2.28*  --  2.04*   HGB 8.4* 7.6* 7.1* 6.8*   HCT 25.2* 23.6* 22.0* 21.1*   .6* 103.5*  --  103.4*   MCH 33.9* 33.3*  --  33.3*   MCHC 33.3 32.2*  --  32.2*   RDW 12.6 12.7  --  12.8    376  --  384   MPV 9.7 9.9  --  10.1     Recent Labs   Lab 01/13/25  0023 01/14/25  0658 01/15/25  0339    134* 135*   K 3.8 4.1 4.3    98 100   CO2 22 21* 22   BUN 59.3* 80.6* 51.4*   CREATININE 6.92* 9.33* 7.16*   CALCIUM 10.2 10.1 10.0   MG 1.90  --   --    ALBUMIN 2.2* 2.2*  --    ALKPHOS 88 88  --    ALT 15 15  --    AST 15 14  --    BILITOT 0.4 0.4  --      Microbiology Results (last 7 days)       Procedure Component Value Units Date/Time    Blood Culture [[id number]] Collected: 01/14/25 1752    Order Status: Resulted Specimen: Blood Updated: 01/14/25 1900    Blood Culture [[id number]] Collected: 01/14/25  1752    Order Status: Resulted Specimen: Blood Updated: 01/14/25 1900           Peripheral Smear Evaluation - Leukocytosis with predominantly mature granulocytes; no circulating blasts  - Macrocytic anemia.       See below for Radiology    Assessment/Plan:  Acute left leg pain and swelling- POA  Left thigh sarcoma   SIRS, no sepsis   NSTEMI- type 2 from sepsis   Anemia- chronic disease, ? Acute blood loss in the left thigh mass   HTN- urgency- POA  ESRD with HD- POA    Weakness- POA    Plan:  Patient has no new complaints  Pain controlled and He has been afebrile.   Continue IV Vanc + zosyn for now   Blood cultures so far negative     MRI left thigh more consistent with sarcoma  Seen by ortho team and recommend transfer to ortho oncology service   CM working on transfer     He is also anemic, Hb <7. Will be transfused 1 unit of PRBD  Suspect bleeding in the left thigh tumor     Continue HD per renal team    Peripheral smear showed no blast     Continue supportive care       VTE prophylaxis: SCD    Patient condition:  Guarded    Anticipated discharge and Disposition:  Transfer       All diagnosis and differential diagnosis have been reviewed; assessment and plan has been documented; I have personally reviewed the labs and test results that are presently available; I have reviewed the patients medication list; I have reviewed the consulting providers response and recommendations. I have reviewed or attempted to review medical records based upon their availability    All of the patient's questions have been  addressed and answered. Patient's is agreeable to the above stated plan. I will continue to monitor closely and make adjustments to medical management as needed.    Portions of this note dictated using EMR integrated voice recognition software, and may be subject to voice recognition errors not corrected at proofreading. Please contact writer for clarification if needed.  Use    Vaping Use: Never used   Substance and Sexual Activity    Alcohol use: Yes     Alcohol/week: 24.0 standard drinks of alcohol     Types: 24 Cans of beer per week     Comment: Some days 24 beers a day. Notmally  at least 18; stopped drinking 6/10/23    Drug use: No    Sexual activity: Not Currently     Partners: Female         ALLERGIES  Patient has no known allergies.    REVIEW OF SYSTEMS  All systems have been reviewed and are negative except for those listed in the HPI      PHYSICAL EXAM  ED Triage Vitals   Temp Heart Rate Resp BP SpO2   10/20/23 1058 10/20/23 1058 10/20/23 1058 10/20/23 1101 10/20/23 1058   98.3 °F (36.8 °C) 101 16 144/94 96 %      Temp src Heart Rate Source Patient Position BP Location FiO2 (%)   10/20/23 1058 10/20/23 1058 10/20/23 1101 10/20/23 1101 --   Tympanic Monitor Lying Right arm        Physical Exam      GENERAL: Awake, alert, oriented x3.  Well-developed, well-nourished and nontoxic-appearing male.  Resting comfortably in no acute distress  HENT: NCAT, nares patent, mildly dry mucous membranes  EYES: EOMI, no nystagmus  CV: regular rhythm, normal rate  RESPIRATORY: normal effort, clear to auscultation bilaterally  ABDOMEN: soft, nontender, nondistended, ostomy is present in the right abdomen, ostomy bag contains liquid brown stool  MUSCULOSKELETAL: Extremities are nontender with full range of motion  NEURO: Speech is normal.  No facial droop.  Follows commands  PSYCH:  calm, cooperative  SKIN: warm, dry    Vital signs and nursing notes reviewed.          LAB RESULTS  Recent Results (from the past 24 hour(s))   Comprehensive Metabolic Panel    Collection Time: 10/20/23 11:15 AM    Specimen: Blood   Result Value Ref Range    Glucose 107 (H) 65 - 99 mg/dL    BUN 23 (H) 6 - 20 mg/dL    Creatinine 1.28 (H) 0.76 - 1.27 mg/dL    Sodium 137 136 - 145 mmol/L    Potassium 3.7 3.5 - 5.2 mmol/L    Chloride 99 98 - 107 mmol/L    CO2 23.2 22.0 - 29.0 mmol/L    Calcium 10.6 (H) 8.6 - 10.5    _____________________________________________________________________    Malnutrition Status:  Nutrition consulted. Most recent weight and BMI monitored-     Measurements:  Wt Readings from Last 1 Encounters:   01/15/25 90 kg (198 lb 6.6 oz)   Body mass index is 27.67 kg/m².    Patient has been screened and assessed by RD.    Malnutrition Type:  Context:    Level:      Malnutrition Characteristic Summary:       Interventions/Recommendations (treatment strategy):        Scheduled Med:   aspirin  81 mg Oral Daily    carvediloL  12.5 mg Oral BID WM    cinacalcet  90 mg Oral Daily with breakfast    cloNIDine  0.1 mg Oral BID    mupirocin   Nasal BID    NIFEdipine  90 mg Oral Daily    piperacillin-tazobactam (Zosyn) IV (PEDS and ADULTS) (extended infusion is not appropriate)  4.5 g Intravenous Q12H      Continuous Infusions:     PRN Meds:    Current Facility-Administered Medications:     0.9%  NaCl infusion (for blood administration), , Intravenous, Q24H PRN    acetaminophen, 1,000 mg, Oral, Q6H PRN    aluminum-magnesium hydroxide-simethicone, 30 mL, Oral, QID PRN    bisacodyL, 10 mg, Rectal, Daily PRN    dextrose 50%, 12.5 g, Intravenous, PRN    dextrose 50%, 25 g, Intravenous, PRN    glucagon (human recombinant), 1 mg, Intramuscular, PRN    glucose, 16 g, Oral, PRN    glucose, 24 g, Oral, PRN    hydrALAZINE, 10 mg, Intravenous, Q2H PRN    HYDROcodone-acetaminophen, 1 tablet, Oral, Q6H PRN    melatonin, 6 mg, Oral, Nightly PRN    morphine, 4 mg, Intravenous, Q4H PRN    ondansetron, 4 mg, Intravenous, Q4H PRN    polyethylene glycol, 17 g, Oral, BID PRN    sodium chloride 0.9%, 10 mL, Intravenous, PRN    vancomycin - pharmacy to dose, , Intravenous, pharmacy to manage frequency     Radiology:  I have personally reviewed the following imaging and agree with the radiologist.     MRI Femur W WO Contrast Left  Narrative: EXAMINATION:  MRI FEMUR W WO CONTRAST LEFT    CLINICAL HISTORY:  Soft tissue mass, thigh, deep;Patient  mg/dL    Total Protein 9.0 (H) 6.0 - 8.5 g/dL    Albumin 5.1 3.5 - 5.2 g/dL    ALT (SGPT) 25 1 - 41 U/L    AST (SGOT) 33 1 - 40 U/L    Alkaline Phosphatase 127 (H) 39 - 117 U/L    Total Bilirubin 0.8 0.0 - 1.2 mg/dL    Globulin 3.9 gm/dL    A/G Ratio 1.3 g/dL    BUN/Creatinine Ratio 18.0 7.0 - 25.0    Anion Gap 14.8 5.0 - 15.0 mmol/L    eGFR 66.9 >60.0 mL/min/1.73   CBC Auto Differential    Collection Time: 10/20/23 11:15 AM    Specimen: Blood   Result Value Ref Range    WBC 9.96 3.40 - 10.80 10*3/mm3    RBC 5.31 4.14 - 5.80 10*6/mm3    Hemoglobin 16.5 13.0 - 17.7 g/dL    Hematocrit 48.7 37.5 - 51.0 %    MCV 91.7 79.0 - 97.0 fL    MCH 31.1 26.6 - 33.0 pg    MCHC 33.9 31.5 - 35.7 g/dL    RDW 13.0 12.3 - 15.4 %    RDW-SD 44.1 37.0 - 54.0 fl    MPV 11.8 6.0 - 12.0 fL    Platelets 254 140 - 450 10*3/mm3    Neutrophil % 71.8 42.7 - 76.0 %    Lymphocyte % 14.7 (L) 19.6 - 45.3 %    Monocyte % 11.2 5.0 - 12.0 %    Eosinophil % 1.6 0.3 - 6.2 %    Basophil % 0.4 0.0 - 1.5 %    Immature Grans % 0.3 0.0 - 0.5 %    Neutrophils, Absolute 7.15 (H) 1.70 - 7.00 10*3/mm3    Lymphocytes, Absolute 1.46 0.70 - 3.10 10*3/mm3    Monocytes, Absolute 1.12 (H) 0.10 - 0.90 10*3/mm3    Eosinophils, Absolute 0.16 0.00 - 0.40 10*3/mm3    Basophils, Absolute 0.04 0.00 - 0.20 10*3/mm3    Immature Grans, Absolute 0.03 0.00 - 0.05 10*3/mm3    nRBC 0.0 0.0 - 0.2 /100 WBC   Magnesium    Collection Time: 10/20/23 11:15 AM    Specimen: Blood   Result Value Ref Range    Magnesium 2.2 1.6 - 2.6 mg/dL       Ordered the above labs and reviewed the results.        RADIOLOGY  No Radiology Exams Resulted Within Past 24 Hours    Ordered the above noted radiological studies. Reviewed by me in PACS.            PROCEDURES  Procedures            MEDICATIONS GIVEN IN ER  Medications   sodium chloride 0.9 % flush 10 mL (has no administration in time range)   lactated ringers bolus 1,000 mL (0 mL Intravenous Stopped 10/20/23 1409)   lactated ringers bolus 1,000 mL (0  mL Intravenous Stopped 10/20/23 1450)                   MEDICAL DECISION MAKING, PROGRESS, and CONSULTS    All labs have been independently reviewed by me.  All radiology studies have been reviewed by me and I have also reviewed the radiology report.   EKG's independently viewed and interpreted by me.  Discussion below represents my analysis of pertinent findings related to patient's condition, differential diagnosis, treatment plan and final disposition.      Additional sources:  - Discussed/ obtained information from independent historians: Wife at bedside    - External (non-ED) record review: Patient was last admitted here in August 2023 for respiratory failure, pneumonia, and pulmonary embolus.  He was seen by pulmonology and underwent bronchoscopy.  Cultures were negative.  He was discharged on Eliquis.    - Chronic or social conditions impacting care: None          Orders placed during this visit:  Orders Placed This Encounter   Procedures    Comprehensive Metabolic Panel    CBC Auto Differential    Magnesium    Insert Peripheral IV    CBC & Differential         Additional orders considered but not ordered:  CT abdomen/pelvis: Patient denied abdominal pain.  Abdominal exam was benign.  Have low suspicion for bowel obstruction.        Differential diagnosis:    Enteritis, dehydration, electrolyte abnormality, bowel obstruction      Independent interpretation of labs, radiology studies, and discussions with consultants:  ED Course as of 10/20/23 1637   Fri Oct 20, 2023   1206 Creatinine(!): 1.28  0.85 one month ago [WH]   1206 WBC: 9.96 [WH]   1206 Hemoglobin: 16.5 [WH]   1258 Magnesium: 2.2 [WH]   1355 Test results discussed with the patient.  He is feeling better.  He will be given a second liter of IV fluids and then reassess. [WH]   1517 Patient was able to ambulate to the restroom without assistance.  He felt dizzy but not faint.  He is comfortable going home.  I will give him a prescription for Lomotil.   on HD,  please coordinate with dialysis;    TECHNIQUE:  Enhanced and unenhanced, multiplanar, multisequence MR imaging of the left thigh was obtained    COMPARISON:  None    FINDINGS:  The patient's bone marrow is poorly demonstrated on today's study due to extensive artifact but does appear markedly abnormal.  On the T1 weighted images, bone marrow is hypointense to the proximal femur and throughout the included portion of the pelvis.  The cortices appear thickened but this is not well demonstrated on today's study either.    A 10 cm x 8 cm by 9 cm complex mass is present to the anterior portion of the proximal thigh and primarily involves the rectus femoris and vastus intermedius muscles.  This demonstrates thick nodular peripheral enhancement and a cystic/necrotic central component.  The margin is aggressive involving the quadriceps and adductor muscles.    A separate fat containing mass is partially visualized to the medial aspect of the distal thigh and measures at least 8 cm by 5 cm by 3 cm in size.  This demonstrates thin enhancing internal septations and is present just anterior to the distal portion of the semimembranosus muscle.  Marked subcutaneous edema is present to the entire thigh.  The neurovascular structures are poorly demonstrated on today's exam and evaluation is somewhat limited.  Impression: A 10 cm x 8 cm by 9 cm complex mass is present to the anterior portion of the proximal thigh and primarily involves the rectus femoris and vastus intermedius muscles. This demonstrates thick nodular peripheral enhancement and a cystic/necrotic central component.  The margin is aggressive involving the quadriceps and adductor muscles.  Primary differential considerations at this point include an aggressive appearing intramuscular abscess with infectious myositis involving the muscles of the anterior thigh and an aggressive soft tissue malignancy which demonstrates central necrosis.  Please correlate with the  He was encouraged to push fluids.  Return precautions were discussed. [WH]   1531 MDM: Patient presented to ED complaining of increased ileostomy output.  He denied abdominal pain.  He was afebrile.  Abdominal exam was benign.  Patient was found to have mild acute kidney injury with elevated creatinine.  Electrolytes were otherwise unremarkable.  Patient was given a total of 2 L of IV fluids.  His symptoms improved.  He will be discharged with a prescription for Lomotil.  Symptoms may be due to enteritis.  Have low suspicion for bowel obstruction. [WH]      ED Course User Index  [WH] Curtis Green MD               DIAGNOSIS  Final diagnoses:   Acute kidney injury   Dehydration   Diarrhea, unspecified type         DISPOSITION  DISCHARGE    Patient discharged in stable condition.    Reviewed implications of results, diagnosis, meds, responsibility to follow up, warning signs and symptoms of possible worsening, potential complications and reasons to return to ER, including worsening/persistent symptoms, abdominal pain, vomiting, fever, or other concern.    Patient/Family voiced understanding of above instructions.    Discussed plan for discharge, as there is no emergent indication for admission. Patient referred to primary care provider for BP management due to today's BP. Pt/family is agreeable and understands need for follow up and repeat testing.  Pt is aware that discharge does not mean that nothing is wrong but it indicates no emergency is present that requires admission and they must continue care with follow-up as given below or physician of their choice.     FOLLOW-UP  Ekta Zepeda, APRN  [address] [facility] [address] [phone]    Schedule an appointment as soon as possible for a visit            Medication List        New Prescriptions      diphenoxylate-atropine 2.5-0.025 MG per tablet  Commonly known as: LOMOTIL  Take 1 tablet by mouth 4 (Four) Times a Day As Needed for Diarrhea.    patient's history as to the need for additional imaging and or referral to Infectious Disease or orthopedic oncology.    A separate fat containing mass is partially visualized to the medial aspect of the distal thigh and measures at least 8 cm by 5 cm by 3 cm in size. This demonstrates thin enhancing internal septations and is present just anterior to the distal portion of the semimembranosus muscle.  The features typically associated with liposarcoma are not well demonstrated on today's study however the majority the lesion is not included in the field of view and may be further evaluated with MRI of the distal thigh/knee if additional imaging is indicated.    The bone marrow is poorly demonstrated on today's study but appears abnormal.  This may be further evaluated with bone scan, CT scan, or both depending on the patient's medical history.    Electronically signed by: Serjio Hutchison  Date:    01/14/2025  Time:    16:12      Devin Matos MD  Department of Hospital Medicine   [facility]   01/15/2025                             Where to Get Your Medications        These medications were sent to [facility]/pharmacy #[id number] - [place], [place] - [address] AT [facility] - [phone]  - [phone] FX  [address] [address]      Phone: [phone]   diphenoxylate-atropine 2.5-0.025 MG per tablet                   Latest Documented Vital Signs:  As of 16:37 EDT  BP- 139/97 HR- 62 Temp- 98.3 °F (36.8 °C) (Tympanic) O2 sat- 92%              --    Please note that portions of this were completed with a voice recognition program.       Note Disclaimer: At [facility], we believe that sharing information builds trust and better relationships. You are receiving this note because you are receiving care at [facility] or recently visited. It is possible you will see health information before a provider has talked with you about it. This kind of information can be easy to misunderstand. To help you fully understand what it means for your health, we urge you to discuss this note with your provider.             Curtis Green MD  10/20/23 [id number]

## 2025-01-29 DIAGNOSIS — F41.9 ANXIETY: ICD-10-CM

## 2025-01-29 DIAGNOSIS — G25.81 RESTLESS LEG SYNDROME: ICD-10-CM

## 2025-01-29 DIAGNOSIS — F33.41 RECURRENT MAJOR DEPRESSIVE DISORDER, IN PARTIAL REMISSION: ICD-10-CM

## 2025-01-29 RX ORDER — ROPINIROLE 1 MG/1
TABLET, FILM COATED ORAL
Qty: 30 TABLET | Refills: 0 | Status: SHIPPED | OUTPATIENT
Start: 2025-01-29

## 2025-01-29 RX ORDER — SERTRALINE HYDROCHLORIDE 100 MG/1
TABLET, FILM COATED ORAL
Qty: 135 TABLET | Refills: 0 | Status: SHIPPED | OUTPATIENT
Start: 2025-01-29

## 2025-02-25 DIAGNOSIS — G25.81 RESTLESS LEG SYNDROME: ICD-10-CM

## 2025-02-25 RX ORDER — ROPINIROLE 1 MG/1
TABLET, FILM COATED ORAL
Qty: 30 TABLET | Refills: 0 | Status: SHIPPED | OUTPATIENT
Start: 2025-02-25

## 2025-02-25 NOTE — TELEPHONE ENCOUNTER
LOV                   7/31/2024  NOV                   (around 1/31/2025   Last refill             1/29/25  Protocol              met

## 2025-03-22 DIAGNOSIS — G25.81 RESTLESS LEG SYNDROME: ICD-10-CM

## 2025-03-24 RX ORDER — ROPINIROLE 1 MG/1
TABLET, FILM COATED ORAL
Qty: 30 TABLET | Refills: 0 | Status: SHIPPED | OUTPATIENT
Start: 2025-03-24

## 2025-03-24 NOTE — TELEPHONE ENCOUNTER
LOV                   7/31/2024  NOV                   (around 1/31/2025   Last refill             2/25/25  Protocol              met

## 2025-04-14 ENCOUNTER — OFFICE VISIT (OUTPATIENT)
Dept: FAMILY MEDICINE CLINIC | Facility: CLINIC | Age: 55
End: 2025-04-14
Payer: COMMERCIAL

## 2025-04-14 VITALS
BODY MASS INDEX: 38.07 KG/M2 | DIASTOLIC BLOOD PRESSURE: 86 MMHG | SYSTOLIC BLOOD PRESSURE: 142 MMHG | TEMPERATURE: 97.3 F | WEIGHT: 251.2 LBS | HEIGHT: 68 IN | HEART RATE: 55 BPM | OXYGEN SATURATION: 95 %

## 2025-04-14 DIAGNOSIS — G25.81 RESTLESS LEG SYNDROME: ICD-10-CM

## 2025-04-14 DIAGNOSIS — E55.9 VITAMIN D DEFICIENCY: ICD-10-CM

## 2025-04-14 DIAGNOSIS — I10 ESSENTIAL HYPERTENSION: ICD-10-CM

## 2025-04-14 DIAGNOSIS — E66.01 CLASS 2 SEVERE OBESITY WITH SERIOUS COMORBIDITY AND BODY MASS INDEX (BMI) OF 38.0 TO 38.9 IN ADULT, UNSPECIFIED OBESITY TYPE: ICD-10-CM

## 2025-04-14 DIAGNOSIS — F33.1 MODERATE EPISODE OF RECURRENT MAJOR DEPRESSIVE DISORDER: ICD-10-CM

## 2025-04-14 DIAGNOSIS — F41.9 ANXIETY: ICD-10-CM

## 2025-04-14 DIAGNOSIS — R12 HEARTBURN: ICD-10-CM

## 2025-04-14 DIAGNOSIS — E66.812 CLASS 2 SEVERE OBESITY WITH SERIOUS COMORBIDITY AND BODY MASS INDEX (BMI) OF 38.0 TO 38.9 IN ADULT, UNSPECIFIED OBESITY TYPE: ICD-10-CM

## 2025-04-14 DIAGNOSIS — K13.70 ORAL LESION: ICD-10-CM

## 2025-04-14 DIAGNOSIS — Z00.00 ENCOUNTER FOR ANNUAL PHYSICAL EXAM: Primary | ICD-10-CM

## 2025-04-14 DIAGNOSIS — R53.83 FATIGUE, UNSPECIFIED TYPE: ICD-10-CM

## 2025-04-14 DIAGNOSIS — R73.03 PREDIABETES: ICD-10-CM

## 2025-04-14 DIAGNOSIS — Z12.5 PROSTATE CANCER SCREENING: ICD-10-CM

## 2025-04-14 DIAGNOSIS — E53.8 VITAMIN B12 DEFICIENCY: ICD-10-CM

## 2025-04-14 PROBLEM — J01.90 ACUTE NON-RECURRENT SINUSITIS: Status: RESOLVED | Noted: 2023-09-07 | Resolved: 2025-04-14

## 2025-04-14 PROBLEM — R42 DIZZINESS: Status: RESOLVED | Noted: 2023-08-02 | Resolved: 2025-04-14

## 2025-04-14 PROBLEM — I26.99 ACUTE PULMONARY EMBOLISM WITHOUT ACUTE COR PULMONALE: Status: RESOLVED | Noted: 2023-08-27 | Resolved: 2025-04-14

## 2025-04-14 PROCEDURE — 99396 PREV VISIT EST AGE 40-64: CPT | Performed by: NURSE PRACTITIONER

## 2025-04-14 RX ORDER — BUPROPION HYDROCHLORIDE 150 MG/1
150 TABLET, EXTENDED RELEASE ORAL DAILY
Qty: 30 TABLET | Refills: 1 | Status: SHIPPED | OUTPATIENT
Start: 2025-04-14

## 2025-04-14 NOTE — PROGRESS NOTES
Subjective   Nikolai Shaw is a 54 y.o. male.     Chief Complaint   Patient presents with    Follow-up    Anxiety       History of Present Illness   Patient presents for CPE with fasting labs.  Nutrition:  somewhat healthy diet  Exercise:  no formal exercise, active in yard and has physical job  Dental:  no recent dental visits  Vision:  last eye exam about 2 years ago, wears glasses  Hearing:  has had problems hearing, had recent hearing test at work and noted hearing loss   Immunizations:  declines COVID-19, PCV20, and Shingrix vaccines  Colonoscopy/Cologuard:  ileoscopy 2/2021, has ileostomy due to history of colon cancer; sees Dr. Crane as needed; no longer seeing onocology.    F/U HTN: no longer taking medication for blood pressure since lost weight and stopped drinking EtOH; does not typically monitor BP.     F/U bradycardia: saw cardiology for RBBB noted on ECG; ordered Echo and WNL; no concern with bradycardia.    F/U anxiety: takes Sertraline daily and not sure is working as well; seems to anger very easily; currently taking Sertraline 150 mg daily; has trouble sleeping with RLS; has found RLS symptoms are better if is cold and can sleep better; some trouble falling asleep and staying asleep; has to get up 3 times per night to take air out of colostomy bag; has tried CPAP in past for borderline sleep apnea and did not make much difference when used; see PHQ-9 and JAQUI-7; no SI/HI; has tried Cymbalta and Citalopram in had SE.      F/U RLS: takes Ropinirole 1 mg nightly 2 hours before bed as needed; does not take consistently; does not always help; will have urge to move legs; has noted having some symptoms during the day as well recently.     F/U heartburn: takes Pantoprazole daily as needed if will be eating foods that will aggravate symptoms and works well.     F/U fatigue: would like to get testosterone checked; has been on testosterone replacement in distant past.     Has been taking Vitamin D  daily.      The following portions of the patient's history were reviewed and updated as appropriate: allergies, current medications, past family history, past medical history, past social history, past surgical history and problem list.    Current Outpatient Medications on File Prior to Visit   Medication Sig    B Complex Vitamins (VITAMIN-B COMPLEX PO) Take 1 tablet by mouth Daily.    Cholecalciferol (VITAMIN D-3 PO) Take  by mouth.    pantoprazole (PROTONIX) 20 MG EC tablet Take 1 tablet by mouth Daily. (Patient taking differently: Take 1 tablet by mouth Daily As Needed for Heartburn.)    rOPINIRole (REQUIP) 1 MG tablet TAKE 1 TABLET BY MOUTH DAILY AT NIGHT 1 HOUR BEFORE BEDTIME (Patient taking differently: Take 1 tablet by mouth At Night As Needed (RLS).)    sertraline (ZOLOFT) 100 MG tablet TAKE 1 AND 1/2 TABLETS DAILY BY MOUTH    diphenoxylate-atropine (LOMOTIL) 2.5-0.025 MG per tablet Take 1 tablet by mouth 4 (Four) Times a Day As Needed for Diarrhea. (Patient not taking: Reported on 4/14/2025)     No current facility-administered medications on file prior to visit.        Past Medical History:   Diagnosis Date    Acute pulmonary embolism without acute cor pulmonale 08/27/2023    Adenocarcinoma of rectum 06/2015    invasive adenocarcinoma of rectum w 59 benign lymph nodes    Adenomatous polyposis     FAMILIAL     Anxiety     At risk for sleep apnea     Chicken pox     patient reports having as a child    Depression     FAP (familial adenomatous polyposis)     Herpes zoster without complication 12/03/2021    History of anemia     Hypertension     Hyponatremia     Insomnia     Malignant neoplasm of rectum 06/01/2016    Parastomal hernia without obstruction or gangrene 02/15/2021    Added automatically from request for surgery [id number]    Pneumonia involving right lung 08/27/2023    Pulmonary nodule 01/10/2020    6/26/20 CT chest: 6 mm nodule resolved.    Renal calculi     RLS (restless legs syndrome)     Syncope  03/19/2021    prolonged QT interval    Testicular hypofunction     Vitamin D deficiency        Past Surgical History:   Procedure Laterality Date    ABDOMINOPERINEAL PROCTOCOLECTOMY  07/06/2015    DR MEET RUDOLPH    BRONCHOSCOPY N/A 8/28/2023    Procedure: BRONCHOSCOPY WITH BRONCHIAL ALVELOR LAVAGE;  Surgeon: Rubin Sood MD;  Location: [facility] ENDOSCOPY;  Service: Pulmonary;  Laterality: N/A;  PRE: RECURRENT PNEUMONIA /   POST: SAME    COLONOSCOPY  06/16/1915    DR SHASTA HOWARD    COLONOSCOPY N/A 2/23/2021    Procedure: ILEOSCOPY, EXCISION OF ILEOSTOMY NODULE;  Surgeon: Meet Rudolph MD;  Location: [facility] MAIN OR;  Service: General;  Laterality: N/A;    ENDOSCOPY  06/23/2015    DR SHASTA HOWARD    EXPLORATORY LAPAROTOMY N/A 2/3/2017    Procedure: LAPAROTOMY EXPLORATORY  SMALL BOWEL OBSTRUCTION;  Surgeon: Meet Rudolph MD;  Location: [facility] MAIN OR;  Service:     ILEOSTOMY  07/06/2015    MYRINGOTOMY W/ TUBES      2-3 times as child       Family History   Problem Relation Age of Onset    Colon cancer Father         familial polyposis    Aneurysm Maternal Grandfather 66        brain aneurysm    Colon cancer Paternal Grandfather         familial polyposis    Hypertension Maternal Uncle     Diabetes Maternal Uncle     Malig Hyperthermia Neg Hx        Social History     Socioeconomic History    Marital status:      Spouse name: Hansa    Years of education: college   Tobacco Use    Smoking status: Never    Smokeless tobacco: Current     Types: Chew    Tobacco comments:     CHEWS TOBACCO   Vaping Use    Vaping status: Never Used   Substance and Sexual Activity    Alcohol use: Yes     Alcohol/week: 24.0 standard drinks of alcohol     Types: 24 Cans of beer per week     Comment: Some days 24 beers a day. Notmally  at least 18; stopped drinking 6/10/23    Drug use: No    Sexual activity: Not Currently     Partners: Female       Review of Systems   Constitutional:  Positive for fatigue. Negative for  appetite change, chills, fever, unexpected weight gain and unexpected weight loss.   HENT:  Negative for ear pain, sinus pressure, sore throat and trouble swallowing.    Eyes:  Negative for blurred vision and discharge.   Respiratory:  Negative for cough, chest tightness and shortness of breath.    Cardiovascular:  Negative for chest pain and palpitations.   Gastrointestinal:  Negative for abdominal pain, blood in stool, constipation and diarrhea.   Endocrine: Positive for polydipsia. Negative for cold intolerance.   Genitourinary:  Positive for nocturia (but also getting up with ileostomy; some change in urinary stream). Negative for dysuria and frequency.   Musculoskeletal:  Negative for arthralgias and back pain.   Skin:  Negative for rash and skin lesions.   Neurological:  Negative for syncope and weakness.   Hematological:  Does not bruise/bleed easily.   Psychiatric/Behavioral:  Negative for suicidal ideas.        PHQ-9 Depression Screening  Little interest or pleasure in doing things? More than half the days   Feeling down, depressed, or hopeless? Several days   PHQ-2 Total Score 3   Trouble falling or staying asleep, or sleeping too much? Nearly every day   Feeling tired or having little energy? Nearly every day   Poor appetite or overeating? Not at all   Feeling bad about yourself - or that you are a failure or have let yourself or your family down? Not at all   Trouble concentrating on things, such as reading the newspaper or watching television? Several days   Moving or speaking so slowly that other people could have noticed? Or the opposite - being so fidgety or restless that you have been moving around a lot more than usual? Not at all     Thoughts that you would be better off dead, or of hurting yourself in some way? Not at all   PHQ-9 Total Score 10   If you checked off any problems, how difficult have these problems made it for you to do your work, take care of things at home, or get along with other  "people? Somewhat difficult       JAQUI-7 anxiety score: 8      Objective   Vitals:    04/14/25 0857   BP: 142/86   BP Location: Left arm   Patient Position: Sitting   Cuff Size: Large Adult   Pulse: 55   Temp: 97.3 °F (36.3 °C)   TempSrc: Temporal   SpO2: 95%   Weight: 114 kg (251 lb 3.2 oz)   Height: 172.7 cm (67.99\")     Body mass index is 38.2 kg/m².    Physical Exam  Vitals and nursing note reviewed.   Constitutional:       General: He is not in acute distress.     Appearance: He is well-developed and well-groomed. He is not diaphoretic.   HENT:      Head: Normocephalic and atraumatic.      Jaw: No tenderness or pain on movement.      Right Ear: External ear normal. No decreased hearing noted. Right ear middle ear effusion: TM dull. Tympanic membrane is not erythematous. Right ear scarred TM: mild.     Left Ear: External ear normal. No decreased hearing noted. Left ear middle ear effusion: TM dull. Tympanic membrane is not erythematous. Left ear scarred TM: mild.     Nose: Nose normal.      Right Sinus: No maxillary sinus tenderness or frontal sinus tenderness.      Left Sinus: No maxillary sinus tenderness or frontal sinus tenderness.      Mouth/Throat:      Mouth: Mucous membranes are moist.      Pharynx: No oropharyngeal exudate or posterior oropharyngeal erythema.     Eyes:      Extraocular Movements: Extraocular movements intact.      Conjunctiva/sclera: Conjunctivae normal.      Pupils: Pupils are equal, round, and reactive to light.   Neck:      Thyroid: No thyromegaly.      Vascular: No carotid bruit.      Trachea: No tracheal deviation.   Cardiovascular:      Rate and Rhythm: Normal rate and regular rhythm.      Pulses: Normal pulses.      Heart sounds: Normal heart sounds. No murmur heard.  Pulmonary:      Effort: Pulmonary effort is normal. No respiratory distress.      Breath sounds: Normal breath sounds.   Abdominal:      General: Bowel sounds are normal.      Palpations: Abdomen is soft. There is no " hepatomegaly or splenomegaly.      Tenderness: There is no abdominal tenderness. There is no guarding.   Musculoskeletal:         General: Normal range of motion.      Cervical back: Normal range of motion and neck supple. No bony tenderness.      Thoracic back: No bony tenderness.      Lumbar back: No bony tenderness.      Right lower leg: No edema.      Left lower leg: No edema.   Lymphadenopathy:      Cervical: No cervical adenopathy.   Skin:     General: Skin is warm and dry.      Findings: No rash.   Neurological:      Mental Status: He is alert and oriented to person, place, and time.      Cranial Nerves: No cranial nerve deficit.      Motor: Motor function is intact.      Coordination: Coordination normal.      Gait: Gait normal.      Deep Tendon Reflexes: Reflexes are normal and symmetric.   Psychiatric:         Mood and Affect: Mood normal.         Behavior: Behavior normal.         Thought Content: Thought content normal.         Cognition and Memory: Cognition normal.         Judgment: Judgment normal.         Lab Results   Component Value Date    WBC 9.97 09/21/2024    RBC 4.82 09/21/2024    HGB 15.0 09/21/2024    HCT 44.9 09/21/2024    MCV 93.2 09/21/2024    MCH 31.1 09/21/2024    MCHC 33.4 09/21/2024    RDW 13.3 09/21/2024    RDWSD 46.0 09/21/2024    MPV 10.9 09/21/2024     09/21/2024    NEUTRORELPCT 90.7 (H) 09/21/2024    LYMPHORELPCT 5.4 (L) 09/21/2024    MONORELPCT 3.0 (L) 09/21/2024    EOSRELPCT 0.3 09/21/2024    BASORELPCT 0.4 09/21/2024    AUTOIGPER 0.2 09/21/2024    NEUTROABS 9.04 (H) 09/21/2024    LYMPHSABS 0.54 (L) 09/21/2024    MONOSABS 0.30 09/21/2024    EOSABS 0.03 09/21/2024    BASOSABS 0.04 09/21/2024    AUTOIGNUM 0.02 09/21/2024    NRBC 0.0 09/21/2024     Lab Results   Component Value Date    GLUCOSE 122 (H) 09/21/2024    BUN 10 09/21/2024    CREATININE 0.99 09/21/2024    EGFRIFNONA 112 10/15/2021    EGFRIFAFRI 135 10/15/2021    BCR 10.1 09/21/2024    K 4.4 09/21/2024    CO2 26.0  09/21/2024    CALCIUM 9.5 09/21/2024    ALBUMIN 4.4 09/21/2024    AST 22 09/21/2024    ALT 24 09/21/2024      Lab Results   Component Value Date    CHLPL 176 04/01/2024    TRIG 152 (H) 04/01/2024    HDL 46 04/01/2024    VLDL 27 04/01/2024     (H) 04/01/2024     Lab Results   Component Value Date    TSH 1.040 09/06/2023     Lab Results   Component Value Date    HGBA1C 5.6 05/26/2023     Lab Results   Component Value Date    PSA 0.5 09/06/2023     Lab Results   Component Value Date    WBCU 0-2 07/12/2015    RBCUA 0-2 07/12/2015    BACTERIA None Seen 07/12/2015    LABPH 6.0 07/12/2015    COLORU Yellow 09/23/2024    CLARITYU Clear 09/23/2024    LEUKOCYTESUR Negative 09/23/2024    GLUCOSEU Negative 09/23/2024    BLOODU Negative 09/23/2024    BILIRUBINUR Negative 09/23/2024    NITRITEU Negative 09/23/2024          Assessment    Problem List Items Addressed This Visit       Essential hypertension    Current Assessment & Plan   Hypertension is borderline off medication.  Weight loss.  Regular aerobic exercise.  Ambulatory blood pressure monitoring.  Blood pressure will be reassessedin 4 weeks.  Call if your blood pressure is consistently greater than 140/90.         Relevant Orders    CBC & Differential    Lipid Panel With LDL / HDL Ratio    Comprehensive Metabolic Panel    Vitamin D deficiency    Relevant Orders    Vitamin D,25-Hydroxy    Depression    Current Assessment & Plan   Patient's depression is a recurrent episode that is moderate without psychosis. Depression is active and  not as well controlled .    Plan:   Continue current medication therapy   Continue Sertraline daily.  Add Wellbutrin daily.    Followup  1 month .          Relevant Medications    buPROPion SR (Wellbutrin SR) 150 MG 12 hr tablet    Anxiety    Current Assessment & Plan   Not as well controlled.  Continue Sertraline daily.  Add Wellbutrin daily.         Relevant Medications    buPROPion SR (Wellbutrin SR) 150 MG 12 hr tablet    Heartburn     Current Assessment & Plan   Stable.  Continue Pantoprazole daily as needed.         Vitamin B12 deficiency    Relevant Orders    Vitamin B12 & Folate    Prediabetes    Current Assessment & Plan   Continue to work on decreasing carbs/sugars in diet.         Relevant Orders    Comprehensive Metabolic Panel    Hemoglobin A1c    Class 2 severe obesity with serious comorbidity and body mass index (BMI) of 38.0 to 38.9 in adult    Current Assessment & Plan   Patient's (Body mass index is 38.2 kg/m².) indicates that they are morbidly/severely obese (BMI > 40 or > 35 with obesity - related health condition) with health conditions that include hypertension and dyslipidemias . Weight is unchanged. BMI  is above average; BMI management plan is completed. We discussed low calorie, low carb based diet program, portion control, and increasing exercise.          Fatigue    Relevant Orders    Testosterone (Free & Total), LC / MS    Restless leg syndrome    Relevant Orders    CBC & Differential    Ferritin    Oral lesion    Relevant Orders    Ambulatory Referral to ENT (Otolaryngology)     Other Visit Diagnoses         Encounter for annual physical exam    -  Primary    Relevant Orders    CBC & Differential    Lipid Panel With LDL / HDL Ratio    Ferritin    Comprehensive Metabolic Panel    Vitamin D,25-Hydroxy    Vitamin B12 & Folate    Testosterone (Free & Total), LC / MS    Hemoglobin A1c      Prostate cancer screening        Relevant Orders    PSA Screen             Return in about 1 month (around 5/14/2025) for Recheck.or sooner if symptoms persist or worsen  Impression: Health maintenance visit.  Currently, eats a somewhat healthy diet and has a no formal exercise routine.  Colorectal cancer screening: UTD.  Prostate cancer screening: PSA today.  Screening lab work includes: CMP, lipid.  Immunizations: declines COVID-19, PCV20, and Shingrix vaccines; risks and benefits of immunizations were discussed with patient.  Patient  was advised to be evaluated by ophthalmology and dentist.  Advice and education were given regarding nutrition and aerobic exercise.   Mood is not as well controlled; will add Wellbutrin daily; discussed AE/BBW with Wellbutrin, particularly with Sertraline; will consider lower dose of Sertraline and higher dose of Wellbutrin depending on response.

## 2025-04-14 NOTE — ASSESSMENT & PLAN NOTE
Patient's (Body mass index is 38.2 kg/m².) indicates that they are morbidly/severely obese (BMI > 40 or > 35 with obesity - related health condition) with health conditions that include hypertension and dyslipidemias . Weight is unchanged. BMI  is above average; BMI management plan is completed. We discussed low calorie, low carb based diet program, portion control, and increasing exercise.

## 2025-04-14 NOTE — PATIENT INSTRUCTIONS
Add Wellbutrin daily.  Continue Sertraline at current dose for now.  Monitor your blood pressure periodically and record results.  Call if your blood pressure is consistently greater than 140/90.  Continue to work on healthy diet and exercise.  Follow up pending lab results.  Follow up in 1 month, or sooner if problems or concerns.

## 2025-04-14 NOTE — ASSESSMENT & PLAN NOTE
Patient's depression is a recurrent episode that is moderate without psychosis. Depression is active and  not as well controlled .    Plan:   Continue current medication therapy   Continue Sertraline daily.  Add Wellbutrin daily.    Followup  1 month .

## 2025-04-14 NOTE — ASSESSMENT & PLAN NOTE
Hypertension is borderline off medication.  Weight loss.  Regular aerobic exercise.  Ambulatory blood pressure monitoring.  Blood pressure will be reassessedin 4 weeks.  Call if your blood pressure is consistently greater than 140/90.

## 2025-04-18 DIAGNOSIS — E53.8 VITAMIN B12 DEFICIENCY: Primary | ICD-10-CM

## 2025-04-18 DIAGNOSIS — D72.829 LEUKOCYTOSIS, UNSPECIFIED TYPE: ICD-10-CM

## 2025-04-18 LAB
25(OH)D3+25(OH)D2 SERPL-MCNC: 36.7 NG/ML (ref 30–100)
ALBUMIN SERPL-MCNC: 4.2 G/DL (ref 3.5–5.2)
ALBUMIN/GLOB SERPL: 1.4 G/DL
ALP SERPL-CCNC: 137 U/L (ref 39–117)
ALT SERPL-CCNC: 14 U/L (ref 1–41)
AST SERPL-CCNC: 19 U/L (ref 1–40)
BASOPHILS # BLD AUTO: 0.04 10*3/MM3 (ref 0–0.2)
BASOPHILS NFR BLD AUTO: 0.3 % (ref 0–1.5)
BILIRUB SERPL-MCNC: 0.5 MG/DL (ref 0–1.2)
BUN SERPL-MCNC: 16 MG/DL (ref 6–20)
BUN/CREAT SERPL: 17.8 (ref 7–25)
CALCIUM SERPL-MCNC: 9.2 MG/DL (ref 8.6–10.5)
CHLORIDE SERPL-SCNC: 103 MMOL/L (ref 98–107)
CHOLEST SERPL-MCNC: 165 MG/DL (ref 0–200)
CO2 SERPL-SCNC: 26.3 MMOL/L (ref 22–29)
CREAT SERPL-MCNC: 0.9 MG/DL (ref 0.76–1.27)
EGFRCR SERPLBLD CKD-EPI 2021: 101.5 ML/MIN/1.73
EOSINOPHIL # BLD AUTO: 0.1 10*3/MM3 (ref 0–0.4)
EOSINOPHIL NFR BLD AUTO: 0.8 % (ref 0.3–6.2)
ERYTHROCYTE [DISTWIDTH] IN BLOOD BY AUTOMATED COUNT: 13 % (ref 12.3–15.4)
FERRITIN SERPL-MCNC: 135 NG/ML (ref 30–400)
FOLATE SERPL-MCNC: 16.2 NG/ML (ref 4.78–24.2)
GLOBULIN SER CALC-MCNC: 2.9 GM/DL
GLUCOSE SERPL-MCNC: 79 MG/DL (ref 65–99)
HBA1C MFR BLD: 5.4 % (ref 4.8–5.6)
HCT VFR BLD AUTO: 45.9 % (ref 37.5–51)
HDLC SERPL-MCNC: 61 MG/DL (ref 40–60)
HGB BLD-MCNC: 14.9 G/DL (ref 13–17.7)
IMM GRANULOCYTES # BLD AUTO: 0.04 10*3/MM3 (ref 0–0.05)
IMM GRANULOCYTES NFR BLD AUTO: 0.3 % (ref 0–0.5)
LDLC SERPL CALC-MCNC: 93 MG/DL (ref 0–100)
LDLC/HDLC SERPL: 1.53 {RATIO}
LYMPHOCYTES # BLD AUTO: 1.28 10*3/MM3 (ref 0.7–3.1)
LYMPHOCYTES NFR BLD AUTO: 10.4 % (ref 19.6–45.3)
MCH RBC QN AUTO: 30.3 PG (ref 26.6–33)
MCHC RBC AUTO-ENTMCNC: 32.5 G/DL (ref 31.5–35.7)
MCV RBC AUTO: 93.5 FL (ref 79–97)
MONOCYTES # BLD AUTO: 0.84 10*3/MM3 (ref 0.1–0.9)
MONOCYTES NFR BLD AUTO: 6.8 % (ref 5–12)
NEUTROPHILS # BLD AUTO: 9.98 10*3/MM3 (ref 1.7–7)
NEUTROPHILS NFR BLD AUTO: 81.4 % (ref 42.7–76)
NRBC BLD AUTO-RTO: 0 /100 WBC (ref 0–0.2)
PLATELET # BLD AUTO: 198 10*3/MM3 (ref 140–450)
POTASSIUM SERPL-SCNC: 4.5 MMOL/L (ref 3.5–5.2)
PROT SERPL-MCNC: 7.1 G/DL (ref 6–8.5)
PSA SERPL-MCNC: 0.32 NG/ML (ref 0–4)
RBC # BLD AUTO: 4.91 10*6/MM3 (ref 4.14–5.8)
SODIUM SERPL-SCNC: 142 MMOL/L (ref 136–145)
TESTOST FREE SERPL-MCNC: 7.3 PG/ML (ref 7.2–24)
TESTOST SERPL-MCNC: 283.4 NG/DL (ref 264–916)
TRIGL SERPL-MCNC: 52 MG/DL (ref 0–150)
VIT B12 SERPL-MCNC: 320 PG/ML (ref 211–946)
VLDLC SERPL CALC-MCNC: 11 MG/DL (ref 5–40)
WBC # BLD AUTO: 12.28 10*3/MM3 (ref 3.4–10.8)

## 2025-04-18 RX ORDER — LANOLIN ALCOHOL/MO/W.PET/CERES
1000 CREAM (GRAM) TOPICAL DAILY
Start: 2025-04-18

## 2025-04-22 DIAGNOSIS — F41.9 ANXIETY: ICD-10-CM

## 2025-04-22 DIAGNOSIS — F33.41 RECURRENT MAJOR DEPRESSIVE DISORDER, IN PARTIAL REMISSION: ICD-10-CM

## 2025-04-22 RX ORDER — SERTRALINE HYDROCHLORIDE 100 MG/1
150 TABLET, FILM COATED ORAL DAILY
Qty: 135 TABLET | Refills: 0 | Status: SHIPPED | OUTPATIENT
Start: 2025-04-22

## 2025-04-26 DIAGNOSIS — G25.81 RESTLESS LEG SYNDROME: ICD-10-CM

## 2025-04-28 RX ORDER — ROPINIROLE 1 MG/1
TABLET, FILM COATED ORAL
Qty: 30 TABLET | Refills: 1 | Status: SHIPPED | OUTPATIENT
Start: 2025-04-28

## 2025-05-06 DIAGNOSIS — F33.1 MODERATE EPISODE OF RECURRENT MAJOR DEPRESSIVE DISORDER: ICD-10-CM

## 2025-05-06 DIAGNOSIS — F41.9 ANXIETY: ICD-10-CM

## 2025-05-07 RX ORDER — BUPROPION HYDROCHLORIDE 150 MG/1
150 TABLET, EXTENDED RELEASE ORAL DAILY
Qty: 90 TABLET | Refills: 0 | Status: SHIPPED | OUTPATIENT
Start: 2025-05-07

## 2025-06-02 ENCOUNTER — OFFICE VISIT (OUTPATIENT)
Dept: FAMILY MEDICINE CLINIC | Facility: CLINIC | Age: 55
End: 2025-06-02
Payer: COMMERCIAL

## 2025-06-02 VITALS
WEIGHT: 251 LBS | OXYGEN SATURATION: 95 % | SYSTOLIC BLOOD PRESSURE: 134 MMHG | TEMPERATURE: 97.1 F | HEART RATE: 53 BPM | DIASTOLIC BLOOD PRESSURE: 82 MMHG | HEIGHT: 68 IN | BODY MASS INDEX: 38.04 KG/M2

## 2025-06-02 DIAGNOSIS — F33.41 RECURRENT MAJOR DEPRESSIVE DISORDER, IN PARTIAL REMISSION: ICD-10-CM

## 2025-06-02 DIAGNOSIS — F41.9 ANXIETY: ICD-10-CM

## 2025-06-02 DIAGNOSIS — G47.30 SLEEP APNEA, UNSPECIFIED TYPE: ICD-10-CM

## 2025-06-02 DIAGNOSIS — E53.8 VITAMIN B12 DEFICIENCY: ICD-10-CM

## 2025-06-02 DIAGNOSIS — R12 HEARTBURN: ICD-10-CM

## 2025-06-02 DIAGNOSIS — I10 ESSENTIAL HYPERTENSION: Primary | ICD-10-CM

## 2025-06-02 DIAGNOSIS — F33.1 MODERATE EPISODE OF RECURRENT MAJOR DEPRESSIVE DISORDER: ICD-10-CM

## 2025-06-02 DIAGNOSIS — D72.829 LEUKOCYTOSIS, UNSPECIFIED TYPE: ICD-10-CM

## 2025-06-02 PROCEDURE — 99213 OFFICE O/P EST LOW 20 MIN: CPT | Performed by: NURSE PRACTITIONER

## 2025-06-02 RX ORDER — SERTRALINE HYDROCHLORIDE 100 MG/1
150 TABLET, FILM COATED ORAL DAILY
Qty: 135 TABLET | Refills: 0 | Status: SHIPPED | OUTPATIENT
Start: 2025-06-02

## 2025-06-02 RX ORDER — BUPROPION HYDROCHLORIDE 150 MG/1
150 TABLET, EXTENDED RELEASE ORAL DAILY
Qty: 90 TABLET | Refills: 0 | Status: SHIPPED | OUTPATIENT
Start: 2025-06-02

## 2025-06-02 NOTE — PATIENT INSTRUCTIONS
Continue to monitor your blood pressure periodically and record results.  Continue to work on healthy diet and exercise.  Follow up pending lab results.  Follow up in 3 months, or sooner if problems or concerns.

## 2025-06-02 NOTE — PROGRESS NOTES
Subjective   Nikolai Shaw is a 55 y.o. male.     Chief Complaint   Patient presents with    Follow-up    Anxiety    Depression     Follow up     Hypertension     Follow up       History of Present Illness   Patient presents for follow up anxiety/depression: takes Sertraline and Wellbutrin daily; added Wellbutrin LOV and has helped; has had more energy and felt better after being on medication for about 3 weeks; still having some trouble sleeping with RLS; takes Ropinirole nightly and helps; mostly has trouble falling asleep; has to set alarm to get up during the night to empty ileostomy bag; also has some trouble with RLS during the day; no worse with Wellbutrin; see PHQ-9 and JAQUI-7; no SI/HI.    F/U HTN: no recent medications for BP; monitors BP, typically runs 130s/80s; no headaches; no orthostasis; occasional lightheadedness if gets dehydrated with ileostomy; tries to drink plenty liquids and helps to prevent; no swelling.    F/U oral lesion: pt declined seeing ENT.    Added Vitamin B12 and Vitamin D daily after last labs.        The following portions of the patient's history were reviewed and updated as appropriate: allergies, current medications, past family history, past medical history, past social history, past surgical history and problem list.       Current Outpatient Medications   Medication Sig Dispense Refill    B Complex Vitamins (VITAMIN-B COMPLEX PO) Take 1 tablet by mouth Daily.      buPROPion SR (Wellbutrin SR) 150 MG 12 hr tablet Take 1 tablet by mouth Daily. 90 tablet 0    Cholecalciferol (VITAMIN D-3 PO) Take  by mouth.      diphenoxylate-atropine (LOMOTIL) 2.5-0.025 MG per tablet Take 1 tablet by mouth 4 (Four) Times a Day As Needed for Diarrhea. 30 tablet 3    pantoprazole (PROTONIX) 20 MG EC tablet Take 1 tablet by mouth Daily. (Patient taking differently: Take 1 tablet by mouth Daily As Needed for Heartburn.) 30 tablet 0    rOPINIRole (REQUIP) 1 MG tablet TAKE 1 TABLET BY MOUTH DAILY AT  NIGHT 1 HOUR BEFORE BEDTIME 30 tablet 1    sertraline (ZOLOFT) 100 MG tablet Take 1.5 tablets by mouth Daily. 135 tablet 0    vitamin B-12 (CYANOCOBALAMIN) 1000 MCG tablet Take 1 tablet by mouth Daily.       No current facility-administered medications for this visit.       Past Medical History:   Diagnosis Date    Acute pulmonary embolism without acute cor pulmonale 08/27/2023    Adenocarcinoma of rectum 06/2015    invasive adenocarcinoma of rectum w 59 benign lymph nodes    Adenomatous polyposis     FAMILIAL     Anxiety     At risk for sleep apnea     Chicken pox     patient reports having as a child    Depression     FAP (familial adenomatous polyposis)     Herpes zoster without complication 12/03/2021    History of anemia     Hypertension     Hyponatremia     Insomnia     Malignant neoplasm of rectum 06/01/2016    Parastomal hernia without obstruction or gangrene 02/15/2021    Added automatically from request for surgery [id number]    Pneumonia involving right lung 08/27/2023    Pulmonary nodule 01/10/2020    6/26/20 CT chest: 6 mm nodule resolved.    Renal calculi     RLS (restless legs syndrome)     Syncope 03/19/2021    prolonged QT interval    Testicular hypofunction     Vitamin D deficiency        Past Surgical History:   Procedure Laterality Date    ABDOMINOPERINEAL PROCTOCOLECTOMY  07/06/2015    DR PAL RUDOLPH    BRONCHOSCOPY N/A 8/28/2023    Procedure: BRONCHOSCOPY WITH BRONCHIAL ALVELOR LAVAGE;  Surgeon: Rubin Sood MD;  Location: [facility] ENDOSCOPY;  Service: Pulmonary;  Laterality: N/A;  PRE: RECURRENT PNEUMONIA /   POST: SAME    COLONOSCOPY  06/16/1915    DR SHASTA HOWARD    COLONOSCOPY N/A 2/23/2021    Procedure: ILEOSCOPY, EXCISION OF ILEOSTOMY NODULE;  Surgeon: Pal Rudolph MD;  Location: [facility] MAIN OR;  Service: General;  Laterality: N/A;    ENDOSCOPY  06/23/2015    DR SHASTA HOWARD    EXPLORATORY LAPAROTOMY N/A 2/3/2017    Procedure: LAPAROTOMY EXPLORATORY  SMALL BOWEL OBSTRUCTION;   Surgeon: Pal Crane MD;  Location: [facility] OR;  Service:     ILEOSTOMY  07/06/2015    MYRINGOTOMY W/ TUBES      2-3 times as child       Family History   Problem Relation Age of Onset    Colon cancer Father         familial polyposis    Aneurysm Maternal Grandfather 66        brain aneurysm    Colon cancer Paternal Grandfather         familial polyposis    Hypertension Maternal Uncle     Diabetes Maternal Uncle     Malig Hyperthermia Neg Hx        Social History     Socioeconomic History    Marital status:      Spouse name: Hansa    Years of education: college   Tobacco Use    Smoking status: Never     Passive exposure: Never    Smokeless tobacco: Current     Types: Chew    Tobacco comments:     CHEWS TOBACCO   Vaping Use    Vaping status: Never Used   Substance and Sexual Activity    Alcohol use: Not Currently     Comment: Some days 24 beers a day. Normally  at least 18; stopped drinking 6/10/23    Drug use: No    Sexual activity: Not Currently     Partners: Female       Review of Systems   Constitutional:  Negative for appetite change, chills, fever, unexpected weight gain and unexpected weight loss. Fatigue: some, has improved.  HENT:  Negative for ear pain, sore throat and trouble swallowing.    Eyes:  Negative for blurred vision.   Respiratory:  Negative for cough, chest tightness and shortness of breath.    Cardiovascular:  Negative for chest pain and palpitations.   Gastrointestinal:  Negative for abdominal pain, blood in stool, GERD (none recently; takes Pantoprazole as needed and works well) and indigestion.   Endocrine: Negative for polydipsia.   Genitourinary:  Negative for decreased urine volume, dysuria and frequency.   Skin:  Negative for rash.   Neurological:  Negative for syncope and weakness.       PHQ-9 Depression Screening  Little interest or pleasure in doing things? Not at all   Feeling down, depressed, or hopeless? Not at all   PHQ-2 Total Score 0   Trouble falling or staying  "asleep, or sleeping too much? Nearly every day   Feeling tired or having little energy? More than half the days   Poor appetite or overeating? Not at all   Feeling bad about yourself - or that you are a failure or have let yourself or your family down? Not at all   Trouble concentrating on things, such as reading the newspaper or watching television? Not at all   Moving or speaking so slowly that other people could have noticed? Or the opposite - being so fidgety or restless that you have been moving around a lot more than usual? Not at all     Thoughts that you would be better off dead, or of hurting yourself in some way? Not at all   PHQ-9 Total Score 5   If you checked off any problems, how difficult have these problems made it for you to do your work, take care of things at home, or get along with other people? Not difficult at all       JAQUI-7 anxiety score: 2      Objective   Vitals:    06/02/25 0937   BP: 134/82   BP Location: Left arm   Patient Position: Sitting   Cuff Size: Large Adult   Pulse: 53   Temp: 97.1 °F (36.2 °C)   TempSrc: Temporal   SpO2: 95%   Weight: 114 kg (251 lb)   Height: 172.7 cm (67.99\")     Body mass index is 38.17 kg/m².    Physical Exam  Vitals and nursing note reviewed.   Constitutional:       General: He is not in acute distress.     Appearance: He is well-developed and well-groomed. He is not diaphoretic.   HENT:      Head: Normocephalic.      Right Ear: External ear normal.      Left Ear: External ear normal.   Eyes:      Conjunctiva/sclera: Conjunctivae normal.   Neck:      Vascular: No carotid bruit.   Cardiovascular:      Rate and Rhythm: Normal rate and regular rhythm.      Pulses: Normal pulses.      Heart sounds: Normal heart sounds. No murmur heard.  Pulmonary:      Effort: Pulmonary effort is normal. No respiratory distress.      Breath sounds: Normal breath sounds.   Abdominal:      General: Bowel sounds are normal.      Palpations: Abdomen is soft. There is no " hepatomegaly or splenomegaly.      Tenderness: There is no abdominal tenderness. There is no guarding.   Musculoskeletal:      Cervical back: Normal range of motion and neck supple.      Right lower leg: No edema.      Left lower leg: No edema.   Skin:     General: Skin is warm and dry.      Findings: No rash.   Neurological:      Mental Status: He is alert and oriented to person, place, and time.      Gait: Gait normal.   Psychiatric:         Mood and Affect: Mood normal.         Behavior: Behavior normal.         Thought Content: Thought content normal.         Cognition and Memory: Cognition normal.         Judgment: Judgment normal.         Lab Results   Component Value Date    WBC 12.28 (H) 04/14/2025    RBC 4.91 04/14/2025    HGB 14.9 04/14/2025    HCT 45.9 04/14/2025    MCV 93.5 04/14/2025    MCH 30.3 04/14/2025    MCHC 32.5 04/14/2025    RDW 13.0 04/14/2025    RDWSD 46.0 09/21/2024    MPV 10.9 09/21/2024     04/14/2025    NEUTRORELPCT 81.4 (H) 04/14/2025    LYMPHORELPCT 10.4 (L) 04/14/2025    MONORELPCT 6.8 04/14/2025    EOSRELPCT 0.8 04/14/2025    BASORELPCT 0.3 04/14/2025    AUTOIGPER 0.2 09/21/2024    NEUTROABS 9.98 (H) 04/14/2025    LYMPHSABS 1.28 04/14/2025    MONOSABS 0.84 04/14/2025    EOSABS 0.10 04/14/2025    BASOSABS 0.04 04/14/2025    AUTOIGNUM 0.02 09/21/2024    NRBC 0.0 04/14/2025     Lab Results   Component Value Date    GLUCOSE 79 04/14/2025    BUN 16 04/14/2025    CREATININE 0.90 04/14/2025    EGFRIFNONA 112 10/15/2021    EGFRIFAFRI 135 10/15/2021    BCR 17.8 04/14/2025    K 4.5 04/14/2025    CO2 26.3 04/14/2025    CALCIUM 9.2 04/14/2025    ALBUMIN 4.2 04/14/2025    AST 19 04/14/2025    ALT 14 04/14/2025      Lab Results   Component Value Date    CHLPL 165 04/14/2025    TRIG 52 04/14/2025    HDL 61 (H) 04/14/2025    VLDL 11 04/14/2025    LDL 93 04/14/2025     Lab Results   Component Value Date    TSH 1.040 09/06/2023     Lab Results   Component Value Date    HGBA1C 5.40 04/14/2025      Lab Results   Component Value Date    PSA 0.316 04/14/2025     Lab Results   Component Value Date    WBCU 0-2 07/12/2015    RBCUA 0-2 07/12/2015    BACTERIA None Seen 07/12/2015    LABPH 6.0 07/12/2015    COLORU Yellow 09/23/2024    CLARITYU Clear 09/23/2024    LEUKOCYTESUR Negative 09/23/2024    GLUCOSEU Negative 09/23/2024    BLOODU Negative 09/23/2024    BILIRUBINUR Negative 09/23/2024    NITRITEU Negative 09/23/2024          Assessment    Problem List Items Addressed This Visit       Essential hypertension - Primary    Current Assessment & Plan   Hypertension is stable off medication.  Continue current treatment regimen.  Weight loss.  Regular aerobic exercise.  Ambulatory blood pressure monitoring.  Blood pressure will be reassessed in 3 months.         Depression    Current Assessment & Plan   Patient's depression is a recurrent episode that is moderate without psychosis. Depression is active and improving with treatment.    Plan:   Continue current medication therapy   Continue Sertraline and Wellbutrin daily.    Followup in 3 months.          Relevant Medications    sertraline (ZOLOFT) 100 MG tablet    buPROPion SR (Wellbutrin SR) 150 MG 12 hr tablet    Anxiety    Current Assessment & Plan   Improving with treatment.  Continue Sertraline and Wellbutrin daily.         Relevant Medications    sertraline (ZOLOFT) 100 MG tablet    buPROPion SR (Wellbutrin SR) 150 MG 12 hr tablet    Sleep apnea    Overview   Does not use CPAP, could not get comfortable with mask/tubing.         Heartburn    Current Assessment & Plan   Stable.  Continue Pantoprazole daily as needed.         Vitamin B12 deficiency    Relevant Orders    Vitamin B12 & Folate     Other Visit Diagnoses         Leukocytosis, unspecified type        Relevant Orders    CBC & Differential             Return in about 3 months (around 9/2/2025) for Recheck.or sooner if symptoms persist or worsen.  Saw cardio for bradycardia 5/2024 and negative work up;  Echo WNL

## 2025-06-03 PROBLEM — E87.1 HYPONATREMIA: Status: RESOLVED | Noted: 2018-07-27 | Resolved: 2025-06-03

## 2025-06-03 LAB
BASOPHILS # BLD AUTO: 0.06 10*3/MM3 (ref 0–0.2)
BASOPHILS NFR BLD AUTO: 0.6 % (ref 0–1.5)
EOSINOPHIL # BLD AUTO: 0.26 10*3/MM3 (ref 0–0.4)
EOSINOPHIL NFR BLD AUTO: 2.7 % (ref 0.3–6.2)
ERYTHROCYTE [DISTWIDTH] IN BLOOD BY AUTOMATED COUNT: 13.6 % (ref 12.3–15.4)
FOLATE SERPL-MCNC: 14.8 NG/ML (ref 4.78–24.2)
HCT VFR BLD AUTO: 46 % (ref 37.5–51)
HGB BLD-MCNC: 15 G/DL (ref 13–17.7)
IMM GRANULOCYTES # BLD AUTO: 0.04 10*3/MM3 (ref 0–0.05)
IMM GRANULOCYTES NFR BLD AUTO: 0.4 % (ref 0–0.5)
LYMPHOCYTES # BLD AUTO: 1.35 10*3/MM3 (ref 0.7–3.1)
LYMPHOCYTES NFR BLD AUTO: 13.8 % (ref 19.6–45.3)
MCH RBC QN AUTO: 30.5 PG (ref 26.6–33)
MCHC RBC AUTO-ENTMCNC: 32.6 G/DL (ref 31.5–35.7)
MCV RBC AUTO: 93.7 FL (ref 79–97)
MONOCYTES # BLD AUTO: 0.87 10*3/MM3 (ref 0.1–0.9)
MONOCYTES NFR BLD AUTO: 8.9 % (ref 5–12)
NEUTROPHILS # BLD AUTO: 7.22 10*3/MM3 (ref 1.7–7)
NEUTROPHILS NFR BLD AUTO: 73.6 % (ref 42.7–76)
NRBC BLD AUTO-RTO: 0 /100 WBC (ref 0–0.2)
PLATELET # BLD AUTO: 183 10*3/MM3 (ref 140–450)
RBC # BLD AUTO: 4.91 10*6/MM3 (ref 4.14–5.8)
VIT B12 SERPL-MCNC: 586 PG/ML (ref 211–946)
WBC # BLD AUTO: 9.8 10*3/MM3 (ref 3.4–10.8)

## 2025-06-03 NOTE — ASSESSMENT & PLAN NOTE
Hypertension is stable off medication.  Continue current treatment regimen.  Weight loss.  Regular aerobic exercise.  Ambulatory blood pressure monitoring.  Blood pressure will be reassessed in 3 months.

## 2025-06-03 NOTE — ASSESSMENT & PLAN NOTE
Patient's depression is a recurrent episode that is moderate without psychosis. Depression is active and improving with treatment.    Plan:   Continue current medication therapy   Continue Sertraline and Wellbutrin daily.    Followup in 3 months.

## 2025-07-04 DIAGNOSIS — G25.81 RESTLESS LEG SYNDROME: ICD-10-CM

## 2025-07-07 RX ORDER — ROPINIROLE 1 MG/1
TABLET, FILM COATED ORAL
Qty: 30 TABLET | Refills: 1 | Status: SHIPPED | OUTPATIENT
Start: 2025-07-07

## 2025-07-07 NOTE — TELEPHONE ENCOUNTER
LOV                   6/2/2025  NOV                  (around 9/2/2025   Last refill       4/28/25        Protocol          met

## (undated) DEVICE — SUT SILK 3/0 TIES 18IN A184H

## (undated) DEVICE — TOTAL TRAY, 16FR 10ML SIL FOLEY, URN: Brand: MEDLINE

## (undated) DEVICE — DRSNG SURESITE WNDW 4X4.5

## (undated) DEVICE — MSK AIRWY LARYNG LMA PILOT SZ4

## (undated) DEVICE — SINGLE USE BIOPSY VALVE MAJ-210: Brand: SINGLE USE BIOPSY VALVE (STERILE)

## (undated) DEVICE — SUT SILK 2/0 TIES 18IN A185H

## (undated) DEVICE — SUT SILK 0 TIES 18IN SA66G

## (undated) DEVICE — SKIN PREP TRAY W/CHG: Brand: MEDLINE INDUSTRIES, INC.

## (undated) DEVICE — SINGLE USE SUCTION VALVE MAJ-209: Brand: SINGLE USE SUCTION VALVE (STERILE)

## (undated) DEVICE — TUBING, SUCTION, 1/4" X 10', STRAIGHT: Brand: MEDLINE

## (undated) DEVICE — SENSR O2 OXIMAX FNGR A/ 18IN NONSTR

## (undated) DEVICE — POOLE SUCTION HANDLE: Brand: CARDINAL HEALTH

## (undated) DEVICE — SPNG GZ WOVN 4X4IN 12PLY 10/BX STRL

## (undated) DEVICE — ENCORE® LATEX ORTHO SIZE 7.5, STERILE LATEX POWDER-FREE SURGICAL GLOVE: Brand: ENCORE

## (undated) DEVICE — VITAL SIGNS™ JACKSON-REES CIRCUITS: Brand: VITAL SIGNS™

## (undated) DEVICE — STPLR SKIN VISISTAT WD 35CT

## (undated) DEVICE — ELECTRD BLD EXT EDGE/INSUL 6IN

## (undated) DEVICE — GLV SURG PREMIERPRO ORTHO LTX PF SZ7.5 BRN

## (undated) DEVICE — LOU MINOR PROCEDURE: Brand: MEDLINE INDUSTRIES, INC.

## (undated) DEVICE — ADAPT SWVL FIBROPTIC BRONCH

## (undated) DEVICE — COLOSTOMY/ILEOSTOMY KIT, FLEXWEAR: Brand: NEW IMAGE

## (undated) DEVICE — ADAPT CLN BIOGUARD AIR/H2O DISP

## (undated) DEVICE — SUT SILK 2/0 SH CR8 18IN CR8 C012D

## (undated) DEVICE — SUT GUT CHRM 3/0 SH 36IN G172H

## (undated) DEVICE — CANN O2 ETCO2 FITS ALL CONN CO2 SMPL A/ 7IN DISP LF

## (undated) DEVICE — PK PROC MAJ 40

## (undated) DEVICE — KT ORCA ORCAPOD DISP STRL

## (undated) DEVICE — MEDI-VAC YANKAUER SUCTION HANDLE W/BULBOUS TIP: Brand: CARDINAL HEALTH

## (undated) DEVICE — SUT PDS 0 CT1 36IN Z346H

## (undated) DEVICE — LN SMPL CO2 SHTRM SD STREAM W/M LUER

## (undated) DEVICE — THE TORRENT IRRIGATION SCOPE CONNECTOR IS USED WITH THE TORRENT IRRIGATION TUBING TO PROVIDE IRRIGATION FLUIDS SUCH AS STERILE WATER DURING GASTROINTESTINAL ENDOSCOPIC PROCEDURES WHEN USED IN CONJUNCTION WITH AN IRRIGATION PUMP (OR ELECTROSURGICAL UNIT).: Brand: TORRENT

## (undated) DEVICE — 3M™ IOBAN™ 2 ANTIMICROBIAL INCISE DRAPE 6650EZ: Brand: IOBAN™ 2

## (undated) DEVICE — IRRIGATOR BULB ASEPTO 60CC STRL